# Patient Record
Sex: FEMALE | Race: WHITE | Employment: OTHER | URBAN - METROPOLITAN AREA
[De-identification: names, ages, dates, MRNs, and addresses within clinical notes are randomized per-mention and may not be internally consistent; named-entity substitution may affect disease eponyms.]

---

## 2017-03-20 ENCOUNTER — HOSPITAL ENCOUNTER (OUTPATIENT)
Dept: SURGERY | Age: 52
Discharge: HOME | End: 2017-03-20
Attending: INTERNAL MEDICINE | Admitting: INTERNAL MEDICINE

## 2017-03-20 VITALS
HEART RATE: 71 BPM | BODY MASS INDEX: 23.46 KG/M2 | WEIGHT: 146 LBS | SYSTOLIC BLOOD PRESSURE: 121 MMHG | HEIGHT: 66 IN | RESPIRATION RATE: 16 BRPM | TEMPERATURE: 98.2 F | DIASTOLIC BLOOD PRESSURE: 78 MMHG | OXYGEN SATURATION: 98 %

## 2017-03-20 DIAGNOSIS — R10.13 EPIGASTRIC PAIN: ICD-10-CM

## 2017-03-20 RX ORDER — LIDOCAINE HYDROCHLORIDE 10 MG/ML
1 INJECTION, SOLUTION EPIDURAL; INFILTRATION; INTRACAUDAL; PERINEURAL ONCE
Status: DISCONTINUED | OUTPATIENT
Start: 2017-03-20 | End: 2017-03-21 | Stop reason: HOSPADM

## 2017-03-20 RX ORDER — FAMOTIDINE 10 MG
10 TABLET ORAL 2 TIMES DAILY
COMMUNITY
End: 2017-05-01

## 2017-03-20 RX ORDER — SODIUM CHLORIDE, SODIUM LACTATE, POTASSIUM CHLORIDE, CALCIUM CHLORIDE 600; 310; 30; 20 MG/100ML; MG/100ML; MG/100ML; MG/100ML
1000 INJECTION, SOLUTION INTRAVENOUS ONCE
Status: COMPLETED | OUTPATIENT
Start: 2017-03-20 | End: 2017-03-20

## 2017-03-20 RX ORDER — DICYCLOMINE HCL 20 MG
20 TABLET ORAL EVERY 6 HOURS
COMMUNITY
End: 2017-05-01

## 2017-03-20 RX ADMIN — SODIUM CHLORIDE, SODIUM LACTATE, POTASSIUM CHLORIDE, CALCIUM CHLORIDE 1000 ML: 600; 310; 30; 20 INJECTION, SOLUTION INTRAVENOUS at 11:09

## 2017-03-20 ASSESSMENT — ENCOUNTER SYMPTOMS
SHORTNESS OF BREATH: 1
ABDOMINAL PAIN: 1
VOMITING: 1
CONSTIPATION: 1
NAUSEA: 1

## 2017-03-20 ASSESSMENT — PAIN DESCRIPTION - DESCRIPTORS: DESCRIPTORS: CONSTANT

## 2017-03-20 ASSESSMENT — PAIN - FUNCTIONAL ASSESSMENT: PAIN_FUNCTIONAL_ASSESSMENT: 0-10

## 2017-03-20 ASSESSMENT — PAIN SCALES - GENERAL
PAINLEVEL_OUTOF10: 0
PAINLEVEL_OUTOF10: 0

## 2017-09-14 ENCOUNTER — TELEPHONE (OUTPATIENT)
Dept: ORTHOPEDIC SURGERY | Age: 52
End: 2017-09-14

## 2018-01-15 ENCOUNTER — HOSPITAL ENCOUNTER (OUTPATIENT)
Dept: OTHER | Age: 53
Discharge: HOME | End: 2018-01-15
Attending: FAMILY MEDICINE | Admitting: FAMILY MEDICINE

## 2018-01-15 DIAGNOSIS — M25.552 LEFT HIP PAIN: ICD-10-CM

## 2018-01-22 ENCOUNTER — HOSPITAL ENCOUNTER (OUTPATIENT)
Dept: CT IMAGING | Age: 53
Discharge: HOME | End: 2018-01-22
Attending: PHYSICIAN ASSISTANT | Admitting: PHYSICIAN ASSISTANT

## 2018-01-22 ENCOUNTER — OFFICE VISIT (OUTPATIENT)
Dept: ORTHOPEDIC SURGERY | Age: 53
End: 2018-01-22

## 2018-01-22 VITALS
HEART RATE: 83 BPM | WEIGHT: 139.99 LBS | DIASTOLIC BLOOD PRESSURE: 85 MMHG | BODY MASS INDEX: 22.5 KG/M2 | HEIGHT: 66 IN | SYSTOLIC BLOOD PRESSURE: 111 MMHG

## 2018-01-22 VITALS
HEART RATE: 80 BPM | SYSTOLIC BLOOD PRESSURE: 121 MMHG | BODY MASS INDEX: 22.5 KG/M2 | DIASTOLIC BLOOD PRESSURE: 92 MMHG | HEIGHT: 66 IN | WEIGHT: 139.99 LBS

## 2018-01-22 DIAGNOSIS — M25.552 PAIN IN LEFT HIP: ICD-10-CM

## 2018-01-22 DIAGNOSIS — M70.62 GREATER TROCHANTERIC BURSITIS OF LEFT HIP: ICD-10-CM

## 2018-01-22 DIAGNOSIS — M25.552 PAIN OF LEFT HIP JOINT: Primary | ICD-10-CM

## 2018-01-22 DIAGNOSIS — M25.552 LEFT HIP PAIN: Primary | ICD-10-CM

## 2018-01-22 DIAGNOSIS — R52 PAIN: ICD-10-CM

## 2018-01-22 DIAGNOSIS — M25.552 LEFT HIP PAIN: ICD-10-CM

## 2018-01-22 DIAGNOSIS — M24.152 ARTICULAR CARTILAGE DISORDER OF HIP, LEFT: ICD-10-CM

## 2018-01-22 PROCEDURE — G8420 CALC BMI NORM PARAMETERS: HCPCS | Performed by: ORTHOPAEDIC SURGERY

## 2018-01-22 PROCEDURE — G8427 DOCREV CUR MEDS BY ELIG CLIN: HCPCS | Performed by: ORTHOPAEDIC SURGERY

## 2018-01-22 PROCEDURE — 73502 X-RAY EXAM HIP UNI 2-3 VIEWS: CPT | Performed by: ORTHOPAEDIC SURGERY

## 2018-01-22 PROCEDURE — 1036F TOBACCO NON-USER: CPT | Performed by: ORTHOPAEDIC SURGERY

## 2018-01-22 PROCEDURE — 99204 OFFICE O/P NEW MOD 45 MIN: CPT | Performed by: ORTHOPAEDIC SURGERY

## 2018-01-22 PROCEDURE — G8484 FLU IMMUNIZE NO ADMIN: HCPCS | Performed by: ORTHOPAEDIC SURGERY

## 2018-01-22 PROCEDURE — 3017F COLORECTAL CA SCREEN DOC REV: CPT | Performed by: ORTHOPAEDIC SURGERY

## 2018-01-22 PROCEDURE — 99202 OFFICE O/P NEW SF 15 MIN: CPT | Performed by: ORTHOPAEDIC SURGERY

## 2018-01-22 RX ORDER — LIDOCAINE HYDROCHLORIDE 10 MG/ML
5 INJECTION, SOLUTION EPIDURAL; INFILTRATION; INTRACAUDAL; PERINEURAL ONCE
Status: COMPLETED | OUTPATIENT
Start: 2018-01-22 | End: 2018-01-22

## 2018-01-22 RX ORDER — LIDOCAINE HYDROCHLORIDE 10 MG/ML
5 INJECTION, SOLUTION INFILTRATION; PERINEURAL ONCE
Status: COMPLETED | OUTPATIENT
Start: 2018-01-22 | End: 2018-01-22

## 2018-01-22 RX ADMIN — LIDOCAINE HYDROCHLORIDE 5 ML: 10 INJECTION, SOLUTION INFILTRATION; PERINEURAL at 14:03

## 2018-01-22 RX ADMIN — LIDOCAINE HYDROCHLORIDE 5 ML: 10 INJECTION, SOLUTION EPIDURAL; INFILTRATION; INTRACAUDAL; PERINEURAL at 14:04

## 2018-01-22 ASSESSMENT — PAIN SCALES - GENERAL: PAINLEVEL_OUTOF10: 9

## 2018-01-22 NOTE — PROGRESS NOTES
found in media images folder. She was instructed to contact her primary care physician regarding ROS positives if not already being addressed during today's visit. Objective:   Physical Exam  Vital Signs:  /85   Pulse 83   Ht 5' 5.98\" (1.676 m)   Wt 139 lb 15.9 oz (63.5 kg)   BMI 22.61 kg/m²     Constitution:  Generally, Shannan Mosley is [x] alert, [x] appears stated age, and [x] in no distress.   Her general body habitus is [] Cachectic  [] Thin  [x] Normal  [] Obese  [] Morbidly Obese    Head: [x] Normocephalic  Eyes: [x] Extra-occular muscles intact  Left Ear: [x] External Ear normal   Right Ear: [x] External Ear normal   Nose: [x] Normal  Mouth: [x] Oral mucosa moist  [x] No perioral lesions    Pulmonary: [x] Respirations unlabored and regular  Skin: [x] Warm [x] Well perfused     Psychiatric:   [x] Good judgement and insight  [x] Oriented to [x] person, [x] place, and [x] time  [x] Mood appropriate for circumstances    Gait:   Gait is [] Normal  [x] Impaired limping  Assistive Device: [x] None  [] Knee Brace  [] Cane  [] Crutches   [] Cookie Pontiff   [] Wheelchair  [] Other     ORTHOPAEDIC LS SPINE EXAM   Inspection:  [x] Skin intact without abrasion, lacerations, surgical scars, pigment changes, dimpling, hairy patches or rashes  [x] Normal back alignment  [] Scoliosis [] Kyphosis  [] Dimpling  [] Hyper/hypopigmentation  [] Hairy Patches  [] Scar / Surgical incision    Palpation:   No tenderness over lower lumbar spine, SI joints or paraspinal musculature    LEFT HIP ORTHOPAEDIC  EXAM:   Inspection:  [x] Skin intact without abrasion, lacerations or rashes  [x] Leg lengths equal  [x] Ecchymosis:  [x] none  [] mild  [] moderate  [] severe   [] Atrophy:  [x] none  [] mild  [] moderate  [] severe      Range of Motion:  [x] No flexion contracture         [] Deferred: acute injury/post-surgery/pain  [] Flexion contracture    Forward flexion: 110 degrees with pain  Supine Internal rotation: 15 degrees with pain  Supine External rotation: 15 degrees with pain  Abduction: with pain   Adduction: with pain      Palpation:   TTP over AIIS and greater trochanteric area    Provocative Tests:  [] Negative  Positive Tests:  [x] Log Roll   [] Fany Test: ITB Tightness   [x] FADIR Anterior impingement:    [] Posterior Impingement    [] Shuck test for insufficient suction seal   [] Dial test for capsular insufficiency     Motor Function:  [] No gross motor weakness of hip [x] No gross motor weakness of knee  [x] No gross motor weakness of ankle    [x] No gross motor weakness of great toe    [x] Motor strength: secondary to pain  [x] Hip Flex [] 5/5 [x] 4/5 [] 3/5 [] 2/5 [] 1/5 [] 0/5   [x] Hip ABductors [] 5/5 [x] 4/5 [] 3/5 [] 2/5 [] 1/5 [] 0/5   [x] Hip ADductors [] 5/5 [x] 4/5 [] 3/5 [] 2/5 [] 1/5 [] 0/5     Neurologic:   [x] Sensation to light touch intact  [x] Coordination / proprioception intact  Motor function intact L2-S1    Circulation:  [x] The limb is warm and well perfused. [x] Capillary refill is intact.   [x] Edema:  [x] none  [] mild  [] moderate  [] severe       RIGHT HIP ORTHOPAEDIC  EXAM:  Inspection:  [x] Skin intact without abrasion, lacerations or rashes  [x] Leg lengths equal  [x] Ecchymosis:  [x] none  [] mild  [] moderate  [] severe   [x] Atrophy:  [x] none  [] mild  [] moderate  [] severe      Range of Motion:  [x] No flexion contracture         [] Deferred: acute injury/post-surgery/pain  [] Flexion contracture     [x] Normal ROM  Forward flexion   Supine Internal rotation   Supine External rotation   Abduction  Adduction:      Palpation:   No TTP    Provocative Tests:  [x] Negative: log roll and FADIR  Positive Tests:  [] Log Roll   [] Fany Test: ITB Tightness   [] FADIR Anterior impingement:    [] Posterior Impingement    [] Shuck test for insufficient suction seal   [] Dial test for capsular insufficiency:    [] Resisted adduction for athletic pubalgia   [] Resisted curl up for athletic pubalgia [address] [facility] [place]  Analy Herrera () - [phone]

## 2018-01-22 NOTE — PATIENT INSTRUCTIONS
Patient Education        Hip Pain: Care Instructions  Your Care Instructions    Hip pain may be caused by many things, including overuse, a fall, or a twisting movement. Another cause of hip pain is arthritis. Your pain may increase when you stand up, walk, or squat. The pain may come and go or may be constant. Home treatment can help relieve hip pain, swelling, and stiffness. If your pain is ongoing, you may need more tests and treatment. Follow-up care is a key part of your treatment and safety. Be sure to make and go to all appointments, and call your doctor if you are having problems. It's also a good idea to know your test results and keep a list of the medicines you take. How can you care for yourself at home? · Take pain medicines exactly as directed. ¨ If the doctor gave you a prescription medicine for pain, take it as prescribed. ¨ If you are not taking a prescription pain medicine, ask your doctor if you can take an over-the-counter medicine. · Rest and protect your hip. Take a break from any activity, including standing or walking, that may cause pain. · Put ice or a cold pack against your hip for 10 to 20 minutes at a time. Try to do this every 1 to 2 hours for the next 3 days (when you are awake) or until the swelling goes down. Put a thin cloth between the ice and your skin. · Sleep on your healthy side with a pillow between your knees, or sleep on your back with pillows under your knees. · If there is no swelling, you can put moist heat, a heating pad, or a warm cloth on your hip. Do gentle stretching exercises to help keep your hip flexible. · Learn how to prevent falls. Have your vision and hearing checked regularly. Wear slippers or shoes with a nonskid sole. · Stay at a healthy weight. · Wear comfortable shoes. When should you call for help? Call 911 anytime you think you may need emergency care.  For example, call if:  ? · You have sudden chest pain and shortness of breath, or you

## 2018-01-23 ENCOUNTER — OFFICE VISIT (OUTPATIENT)
Dept: ORTHOPEDIC SURGERY | Age: 53
End: 2018-01-23

## 2018-01-23 VITALS
BODY MASS INDEX: 22.5 KG/M2 | DIASTOLIC BLOOD PRESSURE: 79 MMHG | HEIGHT: 66 IN | HEART RATE: 82 BPM | SYSTOLIC BLOOD PRESSURE: 118 MMHG | WEIGHT: 139.99 LBS

## 2018-01-23 DIAGNOSIS — M24.152 ARTICULAR CARTILAGE DISORDER OF HIP, LEFT: ICD-10-CM

## 2018-01-23 DIAGNOSIS — M25.552 LEFT HIP PAIN: Primary | ICD-10-CM

## 2018-01-23 DIAGNOSIS — M25.852 FEMOROACETABULAR IMPINGEMENT OF LEFT HIP: ICD-10-CM

## 2018-01-23 DIAGNOSIS — M24.052 LOOSE BODY IN LEFT HIP: ICD-10-CM

## 2018-01-23 PROCEDURE — G8427 DOCREV CUR MEDS BY ELIG CLIN: HCPCS | Performed by: ORTHOPAEDIC SURGERY

## 2018-01-23 PROCEDURE — G8484 FLU IMMUNIZE NO ADMIN: HCPCS | Performed by: ORTHOPAEDIC SURGERY

## 2018-01-23 PROCEDURE — L1686 HO POST-OP HIP ABDUCTION: HCPCS | Performed by: ORTHOPAEDIC SURGERY

## 2018-01-23 PROCEDURE — 99214 OFFICE O/P EST MOD 30 MIN: CPT | Performed by: ORTHOPAEDIC SURGERY

## 2018-01-23 PROCEDURE — G8420 CALC BMI NORM PARAMETERS: HCPCS | Performed by: ORTHOPAEDIC SURGERY

## 2018-01-23 PROCEDURE — 3017F COLORECTAL CA SCREEN DOC REV: CPT | Performed by: ORTHOPAEDIC SURGERY

## 2018-01-23 PROCEDURE — 1036F TOBACCO NON-USER: CPT | Performed by: ORTHOPAEDIC SURGERY

## 2018-01-23 PROCEDURE — E0114 CRUTCH UNDERARM PAIR NO WOOD: HCPCS | Performed by: ORTHOPAEDIC SURGERY

## 2018-01-23 RX ORDER — TRAMADOL HYDROCHLORIDE 50 MG/1
50 TABLET, COATED ORAL EVERY 8 HOURS PRN
Qty: 20 TABLET | Refills: 0 | Status: SHIPPED | OUTPATIENT
Start: 2018-01-23 | End: 2018-01-30

## 2018-01-23 NOTE — LETTER
CONSENT TO OPERATION  AND/OR OTHER PROCEDURE(S)    PATIENT : Juan Alberto Benavidez     YOB: 1965    DATE : 01/23/18      1. I request and consent that Olya Munroe and/or his associates or assistants perform an operation and/or procedures on the above patient at [facility], to treat the condition(s) which appear indicated by the diagnostic studies already performed. I have been told that in general terms the nature, purpose and reasonable expectations of the operation and/or procedure(s) are:     Procedure:  Left Hip Diagnostic Arthroscopy with possible loose body removal and chondroplasty, possible labral repair vs debridement, possible acetabular rim trimming and femoral osteoplasty, possible microfracture of full thickness cartilage defect, possible capsular repair vs plication    2. It has been explained to me by the informing physician that during the course of the operation and/or procedure(s) unforeseen conditions may be revealed that necessitate an extension of the original operation and/or procedure(s) or different operation and/or procedures than those set forth in Paragraph 1. I therefore authorize and request that my physician and/or his associates or assistants perform such operations and/or procedures as are necessary and desirable in the exercise of professional judgment. The authority granted under this Paragraph 2 shall extend to all conditions that require treatment and are known to my physician at the time the operation is commenced. 3. I have been made aware by the informing physician of certain risks and consequences that are associated with the operation and/or procedure(s) described in Paragraph 1, the reasonable alternative methods or treatment, the possible consequences, the possibility that the operation and/or procedure(s) may be unsuccessful and the possibility of complications. I understand the reasonably known risks to be:    ? Bleeding  ?  Infection ? Poor Healing  ? Possible Damage to Nerve, Vessel, Tendon/Muscle or Bone  ? Need for further Treatment/Surgery  ? Stiffness  ? Pain  ? Residual or Recurrent Symptom  ? Anesthetic and/or Medical Risks including death  ? Risks specific to Hip arthroscopy: traction related neuropraxia of leg, foot or groin; heterotopic ossification; inability to reverse course of arthritis or redevelopment of loose bodies from pre-existing unstable cartilage and chondrolysis; flexor tendinitis; microinstability     4. I have also been informed by the informing physician that there are other risks from both known and unknown causes that are attendant to the performance of any surgical procedure. I am aware that the practice of medicine and surgery is not an exact science, and that no guarantees have been made to me concerning the results of the operation and/or procedure(s). 5. I   CONSENT / REFUSE CONSENT  (strike the phrase that does not apply) to the taking of photographs before, during and/or after the operation or procedure for scientific/educational purposes. 6. I consent to the administration of anesthesia and to the use of such anesthetics as may be deemed advisable by the anesthesiologist who has been engaged by me or my physician.     7. I certify that I have read and understand the above consent to operation and/or other procedure(s); that the explanations therein referred to were made to me by the informing physician in advance of my signing this consent; that all blanks or statements requiring insertion or completion were filled in and inapplicable paragraphs, if any, were stricken before I signed; and that all questions asked by me about the operation and/or procedure(s) which I have consented to have been fully answered in a satisfactory manner.             _______________________  Witness        Signature Of Patient      Francy Bee MD.       Informing Physician         Signature of Informing Physician

## 2018-01-23 NOTE — PROGRESS NOTES
8/22/13    PACEMAKER INSERTION      TONSILLECTOMY      UPPER GASTROINTESTINAL ENDOSCOPY  03/20/2017       Allergies:  Quinolones; Dexamethasone sodium phosphate; Codeine; Nitroglycerin; and Prednisone    Medications:  Outpatient Prescriptions Marked as Taking for the 1/23/18 encounter (Office Visit) with Tex Jernigan MD   Medication Sig Dispense Refill    ULTRAM 50 MG tablet Take 1 tablet by mouth every 8 hours as needed for Pain for up to 7 days. 20 tablet 0    ondansetron (ZOFRAN) 4 MG tablet Take 1 tablet by mouth every 8 hours as needed for Nausea or Vomiting 20 tablet 0    nadolol (CORGARD) 40 MG tablet Take 120 mg by mouth daily. Social History     Social History    Marital status:      Spouse name: N/A    Number of children: N/A    Years of education: N/A     Occupational History    Not on file. Social History Main Topics    Smoking status: Former Smoker     Packs/day: 0.25     Years: 3.00     Types: Cigarettes     Quit date: 12/17/1998    Smokeless tobacco: Never Used    Alcohol use No      Comment: rare;u    Drug use: No    Sexual activity: Yes     Partners: Male     Other Topics Concern    Not on file     Social History Narrative    No narrative on file     Family History   Problem Relation Age of Onset    Heart Disease Mother     Colon Cancer Father      colon    Heart Disease Father     High Blood Pressure Father        Review of Systems:    Maycol Nellikerry GERARDO Reema's reported review of systems has been reviewed from 1/22/2018 and is in her medical record. The scanned image can be found in media images folder. She was instructed to contact her primary care physician regarding ROS positives if not already being addressed during today's visit.     Objective:   Physical Exam  Vital Signs:  /79   Pulse 82   Ht 5' 5.98\" (1.676 m)   Wt 139 lb 15.9 oz (63.5 kg)   BMI 22.61 kg/m²     Constitution:  Generally, Perla Valverde is [x] alert, [x] appears stated age, and within the hip joint space (described on   noncontrast CT hip-see report above) is suggested on coronal   series 9 image 32       Cluster of subchondral cysts again involve the posterior   acetabulum.       No fracture identified       IMPRESSION:       Tiny ossific density within the hip joint space, presumably a   loose body       Arthropathic subchondral cysts of the acetabular       No discrete labral tear         Assessment:     Caryl Hoffman is a 50y.o. year old female who appears to have left hip mechanical pain, acute onset from an ossific body that is present within the hip. She also has some evidence of femoral acetabular impingement. She has failed several months of activity modification as well as oral medication    Diagnosis:   1. Left hip pain  Breg T-Scope Hip Brace    Aluminum Crutches   2. Articular cartilage disorder of hip, left     3. Femoroacetabular impingement of left hip     4. Loose body in left hip  ULTRAM 50 MG tablet         Plan:      I discussed the diagnosis and the treatment options with Caryl Hoffman today. Given her severe mechanical symptoms as well as a loose body that is present in the hip, I did recommend arthroscopic intervention for diagnostic arthroscopy, likely loose body removal, possible labral repair and possible femoral osteoplasty and acetabular rim trimming. Patient would like to go ahead with the surgery as soon as possible. We have her scheduled. We did go over the risks, benefits, and alternatives of nonsurgical versus surgical intervention patient signed informed. Greater than 25 minutes was spent counseling coronary care     Return to Clinic/Follow - Up:  Elmer Palacios was instructed to call the office if her symptoms worsen or if new symptoms appear prior to the next scheduled visit.  She is specifically instructed to contact the office between now & her scheduled appointment if she has concerns related to her condition or if she needs

## 2018-01-29 ENCOUNTER — TELEPHONE (OUTPATIENT)
Dept: ORTHOPEDIC SURGERY | Age: 53
End: 2018-01-29

## 2018-01-29 NOTE — TELEPHONE ENCOUNTER
1/29/18  [facility]  - APPROVED #[id number]   1/23/18 TO 7/22/18 - PER FAX FROM SAVITA FUENTES RN @ [facility]

## 2018-02-01 ENCOUNTER — TELEPHONE (OUTPATIENT)
Dept: ORTHOPEDIC SURGERY | Age: 53
End: 2018-02-01

## 2018-02-01 ENCOUNTER — HOSPITAL ENCOUNTER (OUTPATIENT)
Dept: PHYSICAL THERAPY | Age: 53
Discharge: HOME | End: 2018-02-28
Attending: ORTHOPAEDIC SURGERY | Admitting: ORTHOPAEDIC SURGERY

## 2018-02-01 NOTE — PROGRESS NOTES
[facility] / [facility] ([facility]) [address] [facility], [address]    Acknowledgment of Informed Consent for Surgical or Medical Procedure and Sedation  I agree to allow doctor(s) Frankie Ramsey and his/her associates or assistants, including residents and/or other qualified medical practitioner to perform the following medical treatment or procedure and to administer or direct the administration of sedation as necessary:  Procedure(s): LEFT HIP DIAGNOSTIC ARTHROSCOPY WITH POSSIBLE LOOSE BODY REMOVAL AND CHONDROPLASTY, LABRAL REPAIR VERSUS DEBRIDEMENT, ACETABULAR RIM TRIMMING AND FEMORAL OSTEOPLASTY, MICROFRACTURE OF FULL THICKNESS CARTILAGE DEFECT, [address] [facility]  My doctor has explained the following regarding the proposed procedure:   the explanation of the procedure   the benefits of the procedure   the potential problems that might occur during recuperation   the risks and side effects of the procedure which could include but are not limited to severe blood loss, infection, stroke or death   the benefits, risks and side effect of alternative procedures including the consequences of declining this procedure or any alternative procedures   the likelihood of achieving satisfactory results. I acknowledge no guarantee or assurance has been made to me regarding the results. I understand that during the course of this treatment/procedure, unforeseen conditions can occur which require an additional or different procedure. I agree to allow my physician or assistants to perform such extension of the original procedure as they may find necessary. I understand that sedation will often result in temporary impairment of memory and fine motor skills and that sedation can occasionally progress to a state of deep sedation or general anesthesia.     I understand the risks of anesthesia for surgery include, but are not limited to, sore throat, hoarseness, injury to face, mouth, or teeth; nausea; headache; injury to blood vessels or nerves; death, brain damage, or paralysis. I understand that if I have a Limitation of Treatment order in effect during my hospitalization, the order may or may not be in effect during this procedure. I give my doctor permission to give me blood or blood products. I understand that there are risks with receiving blood such as hepatitis, AIDS, fever, or allergic reaction. I acknowledge that the risks, benefits, and alternatives of this treatment have been explained to me and that no express or implied warranty has been given by the hospital, any blood bank, or any person or entity as to the blood or blood components transfused. At the discretion of my doctor, I agree to allow observers, equipment/product representatives and allow photographing, and/or televising of the procedure, provided my name or identity is maintained confidentially. I agree the hospital may dispose of or use for scientific or educational purposes any tissue, fluid, or body parts which may be removed.     ________________________________Date________Time______ am/pm  ([place] [facility])  Patient or Signature of Closest Relative or Legal Guardian    ________________________________Date________Time______am/pm      Page 1 of  2  Witness

## 2018-02-05 ENCOUNTER — TELEPHONE (OUTPATIENT)
Dept: ORTHOPEDIC SURGERY | Age: 53
End: 2018-02-05

## 2018-02-05 ENCOUNTER — HOSPITAL ENCOUNTER (OUTPATIENT)
Dept: OTHER | Age: 53
Discharge: HOME | End: 2018-02-05
Attending: FAMILY MEDICINE | Admitting: FAMILY MEDICINE

## 2018-02-05 DIAGNOSIS — Z01.811 PRE-OP CHEST EXAM: ICD-10-CM

## 2018-02-05 RX ORDER — ALBUTEROL SULFATE 90 UG/1
2 AEROSOL, METERED RESPIRATORY (INHALATION) EVERY 6 HOURS PRN
COMMUNITY
End: 2018-07-17

## 2018-02-05 RX ORDER — AMOXICILLIN AND CLAVULANATE POTASSIUM 875; 125 MG/1; MG/1
1 TABLET, FILM COATED ORAL 2 TIMES DAILY
COMMUNITY
End: 2018-07-17

## 2018-02-05 NOTE — PRE-PROCEDURE INSTRUCTIONS
option #2 option #2 if you have not been preregistered yet. On the day of your procedure bring your insurance card and photo ID. You will be registered at your bedside once brought back to your room. 5. DO NOT EAT OR DRINK ANYTHING AFTER MIDNIGHT. 6. MEDICATIONS    Take the following medications with a SMALL sip of water: BRING INHALER. TAKE NADOLOL NIGHT BEFORE    Use your usual dose of inhalers the morning of surgery. BRING your rescue inhaler with you to hospital.    Anesthesia does NOT want you to take insulin the morning of surgery. They will control your blood sugar while you are at the hospital. Please contact your ordering physician for instructions regarding your insulin the night before your procedure. If you have an insulin pump, please keep it set on basal rate. 7. Do not swallow water when brushing teeth. No gum, candy, mints or ice chips. Refrain from smoking or at least decrease the amount. 8. Dress in loose, comfortable clothing appropriate for redressing after your procedure. Do not wear jewelry (including body piercings), make-up (especially NO eye make-up), fingernail polish (NO toenail polish if foot/leg surgery), lotion, powders or metal hairclips. 9. Dentures, glasses, or contacts will need to be removed before your procedure. Bring cases for your glasses, contacts, dentures, or hearing aids to protect them while you are in surgery. 10. If you use a CPAP, please bring it with you on the day of your procedure. 11. We recommend that valuable personal  belongings, such as credit cards, cash, cell phones, e-tablets or jewelry, be left at home during your stay. The hospital will not be responsible for valuables that are not secured in the hospital safe. However, if your insurance requires a co-pay, you may want to bring a method of payment, i.e. Check or credit card, if you wish to pay your co-pay the day of surgery.       12. If you are to stay overnight, you may bring before and after caring for your wound. Do not let your family touch your surgery site without cleaning their hands. 4. Mild nausea, headache, muscle aches, sore throat, or fatigue may occur after anesthesia. Should any of these symptoms become severe, or should you notice any signs of infection, you should call your surgeon. 5. Narcotic pain medications can cause significant constipation. You may want to add a stool softener to your postoperative medication schedule or speak to your surgeon on how best to manage this SIDE EFFECT. SPECIAL INSTRUCTIONS     Thank you for allowing us to care for you. We strive to exceed your expectations in the delivery of care and service provided to you and your family. If you need to contact us for any reason, please call us at [phone]    Instructions reviewed with patient during preadmission testing phone interview. Alex Judge. 2/5/2018 .3:22 PM      ADDITIONAL EDUCATIONAL INFORMATION REVIEWED PER PHONE WITH YOU AND/OR YOUR FAMILY:  No Bring a urine sample on day of surgery  Yes Pain Goal-Taking Control of Your Pain  Yes FAQs about Surgical Site Infections  Yes Hibiclens® Bathing Instructions / or Other Antibacterial Soap  NO Incentive Spirometer Education  No Other

## 2018-02-05 NOTE — TELEPHONE ENCOUNTER
questions about what is needed before sx, needs paperwork with what blood work is needed faxed to her pcp or [employer] location and she has a pace maker and wants to know if a cardiac release is needed, and needs to get these things done asap and [place] exactly close in distance, patient can be reached at [phone], leave a msg, Electronically signed by Maci Oliveira on 2/5/18 at 10:40 AM

## 2018-02-05 NOTE — PROGRESS NOTES
The following educational items and goals will be achieved upon completion of the patient's Pre-admission testing experience:             Identify the learner who is being assessed for education:  patient                    Ability to Learn:  Exhibits ability to grasp concepts and respond to questions: High  Ready to Learn: Yes  calm   Preferred Method of Learning:  verbal  Barriers to Learning: Verbalizes interest  Special Considerations due to cultural, Mandaeism, spiritual beliefs:  No  Language:  English  :  Isadora Frias  [x] Appropriate evaluation / integration of data as delineated by ASPAN Standards of Perianesthesia Nursing Practice    Pain scale and pain management   [x]Patient verbalizes understanding of pain scale and pain management  [x]Pre-operative determination of patients anticipated Post-Operative pain goal:   4 of 10 on 10 point scale post op goal  [] Other     Medication(s) - Compliance with preop medication instructions  [x] Patient verbalizes understanding of preop medications (see [facility] Presurgical Instructions)    Instructions, Pre op                                                                                            [x] Patient verbalizes understanding of presurgical instructions as reviewed with phone interview nurse or in-person nurse review    Fall Risk Potential, Preoperatively                                                                                   [x]No preoperative risk identified  []Preop risk identified:                    []Sensory deficit        []Motor deficit        []Balance problem        []Home medication        []Uses assistive device                    []History of a Fall within the last 30 days    Goal(s) for fall prevention:  [x]Prevent fall or injury by requesting assistance with activities of daily living  [x]Patient / Significant other verbalizes understanding the need to call for assistance prior to getting out of bed during hospitalization      Infection Precautions                                                                                            [x] Patient understands implementation of Surgical Site Infection precautions (see [facility] Presurgical Instructions)     Patient Safety  [x] Patient identification verified  [x] Site verified    Instructions - Discharge Planning for Outpatients  [x] Patient / significant other voices understanding of home care and follow up procedures  [x] Encourage patient / significant other to review discharge instructions the day after procedure due to sedation on day of surgery    Anticipated Special Needs upon discharge:        [] Cooling device        [] Crutches       [] Zaira Hedge        [] Wound Support device        [] Drain        [] Other       Instructions - Discharge Planning for Admitted patients  [] Patient / significant other understands plan for admission after surgery  [] Patient / significant other understands plan for anticipated discharge dispostion        2/5/2018 3:26 PM Sonido Freeman

## 2018-02-07 RX ORDER — ROPIVACAINE HYDROCHLORIDE 5 MG/ML
30 INJECTION, SOLUTION EPIDURAL; INFILTRATION; PERINEURAL ONCE
Status: CANCELLED | OUTPATIENT
Start: 2018-02-07 | End: 2018-02-07

## 2018-02-07 RX ORDER — FENTANYL CITRATE 50 UG/ML
100 INJECTION, SOLUTION INTRAMUSCULAR; INTRAVENOUS ONCE
Status: CANCELLED | OUTPATIENT
Start: 2018-02-07 | End: 2018-02-07

## 2018-02-07 RX ORDER — MIDAZOLAM HYDROCHLORIDE 1 MG/ML
4 INJECTION INTRAMUSCULAR; INTRAVENOUS
Status: CANCELLED | OUTPATIENT
Start: 2018-02-07 | End: 2018-02-07

## 2018-02-08 ENCOUNTER — HOSPITAL ENCOUNTER (OUTPATIENT)
Dept: PREADMISSION TESTING | Age: 53
Discharge: HOME | End: 2018-02-08
Attending: ORTHOPAEDIC SURGERY | Admitting: ORTHOPAEDIC SURGERY

## 2018-02-08 VITALS
OXYGEN SATURATION: 99 % | WEIGHT: 160 LBS | BODY MASS INDEX: 25.71 KG/M2 | HEIGHT: 66 IN | SYSTOLIC BLOOD PRESSURE: 118 MMHG | TEMPERATURE: 97.4 F | DIASTOLIC BLOOD PRESSURE: 81 MMHG | RESPIRATION RATE: 19 BRPM | HEART RATE: 71 BPM

## 2018-02-08 DIAGNOSIS — M70.72 BURSITIS OF LEFT HIP, UNSPECIFIED BURSA: ICD-10-CM

## 2018-02-08 DIAGNOSIS — Z98.890 STATUS POST HIP SURGERY: Primary | ICD-10-CM

## 2018-02-08 LAB
ABO/RH: NORMAL
ANTIBODY SCREEN: NORMAL

## 2018-02-08 RX ORDER — ACETAMINOPHEN 325 MG/1
650 TABLET ORAL EVERY 4 HOURS PRN
Status: DISCONTINUED | OUTPATIENT
Start: 2018-02-08 | End: 2018-02-09 | Stop reason: HOSPADM

## 2018-02-08 RX ORDER — SODIUM CHLORIDE, SODIUM LACTATE, POTASSIUM CHLORIDE, CALCIUM CHLORIDE 600; 310; 30; 20 MG/100ML; MG/100ML; MG/100ML; MG/100ML
INJECTION, SOLUTION INTRAVENOUS CONTINUOUS
Status: DISCONTINUED | OUTPATIENT
Start: 2018-02-08 | End: 2018-02-09 | Stop reason: HOSPADM

## 2018-02-08 RX ORDER — FENTANYL CITRATE 50 UG/ML
25 INJECTION, SOLUTION INTRAMUSCULAR; INTRAVENOUS EVERY 5 MIN PRN
Status: DISCONTINUED | OUTPATIENT
Start: 2018-02-08 | End: 2018-02-09 | Stop reason: HOSPADM

## 2018-02-08 RX ORDER — ONDANSETRON 2 MG/ML
4 INJECTION INTRAMUSCULAR; INTRAVENOUS
Status: ACTIVE | OUTPATIENT
Start: 2018-02-08 | End: 2018-02-08

## 2018-02-08 RX ORDER — CYCLOBENZAPRINE HCL 5 MG
5 TABLET ORAL 2 TIMES DAILY PRN
Qty: 40 TABLET | Refills: 0 | Status: SHIPPED | OUTPATIENT
Start: 2018-02-08 | End: 2018-02-18

## 2018-02-08 RX ORDER — DOCUSATE SODIUM 100 MG/1
100 CAPSULE, LIQUID FILLED ORAL 2 TIMES DAILY PRN
Qty: 60 CAPSULE | Refills: 0 | Status: SHIPPED | OUTPATIENT
Start: 2018-02-08 | End: 2018-04-09 | Stop reason: ALTCHOICE

## 2018-02-08 RX ORDER — ONDANSETRON 2 MG/ML
4 INJECTION INTRAMUSCULAR; INTRAVENOUS EVERY 6 HOURS PRN
Status: DISCONTINUED | OUTPATIENT
Start: 2018-02-08 | End: 2018-02-09 | Stop reason: HOSPADM

## 2018-02-08 RX ORDER — SCOLOPAMINE TRANSDERMAL SYSTEM 1 MG/1
1 PATCH, EXTENDED RELEASE TRANSDERMAL ONCE
Status: DISCONTINUED | OUTPATIENT
Start: 2018-02-08 | End: 2018-02-09 | Stop reason: HOSPADM

## 2018-02-08 RX ORDER — GLYCOPYRROLATE 0.2 MG/ML
0.1 INJECTION INTRAMUSCULAR; INTRAVENOUS ONCE
Status: DISCONTINUED | OUTPATIENT
Start: 2018-02-08 | End: 2018-02-09 | Stop reason: HOSPADM

## 2018-02-08 RX ORDER — SODIUM CHLORIDE 0.9 % (FLUSH) 0.9 %
10 SYRINGE (ML) INJECTION PRN
Status: DISCONTINUED | OUTPATIENT
Start: 2018-02-08 | End: 2018-02-09 | Stop reason: HOSPADM

## 2018-02-08 RX ORDER — OXYCODONE HCL 20 MG/1
20 TABLET, FILM COATED, EXTENDED RELEASE ORAL ONCE
Status: COMPLETED | OUTPATIENT
Start: 2018-02-08 | End: 2018-02-08

## 2018-02-08 RX ORDER — OXYCODONE HYDROCHLORIDE AND ACETAMINOPHEN 5; 325 MG/1; MG/1
1 TABLET ORAL EVERY 6 HOURS PRN
Qty: 30 TABLET | Refills: 0 | Status: SHIPPED | OUTPATIENT
Start: 2018-02-08 | End: 2018-02-15

## 2018-02-08 RX ORDER — MEPERIDINE HYDROCHLORIDE 25 MG/ML
12.5 INJECTION INTRAMUSCULAR; INTRAVENOUS; SUBCUTANEOUS EVERY 5 MIN PRN
Status: DISCONTINUED | OUTPATIENT
Start: 2018-02-08 | End: 2018-02-09 | Stop reason: HOSPADM

## 2018-02-08 RX ORDER — LABETALOL HYDROCHLORIDE 5 MG/ML
5 INJECTION, SOLUTION INTRAVENOUS EVERY 10 MIN PRN
Status: DISCONTINUED | OUTPATIENT
Start: 2018-02-08 | End: 2018-02-09 | Stop reason: HOSPADM

## 2018-02-08 RX ORDER — PANTOPRAZOLE SODIUM 20 MG/1
20 TABLET, DELAYED RELEASE ORAL DAILY
Qty: 28 TABLET | Refills: 0 | Status: SHIPPED | OUTPATIENT
Start: 2018-02-08 | End: 2018-04-09 | Stop reason: ALTCHOICE

## 2018-02-08 RX ORDER — OXYCODONE HYDROCHLORIDE AND ACETAMINOPHEN 5; 325 MG/1; MG/1
1 TABLET ORAL EVERY 4 HOURS PRN
Status: DISCONTINUED | OUTPATIENT
Start: 2018-02-08 | End: 2018-02-09 | Stop reason: HOSPADM

## 2018-02-08 RX ORDER — OXYCODONE HYDROCHLORIDE AND ACETAMINOPHEN 5; 325 MG/1; MG/1
2 TABLET ORAL EVERY 4 HOURS PRN
Status: DISCONTINUED | OUTPATIENT
Start: 2018-02-08 | End: 2018-02-09 | Stop reason: HOSPADM

## 2018-02-08 RX ORDER — ASPIRIN 325 MG
325 TABLET, DELAYED RELEASE (ENTERIC COATED) ORAL 2 TIMES DAILY WITH MEALS
Qty: 56 TABLET | Refills: 0 | Status: SHIPPED | OUTPATIENT
Start: 2018-02-08 | End: 2018-06-28 | Stop reason: ALTCHOICE

## 2018-02-08 RX ORDER — DOCUSATE SODIUM 100 MG/1
100 CAPSULE, LIQUID FILLED ORAL 2 TIMES DAILY
Status: DISCONTINUED | OUTPATIENT
Start: 2018-02-08 | End: 2018-02-09 | Stop reason: HOSPADM

## 2018-02-08 RX ORDER — INDOMETHACIN 75 MG/1
75 CAPSULE, EXTENDED RELEASE ORAL
Qty: 7 CAPSULE | Refills: 0 | Status: SHIPPED | OUTPATIENT
Start: 2018-02-08 | End: 2018-04-09 | Stop reason: ALTCHOICE

## 2018-02-08 RX ORDER — ACETAMINOPHEN 500 MG
1000 TABLET ORAL ONCE
Status: COMPLETED | OUTPATIENT
Start: 2018-02-08 | End: 2018-02-08

## 2018-02-08 RX ORDER — SODIUM CHLORIDE 0.9 % (FLUSH) 0.9 %
10 SYRINGE (ML) INJECTION EVERY 12 HOURS SCHEDULED
Status: DISCONTINUED | OUTPATIENT
Start: 2018-02-08 | End: 2018-02-09 | Stop reason: HOSPADM

## 2018-02-08 RX ADMIN — Medication 1000 MG: at 11:33

## 2018-02-08 RX ADMIN — OXYCODONE HCL 20 MG: 20 TABLET, FILM COATED, EXTENDED RELEASE ORAL at 11:32

## 2018-02-08 RX ADMIN — Medication 0.5 MG: at 18:14

## 2018-02-08 RX ADMIN — Medication 0.5 MG: at 18:36

## 2018-02-08 RX ADMIN — OXYCODONE HYDROCHLORIDE AND ACETAMINOPHEN 1 TABLET: 5; 325 TABLET ORAL at 19:32

## 2018-02-08 RX ADMIN — Medication 0.5 MG: at 18:21

## 2018-02-08 RX ADMIN — SODIUM CHLORIDE, SODIUM LACTATE, POTASSIUM CHLORIDE, CALCIUM CHLORIDE: 600; 310; 30; 20 INJECTION, SOLUTION INTRAVENOUS at 11:15

## 2018-02-08 ASSESSMENT — PAIN SCALES - GENERAL
PAINLEVEL_OUTOF10: 7
PAINLEVEL_OUTOF10: 4
PAINLEVEL_OUTOF10: 5
PAINLEVEL_OUTOF10: 9
PAINLEVEL_OUTOF10: 7

## 2018-02-08 ASSESSMENT — PAIN DESCRIPTION - FREQUENCY
FREQUENCY: CONTINUOUS

## 2018-02-08 ASSESSMENT — PAIN DESCRIPTION - LOCATION
LOCATION: HIP

## 2018-02-08 ASSESSMENT — PAIN DESCRIPTION - DESCRIPTORS
DESCRIPTORS: DISCOMFORT
DESCRIPTORS: ACHING;DISCOMFORT
DESCRIPTORS: DISCOMFORT

## 2018-02-08 ASSESSMENT — PAIN DESCRIPTION - PAIN TYPE
TYPE: SURGICAL PAIN

## 2018-02-08 ASSESSMENT — PAIN DESCRIPTION - ORIENTATION
ORIENTATION: LEFT

## 2018-02-08 ASSESSMENT — ENCOUNTER SYMPTOMS: SHORTNESS OF BREATH: 1

## 2018-02-08 ASSESSMENT — PAIN - FUNCTIONAL ASSESSMENT
PAIN_FUNCTIONAL_ASSESSMENT: 0-10
PAIN_FUNCTIONAL_ASSESSMENT: 0-10

## 2018-02-08 ASSESSMENT — LIFESTYLE VARIABLES: SMOKING_STATUS: 0

## 2018-02-08 NOTE — ANESTHESIA PRE-OP
Used    Alcohol use No      Comment: rare;u                                Counseling given: Not Answered      Vital Signs (Current):   Vitals:    02/05/18 1516 02/08/18 1045   BP:  108/77   Pulse:  87   Resp:  16   Temp:  97.3 °F (36.3 °C)   TempSrc:  Oral   SpO2:  97%   Weight: 160 lb (72.6 kg) 160 lb (72.6 kg)   Height: 5' 6\" (1.676 m) 5' 6\" (1.676 m)                                              BP Readings from Last 3 Encounters:   02/08/18 108/77   01/23/18 118/79   01/22/18 111/85       NPO Status:MN Time of last liquid consumption: 2000                        Time of last solid consumption: 2000                        Date of last liquid consumption: 02/07/18                        Date of last solid food consumption: 02/07/18    BMI:   Wt Readings from Last 3 Encounters:   02/08/18 160 lb (72.6 kg)   01/23/18 139 lb 15.9 oz (63.5 kg)   01/22/18 139 lb 15.9 oz (63.5 kg)     Body mass index is 25.82 kg/m². Anesthesia Evaluation  Patient summary reviewed and Nursing notes reviewed   history of anesthetic complications: PONV. Airway: Mallampati: I  TM distance: >3 FB   Neck ROM: full  Mouth opening: > = 3 FB Dental: normal exam         Pulmonary:Negative Pulmonary ROS and normal exam    (+) shortness of breath:  asthma:     (-) sleep apnea and not a current smoker                          ROS comment: Hx of lung MRSA  On Augmentin   Cardiovascular:Negative CV ROS    (+) pacemaker: pacemaker, CAD:, dysrhythmias:,       ECG reviewed                     ROS comment: Prolonged QT  PPM for prolonged QT     Neuro/Psych:   Negative Neuro/Psych ROS  (+) seizures:,             GI/Hepatic/Renal: Neg GI/Hepatic/Renal ROS       (-) hiatal hernia and GERD       Endo/Other: Negative Endo/Other ROS                    Abdominal:           Vascular: negative vascular ROS.                                 6/25/2017 ECG Electronic atrial pacemaker   When compared with ECG of 13-MAR-2017 12:25,   No significant change was found

## 2018-02-08 NOTE — PROGRESS NOTES
[facility] PACU Education and Care Plan Goals  The following items will be achieved upon completion of the patient's transfer or discharge from the PACU:    Post Operative Pain Management                                                                               [x] Patient will verbalize understanding of pain scale and pain management. [x] Patient achieves predetermined pain goal of 4. [x] Self reports a comfort level acceptable for discharge to home.   [] Other     Fall Risk Potential  [x] Due to Perioperative medication administration  Additional Risk Identified:   [] Sensory deficit         [] Motor deficit         [] Balance problem         [] Home medication         [] Uses assistive device to ambulate    Goal(s) for fall prevention:  [x] Prevent fall or injury by calling for assistance with activity and use of siderails while hospitalized  [x] Prevent fall or injury by using assistance with activity after discharge to home. [x] Patient / Significant other verbalize understanding in use of any ordered assistive devices (previously provided crutches and hip brace)     Mobility Safety/ ADL  [x] Reach a functional mobility goal within limitations of the procedure. Infection Precautions                                                                                                            [x]Patient understands implementation of infection precautions (see [facility] Presurgical Instructions and SSI Prevention Handout)    Post operative Assessment and Care                                                             [x] Standards of care met as delineated by ASPAN.                                                               Discharge Education and Goals  [x]Patient voices understanding of PACU discharge criteria  [x]Outpatient / significant other voices understanding of home care and follow up procedures (See [facility] Procedure Discharge Instructions)  [x] Patient / significant other

## 2018-02-08 NOTE — ANESTHESIA POST-OP
Anesthesia Post-op Note    Patient: Muna Díaz  MRN: [id number]  YOB: 1965  Date of evaluation: 2/8/2018  Time:  6:19 PM     Procedure(s) Performed:     Last Vitals: BP (!) 136/91   Pulse 70   Temp 98.4 °F (36.9 °C) (Temporal)   Resp 18   Ht 5' 6\" (1.676 m)   Wt 160 lb (72.6 kg)   SpO2 100%   BMI 25.82 kg/m²     Med Phase I: Med Score: 8    Med Phase II:      Anesthesia Post Evaluation    Final anesthesia type: general  Patient location during evaluation: PACU  Patient participation: complete - patient participated  Level of consciousness: awake and alert  Pain score: 0  Airway patency: patent  Nausea & Vomiting: no nausea and no vomiting  Complications: no  Cardiovascular status: blood pressure returned to baseline  Respiratory status: acceptable  Hydration status: euvolemic        Maria Luisa Anton MD  6:19 PM

## 2018-02-08 NOTE — BRIEF OP NOTE
Brief Postoperative Note    Caryl Hoffman  YOB: 1965  [id number]    Pre-operative Diagnosis: NAGI L Hip    Post-operative Diagnosis: Same    Procedure: Arthroscopic L hip labral repair, acetabuloplasty and femoroplasty    Anesthesia: General    Surgeons/Assistants: Sushant Suárez    Estimated Blood Loss: nil    Complications: None    Specimens: Was Not Obtained    Stable to PACU    Electronically signed by Mily Salinas DO on 2/8/2018 at 6:02 PM

## 2018-02-08 NOTE — OP NOTE
MRN: [id number]  :  1965  Location: [facility]    DOS: 2018    Surgeon: Blessing Graves MD    Assistant:  1. Diandra Quiñones MD 2. Yajaira Denis MD    PRE-OPERATIVE DIAGNOSIS:  1. Left Hip (focal) femoroacetabular impingement   a. Mixed type, Cam-Predominant    2. Left Hip labral tear  3. Left Hip chondromalacia / chondral defect  4. Left Hip symptomatic loose body  5. Left Hip synovitis  6. Left Hip capsular laxity     POST-OPERATIVE DIAGNOSIS:  1. Left Hip (focal) femoroacetabular impingement  a. Mixed type, Cam-Predominant    2. Left Hip labral tear  a. From 12 oclock to 3 oclock  3. Left Hip Grade II/III chondromalacia of the chondrolabral junction associated with the labral tear. 4. Left Hip intra-articular loose body   5. Left Hip synovitis  6. Left Hip capsular laxity    PROCEDURE PERFORMED:  1. Left Hip arthroscopic labral repair with placement of 4 suture anchors  2. Left Hip arthroscopic acetabuloplasty to resect pincer lesion   3. Left Hip arthroscopic femoral head-neck osteochondroplasty to resect cam lesion  4. Left Hip arthroscopic removal of loose body  5. Left Hip acetabular chondroplasty   6. Left Hip arthroscopic synovectomy and limited debridement of ligamentum teres  7. Left Hip capsular plication    ANESTHESIA:  General with single shot maryann-capsular block    IV FLUIDS: Per anesthesia report    ESTIMATED BLOOD LOSS: Minimal  COMPLICATIONS: none   TRACTION TIME: 70 MINUTES  DISPOSITION: stable, to PACU    INDICATIONS FOR PROCEDURE: Shannan Mosley is a pleasant 50 y.o. woman with sudden onset mechanical Left hip pain. Clinical and radiographic evaluation demonstrated an ossific loose body in the hip which may have been causing the sharp sudden onset pain as well as mixed NAGI with associated labral tear and possibly a cartilage lesion. Because the pain is interfering with her activities of daily living, a recommendation for hip arthroscopy was made.   A thorough CHONDROPLASTY: With the hip in traction, the cartilage overlying the area of pincer resection was noted to have Grade II/III chondromalacia and a wave sign. This cartilage was stabilized using mechanical shaver back to a normal and stable transition zone. This cartilage was then stabilized during labral repair. LABRAL REPAIR: Following an adequate rim resection, we proceeded with fixation of the labrum. A total of 4 anchors were used to repair the labrum back to the acetabular rim. First, a 1.4mm anchor was placed at the 3 oclock position in a looped configuration and secured to the bone using an arthroscopic sliding viktor knot techique with care to avoid significant eversion or inversion of the labrum. Two more anchors were placed, one at the 12 oclock (1.4mm Nanotack) and one at the 1 oclock position (1.4mm Nanotack). These passed in a looped fashion and secured using standard knot-tying technique. An additional anchor was placed at the 2 o'clock position. Great care was taken to re-create the acetabular-labral seal and there was anatomic reduction of the labrum to the acetabulum without significant eversion or inversion. The delaminated cartilage from the wave sign was incorporated into the repair as well with sutures. Traction was let down and under dynamic examination the labrum appeared stable without motion. DEBRIDEMENT OF LIGAMENTUM TERES: The ligamentum teres was debrided using the radiofrequency probe and mechanical shaver. LIMITED SYNOVECTOMY AND DEBRIDEMENT: Using the radiofrequency device and a mechanical shaver, a synovectomy was performed to remove the inflamed lining of the hip joint and cotyloid fossa. FEMORAL HEAD-NECK OSTEOCHONDROPLASTY: The femoral osteochondroplasty was performed after dynamic examination of the hip to verify the location of the cam deformity and the area of impingement with the labrum.   A Modified interportal technique was used throughout the resection to avoid excessive distruption to the capsule or iliofemoral ligament. An arthroscopic jerson was utilized to perform a femoral head-neck osteochondroplasty. Resection was carried from the 12 oclock position to the 6 oclock position until there was a smooth concavity and re-establishment of femoral head-neck offset. The lateral epiphyseal vessels were identified and protected throughout the resection. Final images were saved into the patients chart. Fluoroscopic imaging was utilized to ensure adequate resection in flexion and extension with both internal and external rotation. Additionally a dynamic exam was performed with the leg in flexion, internal and external rotation to verify the location of femoroacetabular impingement under direct arthroscopic visualization and to confirm adequacy of osteoplasty resection. CAPSULAR PLICATION: Attention was then turned capsular plication. The central and peripheral compartments were copiously lavaged with arthroscopic fluid. The capsule was plicated with 2 stitches of #2 force fiber sutures passed through the capsular leaflets with a suture passer and secured with a sliding mariangel knot. Care was taken to ensure approximately 5mm of overlap of the capsular leaflets given the patients presentation of capsular laxity, ensuring an adequate and secure capsular plication. This was a separate and identifiable procedure that required extra operative time. SKIN CLOSURE: Following this, the pump was turned off and the hip drained of fluid. At this time, a single shot maryann-capsular block was administered after closure of the capsule. Using a spinal needle via the MAP just outside the capsule, I injected a mixture consisting of 30cc 0.5% ropivicaine. This went without complication. The arthroscopic instruments and cannula were then removed. The portal sites were closed with 3-0 buried vicryl sutures followed by 4-0 buried monocryl sutures and steri-strips.  A sterile

## 2018-02-08 NOTE — H&P
Mary Beth Hickey    [id number]    [facility] Same Day Surgery Update H & P  Department of General Surgery   Surgical Service   CNP Pre-operative History and Physical  Last H & P within the last 30 days. DIAGNOSIS:   GREAT TROCHANTERIC BURSITIS OF LE    PROCEDURE:  Left Hip Diagnostic Arthroscopy With Possible Loose Body Removal and Chondroplasty, Labral Repair VS Debridement, Acetabular Rim Trimming And Femoral Osteoplasty, Microfracrure Of Full Thickness Cartilage Defect, Capsular Repair VS Plication      HISTORY OF PRESENT ILLNESS: Pt. Is a 50 y.o. Female who c/o of left hip pain, stiffness, limited ROM and inability to achieve and maintain FWB status with ambulation. Sx. Not relieved with conservative treatment. Pt. Is now here for surgical intervention. Please see initial H & P     Past Medical History:        Diagnosis Date    Asthma     Bursitis of left hip     CAD (coronary artery disease)     COPD (chronic obstructive pulmonary disease) ([address]     History of blood transfusion     Long Q-T syndrome     MRSA (methicillin resistant staph aureus) culture positive 12/2015    Lungs.   Diagnosed [facility] [place] with bronchoscopy    PONV (postoperative nausea and vomiting)     Prolonged QT interval syndrome     S/P bronchoscopy     Vertigo      Past Surgical History:        Procedure Laterality Date    BREAST SURGERY      augmentation    CARDIAC SURGERY      septum occluder    COLONOSCOPY      CYSTOSCOPY  6/8/2016    [facility]    HYSTERECTOMY      NASAL SINUS SURGERY  8/22/13    PACEMAKER INSERTION      TONSILLECTOMY      UPPER GASTROINTESTINAL ENDOSCOPY  03/20/2017     Past Social History:  Social History     Social History    Marital status:      Spouse name: N/A    Number of children: N/A    Years of education: N/A     Social History Main Topics    Smoking status: Former Smoker     Packs/day: 0.25     Years: 3.00     Types: Cigarettes     Quit date: 12/17/1998    Smokeless

## 2018-02-09 ENCOUNTER — HOSPITAL ENCOUNTER (OUTPATIENT)
Dept: PHYSICAL THERAPY | Age: 53
Discharge: HOME | End: 2018-01-31
Admitting: ORTHOPAEDIC SURGERY

## 2018-02-09 DIAGNOSIS — J44.9 OTHER SPECIFIED CHRONIC OBSTRUCTIVE AIRWAYS DISEASE: ICD-10-CM

## 2018-02-09 NOTE — PROGRESS NOTES
Ambulatory Surgery/Procedure Discharge Note    Vitals:    02/08/18 2015   BP: 118/81   Pulse: 71   Resp: 19   Temp: 97.4 °F (36.3 °C)   SpO2: 99%       In: 2355 [P.O.:240; I.V.:2115]  Out: 20     Pain assessment:  present - adequately treated  Pain Level: 4    Patient discharged to home/self care. Patient discharged via wheel chair by transporter to waiting family/S.O. Patient discharged to home with boyfriend. Hip brace in place prior to discharge. No further changes.        2/8/2018 8:44 PM

## 2018-02-13 ENCOUNTER — OFFICE VISIT (OUTPATIENT)
Dept: ORTHOPEDIC SURGERY | Age: 53
End: 2018-02-13

## 2018-02-13 VITALS
SYSTOLIC BLOOD PRESSURE: 128 MMHG | BODY MASS INDEX: 25.72 KG/M2 | HEART RATE: 90 BPM | HEIGHT: 66 IN | DIASTOLIC BLOOD PRESSURE: 90 MMHG | WEIGHT: 160.05 LBS

## 2018-02-13 DIAGNOSIS — Z98.890 STATUS POST HIP SURGERY: Primary | ICD-10-CM

## 2018-02-13 PROCEDURE — 99024 POSTOP FOLLOW-UP VISIT: CPT | Performed by: ORTHOPAEDIC SURGERY

## 2018-02-15 DIAGNOSIS — Z98.890 STATUS POST HIP SURGERY: Primary | ICD-10-CM

## 2018-03-20 DIAGNOSIS — Z98.890 STATUS POST HIP SURGERY: ICD-10-CM

## 2018-03-26 ENCOUNTER — TELEPHONE (OUTPATIENT)
Dept: ORTHOPEDIC SURGERY | Age: 53
End: 2018-03-26

## 2018-06-12 ENCOUNTER — HOSPITAL ENCOUNTER (OUTPATIENT)
Dept: OTHER | Age: 53
Discharge: HOME | End: 2018-06-12
Attending: INTERNAL MEDICINE | Admitting: INTERNAL MEDICINE

## 2018-06-12 DIAGNOSIS — R05.9 COUGH: ICD-10-CM

## 2018-06-27 ENCOUNTER — TELEPHONE (OUTPATIENT)
Dept: ORTHOPEDIC SURGERY | Age: 53
End: 2018-06-27

## 2018-06-28 ENCOUNTER — OFFICE VISIT (OUTPATIENT)
Dept: ORTHOPEDIC SURGERY | Age: 53
End: 2018-06-28

## 2018-06-28 VITALS
SYSTOLIC BLOOD PRESSURE: 120 MMHG | HEIGHT: 66 IN | HEART RATE: 76 BPM | DIASTOLIC BLOOD PRESSURE: 84 MMHG | WEIGHT: 155 LBS | BODY MASS INDEX: 24.91 KG/M2

## 2018-06-28 DIAGNOSIS — Z98.890 STATUS POST HIP SURGERY: ICD-10-CM
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
## 2018-06-28 DIAGNOSIS — M25.552 LEFT HIP PAIN: Primary | ICD-10-CM

## 2018-06-28 DIAGNOSIS — M24.152 ARTICULAR CARTILAGE DISORDER OF HIP, LEFT: ICD-10-CM

## 2018-06-28 DIAGNOSIS — M70.62 GREATER TROCHANTERIC BURSITIS, LEFT: ICD-10-CM

## 2018-06-28 PROCEDURE — 3017F COLORECTAL CA SCREEN DOC REV: CPT | Performed by: PHYSICIAN ASSISTANT

## 2018-06-28 PROCEDURE — 1036F TOBACCO NON-USER: CPT | Performed by: PHYSICIAN ASSISTANT

## 2018-06-28 PROCEDURE — G8427 DOCREV CUR MEDS BY ELIG CLIN: HCPCS | Performed by: PHYSICIAN ASSISTANT

## 2018-06-28 PROCEDURE — 99214 OFFICE O/P EST MOD 30 MIN: CPT | Performed by: PHYSICIAN ASSISTANT

## 2018-06-28 PROCEDURE — G8419 CALC BMI OUT NRM PARAM NOF/U: HCPCS | Performed by: PHYSICIAN ASSISTANT

## 2018-06-28 RX ORDER — IBUPROFEN 800 MG/1
800 TABLET ORAL EVERY 6 HOURS PRN
Qty: 30 TABLET | Refills: 0 | Status: SHIPPED | OUTPATIENT
Start: 2018-06-28 | End: 2018-09-27 | Stop reason: ALTCHOICE

## 2018-07-17 ENCOUNTER — APPOINTMENT (OUTPATIENT)
Dept: GENERAL RADIOLOGY | Age: 53
End: 2018-07-17
Payer: COMMERCIAL

## 2018-07-17 ENCOUNTER — HOSPITAL ENCOUNTER (EMERGENCY)
Age: 53
Discharge: HOME | End: 2018-07-17
Attending: EMERGENCY MEDICINE
Payer: COMMERCIAL

## 2018-07-17 VITALS
WEIGHT: 150 LBS | TEMPERATURE: 98.2 F | SYSTOLIC BLOOD PRESSURE: 133 MMHG | BODY MASS INDEX: 24.11 KG/M2 | HEIGHT: 66 IN | DIASTOLIC BLOOD PRESSURE: 88 MMHG | RESPIRATION RATE: 16 BRPM | OXYGEN SATURATION: 99 % | HEART RATE: 77 BPM

## 2018-07-17 DIAGNOSIS — S70.12XA CONTUSION OF LEFT HIP AND THIGH, INITIAL ENCOUNTER: ICD-10-CM

## 2018-07-17 DIAGNOSIS — S70.02XA CONTUSION OF LEFT HIP AND THIGH, INITIAL ENCOUNTER: ICD-10-CM

## 2018-07-17 DIAGNOSIS — S60.222A CONTUSION OF LEFT HAND, INITIAL ENCOUNTER: ICD-10-CM

## 2018-07-17 DIAGNOSIS — S93.402A MODERATE LEFT ANKLE SPRAIN, INITIAL ENCOUNTER: Primary | ICD-10-CM

## 2018-07-17 PROCEDURE — 99284 EMERGENCY DEPT VISIT MOD MDM: CPT

## 2018-07-17 PROCEDURE — 73610 X-RAY EXAM OF ANKLE: CPT

## 2018-07-17 PROCEDURE — 73630 X-RAY EXAM OF FOOT: CPT

## 2018-07-17 PROCEDURE — 73130 X-RAY EXAM OF HAND: CPT

## 2018-07-17 PROCEDURE — 73502 X-RAY EXAM HIP UNI 2-3 VIEWS: CPT

## 2018-07-17 PROCEDURE — L4350 ANKLE CONTROL ORTHO PRE OTS: HCPCS

## 2018-07-17 RX ORDER — NAPROXEN 500 MG/1
500 TABLET ORAL 2 TIMES DAILY WITH MEALS
Qty: 30 TABLET | Refills: 0 | Status: SHIPPED | OUTPATIENT
Start: 2018-07-17 | End: 2018-09-27 | Stop reason: ALTCHOICE

## 2018-07-17 ASSESSMENT — PAIN SCALES - GENERAL: PAINLEVEL_OUTOF10: 7

## 2018-07-18 NOTE — ED NOTES
Documentation removed due to charting error on wrong patient  Yeyo Sims RN  07/17/18 2135       Yeyo Sims RN  07/17/18 2135       Yeyo Sims RN  07/17/18 2136

## 2018-07-19 NOTE — ED PROVIDER NOTES
[facility] PROVIDER NOTE    Patient Identification  Pt Name: Ross Merrill  MRN: [id number]  [place] 1965  Date of evaluation: 7/17/2018  Provider: Carolann Dunn MD  PCP: Edward Epperson MD      [address] [place] [address]       Chief Complaint   Patient presents with    Fall     multiple c/o left shoulder pain, hip, hand, and outer ankle pain. noted outer ankle to have slight discoloration and edema. Fall (multiple c/o left shoulder pain, hip, hand, and outer ankle pain. noted outer ankle to have slight discoloration and edema. )        HISTORY OF PRESENT ILLNESS   (Location/Symptom, Timing/Onset, Context/Setting, Quality, Duration, Modifying Factors, Severity)  Note limiting factors. (History provided by patient)  Ross Merrill is a 50 y.o. female with PMH as below who presents to the emergency department with multiple complaints of pain status post mechanical fall. States she got her foot caught in something and tripped and fell, and immediate pain in the left foot and ankle, hip, shoulder and left palm. States she's had continuing worsening swelling and discoloration of the left foot/ankle with continued pain, worse with ambulation, so came in for evaluation. Pain is 7 out of 10, worse with movement and weightbearing, somewhat improved at rest.  No numbness or tingling. Able to move all extremities equally. States she has chronic left hip pain and is being followed closely by or so, has a CT scheduled next week for reevaluation. No head injury, loss of consciousness, chest pain, shortness of breath or other complaints. HPI    Nursing Notes were reviewed. REVIEW OF SYSTEMS    (2-9 systems for level 4, 10 or more for level 5)     Review of Systems    Except as noted above the remainder of the review of systems was reviewed and negative. I have reviewed the following nursing documentation:    Allergies: Quinolones;  Dexamethasone sodium phosphate; Codeine; prescriptions over the last year from multiple different providers, though prescribed for well-documented issues at this time. Given that there is no acute fracture today, NSAIDs and Tylenol via the most benefit for continued treatment actually addressed the issues at hand as well as RICE and other therapies. Discussed this with the patient, who verbalized understanding and agreement. Encouraged close follow-up with her orthopedic doctor for continued management of her chronic and now acute on chronic complaints including continued pain management. Discussed home care, follow-up, strict return precautions; patient verbalized understanding and agreement. Stable for discharge. CRITICAL CARE TIME   Total Critical Care time was 0 minutes, excluding separately reportable procedures. There was a high probability of clinically significant/life threatening deterioration in the patient's condition which required my urgent intervention. CONSULTS:  None      FINAL IMPRESSION      1. Moderate left ankle sprain, initial encounter    2. Contusion of left hip and thigh, initial encounter    3. Contusion of left hand, initial encounter          DISPOSITION/PLAN   DISPOSITION Decision To Discharge 07/17/2018 10:46:56 PM      PATIENT REFERRED TO:  Melania Mosley MD  [address] [place] [address] Dr. [place] [address] [place] [address]  [phone]    Schedule an appointment as soon as possible for a visit in 2 days      Your orthopedic    Schedule an appointment as soon as possible for a visit in 2 days        DISCHARGE MEDICATIONS:  Discharge Medication List as of 7/17/2018 10:48 PM      START taking these medications    Details   naproxen (NAPROSYN) 500 MG tablet Take 1 tablet by mouth 2 times daily (with meals), Disp-30 tablet, R-0Print           Attestation: The Prescription Monitoring Report for this patient was reviewed today.  Pastor Abbe MD)    (Please note that portions of this note were completed with a voice recognition program.  Efforts were made to edit the dictations but occasionally words are mis-transcribed.)    Shavonne Caldera MD (electronically signed)  Attending Emergency Physician             Shavonne Caldera MD  07/19/18 0600

## 2018-07-20 ENCOUNTER — TELEPHONE (OUTPATIENT)
Dept: ORTHOPEDIC SURGERY | Age: 53
End: 2018-07-20

## 2018-07-20 NOTE — TELEPHONE ENCOUNTER
PATIENT FELL AGAIN AND IS HAVING PROBLEMS WITH HIP THAT DR Cheryl Brandon DID SX AND WAS IN THE HOSPITAL AND WOULD LIKE A CALL BACK TO SEE IF THERE IS AN RX FOR PAIN THAT SHE COULD TAKE BEFORE HER 07-30-18 APPT, CAN BE REACHED [phone], OK TO LEAVE A MSG ON THIS NUMBER

## 2018-07-26 ENCOUNTER — TELEPHONE (OUTPATIENT)
Dept: ORTHOPEDIC SURGERY | Age: 53
End: 2018-07-26

## 2018-07-30 ENCOUNTER — OFFICE VISIT (OUTPATIENT)
Dept: ORTHOPEDIC SURGERY | Age: 53
End: 2018-07-30

## 2018-07-30 ENCOUNTER — TELEPHONE (OUTPATIENT)
Dept: ORTHOPEDIC SURGERY | Age: 53
End: 2018-07-30

## 2018-07-30 VITALS
HEIGHT: 66 IN | SYSTOLIC BLOOD PRESSURE: 118 MMHG | HEART RATE: 72 BPM | WEIGHT: 150 LBS | BODY MASS INDEX: 24.11 KG/M2 | DIASTOLIC BLOOD PRESSURE: 80 MMHG

## 2018-07-30 DIAGNOSIS — M25.552 LEFT HIP PAIN: Primary | ICD-10-CM

## 2018-07-30 DIAGNOSIS — M76.892 HIP FLEXOR TENDINITIS, LEFT: ICD-10-CM

## 2018-07-30 DIAGNOSIS — Z98.890 STATUS POST HIP SURGERY: ICD-10-CM

## 2018-07-30 PROCEDURE — 99214 OFFICE O/P EST MOD 30 MIN: CPT | Performed by: ORTHOPAEDIC SURGERY

## 2018-07-30 PROCEDURE — 1036F TOBACCO NON-USER: CPT | Performed by: ORTHOPAEDIC SURGERY

## 2018-07-30 PROCEDURE — 20611 DRAIN/INJ JOINT/BURSA W/US: CPT | Performed by: ORTHOPAEDIC SURGERY

## 2018-07-30 PROCEDURE — G8427 DOCREV CUR MEDS BY ELIG CLIN: HCPCS | Performed by: ORTHOPAEDIC SURGERY

## 2018-07-30 PROCEDURE — G8420 CALC BMI NORM PARAMETERS: HCPCS | Performed by: ORTHOPAEDIC SURGERY

## 2018-07-30 PROCEDURE — 3017F COLORECTAL CA SCREEN DOC REV: CPT | Performed by: ORTHOPAEDIC SURGERY

## 2018-07-30 RX ORDER — IBUPROFEN 800 MG/1
800 TABLET ORAL EVERY 8 HOURS PRN
Qty: 42 TABLET | Refills: 0 | Status: SHIPPED | OUTPATIENT
Start: 2018-07-30 | End: 2018-09-27 | Stop reason: ALTCHOICE

## 2018-07-30 NOTE — LETTER
education to avoid continued irritation with ADLs. Normalization of gait. AVOID LONG LEVER SIDE or STRAIGHT LEG RAISE.

## 2018-07-30 NOTE — PROGRESS NOTES
with bronchoscopy    PONV (postoperative nausea and vomiting)     Prolonged QT interval syndrome     S/P bronchoscopy     Vertigo      Past Surgical History:   Procedure Laterality Date    BREAST SURGERY      augmentation    CARDIAC SURGERY      septum occluder    COLONOSCOPY      CYSTOSCOPY  6/8/2016    [facility]    HIP ARTHROSCOPY Left 02/08/2018    HYSTERECTOMY      NASAL SINUS SURGERY  8/22/13    PACEMAKER INSERTION      TONSILLECTOMY      UPPER GASTROINTESTINAL ENDOSCOPY  03/20/2017       Allergies:  Quinolones; Dexamethasone sodium phosphate; Codeine; Nitroglycerin; and Prednisone    Medications:  Outpatient Prescriptions Marked as Taking for the 7/30/18 encounter (Office Visit) with Merlinda Quarto, MD   Medication Sig Dispense Refill     MG tablet Take 1 tablet by mouth every 8 hours as needed for Pain 42 tablet 0    naproxen (NAPROSYN) 500 MG tablet Take 1 tablet by mouth 2 times daily (with meals) 30 tablet 0    ibuprofen (ADVIL;MOTRIN) 800 MG tablet Take 1 tablet by mouth every 6 hours as needed for Pain 30 tablet 0    nadolol (CORGARD) 40 MG tablet Take 120 mg by mouth nightly        Social History     Social History    Marital status:      Spouse name: N/A    Number of children: N/A    Years of education: N/A     Occupational History    Not on file.      Social History Main Topics    Smoking status: Former Smoker     Packs/day: 0.25     Years: 3.00     Types: Cigarettes     Quit date: 12/17/1998    Smokeless tobacco: Never Used    Alcohol use Yes      Comment: 4 beers last night    Drug use: No    Sexual activity: Yes     Partners: Male     Other Topics Concern    Not on file     Social History Narrative    No narrative on file     Family History   Problem Relation Age of Onset    Heart Disease Mother     Colon Cancer Father         colon    Heart Disease Father     High Blood Pressure Father        Review of Systems:    Sol Benavidez's reported review of [address] [address]  Phone: [phone] Fax: [phone]         Faheem Lan MD  Orthopaedic Surgeon, Sports Medicine  Director, Hip Arthroscopy and [facility] [address] [place] [address]    Contact Information:  (Raeann Cross 52 [phone])  [facility] ([place], [place]) main number: use after hours [phone])    This dictation was performed with a verbal recognition program (DRAGON) and it was checked for errors. It is possible that there are still dictated errors within this office note. If so, please bring any errors to my attention for an addendum. All efforts were made to ensure that this office note is accurate.

## 2018-09-04 ENCOUNTER — HOSPITAL ENCOUNTER (EMERGENCY)
Age: 53
Discharge: HOME | End: 2018-09-04
Payer: COMMERCIAL

## 2018-09-04 ENCOUNTER — APPOINTMENT (OUTPATIENT)
Dept: GENERAL RADIOLOGY | Age: 53
End: 2018-09-04
Payer: COMMERCIAL

## 2018-09-04 VITALS
SYSTOLIC BLOOD PRESSURE: 140 MMHG | WEIGHT: 160 LBS | RESPIRATION RATE: 16 BRPM | DIASTOLIC BLOOD PRESSURE: 105 MMHG | HEART RATE: 80 BPM | TEMPERATURE: 98.2 F | HEIGHT: 66 IN | BODY MASS INDEX: 25.71 KG/M2

## 2018-09-04 DIAGNOSIS — M70.72 BURSITIS OF LEFT HIP, UNSPECIFIED BURSA: Primary | ICD-10-CM

## 2018-09-04 DIAGNOSIS — M25.552 LEFT HIP PAIN: ICD-10-CM

## 2018-09-04 PROCEDURE — G8980 MOBILITY D/C STATUS: HCPCS

## 2018-09-04 PROCEDURE — 97161 PT EVAL LOW COMPLEX 20 MIN: CPT

## 2018-09-04 PROCEDURE — 73502 X-RAY EXAM HIP UNI 2-3 VIEWS: CPT

## 2018-09-04 PROCEDURE — 99283 EMERGENCY DEPT VISIT LOW MDM: CPT

## 2018-09-04 PROCEDURE — G8978 MOBILITY CURRENT STATUS: HCPCS

## 2018-09-04 PROCEDURE — G8979 MOBILITY GOAL STATUS: HCPCS

## 2018-09-04 RX ORDER — TRAMADOL HYDROCHLORIDE 50 MG/1
50 TABLET ORAL EVERY 6 HOURS PRN
Qty: 12 TABLET | Refills: 0 | Status: SHIPPED | OUTPATIENT
Start: 2018-09-04 | End: 2018-09-07

## 2018-09-04 ASSESSMENT — ENCOUNTER SYMPTOMS
EYE PAIN: 0
CHEST TIGHTNESS: 0
EYE REDNESS: 0
CONSTIPATION: 0
BACK PAIN: 0
DIARRHEA: 0
COUGH: 0
SHORTNESS OF BREATH: 0
SORE THROAT: 0
RHINORRHEA: 0
FACIAL SWELLING: 0
ABDOMINAL PAIN: 0
NAUSEA: 0

## 2018-09-04 ASSESSMENT — PAIN DESCRIPTION - LOCATION: LOCATION: HIP

## 2018-09-04 ASSESSMENT — PAIN SCALES - GENERAL: PAINLEVEL_OUTOF10: 9

## 2018-09-04 ASSESSMENT — PAIN DESCRIPTION - PAIN TYPE: TYPE: ACUTE PAIN

## 2018-09-04 NOTE — ED PROVIDER NOTES
left hip     CAD (coronary artery disease)     COPD (chronic obstructive pulmonary disease) (HCC)     History of blood transfusion     Long Q-T syndrome     MRSA (methicillin resistant staph aureus) culture positive 12/2015    Lungs. Diagnosed [facility] with bronchoscopy    PONV (postoperative nausea and vomiting)     Prolonged QT interval syndrome     S/P bronchoscopy     Vertigo          Procedure Laterality Date    BREAST SURGERY      augmentation    CARDIAC SURGERY      septum occluder    COLONOSCOPY      CYSTOSCOPY  6/8/2016    [facility]il    HIP ARTHROSCOPY Left 02/08/2018    HYSTERECTOMY      NASAL SINUS SURGERY  8/22/13    PACEMAKER INSERTION      TONSILLECTOMY      UPPER GASTROINTESTINAL ENDOSCOPY  03/20/2017       Medications:  Previous Medications     MG TABLET    Take 1 tablet by mouth every 8 hours as needed for Pain    IBUPROFEN (ADVIL;MOTRIN) 800 MG TABLET    Take 1 tablet by mouth every 6 hours as needed for Pain    NADOLOL (CORGARD) 40 MG TABLET    Take 120 mg by mouth nightly     NAPROXEN (NAPROSYN) 500 MG TABLET    Take 1 tablet by mouth 2 times daily (with meals)         Review of Systems:  Review of Systems   Constitutional: Negative for diaphoresis, fatigue and fever. HENT: Negative for ear pain, facial swelling, postnasal drip, rhinorrhea and sore throat. Eyes: Negative for pain, redness and visual disturbance. Respiratory: Negative for cough, chest tightness and shortness of breath. Cardiovascular: Negative for chest pain, palpitations and leg swelling. Gastrointestinal: Negative for abdominal pain, constipation, diarrhea and nausea. Musculoskeletal: Positive for arthralgias. Negative for back pain and myalgias. Skin: Negative for pallor and rash. Neurological: Negative for dizziness, numbness and headaches. Psychiatric/Behavioral: Negative for agitation, behavioral problems and confusion. Positives and Pertinent negatives as per HPI.   Except as noted above in the ROS, problem specific ROS was completed and is negative. Physical Exam:  Physical Exam   Constitutional: She is oriented to person, place, and time. She appears well-developed and well-nourished. HENT:   Head: Normocephalic and atraumatic. Nose: Nose normal.   Eyes: Right eye exhibits no discharge. Left eye exhibits no discharge. Neck: Normal range of motion. Neck supple. Pulmonary/Chest: Effort normal. No respiratory distress. Musculoskeletal: Normal range of motion. Left hip: She exhibits tenderness and crepitus. She exhibits normal range of motion, normal strength, no bony tenderness, no swelling, no deformity and no laceration. Left knee: Normal. She exhibits normal range of motion and no swelling. Left ankle: Normal. No tenderness. Achilles tendon normal.        Right foot: There is normal range of motion and no tenderness. Neurological: She is alert and oriented to person, place, and time. Reflex Scores:       Patellar reflexes are 2+ on the left side. Achilles reflexes are 2+ on the left side. Skin: Skin is warm and dry. She is not diaphoretic. Psychiatric: She has a normal mood and affect. Her behavior is normal.   Nursing note and vitals reviewed. MEDICAL DECISION MAKING    Vitals:    Vitals:    09/04/18 1006   BP: (!) 140/105   Pulse: 80   Resp: 16   Temp: 98.2 °F (36.8 °C)   TempSrc: Oral   Weight: 160 lb (72.6 kg)   Height: 5' 6\" (1.676 m)       LABS: Labs Reviewed - No data to display     Remainder of labs reviewed and were negative at this time or not returned at the time of this note.     RADIOLOGY:   Non-plain film images such as CT, Ultrasound and MRI are read by the radiologist. Zain Saldaña PA-C have directly visualized the radiologic plain film image(s) with the below findings:        Interpretation per the Radiologist below, if available at the time of this note:    XR HIP LEFT (2-3 VIEWS)   Final Result   No discussion, Jelly Abel decided to verbally consent to opiates. Prior to consenting, I believe that Jelly Abel has the capacity to make this medical decision. This patient will be discharged home with the medications listed below. I counseled on how to take these medicines and risks/benefits and AEs. The patient was agreeable to the prescriptions. He/She will follow up with primary care provider or present back for worsening symptoms. I estimate there is LOW risk for FRACTURE, COMPARTMENT SYNDROME, DEEP VENOUS THROMBOSIS, SEPTIC ARTHRITIS, TENDON OR NEUROVASCULAR INJURY, thus I consider the discharge disposition reasonable. The patient tolerated their visit well. I evaluated the patient. The physician was available for consultation as needed. The patient and / or the family were informed of the results of any tests, a time was given to answer questions, a plan was proposed and they agreed with plan. CLINICAL IMPRESSION:  1. Bursitis of left hip, unspecified bursa    2. Left hip pain        DISPOSITION Decision To Discharge 09/04/2018 11:04:55 AM      PATIENT REFERRED TO:  Marga Gunderson MD  [address] [place] [address] [address]  [address] [address] [facility]  [phone]    Schedule an appointment as soon as possible for a visit in 2 days      [facility] [place] [facility]  [address] [facility] 53  Go to   As needed, If symptoms worsen    [address]  [phone]    Schedule an appointment as soon as possible for a visit in 2 days        DISCHARGE MEDICATIONS:  New Prescriptions    TRAMADOL (ULTRAM) 50 MG TABLET    Take 1 tablet by mouth every 6 hours as needed for Pain for up to 3 days. Intended supply: 3 days. Take lowest dose possible to manage pain.        DISCONTINUED MEDICATIONS:  Discontinued Medications    No medications on file              (Please note the MDM and HPI sections of this note were completed with a voice recognition program.  Efforts were made to edit the dictations but occasionally words are mis-transcribed.)    Electronically signed, Flora JC Marino,           Enrico Muñoz PA-C  09/04/18 1142

## 2018-09-04 NOTE — PROGRESS NOTES
unless otherwise noted below:  Static/ Dynamic Sitting:  Static/Dynamic Standing:    Lumbar Spine    [x]   [facility] []   Dysfunction Noted. Comment below:     Quick Tests/Functional Myotome Tests:     Heel Walk (L4): [x]   Able to perform WNL       []   Unable to perform        Toe Walk (S1):  [x]   Able to perform WNL     []   Unable to perform    Range of Motion/Strength Testing     [x] All ROM and strength WNL except as marked below   * Denotes limitation by pain    Range Tested AROM PROM MMT/Resisted    Left Right Left Right Left Right   Hip Flexion     4    Hip Extension         Hip Abduction     4-    Hip Adduction         Hip IR         Hip ER         Knee Flexion         Knee Extension         Ankle Dorsiflex         Ankle Plantarflex         Ankle Inversion         Ankle Eversion           Flexibility     [x] All flexibility WNL except as marked below  Muscle Left Right   Hamstrings (90/90) Tighter than R    Gastroc     TFL/ITB (Fanys) Tighter than R    Iliopsoas (Akshat) Tighter than R    Piriformis       Palpation/Tenderness/Visual Inspection    Noted tenderness with palpation over L hip greater trochanter and bursa  No noted redness   No noted warmth   No noted edema   No pain with Scouring of L hip  Pain with passive L hip ER and abd    TREATMENT  Educated on anatomy, physiology, and biomechanics of hip joint. Pt verbalized understanding and all of patient's questions answered to satisfaction. Business card and HEP issued. Manual tx: none    Therex: issued HEP of IT band and hip flexor stretches, not performed today    Equipment: none    Pt response to treatment: no change in s/s      ASSESSMENT  Pt presenting to ED with c/o L hip pain worsening over past three months. Thorough evaluation and examination, identified dec flexibility L hip joint and tenderness at bursa indicating possible L hip bursitis. Issued HEP for L hip stretches. PT recommending pt follow-up with OP PT.  Discussed findings and recommendations with referring provider. GCODEs and Functional Outcome Measure:     PT G-Codes  Functional Assessment Tool Used: pain scale  Score: 7/10  Functional Limitation: Mobility: Walking and moving around  Mobility: Walking and Moving Around Current Status (): At least 60 percent but less than 80 percent impaired, limited or restricted  Mobility: Walking and Moving Around Goal Status (): At least 60 percent but less than 80 percent impaired, limited or restricted  Mobility: Walking and Moving Around Discharge Status (): At least 60 percent but less than 80 percent impaired, limited or restricted    PLAN OF CARE    One time visit in the setting of the ED. No goals written. Thank you for the referral of this patient.      Timed Code Treatment Minutes:  0   minutes            Total Treatment Time: 15  minutes    Jovita Benoit PT, DPT # [id number]

## 2018-09-27 ENCOUNTER — TELEPHONE (OUTPATIENT)
Dept: ORTHOPEDIC SURGERY | Age: 53
End: 2018-09-27

## 2018-09-27 ENCOUNTER — OFFICE VISIT (OUTPATIENT)
Dept: ORTHOPEDIC SURGERY | Age: 53
End: 2018-09-27
Payer: COMMERCIAL

## 2018-09-27 VITALS
DIASTOLIC BLOOD PRESSURE: 82 MMHG | HEIGHT: 66 IN | SYSTOLIC BLOOD PRESSURE: 124 MMHG | WEIGHT: 163 LBS | BODY MASS INDEX: 26.2 KG/M2 | HEART RATE: 80 BPM

## 2018-09-27 DIAGNOSIS — S76.012A HIP STRAIN, LEFT, INITIAL ENCOUNTER: ICD-10-CM

## 2018-09-27 DIAGNOSIS — M25.552 HIP PAIN, LEFT: Primary | ICD-10-CM

## 2018-09-27 PROCEDURE — 99213 OFFICE O/P EST LOW 20 MIN: CPT | Performed by: ORTHOPAEDIC SURGERY

## 2018-09-27 PROCEDURE — 3017F COLORECTAL CA SCREEN DOC REV: CPT | Performed by: ORTHOPAEDIC SURGERY

## 2018-09-27 PROCEDURE — G8427 DOCREV CUR MEDS BY ELIG CLIN: HCPCS | Performed by: ORTHOPAEDIC SURGERY

## 2018-09-27 PROCEDURE — G8419 CALC BMI OUT NRM PARAM NOF/U: HCPCS | Performed by: ORTHOPAEDIC SURGERY

## 2018-09-27 PROCEDURE — 1036F TOBACCO NON-USER: CPT | Performed by: ORTHOPAEDIC SURGERY

## 2018-09-27 RX ORDER — TRAMADOL HYDROCHLORIDE 50 MG/1
50 TABLET ORAL EVERY 6 HOURS PRN
Qty: 20 TABLET | Refills: 0 | Status: SHIPPED | OUTPATIENT
Start: 2018-09-27 | End: 2018-10-02

## 2018-09-27 RX ORDER — TRAMADOL HYDROCHLORIDE 50 MG/1
50 TABLET ORAL EVERY 6 HOURS PRN
COMMUNITY
End: 2018-10-31

## 2018-09-27 NOTE — PROGRESS NOTES
staph aureus) culture positive 12/2015    Lungs. Diagnosed [facility] with bronchoscopy    PONV (postoperative nausea and vomiting)     Prolonged QT interval syndrome     S/P bronchoscopy     Vertigo      Patient Active Problem List    Diagnosis Date Noted    Asthma exacerbation 04/30/2015     Priority: High    Recurrent respiratory infection 03/02/2016    Acute tracheobronchitis 05/02/2015    Cough 04/29/2015    Dyspnea 04/29/2015    Chest heaviness 04/29/2015    Toe fracture, right 07/16/2014     Past Surgical History:   Procedure Laterality Date    BREAST SURGERY      augmentation    CARDIAC SURGERY      septum occluder    COLONOSCOPY      CYSTOSCOPY  6/8/2016    [facility]    HIP ARTHROSCOPY Left 02/08/2018    HIP SURGERY      HYSTERECTOMY      NASAL SINUS SURGERY  8/22/13    PACEMAKER INSERTION      TONSILLECTOMY      UPPER GASTROINTESTINAL ENDOSCOPY  03/20/2017     Family History   Problem Relation Age of Onset    Heart Disease Mother     Colon Cancer Father         colon    Heart Disease Father     High Blood Pressure Father      Social History     Social History    Marital status:      Spouse name: N/A    Number of children: N/A    Years of education: N/A     Social History Main Topics    Smoking status: Former Smoker     Packs/day: 0.25     Years: 3.00     Types: Cigarettes     Quit date: 12/17/1998    Smokeless tobacco: Never Used    Alcohol use Yes      Comment: 4 beers last night    Drug use: No    Sexual activity: Yes     Partners: Male     Other Topics Concern    None     Social History Narrative    None     Current Outpatient Prescriptions   Medication Sig Dispense Refill    traMADol (ULTRAM) 50 MG tablet Take 50 mg by mouth every 6 hours as needed for Pain. Valiant Alvarado nadolol (CORGARD) 40 MG tablet Take 120 mg by mouth nightly        No current facility-administered medications for this visit.         Review of Systems:  Relevant review of systems reviewed and available in the patient's chart    Vital Signs:  Vitals:    09/27/18 1034   BP: 124/82   Pulse: 80       General Exam:   Constitutional: Patient is adequately groomed with no evidence of malnutrition  DTRs: Deep tendon reflexes are intact  Mental Status: The patient is oriented to time, place and person. The patient's mood and affect are appropriate. Lymphatic: The lymphatic examination bilaterally reveals all areas to be without enlargement or induration. Vascular: Examination reveals no swelling or calf tenderness. Peripheral pulses are palpable and 2+. Neurological: The patient has good coordination. There is no weakness or sensory deficit. Body mass index is 26.31 kg/m². Left Hip Examination:    Inspection:  No erythema or signs of infection. There are no cutaneous lesions. Palpation:  Severe tenderness to palpation over the greater trochanteric region. Continued tenderness over the ASIS and posterior hip. Range of Motion:  Painful limited range of motion of the hip particularly with flexion and external rotation causing reproducible groin pain. Strength:  Core/5 strength in flexion and abduction limited by pain. Special Tests: There is a positive log roll maneuver. Positive straight leg raise against resistance. Positive Fany's test.  Negative Homans test.    Skin: There are no rashes, ulcerations or lesions. Gait: Antalgic favoring the right side    Reflex 2+ patellar    Additional Comments:       Additional Examinations:         Contralateral Exam: Examination of the right hip reveals intact skin. The patient demonstrates full painless range of motion with regards to flexion, abduction, internal and external rotation. There is no tenderness about the greater trochanter. There is a negative straight leg raise against resistance. Strength is 5/5 throughout all planes. Lower Back: Examination of the lower back does not show any tenderness, deformity or injury.   Range of motion

## 2018-10-01 ENCOUNTER — TELEPHONE (OUTPATIENT)
Dept: ORTHOPEDIC SURGERY | Age: 53
End: 2018-10-01

## 2018-10-04 ENCOUNTER — TELEPHONE (OUTPATIENT)
Dept: ORTHOPEDIC SURGERY | Age: 53
End: 2018-10-04

## 2018-10-04 DIAGNOSIS — Z98.890 STATUS POST HIP SURGERY: ICD-10-CM

## 2018-10-04 DIAGNOSIS — M25.552 LEFT HIP PAIN: Primary | ICD-10-CM

## 2018-10-04 NOTE — TELEPHONE ENCOUNTER
Patient called in very upset because she has not heard anything about scheduling her arthrogram. She would like to speak with someone in Dr. Yarbrough [facility] office. Please call her back at [phone].

## 2018-10-05 DIAGNOSIS — M25.552 LEFT HIP PAIN: Primary | ICD-10-CM

## 2018-10-18 ENCOUNTER — HOSPITAL ENCOUNTER (OUTPATIENT)
Dept: CT IMAGING | Age: 53
Discharge: HOME | End: 2018-10-18
Payer: COMMERCIAL

## 2018-10-18 ENCOUNTER — HOSPITAL ENCOUNTER (OUTPATIENT)
Dept: GENERAL RADIOLOGY | Age: 53
Discharge: HOME | End: 2018-10-18
Payer: COMMERCIAL

## 2018-10-18 VITALS
DIASTOLIC BLOOD PRESSURE: 94 MMHG | SYSTOLIC BLOOD PRESSURE: 133 MMHG | RESPIRATION RATE: 14 BRPM | OXYGEN SATURATION: 97 % | HEART RATE: 69 BPM

## 2018-10-18 DIAGNOSIS — M25.552 LEFT HIP PAIN: ICD-10-CM

## 2018-10-18 PROCEDURE — 77002 NEEDLE LOCALIZATION BY XRAY: CPT

## 2018-10-18 PROCEDURE — 27093 INJECTION FOR HIP X-RAY: CPT

## 2018-10-18 PROCEDURE — 99152 MOD SED SAME PHYS/QHP 5/>YRS: CPT

## 2018-10-18 PROCEDURE — 73701 CT LOWER EXTREMITY W/DYE: CPT

## 2018-10-18 PROCEDURE — 6360000002 HC RX W HCPCS: Performed by: RADIOLOGY

## 2018-10-18 PROCEDURE — 73525 CONTRAST X-RAY OF HIP: CPT

## 2018-10-18 PROCEDURE — 6360000004 HC RX CONTRAST MEDICATION

## 2018-10-18 RX ORDER — MIDAZOLAM HYDROCHLORIDE 5 MG/ML
INJECTION INTRAMUSCULAR; INTRAVENOUS
Status: COMPLETED | OUTPATIENT
Start: 2018-10-18 | End: 2018-10-18

## 2018-10-18 RX ORDER — FENTANYL CITRATE 50 UG/ML
INJECTION, SOLUTION INTRAMUSCULAR; INTRAVENOUS
Status: COMPLETED | OUTPATIENT
Start: 2018-10-18 | End: 2018-10-18

## 2018-10-18 RX ADMIN — MIDAZOLAM HYDROCHLORIDE 0.5 MG: 5 INJECTION, SOLUTION INTRAMUSCULAR; INTRAVENOUS at 14:55

## 2018-10-18 RX ADMIN — FENTANYL CITRATE 50 MCG: 50 INJECTION INTRAMUSCULAR; INTRAVENOUS at 14:54

## 2018-10-22 ENCOUNTER — OFFICE VISIT (OUTPATIENT)
Dept: ORTHOPEDIC SURGERY | Age: 53
End: 2018-10-22
Payer: COMMERCIAL

## 2018-10-22 VITALS
WEIGHT: 163 LBS | SYSTOLIC BLOOD PRESSURE: 122 MMHG | BODY MASS INDEX: 26.2 KG/M2 | HEART RATE: 83 BPM | DIASTOLIC BLOOD PRESSURE: 85 MMHG | HEIGHT: 66 IN

## 2018-10-22 DIAGNOSIS — Z98.890 STATUS POST HIP SURGERY: Primary | ICD-10-CM

## 2018-10-22 DIAGNOSIS — M24.152 ARTICULAR CARTILAGE DISORDER OF HIP, LEFT: ICD-10-CM

## 2018-10-22 DIAGNOSIS — M76.892 HIP FLEXOR TENDINITIS, LEFT: ICD-10-CM

## 2018-10-22 PROCEDURE — 3017F COLORECTAL CA SCREEN DOC REV: CPT | Performed by: ORTHOPAEDIC SURGERY

## 2018-10-22 PROCEDURE — 1036F TOBACCO NON-USER: CPT | Performed by: ORTHOPAEDIC SURGERY

## 2018-10-22 PROCEDURE — 99214 OFFICE O/P EST MOD 30 MIN: CPT | Performed by: ORTHOPAEDIC SURGERY

## 2018-10-22 PROCEDURE — G8484 FLU IMMUNIZE NO ADMIN: HCPCS | Performed by: ORTHOPAEDIC SURGERY

## 2018-10-22 PROCEDURE — G8427 DOCREV CUR MEDS BY ELIG CLIN: HCPCS | Performed by: ORTHOPAEDIC SURGERY

## 2018-10-22 PROCEDURE — G8419 CALC BMI OUT NRM PARAM NOF/U: HCPCS | Performed by: ORTHOPAEDIC SURGERY

## 2018-10-22 NOTE — PROGRESS NOTES
Son Basurto MD  Orthopaedic Surgery & Sports Medicine  Shoulder, Hip & Knee Specialist                       MAIN PHONE NUMBER  [phone]      PATIENT: Conchita Hernandez    50 y.o.  female  [facility]: 1965   MRN:  [id number]       Date of current encounter: 10/22/2018  This encounter is evaluated as a:       [] New Patient Visit    [x] Established Patient Visit   [] Post-Op Visit     [] Consult: requested by          [] Worker's Comp       Patient's PCP is Dr. Eliana Rutledge MD    Subjective:     Chief Complaint   Patient presents with    Follow-up     lt hip pain       HPI:  [address] [facility] Carina is a very nice 51-year-old woman who follows up with us today regarding her left hip. She is approximately 8 months status post a left hip arthroscopy. The hip arthroscopy was done for loose body removal.  She did extremely well for approximately 6 months but then had a very severe fall in July 2018 and since that point she has had severe catching and mechanical symptoms regarding the left hip. She is had intra-articular injections as well as some physical therapy and is still having these issues.   She recently had a CT arthrogram.  Pain is mostly with external rotation and extension of the hip    Pain Assessment  Location of Pain:  (hip)  Location Modifiers: Left  Severity of Pain: 10  Quality of Pain: Dull, Aching, Sharp  Duration of Pain: Persistent  Frequency of Pain: Constant  Limiting Behavior: Yes  Result of Injury: No  Work-Related Injury: No  Are there other pain locations you wish to document?: No    Patient Active Problem List   Diagnosis    Toe fracture, right    Cough    Dyspnea    Chest heaviness    Asthma exacerbation    Acute tracheobronchitis    Recurrent respiratory infection    Hip strain, left, initial encounter     Past Medical History:   Diagnosis Date    Asthma     Bursitis of left hip     CAD (coronary artery disease)     COPD (chronic obstructive pulmonary disease) ([facility] [address]     History

## 2018-10-24 NOTE — PROGRESS NOTES
understand that if I have a Limitation of Treatment order in effect during my hospitalization, the order may or may not be in effect during this procedure. I give my doctor permission to give me blood or blood products. I understand that there are risks with receiving blood such as hepatitis, AIDS, fever, or allergic reaction. I acknowledge that the risks, benefits, and alternatives of this treatment have been explained to me and that no express or implied warranty has been given by the hospital, any blood bank, or any person or entity as to the blood or blood components transfused. At the discretion of my doctor, I agree to allow observers, equipment/product representatives and allow photographing, and/or televising of the procedure, provided my name or identity is maintained confidentially. I agree the hospital may dispose of or use for scientific or educational purposes any tissue, fluid, or body parts which may be removed.     ________________________________Date________Time______ am/pm  ([facility])  Patient or Signature of Closest Relative or Legal Guardian    ________________________________Date________Time______am/pm      Page 1 of  1  Witness

## 2018-10-26 ENCOUNTER — HOSPITAL ENCOUNTER (OUTPATIENT)
Age: 53
Discharge: HOME | End: 2018-10-26
Payer: COMMERCIAL

## 2018-10-26 LAB
ANION GAP SERPL CALCULATED.3IONS-SCNC: 12 MMOL/L (ref 3–16)
APTT: 34 SEC (ref 26–36)
BASOPHILS ABSOLUTE: 0 K/UL (ref 0–0.2)
BASOPHILS RELATIVE PERCENT: 0.8 %
BUN BLDV-MCNC: 16 MG/DL (ref 7–20)
CALCIUM SERPL-MCNC: 9.4 MG/DL (ref 8.3–10.6)
CHLORIDE BLD-SCNC: 104 MMOL/L (ref 99–110)
CO2: 25 MMOL/L (ref 21–32)
CREAT SERPL-MCNC: 0.6 MG/DL (ref 0.6–1.1)
EOSINOPHILS ABSOLUTE: 0.2 K/UL (ref 0–0.6)
EOSINOPHILS RELATIVE PERCENT: 3.4 %
GFR AFRICAN AMERICAN: >60
GFR NON-AFRICAN AMERICAN: >60
GLUCOSE BLD-MCNC: 93 MG/DL (ref 70–99)
HCT VFR BLD CALC: 42.4 % (ref 36–48)
HEMOGLOBIN: 13.9 G/DL (ref 12–16)
INR BLD: 0.84 (ref 0.86–1.14)
LYMPHOCYTES ABSOLUTE: 1.6 K/UL (ref 1–5.1)
LYMPHOCYTES RELATIVE PERCENT: 33.4 %
MCH RBC QN AUTO: 30.5 PG (ref 26–34)
MCHC RBC AUTO-ENTMCNC: 32.9 G/DL (ref 31–36)
MCV RBC AUTO: 92.8 FL (ref 80–100)
MONOCYTES ABSOLUTE: 0.4 K/UL (ref 0–1.3)
MONOCYTES RELATIVE PERCENT: 7.9 %
NEUTROPHILS ABSOLUTE: 2.7 K/UL (ref 1.7–7.7)
NEUTROPHILS RELATIVE PERCENT: 54.5 %
PDW BLD-RTO: 13 % (ref 12.4–15.4)
PLATELET # BLD: 177 K/UL (ref 135–450)
PMV BLD AUTO: 9.6 FL (ref 5–10.5)
POTASSIUM SERPL-SCNC: 5.1 MMOL/L (ref 3.5–5.1)
PROTHROMBIN TIME: 9.6 SEC (ref 9.8–13)
RBC # BLD: 4.56 M/UL (ref 4–5.2)
SODIUM BLD-SCNC: 141 MMOL/L (ref 136–145)
WBC # BLD: 4.9 K/UL (ref 4–11)

## 2018-10-26 PROCEDURE — 80048 BASIC METABOLIC PNL TOTAL CA: CPT

## 2018-10-26 PROCEDURE — 85025 COMPLETE CBC W/AUTO DIFF WBC: CPT

## 2018-10-26 PROCEDURE — 36415 COLL VENOUS BLD VENIPUNCTURE: CPT

## 2018-10-26 PROCEDURE — 85610 PROTHROMBIN TIME: CPT

## 2018-10-26 PROCEDURE — 85730 THROMBOPLASTIN TIME PARTIAL: CPT

## 2018-10-31 ENCOUNTER — ANESTHESIA EVENT (OUTPATIENT)
Dept: OPERATING ROOM | Age: 53
End: 2018-10-31
Payer: COMMERCIAL

## 2018-10-31 RX ORDER — HYDROCODONE BITARTRATE AND ACETAMINOPHEN 5; 325 MG/1; MG/1
1 TABLET ORAL EVERY 8 HOURS PRN
COMMUNITY
End: 2019-05-02 | Stop reason: ALTCHOICE

## 2018-10-31 RX ORDER — CYCLOBENZAPRINE HCL 10 MG
10 TABLET ORAL 3 TIMES DAILY PRN
COMMUNITY
End: 2019-05-05

## 2018-10-31 RX ORDER — PHENOL 1.4 %
AEROSOL, SPRAY (ML) MUCOUS MEMBRANE NIGHTLY
COMMUNITY
End: 2019-05-02 | Stop reason: ALTCHOICE

## 2018-10-31 NOTE — PROGRESS NOTES
[address]                          Date of Procedure 11/1/18 Time of Procedure per surgeon's office    PRIOR TO PROCEDURE DATE:  1. Please follow any guidelines/instructions prior to your procedure as advised by your surgeon. 2. Arrange for someone to drive you home and be with you for the first 24 hours after discharge for your safety after your procedure for which you received sedation. Ensure it is someone we can share information with regarding your discharge. 3. You must contact your surgeon for instructions IF:   You are taking any blood thinners, aspirin, anti-inflammatory or vitamin E.   There is a change in your physical condition such as a cold, fever, rash, cuts, sores or any other infection, especially near your surgical site. 4. Do not drink alcohol the day before or day of your procedure. 5. A Pre-op History and Physical for surgery MUST be completed by your Physician or Urgent Care within 30 days of your procedure date. Please bring a copy with you on the day of your procedure and along with any other testing performed. THE DAY OF YOUR PROCEDURE:  1. Follow instructions for ARRIVAL TIME as DIRECTED BY YOUR SURGEON. If your surgeon does not give you a specific arrival time, please arrive at per surgeon's office. 2. Enter the MAIN entrance from [employer] and follow the signs to the free [employer] or [employer] parking (offered free of charge 6am-5pm). 3. Enter the Main Entrance of the hospital (do not enter from the lower level of the parking garage). Upon entrance, check in with the  at the main desk on your left. If no one is available at the desk, proceed into the [facility] Waiting Room and go through the door directly into the [facility]. There is a Check-in desk ACROSS from Room 5 (marked with a sign hanging from the ceiling). The phone number for the surgery center is [phone].     4. Please call breathing patterns. Nausea, headache, muscle aches, or sore throat may also occur after anesthesia. Your nurse will help you manage these potential side effects. 2. For comfort and safety, arrange to have someone at home with you for the first 24 hours after discharge. 3. You and your family will be given written instructions about your diet, activity, dressing care, medications, and return visits. 4. Once at home, should issues with nausea, pain, or bleeding occur, or should you notice any signs of infection, you should call your surgeon. 5. Always clean your hands before and after caring for your wound. Do not let your family touch your surgery site without cleaning their hands. 6. Narcotic pain medications can cause significant constipation. You may want to add a stool softener to your postoperative medication schedule or speak to your surgeon on how best to manage this SIDE EFFECT. SPECIAL INSTRUCTIONS    Thank you for allowing us to care for you. We strive to exceed your expectations in the delivery of care and service provided to you and your family. If you need to contact us for any reason, please call us at [phone]    Instructions reviewed with patient during preadmission testing phone interview. Vladimir Patel. 10/31/2018 .1:00 PM      ADDITIONAL EDUCATIONAL INFORMATION REVIEWED PER PHONE WITH YOU AND/OR YOUR FAMILY:  No Bring a urine sample on day of surgery  No Pain Goal-Taking Control of Your Pain  No FAQs about Surgical Site Infections  No Hibiclens® Bathing Instructions   Yes Antibacterial Soap  No Zachary® Wipes Bathing Instructions (Obtained from: [email or web address]://www.[employer]/. pdf )  No Incentive Spirometer Education  No Other

## 2018-11-01 ENCOUNTER — HOSPITAL ENCOUNTER (OUTPATIENT)
Age: 53
Setting detail: OUTPATIENT SURGERY
Discharge: HOME | End: 2018-11-01
Attending: ORTHOPAEDIC SURGERY | Admitting: ORTHOPAEDIC SURGERY
Payer: COMMERCIAL

## 2018-11-01 ENCOUNTER — APPOINTMENT (OUTPATIENT)
Dept: GENERAL RADIOLOGY | Age: 53
End: 2018-11-01
Attending: ORTHOPAEDIC SURGERY
Payer: COMMERCIAL

## 2018-11-01 ENCOUNTER — TELEPHONE (OUTPATIENT)
Dept: ORTHOPEDIC SURGERY | Age: 53
End: 2018-11-01

## 2018-11-01 ENCOUNTER — ANESTHESIA (OUTPATIENT)
Dept: OPERATING ROOM | Age: 53
End: 2018-11-01
Payer: COMMERCIAL

## 2018-11-01 VITALS
BODY MASS INDEX: 26.52 KG/M2 | HEART RATE: 78 BPM | TEMPERATURE: 97 F | HEIGHT: 66 IN | SYSTOLIC BLOOD PRESSURE: 105 MMHG | RESPIRATION RATE: 10 BRPM | DIASTOLIC BLOOD PRESSURE: 77 MMHG | OXYGEN SATURATION: 98 % | WEIGHT: 165 LBS

## 2018-11-01 VITALS — TEMPERATURE: 92.1 F | DIASTOLIC BLOOD PRESSURE: 74 MMHG | SYSTOLIC BLOOD PRESSURE: 108 MMHG | OXYGEN SATURATION: 100 %

## 2018-11-01 LAB
APTT: 32.2 SEC (ref 26–36)
INR BLD: 0.88 (ref 0.86–1.14)
PROTHROMBIN TIME: 10 SEC (ref 9.8–13)

## 2018-11-01 PROCEDURE — 2580000003 HC RX 258: Performed by: ORTHOPAEDIC SURGERY

## 2018-11-01 PROCEDURE — 6360000002 HC RX W HCPCS: Performed by: NURSE ANESTHETIST, CERTIFIED REGISTERED

## 2018-11-01 PROCEDURE — 2709999900 HC NON-CHARGEABLE SUPPLY: Performed by: ORTHOPAEDIC SURGERY

## 2018-11-01 PROCEDURE — 85730 THROMBOPLASTIN TIME PARTIAL: CPT

## 2018-11-01 PROCEDURE — 2500000003 HC RX 250 WO HCPCS: Performed by: NURSE ANESTHETIST, CERTIFIED REGISTERED

## 2018-11-01 PROCEDURE — 6370000000 HC RX 637 (ALT 250 FOR IP): Performed by: ANESTHESIOLOGY

## 2018-11-01 PROCEDURE — 2720000010 HC SURG SUPPLY STERILE: Performed by: ORTHOPAEDIC SURGERY

## 2018-11-01 PROCEDURE — 3700000001 HC ADD 15 MINUTES (ANESTHESIA): Performed by: ORTHOPAEDIC SURGERY

## 2018-11-01 PROCEDURE — 7100000000 HC PACU RECOVERY - FIRST 15 MIN: Performed by: ORTHOPAEDIC SURGERY

## 2018-11-01 PROCEDURE — C1788 PORT, INDWELLING, IMP: HCPCS | Performed by: ORTHOPAEDIC SURGERY

## 2018-11-01 PROCEDURE — 2580000003 HC RX 258: Performed by: ANESTHESIOLOGY

## 2018-11-01 PROCEDURE — 6360000002 HC RX W HCPCS: Performed by: ANESTHESIOLOGY

## 2018-11-01 PROCEDURE — 6370000000 HC RX 637 (ALT 250 FOR IP): Performed by: ORTHOPAEDIC SURGERY

## 2018-11-01 PROCEDURE — 7100000010 HC PHASE II RECOVERY - FIRST 15 MIN: Performed by: ORTHOPAEDIC SURGERY

## 2018-11-01 PROCEDURE — 7100000001 HC PACU RECOVERY - ADDTL 15 MIN: Performed by: ORTHOPAEDIC SURGERY

## 2018-11-01 PROCEDURE — 85610 PROTHROMBIN TIME: CPT

## 2018-11-01 PROCEDURE — 64450 NJX AA&/STRD OTHER PN/BRANCH: CPT | Performed by: ANESTHESIOLOGY

## 2018-11-01 PROCEDURE — 6360000002 HC RX W HCPCS: Performed by: ORTHOPAEDIC SURGERY

## 2018-11-01 PROCEDURE — 3600000014 HC SURGERY LEVEL 4 ADDTL 15MIN: Performed by: ORTHOPAEDIC SURGERY

## 2018-11-01 PROCEDURE — 6360000002 HC RX W HCPCS

## 2018-11-01 PROCEDURE — 3209999900 FLUORO FOR SURGICAL PROCEDURES

## 2018-11-01 PROCEDURE — 3600000004 HC SURGERY LEVEL 4 BASE: Performed by: ORTHOPAEDIC SURGERY

## 2018-11-01 PROCEDURE — 73501 X-RAY EXAM HIP UNI 1 VIEW: CPT

## 2018-11-01 PROCEDURE — 7100000011 HC PHASE II RECOVERY - ADDTL 15 MIN: Performed by: ORTHOPAEDIC SURGERY

## 2018-11-01 PROCEDURE — 3700000000 HC ANESTHESIA ATTENDED CARE: Performed by: ORTHOPAEDIC SURGERY

## 2018-11-01 RX ORDER — ONDANSETRON 2 MG/ML
INJECTION INTRAMUSCULAR; INTRAVENOUS PRN
Status: DISCONTINUED | OUTPATIENT
Start: 2018-11-01 | End: 2018-11-01 | Stop reason: SDUPTHER

## 2018-11-01 RX ORDER — ROPIVACAINE HYDROCHLORIDE 5 MG/ML
INJECTION, SOLUTION EPIDURAL; INFILTRATION; PERINEURAL PRN
Status: DISCONTINUED | OUTPATIENT
Start: 2018-11-01 | End: 2018-11-01 | Stop reason: SDUPTHER

## 2018-11-01 RX ORDER — ONDANSETRON 2 MG/ML
4 INJECTION INTRAMUSCULAR; INTRAVENOUS EVERY 6 HOURS PRN
Status: CANCELLED | OUTPATIENT
Start: 2018-11-01

## 2018-11-01 RX ORDER — LABETALOL HYDROCHLORIDE 5 MG/ML
5 INJECTION, SOLUTION INTRAVENOUS EVERY 10 MIN PRN
Status: DISCONTINUED | OUTPATIENT
Start: 2018-11-01 | End: 2018-11-06 | Stop reason: HOSPADM

## 2018-11-01 RX ORDER — ROPIVACAINE HYDROCHLORIDE 5 MG/ML
INJECTION, SOLUTION EPIDURAL; INFILTRATION; PERINEURAL PRN
Status: DISCONTINUED | OUTPATIENT
Start: 2018-11-01 | End: 2018-11-06 | Stop reason: HOSPADM

## 2018-11-01 RX ORDER — FENTANYL CITRATE 50 UG/ML
25 INJECTION, SOLUTION INTRAMUSCULAR; INTRAVENOUS EVERY 5 MIN PRN
Status: DISCONTINUED | OUTPATIENT
Start: 2018-11-01 | End: 2018-11-06 | Stop reason: HOSPADM

## 2018-11-01 RX ORDER — NEOSTIGMINE METHYLSULFATE 5 MG/5 ML
SYRINGE (ML) INTRAVENOUS PRN
Status: DISCONTINUED | OUTPATIENT
Start: 2018-11-01 | End: 2018-11-01 | Stop reason: SDUPTHER

## 2018-11-01 RX ORDER — SCOLOPAMINE TRANSDERMAL SYSTEM 1 MG/1
1 PATCH, EXTENDED RELEASE TRANSDERMAL ONCE
Status: COMPLETED | OUTPATIENT
Start: 2018-11-01 | End: 2018-11-04

## 2018-11-01 RX ORDER — PANTOPRAZOLE SODIUM 40 MG/1
40 TABLET, DELAYED RELEASE ORAL DAILY
Qty: 10 TABLET | Refills: 0 | Status: SHIPPED | OUTPATIENT
Start: 2018-11-01 | End: 2019-05-05

## 2018-11-01 RX ORDER — GLYCOPYRROLATE 1 MG/5 ML
SYRINGE (ML) INTRAVENOUS PRN
Status: DISCONTINUED | OUTPATIENT
Start: 2018-11-01 | End: 2018-11-01 | Stop reason: SDUPTHER

## 2018-11-01 RX ORDER — SODIUM CHLORIDE, SODIUM LACTATE, POTASSIUM CHLORIDE, CALCIUM CHLORIDE 600; 310; 30; 20 MG/100ML; MG/100ML; MG/100ML; MG/100ML
INJECTION, SOLUTION INTRAVENOUS CONTINUOUS
Status: DISCONTINUED | OUTPATIENT
Start: 2018-11-01 | End: 2018-11-06 | Stop reason: HOSPADM

## 2018-11-01 RX ORDER — SODIUM CHLORIDE, SODIUM LACTATE, POTASSIUM CHLORIDE, CALCIUM CHLORIDE 600; 310; 30; 20 MG/100ML; MG/100ML; MG/100ML; MG/100ML
INJECTION, SOLUTION INTRAVENOUS CONTINUOUS
Status: CANCELLED | OUTPATIENT
Start: 2018-11-01

## 2018-11-01 RX ORDER — ROCURONIUM BROMIDE 10 MG/ML
INJECTION, SOLUTION INTRAVENOUS PRN
Status: DISCONTINUED | OUTPATIENT
Start: 2018-11-01 | End: 2018-11-01 | Stop reason: SDUPTHER

## 2018-11-01 RX ORDER — DIAZEPAM 5 MG/1
5 TABLET ORAL EVERY 6 HOURS PRN
Status: DISCONTINUED | OUTPATIENT
Start: 2018-11-01 | End: 2018-11-06 | Stop reason: HOSPADM

## 2018-11-01 RX ORDER — OXYCODONE HCL 10 MG/1
10 TABLET, FILM COATED, EXTENDED RELEASE ORAL ONCE
Status: COMPLETED | OUTPATIENT
Start: 2018-11-01 | End: 2018-11-01

## 2018-11-01 RX ORDER — OXYCODONE HYDROCHLORIDE AND ACETAMINOPHEN 5; 325 MG/1; MG/1
2 TABLET ORAL PRN
Status: COMPLETED | OUTPATIENT
Start: 2018-11-01 | End: 2018-11-01

## 2018-11-01 RX ORDER — ACETAMINOPHEN 325 MG/1
650 TABLET ORAL EVERY 4 HOURS PRN
Status: CANCELLED | OUTPATIENT
Start: 2018-11-01

## 2018-11-01 RX ORDER — SODIUM CHLORIDE, SODIUM LACTATE, POTASSIUM CHLORIDE, AND CALCIUM CHLORIDE .6; .31; .03; .02 G/100ML; G/100ML; G/100ML; G/100ML
IRRIGANT IRRIGATION PRN
Status: DISCONTINUED | OUTPATIENT
Start: 2018-11-01 | End: 2018-11-06 | Stop reason: HOSPADM

## 2018-11-01 RX ORDER — DOCUSATE SODIUM 100 MG/1
100 CAPSULE, LIQUID FILLED ORAL 2 TIMES DAILY PRN
Qty: 60 CAPSULE | Refills: 0 | Status: SHIPPED | OUTPATIENT
Start: 2018-11-01 | End: 2019-05-02 | Stop reason: ALTCHOICE

## 2018-11-01 RX ORDER — ASPIRIN 325 MG
325 TABLET, DELAYED RELEASE (ENTERIC COATED) ORAL 2 TIMES DAILY WITH MEALS
Qty: 60 TABLET | Refills: 0 | Status: SHIPPED | OUTPATIENT
Start: 2018-11-01 | End: 2019-05-05

## 2018-11-01 RX ORDER — HYDRALAZINE HYDROCHLORIDE 20 MG/ML
5 INJECTION INTRAMUSCULAR; INTRAVENOUS EVERY 10 MIN PRN
Status: DISCONTINUED | OUTPATIENT
Start: 2018-11-01 | End: 2018-11-06 | Stop reason: HOSPADM

## 2018-11-01 RX ORDER — MIDAZOLAM HYDROCHLORIDE 1 MG/ML
INJECTION INTRAMUSCULAR; INTRAVENOUS
Status: COMPLETED
Start: 2018-11-01 | End: 2018-11-01

## 2018-11-01 RX ORDER — CEFAZOLIN SODIUM 2 G/50ML
2 SOLUTION INTRAVENOUS ONCE
Status: COMPLETED | OUTPATIENT
Start: 2018-11-01 | End: 2018-11-01

## 2018-11-01 RX ORDER — ONDANSETRON 2 MG/ML
4 INJECTION INTRAMUSCULAR; INTRAVENOUS
Status: ACTIVE | OUTPATIENT
Start: 2018-11-01 | End: 2018-11-01

## 2018-11-01 RX ORDER — FENTANYL CITRATE 50 UG/ML
INJECTION, SOLUTION INTRAMUSCULAR; INTRAVENOUS PRN
Status: DISCONTINUED | OUTPATIENT
Start: 2018-11-01 | End: 2018-11-01 | Stop reason: SDUPTHER

## 2018-11-01 RX ORDER — ROPIVACAINE HYDROCHLORIDE 5 MG/ML
INJECTION, SOLUTION EPIDURAL; INFILTRATION; PERINEURAL
Status: COMPLETED
Start: 2018-11-01 | End: 2018-11-01

## 2018-11-01 RX ORDER — FENTANYL CITRATE 50 UG/ML
50 INJECTION, SOLUTION INTRAMUSCULAR; INTRAVENOUS EVERY 5 MIN PRN
Status: DISCONTINUED | OUTPATIENT
Start: 2018-11-01 | End: 2018-11-06 | Stop reason: HOSPADM

## 2018-11-01 RX ORDER — PROPOFOL 10 MG/ML
INJECTION, EMULSION INTRAVENOUS PRN
Status: DISCONTINUED | OUTPATIENT
Start: 2018-11-01 | End: 2018-11-01 | Stop reason: SDUPTHER

## 2018-11-01 RX ORDER — HYDROCODONE BITARTRATE AND ACETAMINOPHEN 5; 325 MG/1; MG/1
1 TABLET ORAL EVERY 4 HOURS PRN
Status: CANCELLED | OUTPATIENT
Start: 2018-11-01

## 2018-11-01 RX ORDER — INDOMETHACIN 75 MG/1
75 CAPSULE, EXTENDED RELEASE ORAL
Qty: 7 CAPSULE | Refills: 0 | Status: SHIPPED | OUTPATIENT
Start: 2018-11-01 | End: 2018-11-09 | Stop reason: ALTCHOICE

## 2018-11-01 RX ORDER — DOCUSATE SODIUM 100 MG/1
100 CAPSULE, LIQUID FILLED ORAL 2 TIMES DAILY
Status: CANCELLED | OUTPATIENT
Start: 2018-11-01

## 2018-11-01 RX ORDER — SODIUM CHLORIDE 0.9 % (FLUSH) 0.9 %
10 SYRINGE (ML) INJECTION PRN
Status: CANCELLED | OUTPATIENT
Start: 2018-11-01

## 2018-11-01 RX ORDER — OXYCODONE HYDROCHLORIDE AND ACETAMINOPHEN 5; 325 MG/1; MG/1
1 TABLET ORAL PRN
Status: COMPLETED | OUTPATIENT
Start: 2018-11-01 | End: 2018-11-01

## 2018-11-01 RX ORDER — KETOROLAC TROMETHAMINE 15 MG/ML
INJECTION, SOLUTION INTRAMUSCULAR; INTRAVENOUS PRN
Status: DISCONTINUED | OUTPATIENT
Start: 2018-11-01 | End: 2018-11-01 | Stop reason: SDUPTHER

## 2018-11-01 RX ORDER — SODIUM CHLORIDE 0.9 % (FLUSH) 0.9 %
10 SYRINGE (ML) INJECTION EVERY 12 HOURS SCHEDULED
Status: CANCELLED | OUTPATIENT
Start: 2018-11-01

## 2018-11-01 RX ORDER — ACETAMINOPHEN 500 MG
1000 TABLET ORAL ONCE
Status: COMPLETED | OUTPATIENT
Start: 2018-11-01 | End: 2018-11-01

## 2018-11-01 RX ORDER — HYDROCODONE BITARTRATE AND ACETAMINOPHEN 5; 325 MG/1; MG/1
2 TABLET ORAL EVERY 4 HOURS PRN
Status: CANCELLED | OUTPATIENT
Start: 2018-11-01

## 2018-11-01 RX ADMIN — KETOROLAC TROMETHAMINE 30 MG: 15 INJECTION, SOLUTION INTRAMUSCULAR; INTRAVENOUS at 11:30

## 2018-11-01 RX ADMIN — HYDROMORPHONE HYDROCHLORIDE 0.5 MG: 1 INJECTION, SOLUTION INTRAMUSCULAR; INTRAVENOUS; SUBCUTANEOUS at 12:33

## 2018-11-01 RX ADMIN — OXYCODONE HYDROCHLORIDE AND ACETAMINOPHEN 2 TABLET: 5; 325 TABLET ORAL at 12:57

## 2018-11-01 RX ADMIN — FENTANYL CITRATE 100 MCG: 50 INJECTION INTRAMUSCULAR; INTRAVENOUS at 10:25

## 2018-11-01 RX ADMIN — SODIUM CHLORIDE, SODIUM LACTATE, POTASSIUM CHLORIDE, AND CALCIUM CHLORIDE: 600; 310; 30; 20 INJECTION, SOLUTION INTRAVENOUS at 06:45

## 2018-11-01 RX ADMIN — Medication 0.8 MG: at 11:40

## 2018-11-01 RX ADMIN — Medication 5 MG: at 11:40

## 2018-11-01 RX ADMIN — CEFAZOLIN SODIUM 2 G: 2 SOLUTION INTRAVENOUS at 10:37

## 2018-11-01 RX ADMIN — HYDROMORPHONE HYDROCHLORIDE 0.5 MG: 1 INJECTION, SOLUTION INTRAMUSCULAR; INTRAVENOUS; SUBCUTANEOUS at 12:40

## 2018-11-01 RX ADMIN — SODIUM CHLORIDE, SODIUM LACTATE, POTASSIUM CHLORIDE, AND CALCIUM CHLORIDE: 600; 310; 30; 20 INJECTION, SOLUTION INTRAVENOUS at 11:30

## 2018-11-01 RX ADMIN — ONDANSETRON 4 MG: 2 INJECTION INTRAMUSCULAR; INTRAVENOUS at 11:30

## 2018-11-01 RX ADMIN — FENTANYL CITRATE 100 MCG: 50 INJECTION INTRAMUSCULAR; INTRAVENOUS at 11:50

## 2018-11-01 RX ADMIN — OXYCODONE HYDROCHLORIDE 10 MG: 10 TABLET, FILM COATED, EXTENDED RELEASE ORAL at 07:57

## 2018-11-01 RX ADMIN — PROPOFOL 200 MG: 10 INJECTION, EMULSION INTRAVENOUS at 10:25

## 2018-11-01 RX ADMIN — ROCURONIUM BROMIDE 50 MG: 10 INJECTION, SOLUTION INTRAVENOUS at 10:25

## 2018-11-01 RX ADMIN — HYDROMORPHONE HYDROCHLORIDE 0.5 MG: 1 INJECTION, SOLUTION INTRAMUSCULAR; INTRAVENOUS; SUBCUTANEOUS at 12:09

## 2018-11-01 RX ADMIN — ROPIVACAINE HYDROCHLORIDE 30 ML: 5 INJECTION, SOLUTION EPIDURAL; INFILTRATION; PERINEURAL at 08:42

## 2018-11-01 RX ADMIN — HYDROMORPHONE HYDROCHLORIDE 0.5 MG: 1 INJECTION, SOLUTION INTRAMUSCULAR; INTRAVENOUS; SUBCUTANEOUS at 12:19

## 2018-11-01 RX ADMIN — PHENYLEPHRINE HYDROCHLORIDE 100 MCG: 10 INJECTION, SOLUTION INTRAMUSCULAR; INTRAVENOUS; SUBCUTANEOUS at 10:35

## 2018-11-01 RX ADMIN — ACETAMINOPHEN 1000 MG: 500 TABLET, FILM COATED ORAL at 07:32

## 2018-11-01 RX ADMIN — MIDAZOLAM HYDROCHLORIDE 2 MG: 1 INJECTION, SOLUTION INTRAMUSCULAR; INTRAVENOUS at 08:34

## 2018-11-01 ASSESSMENT — PULMONARY FUNCTION TESTS
PIF_VALUE: 17
PIF_VALUE: 20
PIF_VALUE: 4
PIF_VALUE: 32
PIF_VALUE: 12
PIF_VALUE: 18
PIF_VALUE: 14
PIF_VALUE: 18
PIF_VALUE: 19
PIF_VALUE: 18
PIF_VALUE: 21
PIF_VALUE: 14
PIF_VALUE: 14
PIF_VALUE: 17
PIF_VALUE: 17
PIF_VALUE: 14
PIF_VALUE: 17
PIF_VALUE: 20
PIF_VALUE: 19
PIF_VALUE: 17
PIF_VALUE: 14
PIF_VALUE: 19
PIF_VALUE: 15
PIF_VALUE: 19
PIF_VALUE: 17
PIF_VALUE: 14
PIF_VALUE: 1
PIF_VALUE: 14
PIF_VALUE: 16
PIF_VALUE: 14
PIF_VALUE: 19
PIF_VALUE: 19
PIF_VALUE: 14
PIF_VALUE: 14
PIF_VALUE: 20
PIF_VALUE: 19
PIF_VALUE: 20
PIF_VALUE: 19
PIF_VALUE: 14
PIF_VALUE: 19
PIF_VALUE: 17
PIF_VALUE: 14
PIF_VALUE: 14
PIF_VALUE: 17
PIF_VALUE: 19
PIF_VALUE: 18
PIF_VALUE: 19
PIF_VALUE: 19
PIF_VALUE: 17
PIF_VALUE: 18
PIF_VALUE: 18
PIF_VALUE: 19
PIF_VALUE: 19
PIF_VALUE: 18
PIF_VALUE: 17
PIF_VALUE: 20
PIF_VALUE: 19
PIF_VALUE: 14
PIF_VALUE: 1
PIF_VALUE: 14
PIF_VALUE: 19
PIF_VALUE: 20
PIF_VALUE: 17
PIF_VALUE: 14
PIF_VALUE: 1
PIF_VALUE: 19
PIF_VALUE: 19
PIF_VALUE: 18
PIF_VALUE: 14
PIF_VALUE: 1
PIF_VALUE: 14
PIF_VALUE: 19
PIF_VALUE: 1
PIF_VALUE: 18
PIF_VALUE: 16
PIF_VALUE: 17
PIF_VALUE: 20
PIF_VALUE: 14
PIF_VALUE: 14
PIF_VALUE: 19
PIF_VALUE: 18
PIF_VALUE: 14
PIF_VALUE: 18
PIF_VALUE: 14

## 2018-11-01 ASSESSMENT — PAIN DESCRIPTION - LOCATION
LOCATION: BACK;HIP

## 2018-11-01 ASSESSMENT — PAIN DESCRIPTION - ORIENTATION
ORIENTATION: LEFT

## 2018-11-01 ASSESSMENT — PAIN SCALES - GENERAL
PAINLEVEL_OUTOF10: 7
PAINLEVEL_OUTOF10: 8
PAINLEVEL_OUTOF10: 7
PAINLEVEL_OUTOF10: 7
PAINLEVEL_OUTOF10: 10
PAINLEVEL_OUTOF10: 10
PAINLEVEL_OUTOF10: 7
PAINLEVEL_OUTOF10: 10
PAINLEVEL_OUTOF10: 10
PAINLEVEL_OUTOF10: 8
PAINLEVEL_OUTOF10: 8
PAINLEVEL_OUTOF10: 10
PAINLEVEL_OUTOF10: 10

## 2018-11-01 ASSESSMENT — PAIN DESCRIPTION - PAIN TYPE
TYPE: CHRONIC PAIN;SURGICAL PAIN

## 2018-11-01 ASSESSMENT — PAIN - FUNCTIONAL ASSESSMENT: PAIN_FUNCTIONAL_ASSESSMENT: 0-10

## 2018-11-01 ASSESSMENT — PAIN DESCRIPTION - FREQUENCY
FREQUENCY: CONTINUOUS

## 2018-11-01 ASSESSMENT — PAIN DESCRIPTION - DESCRIPTORS
DESCRIPTORS: ACHING
DESCRIPTORS: ACHING
DESCRIPTORS: ACHING;DISCOMFORT
DESCRIPTORS: ACHING

## 2018-11-01 ASSESSMENT — LIFESTYLE VARIABLES: SMOKING_STATUS: 0

## 2018-11-01 ASSESSMENT — ENCOUNTER SYMPTOMS: SHORTNESS OF BREATH: 1

## 2018-11-01 NOTE — H&P
Wilner Schirmer    [id number]    [facility] Same Day Surgery Update H & P  Department of General Surgery   Surgical Service   Physician Assistant Pre-operative History and Physical  Last H & P within the last 30 days. DIAGNOSIS:   LEFT HIP PAIN    PROCEDURE:  Left Hip Arthroscopy, Possible Labral Repair VS Debridement, Chondroplasty, Possible Loose Body Removal, Possible Capsular Repair VS Plication-Left      HISTORY OF PRESENT ILLNESS:   Patient has left hip pain and limited range of motion that is unresponsive to conservative treatments. Past Medical History:        Diagnosis Date    Asthma     Bursitis of left hip     CAD (coronary artery disease)     COPD (chronic obstructive pulmonary disease) (HCC)     History of blood transfusion     MRSA (methicillin resistant staph aureus) culture positive 12/2015    Lungs.   Diagnosed [facility] with bronchoscopy    MVP (mitral valve prolapse)     PONV (postoperative nausea and vomiting)     Prolonged QT interval syndrome     S/P bronchoscopy     Vertigo      Past Surgical History:        Procedure Laterality Date    BREAST SURGERY      augmentation    CARDIAC CATHETERIZATION      CARDIAC SURGERY      septum occluder    COLONOSCOPY      CYSTOSCOPY  6/8/2016    [facility]    HIP ARTHROSCOPY Left 02/08/2018    HIP SURGERY      HYSTERECTOMY      NASAL SINUS SURGERY  8/22/13    OTHER SURGICAL HISTORY      thoracic sympathectomy    PACEMAKER INSERTION      also revision x 3    TONSILLECTOMY      UPPER GASTROINTESTINAL ENDOSCOPY  03/20/2017     Past Social History:  Social History     Social History    Marital status:      Spouse name: N/A    Number of children: N/A    Years of education: N/A     Social History Main Topics    Smoking status: Former Smoker     Packs/day: 0.25     Years: 3.00     Types: Cigarettes     Quit date: 12/17/1998    Smokeless tobacco: Never Used    Alcohol use Yes      Comment: 2 glasses wine or 1

## 2018-11-01 NOTE — ANESTHESIA PRE PROCEDURE
(1.676 m)                                              BP Readings from Last 3 Encounters:   11/01/18 120/84   10/22/18 122/85   10/18/18 (!) 133/94       NPO Status: Time of last liquid consumption: 2000                        Time of last solid consumption: 2000                        Date of last liquid consumption: 10/31/18                        Date of last solid food consumption: 10/31/18    BMI:   Wt Readings from Last 3 Encounters:   11/01/18 165 lb (74.8 kg)   10/22/18 163 lb (73.9 kg)   09/27/18 163 lb (73.9 kg)     Body mass index is 26.63 kg/m². CBC:   Lab Results   Component Value Date    WBC 4.9 10/26/2018    RBC 4.56 10/26/2018    RBC 4.61 03/09/2016    HGB 13.9 10/26/2018    HCT 42.4 10/26/2018    MCV 92.8 10/26/2018    RDW 13.0 10/26/2018     10/26/2018       CMP:   Lab Results   Component Value Date     10/26/2018    K 5.1 10/26/2018     10/26/2018    CO2 25 10/26/2018    BUN 16 10/26/2018    CREATININE 0.6 10/26/2018    GFRAA >60 10/26/2018    GFRAA >60 03/18/2011    AGRATIO 1.7 10/18/2017    LABGLOM >60 10/26/2018    GLUCOSE 93 10/26/2018    PROT 7.6 10/18/2017    PROT 7.1 03/18/2011    CALCIUM 9.4 10/26/2018    BILITOT 0.5 10/18/2017    ALKPHOS 99 10/18/2017    AST 21 10/18/2017    ALT 22 10/18/2017       POC Tests: No results for input(s): POCGLU, POCNA, POCK, POCCL, POCBUN, POCHEMO, POCHCT in the last 72 hours. Coags:   Lab Results   Component Value Date    PROTIME 10.0 11/01/2018    INR 0.88 11/01/2018    APTT 32.2 11/01/2018       HCG (If Applicable):   Lab Results   Component Value Date    PREGTESTUR Negative 05/22/2016        ABGs: No results found for: PHART, PO2ART, RVR6ADO, QMA9AFS, BEART, Y8SLODEI     Type & Screen (If Applicable):  No results found for: LABABO, LABRH    Anesthesia Evaluation     history of anesthetic complications: PONV.   Airway: Mallampati: II  TM distance: >3 FB   Neck ROM: full  Mouth opening: > = 3 FB Dental:          Pulmonary: breath

## 2018-11-01 NOTE — PROGRESS NOTES
From OR to pacu bay 9 accompanied by michael crna and monitors applied. Pt arrives in pain 10/10 and no intraop issues reported. Pt prefers to lay on her right side due to chronic back pain. Assisted pt to lay on the right side. Pt states she has crutches and walker at home. Pt has COPD and arrivs with a slight productive cough and feels that some mucous is stuck in her airway. Airway intact and pt is trying to cough up stuck mucous.

## 2018-11-02 NOTE — OP NOTE
[address] [facility]               [address]                                OPERATIVE REPORT    PATIENT NAME: Priscilla Arteaga                   :        1965  MED REC NO:   [id number]                          ROOM:  ACCOUNT NO:   [de-identified]                           ADMIT DATE: 2018  PROVIDER:     Jc Lr MD    DATE OF PROCEDURE:  2018    PREOPERATIVE DIAGNOSES:  1. Left hip mechanical symptoms status post fall three months ago. 2.  Left hip status post hip arthroscopy with labral repair, femoral  osteoplasty, loose body removal and acetabular rim trimming, and  capsular repair, 2018. POSTOPERATIVE DIAGNOSES:  1. Left hip severe synovitis. 2.  Left hip healed labrum and no recurrent labral tear. 3.  Left hip small regrowth of femoral osteoplasty bone spur. 4.  Left hip no intraarticular loose body. 5.  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 OPERATION PERFORMED:  1. Left hip arthroscopy, diagnostic. 2.  Left hip arthroscopic synovectomy with partial labral debridement of  synovial tissue. 3.  Left hip revision femoral osteoplasty. ATTENDING SURGEON:  Jc Lr MD    ASSISTANTS:  1. Cyndee Mendoza DO.  2.  Fox Torres PA-C.    TRACTION TIME:  25 minutes. ANESTHESIA:  General with single-shot fascia iliaca block. COMPLICATIONS:  None observed. IMPLANT:  None used. DISPOSITION:  Stable to PACU. INDICATIONS FOR SURGERY:  The patient is a very nice 51-year-old woman,  who has past medical history notable for pain management issues as well  as a previous hip arthroscopy for a loose body removal, labral repair,  and femoral acetabular impingement take down in 2018. The patient  did extremely well following the surgery for approximately six months.    In 2018, the patient then had a rather severe fall and since that  point, has been having recurrent pain in the hip as well as some sharp  mechanical symptoms. She does have a past medical history notable for  being unable to get an MRI or any type of MR arthrogram study. As such,  she did have a CT arthrogram performed a few weeks ago, which was not  notable for any recurrent loose body or any other fractures of any kind. However, she continued to complain of sharp mechanical pain in her hip  and had difficulty with range of motion. She also underwent an  intraarticular injection, but still is having some pain. She also is  requesting more pain medications. Given the fact that we were unable to  get a true MRI of her hip, her history of intraarticular loose body, her  recent accident in 07/2018, and the mechanical symptoms that she was  having, we did discuss the possibility of a diagnostic hip arthroscopy  to make sure there is no mechanical cause for her symptoms and then  followed by physical therapy. The patient was on board with this. She  also understood that if the arthroscopy was negative, her symptoms  likely will be related to internal snapping hip or any type of iliopsoas  dysfunction. We did discuss the risk of surgery which include but are  not limited to persistent pain from chronic inflammation and narcotic  use, persistent progression of arthritis, chondrolysis, hip flexor  tendinitis, and stiffness about the hip. The patient wanted to go ahead  with surgery and signed the informed consent. DESCRIPTION OF SURGERY:  On 11/01/2018, the patient was met in the  preoperative holding area. The left hip was marked with indelible ink. The patient was seen by the anesthesia team who successfully placed a  single-shot fascia iliaca block. She was taken to the operating room,  placed supine on the OR table. There, she was positioned in traction  boots. Anesthesia team successfully induced the general anesthesia. The patient was then positioned and draped in the standard sterile  fashion.   A timeout was performed to identify the correct patient,  allergies, implants, procedure, and use of preoperative IV antibiotics. The left hip was then gently distracted in order to allow access into  the central compartment of the hip. DIAGNOSTIC ARTHROSCOPY:  At this point, using the same arthroscopy  portal sites, a spinal needle was used to enter the central compartment. We cannulated the spinal needle with a nitinol wire and then gently  entered into the joint with a trocar in an atraumatic fashion. Prior to  distracting, the patient was noted to have an excellent suction seal and  no evidence of any type of microinstability. The scope was then  introduced into the joint and immediately there was an incredible amount  of synovitis that was appreciable. An anteromedial portal was then  established using fluoroscopic and arthroscopic visualization. OPERATIVE FINDINGS:  There were no recurrent loose bodies in the left  hip joint. The labral repair that had previously been performed eight  months ago was completely healed. It was stable to probing. There was  a significant amount of synovitis throughout the hip joint including  just proximal to the labrum over the proximal leaflet of the capsule as  well as in the pulvinar area. There was grade 1 and grade 2  chondromalacia of the acetabulum as well as the femoral head. There was  also a small recurrent early-developing bone spur near the prior femoral  osteoplasty site medially. ARTHROSCOPIC SYNOVECTOMY AND PARTIAL LABRAL DEBRIDEMENT:  At this stage,  I then used an arthroscopic shaver and an ArthroCare wand to gently  perform an arthroscopic synovectomy of the central compartment in the  condyloid fossa area, the proximal portion of the capsular leaflet, the  distal capsular leaflet as well as in the peripheral compartment of the  hip. I also used a mechanical shaver to gently debride some frayed  synovial tissue that was adherent to the labrum.   Once again, the labrum  was assessed throughout the repair area, and was noted to be completely  healed with excellent stability upon probing. There was an excellent  suction seal.    REVISION FEMORAL OSTEOPLASTY:  At this point, the hip was reduced back  into the joint and was partially flexed. A small recurrent bone spur  was demonstrable on the anterior aspect of the previous femoral  osteoplasty site. Using a mechanical jerson, I did smoothed this over to  help restore the normal head and neck offset of the joint surface. I  then performed a dynamic exam to confirm there was no areas of  impingement. There was an excellent suction seal about the hip. There  was excellent hemostasis as well. Hemostasis was maintained with fluid  pressure as well as an ArthroCare wand. CAPSULAR WEAR:  Because a true capsulotomy was not performed,  specifically the portal site capsular windows were never connected, a  capsular repair was unnecessary. However, evidence of capsular healing  was appreciable and the previous capsular repair wore demonstrated the  things that healed and everything was intact. WOUND CLOSURE:  At this point, the arthroscope was removed from the hip. Portal sites were closed with 3-0 Vicryl in deep layer followed by 4-0  Monocryl in an inverted interrupted fashion. Wounds were covered by  Steri-Strips, followed by gauze, followed by a Medipore dressing. POSTOPERATIVE COURSE: The patient will be allowed to go home. She is  receiving pain medications from her pain management physician, Dr. Domingo Mercado. She will start the physical therapy tomorrow and she has been  encouraged to do this twice a week as majority of her symptoms likely  are related to her iliopsoas and hip spasming.   Once again, there were  no mechanical causes for hip pain such as recurrent loose bodies or  anything else intraarticularly in the joint and therefore, I think she  should respond very stably to conservative management

## 2018-11-05 ENCOUNTER — TELEPHONE (OUTPATIENT)
Dept: ORTHOPEDIC SURGERY | Age: 53
End: 2018-11-05

## 2018-11-05 NOTE — TELEPHONE ENCOUNTER
Lm: scheduled the patient post op visit for 11/12/2018. Advised patient to call me on my direct line if that date / time don't work.

## 2018-11-06 ENCOUNTER — HOSPITAL ENCOUNTER (OUTPATIENT)
Dept: PHYSICAL THERAPY | Age: 53
Setting detail: THERAPIES SERIES
Discharge: HOME | End: 2018-11-06
Payer: COMMERCIAL

## 2018-11-06 DIAGNOSIS — M62.81 MUSCLE WEAKNESS: ICD-10-CM

## 2018-11-06 DIAGNOSIS — Z98.890 STATUS POST HIP SURGERY: Primary | ICD-10-CM

## 2018-11-06 PROCEDURE — 97161 PT EVAL LOW COMPLEX 20 MIN: CPT | Performed by: PHYSICAL THERAPIST

## 2018-11-06 PROCEDURE — G8979 MOBILITY GOAL STATUS: HCPCS | Performed by: PHYSICAL THERAPIST

## 2018-11-06 PROCEDURE — G8978 MOBILITY CURRENT STATUS: HCPCS | Performed by: PHYSICAL THERAPIST

## 2018-11-06 PROCEDURE — 97110 THERAPEUTIC EXERCISES: CPT | Performed by: PHYSICAL THERAPIST

## 2018-11-06 NOTE — PLAN OF CARE
Deficits. Long Term Goals: To be achieved in: 12 weeks  1. Disability index score of 10% or less for the LEFS to assist with reaching prior level of function. 2. Patient will demonstrate increased AROM to equal the opposite side bilaterally to allow for proper joint functioning as indicated by patients Functional Deficits. 3. Patient will demonstrate an increase in strength to LE MMT scores to match bilaterally to allow for proper functional mobility as indicated by patients Functional Deficits. 4. Patient will return to all transfers, work activities, and functional activities without increased symptoms or restriction.     5. Patient will have 0/10 pain with ADL's.  6. Patient stated goal: Return to outdoor activities (hiking, camping, riding 4 skinner)     Electronically signed by:  Santiago Restrepo PT

## 2018-11-08 ENCOUNTER — TELEPHONE (OUTPATIENT)
Dept: ORTHOPEDIC SURGERY | Age: 53
End: 2018-11-08

## 2018-11-09 ENCOUNTER — OFFICE VISIT (OUTPATIENT)
Dept: ORTHOPEDIC SURGERY | Age: 53
End: 2018-11-09

## 2018-11-09 VITALS
DIASTOLIC BLOOD PRESSURE: 80 MMHG | BODY MASS INDEX: 26.52 KG/M2 | SYSTOLIC BLOOD PRESSURE: 113 MMHG | HEART RATE: 86 BPM | WEIGHT: 165 LBS | HEIGHT: 66 IN

## 2018-11-09 DIAGNOSIS — Z98.890 STATUS POST HIP SURGERY: Primary | ICD-10-CM

## 2018-11-09 DIAGNOSIS — R21: ICD-10-CM

## 2018-11-09 PROCEDURE — 99024 POSTOP FOLLOW-UP VISIT: CPT | Performed by: ORTHOPAEDIC SURGERY

## 2018-11-09 RX ORDER — SULFAMETHOXAZOLE AND TRIMETHOPRIM 800; 160 MG/1; MG/1
1 TABLET ORAL 2 TIMES DAILY
Qty: 20 TABLET | Refills: 0 | Status: SHIPPED | OUTPATIENT
Start: 2018-11-09 | End: 2018-11-19

## 2018-11-09 NOTE — PROGRESS NOTES
Hip Arthroscopy Follow-up  Conchita Hernandez is here for follow up after left hip arthroscopic surgery. Surgery date was 8 days ago. Findings at surgery: No loose body. The patient's pain is rated at 1/10. The patient denies fever, wound drainage but she has had a papular rash developed on the left lateral thigh area as well as the left stomach area. She denies pus, increasing swelling, fevers, chills, shortness of breath or any increased pain with motion. In fact patient has been moving and ambulate quite well. She states that the preoperative pain that she had is gone completely. She has not been using crutches except for long distances    Physical Examination:    Patient is awake, alert, and in no acute distress. The wound is healing. No dressing or Prineo is on the wound. There is no induration or fluctuance of any kind. There is no tenderness about the wounds. There is a somewhat papular rash surrounding the wounds and this papular rash does extend into her abdomen. Hip brace is not present    Forward flexion: 110  Supine Internal rotation: 25  Supine External rotation: 65  Abduction: 50  Adduction: 30    No pain with short arcs about the hip    Motor intact L4-S1    Sensation intact L2-S1    No calf tenderness    Lower extremity warm and well perfused    Xrays:   No new x-rays taken today about the hip    Assessment:   8 days status post left hip arthroscopy, debridement and revision femoral osteoplasty. Fortunately, patient states that her preoperative pain is gone. However, she does appear to have a rash surrounding her skin wounds and extending up into her abdomen. It is unclear if this rash is related to drug allergy versus early developing cellulitis. At this time, clinically she has no signs of deep infection because she has excellent range of motion, better than would be expected at this stage, is ambulating without any problems, and also has no fevers or chills. Plan:    We

## 2018-11-13 ENCOUNTER — OFFICE VISIT (OUTPATIENT)
Dept: ORTHOPEDIC SURGERY | Age: 53
End: 2018-11-13

## 2018-11-13 VITALS
HEART RATE: 87 BPM | BODY MASS INDEX: 26.52 KG/M2 | HEIGHT: 66 IN | SYSTOLIC BLOOD PRESSURE: 102 MMHG | DIASTOLIC BLOOD PRESSURE: 73 MMHG | WEIGHT: 165 LBS

## 2018-11-13 DIAGNOSIS — Z98.890 STATUS POST HIP SURGERY: Primary | ICD-10-CM

## 2018-11-13 PROCEDURE — 99024 POSTOP FOLLOW-UP VISIT: CPT | Performed by: ORTHOPAEDIC SURGERY

## 2018-11-14 ENCOUNTER — HOSPITAL ENCOUNTER (OUTPATIENT)
Dept: PHYSICAL THERAPY | Age: 53
Setting detail: THERAPIES SERIES
End: 2018-11-14
Payer: COMMERCIAL

## 2018-11-20 ENCOUNTER — HOSPITAL ENCOUNTER (OUTPATIENT)
Dept: PHYSICAL THERAPY | Age: 53
Setting detail: THERAPIES SERIES
Discharge: HOME | End: 2018-11-20
Payer: COMMERCIAL

## 2018-11-20 PROCEDURE — 97112 NEUROMUSCULAR REEDUCATION: CPT

## 2018-11-20 PROCEDURE — 97110 THERAPEUTIC EXERCISES: CPT

## 2018-11-20 NOTE — FLOWSHEET NOTE
per patient tolerance, in order to prevent re-injury. 2. Patient will have a decrease in pain to facilitate improvement in movement, function, and ADLs as indicated by Functional Deficits.     Long Term Goals: To be achieved in: 12 weeks  1. Disability index score of 10% or less for the LEFS to assist with reaching prior level of function. 2. Patient will demonstrate increased AROM to equal the opposite side bilaterally to allow for proper joint functioning as indicated by patients Functional Deficits. 3. Patient will demonstrate an increase in strength to LE MMT scores to match bilaterally to allow for proper functional mobility as indicated by patients Functional Deficits. 4. Patient will return to all transfers, work activities, and functional activities without increased symptoms or restriction. 5. Patient will have 0/10 pain with ADL's.  6. Patient stated goal: Return to outdoor activities (hiking, camping, riding 4 skinner)           Goals that are underlined signify the goal has been accomplished. Progression Towards Functional goals:  [] Patient is progressing as expected towards functional goals listed. [] Progression is slowed due to complexities listed. [] Progression has been slowed due to co-morbidities.   [x] Plan just implemented, too soon to assess goals progression  [] Other:     ASSESSMENT:  See eval    Treatment/Activity Tolerance:   [x] Patient tolerated treatment well [] Patient limited by fatique  [] Patient limited by pain  [] Patient limited by other medical complications  [] Other:      Prognosis: [x] Good [] Fair  [] Poor    Patient Requires Follow-up: [x] Yes  [] No    PLAN: See eval  [x] Continue per plan of care [] Alter current plan (see comments)  [] Plan of care initiated [] Hold pending MD visit [] Discharge    Electronically signed by: Michelle Parkinson PT

## 2018-11-26 ENCOUNTER — HOSPITAL ENCOUNTER (OUTPATIENT)
Dept: PHYSICAL THERAPY | Age: 53
Setting detail: THERAPIES SERIES
End: 2018-11-26
Payer: COMMERCIAL

## 2018-11-28 ENCOUNTER — HOSPITAL ENCOUNTER (OUTPATIENT)
Dept: PHYSICAL THERAPY | Age: 53
Setting detail: THERAPIES SERIES
End: 2018-11-28
Payer: COMMERCIAL

## 2018-12-10 RX ORDER — PANTOPRAZOLE SODIUM 20 MG/1
TABLET, DELAYED RELEASE ORAL
Qty: 28 TABLET | Refills: 0 | OUTPATIENT
Start: 2018-12-10

## 2019-04-04 ENCOUNTER — OFFICE VISIT (OUTPATIENT)
Dept: ORTHOPEDIC SURGERY | Age: 54
End: 2019-04-04
Payer: COMMERCIAL

## 2019-04-04 DIAGNOSIS — M25.562 ACUTE PAIN OF LEFT KNEE: ICD-10-CM

## 2019-04-04 DIAGNOSIS — M17.12 PRIMARY OSTEOARTHRITIS OF LEFT KNEE: ICD-10-CM

## 2019-04-04 DIAGNOSIS — M23.204 DEGENERATIVE TEAR OF MEDIAL MENISCUS OF LEFT KNEE: ICD-10-CM

## 2019-04-04 DIAGNOSIS — M22.42 CHONDROMALACIA PATELLAE OF LEFT KNEE: Primary | ICD-10-CM

## 2019-04-04 PROCEDURE — G8428 CUR MEDS NOT DOCUMENT: HCPCS | Performed by: ORTHOPAEDIC SURGERY

## 2019-04-04 PROCEDURE — L1812 KO ELASTIC W/JOINTS PRE OTS: HCPCS | Performed by: ORTHOPAEDIC SURGERY

## 2019-04-04 PROCEDURE — G8419 CALC BMI OUT NRM PARAM NOF/U: HCPCS | Performed by: ORTHOPAEDIC SURGERY

## 2019-04-04 PROCEDURE — 99243 OFF/OP CNSLTJ NEW/EST LOW 30: CPT | Performed by: ORTHOPAEDIC SURGERY

## 2019-04-04 PROCEDURE — 3017F COLORECTAL CA SCREEN DOC REV: CPT | Performed by: ORTHOPAEDIC SURGERY

## 2019-04-04 NOTE — PROGRESS NOTES
KNEE VISIT      HISTORY OF PRESENT ILLNESS    Camilla Schaefer is a 48 y.o. female who presents for consultation at request of Dr. Santos Jama, for left knee pain. He she's had for last 25 years. She had a reinjury on 2/13/19 when she stepped off a curb, but does have pain in around her kneecap. Since that time and over the last several years intermittently. The pain is constant and accompanied by stiffness and instability and seems to be getting worse. Walking, standing, steps and 6 popping seemed to aggravate it. Elevation and ice seem to help it. She's had a knee brace on. 4.  Medical comorbidities she does have a pacemaker and has had an ablation. ROS    Well-documented in the patient history form dated 4/4/19  All other ROS negative except for above. Past Surgical history    Past Surgical History:   Procedure Laterality Date    BREAST SURGERY      augmentation    CARDIAC CATHETERIZATION      CARDIAC SURGERY      septum occluder    COLONOSCOPY      CYSTOSCOPY  6/8/2016    [facility]    HIP ARTHROSCOPY Left 02/08/2018    HIP SURGERY      HYSTERECTOMY      NASAL SINUS SURGERY  8/22/13    OTHER SURGICAL HISTORY      thoracic sympathectomy    PACEMAKER INSERTION      also revision x 3    [place] HIP ARTHROSCOPY, DX Left 11/1/2018    LEFT HIP ARTHROSCOPY, LABRAL  DEBRIDEMENT, CHONDROPLASTY performed by Tegan Veronica MD at [address]  03/20/2017       PAST MEDICAL    Past Medical History:   Diagnosis Date    Asthma     Bursitis of left hip     CAD (coronary artery disease)     COPD (chronic obstructive pulmonary disease) ([facility] [address]     History of blood transfusion     MRSA (methicillin resistant staph aureus) culture positive 12/2015    Lungs.   Diagnosed [facility] with bronchoscopy    MVP (mitral valve prolapse)     PONV (postoperative nausea and vomiting)     Prolonged QT interval syndrome     S/P bronchoscopy     Vertigo 1998     Years since quittin.3    Smokeless tobacco: Never Used   Substance and Sexual Activity    Alcohol use: Yes     Comment: 2 glasses wine or 1 beer/nightly    Drug use: No    Sexual activity: Yes     Partners: Male   Lifestyle    Physical activity:     Days per week: Not on file     Minutes per session: Not on file    Stress: Not on file   Relationships    Social connections:     Talks on phone: Not on file     Gets together: Not on file     Attends Baptism service: Not on file     Active member of club or organization: Not on file     Attends meetings of clubs or organizations: Not on file     Relationship status: Not on file    Intimate partner violence:     Fear of current or ex partner: Not on file     Emotionally abused: Not on file     Physically abused: Not on file     Forced sexual activity: Not on file   Other Topics Concern    Not on file   Social History Narrative    Not on file       Family HISTORY    Family History   Problem Relation Age of Onset    Heart Disease Mother     Colon Cancer Father         colon    Heart Disease Father     High Blood Pressure Father        PHYSICAL EXAM    Vital Signs: There were no vitals taken for this visit. General Appearance:  Normal body habitus. Alert and oriented to person, place, and time. Affect:  Normal.   Gait:  Normal. Good balance and coordination. Skin:  Intact. Sensation:  Intact. Strength:  Intact. Reflexes:  Intact. Pulses:  Intact. Knee Exam:    Effusion:  Negative    Range of Motion Right Left   Extension 0 0   Flexion 125 125     Provocative Test Right Left    Positive Negative Positive Negative   Anterior drawer [] [x] [] [x]   Lachman [] [x] [] [x]   Posterior drawer [] [x] [] [x]   Varus testing [] [x] [] [x]   Valgus testing [] [x] [x] []   Joint line tenderness [] [x] [x] []     Additional Exam Comments:  Her neurocirculatory lymphatic exam is normal symmetric to both lower extremities.   She does have medial compartment pain to direct palpation as well as crepitus in the parapatellar region and with compression has patellofemoral pain. 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 Conservative care options including physical therapy, NSAIDs, bracing, and activity modification were discussed. 2.  The indications for therapeutic injections were discussed. 3.  The indications for additional imaging studies were discussed. 4.  After considering the various options discussed, the patient elected to pursue a course that includes a knee support, and aggressive home rehab program, some meloxicam, and if not substantially improved over the next few weeks. Return for consideration of injection versus arthroscopic intervention.

## 2019-04-05 ENCOUNTER — TELEPHONE (OUTPATIENT)
Dept: ORTHOPEDIC SURGERY | Age: 54
End: 2019-04-05

## 2019-04-05 NOTE — TELEPHONE ENCOUNTER
4/4/19  DME   - NOT COVERED - FOLLOW MEDICAID FEE SCHEDULE AND ITEM NOT ON FEE SCHEDULE - SPLIT CODING CROSSWALK FOR MEDICAID  IS COVERED AND NO PRECERT REQUIRED - PER NOTES - NDS

## 2019-04-25 ENCOUNTER — TELEPHONE (OUTPATIENT)
Dept: ORTHOPEDIC SURGERY | Age: 54
End: 2019-04-25

## 2019-05-01 NOTE — TELEPHONE ENCOUNTER
Auth: [employer]  Date: 5/13/19  Reference # None  Spoke with: None  Type of SX: Outpatient  Location: [facility]  CPT [id number]   SX area: Lt knee

## 2019-05-02 ENCOUNTER — APPOINTMENT (OUTPATIENT)
Dept: CT IMAGING | Age: 54
End: 2019-05-02
Payer: COMMERCIAL

## 2019-05-02 ENCOUNTER — HOSPITAL ENCOUNTER (EMERGENCY)
Age: 54
Discharge: HOME | End: 2019-05-02
Attending: EMERGENCY MEDICINE
Payer: COMMERCIAL

## 2019-05-02 ENCOUNTER — APPOINTMENT (OUTPATIENT)
Dept: ULTRASOUND IMAGING | Age: 54
End: 2019-05-02
Payer: COMMERCIAL

## 2019-05-02 VITALS
BODY MASS INDEX: 27 KG/M2 | HEIGHT: 66 IN | WEIGHT: 168 LBS | SYSTOLIC BLOOD PRESSURE: 114 MMHG | OXYGEN SATURATION: 97 % | RESPIRATION RATE: 16 BRPM | HEART RATE: 76 BPM | TEMPERATURE: 98 F | DIASTOLIC BLOOD PRESSURE: 82 MMHG

## 2019-05-02 DIAGNOSIS — R10.11 RIGHT UPPER QUADRANT ABDOMINAL PAIN: Primary | ICD-10-CM

## 2019-05-02 LAB
A/G RATIO: 1.8 (ref 1.1–2.2)
ALBUMIN SERPL-MCNC: 4.8 G/DL (ref 3.4–5)
ALP BLD-CCNC: 130 U/L (ref 40–129)
ALT SERPL-CCNC: 18 U/L (ref 10–40)
ANION GAP SERPL CALCULATED.3IONS-SCNC: 10 MMOL/L (ref 3–16)
AST SERPL-CCNC: 21 U/L (ref 15–37)
BASOPHILS ABSOLUTE: 0.1 K/UL (ref 0–0.2)
BASOPHILS RELATIVE PERCENT: 0.8 %
BILIRUB SERPL-MCNC: 0.3 MG/DL (ref 0–1)
BUN BLDV-MCNC: 17 MG/DL (ref 7–20)
CALCIUM SERPL-MCNC: 9.7 MG/DL (ref 8.3–10.6)
CHLORIDE BLD-SCNC: 104 MMOL/L (ref 99–110)
CO2: 28 MMOL/L (ref 21–32)
CREAT SERPL-MCNC: 0.7 MG/DL (ref 0.6–1.1)
EOSINOPHILS ABSOLUTE: 0.2 K/UL (ref 0–0.6)
EOSINOPHILS RELATIVE PERCENT: 2.7 %
GFR AFRICAN AMERICAN: >60
GFR NON-AFRICAN AMERICAN: >60
GLOBULIN: 2.7 G/DL
GLUCOSE BLD-MCNC: 94 MG/DL (ref 70–99)
HCT VFR BLD CALC: 45.4 % (ref 36–48)
HEMOGLOBIN: 15.5 G/DL (ref 12–16)
LIPASE: 42 U/L (ref 13–60)
LYMPHOCYTES ABSOLUTE: 2.2 K/UL (ref 1–5.1)
LYMPHOCYTES RELATIVE PERCENT: 32 %
MCH RBC QN AUTO: 31.7 PG (ref 26–34)
MCHC RBC AUTO-ENTMCNC: 34.2 G/DL (ref 31–36)
MCV RBC AUTO: 92.6 FL (ref 80–100)
MONOCYTES ABSOLUTE: 0.5 K/UL (ref 0–1.3)
MONOCYTES RELATIVE PERCENT: 7.6 %
NEUTROPHILS ABSOLUTE: 4 K/UL (ref 1.7–7.7)
NEUTROPHILS RELATIVE PERCENT: 56.9 %
PDW BLD-RTO: 12.6 % (ref 12.4–15.4)
PLATELET # BLD: 177 K/UL (ref 135–450)
PMV BLD AUTO: 9 FL (ref 5–10.5)
POTASSIUM REFLEX MAGNESIUM: 4 MMOL/L (ref 3.5–5.1)
RBC # BLD: 4.9 M/UL (ref 4–5.2)
SODIUM BLD-SCNC: 142 MMOL/L (ref 136–145)
TOTAL PROTEIN: 7.5 G/DL (ref 6.4–8.2)
WBC # BLD: 7 K/UL (ref 4–11)

## 2019-05-02 PROCEDURE — 96375 TX/PRO/DX INJ NEW DRUG ADDON: CPT

## 2019-05-02 PROCEDURE — 96374 THER/PROPH/DIAG INJ IV PUSH: CPT

## 2019-05-02 PROCEDURE — 6360000002 HC RX W HCPCS: Performed by: EMERGENCY MEDICINE

## 2019-05-02 PROCEDURE — 93005 ELECTROCARDIOGRAM TRACING: CPT | Performed by: EMERGENCY MEDICINE

## 2019-05-02 PROCEDURE — 6360000004 HC RX CONTRAST MEDICATION: Performed by: EMERGENCY MEDICINE

## 2019-05-02 PROCEDURE — 76705 ECHO EXAM OF ABDOMEN: CPT

## 2019-05-02 PROCEDURE — 96361 HYDRATE IV INFUSION ADD-ON: CPT

## 2019-05-02 PROCEDURE — 80053 COMPREHEN METABOLIC PANEL: CPT

## 2019-05-02 PROCEDURE — 74177 CT ABD & PELVIS W/CONTRAST: CPT

## 2019-05-02 PROCEDURE — 2580000003 HC RX 258: Performed by: EMERGENCY MEDICINE

## 2019-05-02 PROCEDURE — 83690 ASSAY OF LIPASE: CPT

## 2019-05-02 PROCEDURE — 85025 COMPLETE CBC W/AUTO DIFF WBC: CPT

## 2019-05-02 PROCEDURE — 99284 EMERGENCY DEPT VISIT MOD MDM: CPT

## 2019-05-02 RX ORDER — ONDANSETRON 2 MG/ML
4 INJECTION INTRAMUSCULAR; INTRAVENOUS ONCE
Status: COMPLETED | OUTPATIENT
Start: 2019-05-02 | End: 2019-05-02

## 2019-05-02 RX ORDER — 0.9 % SODIUM CHLORIDE 0.9 %
1000 INTRAVENOUS SOLUTION INTRAVENOUS ONCE
Status: COMPLETED | OUTPATIENT
Start: 2019-05-02 | End: 2019-05-02

## 2019-05-02 RX ORDER — MORPHINE SULFATE 4 MG/ML
4 INJECTION, SOLUTION INTRAMUSCULAR; INTRAVENOUS
Status: DISCONTINUED | OUTPATIENT
Start: 2019-05-02 | End: 2019-05-02 | Stop reason: HOSPADM

## 2019-05-02 RX ORDER — HYDROCODONE BITARTRATE AND ACETAMINOPHEN 5; 325 MG/1; MG/1
1 TABLET ORAL EVERY 6 HOURS PRN
Qty: 12 TABLET | Refills: 0 | Status: SHIPPED | OUTPATIENT
Start: 2019-05-02 | End: 2019-05-05

## 2019-05-02 RX ORDER — CLONIDINE HYDROCHLORIDE 0.1 MG/1
0.1 TABLET ORAL NIGHTLY PRN
COMMUNITY
End: 2019-09-02

## 2019-05-02 RX ORDER — ONDANSETRON 4 MG/1
4 TABLET, FILM COATED ORAL EVERY 8 HOURS PRN
Qty: 20 TABLET | Refills: 0 | Status: SHIPPED | OUTPATIENT
Start: 2019-05-02 | End: 2019-09-02

## 2019-05-02 RX ADMIN — SODIUM CHLORIDE 1000 ML: 9 INJECTION, SOLUTION INTRAVENOUS at 18:03

## 2019-05-02 RX ADMIN — IOPAMIDOL 75 ML: 755 INJECTION, SOLUTION INTRAVENOUS at 19:36

## 2019-05-02 RX ADMIN — ONDANSETRON 4 MG: 2 INJECTION INTRAMUSCULAR; INTRAVENOUS at 18:03

## 2019-05-02 RX ADMIN — MORPHINE SULFATE 4 MG: 4 INJECTION INTRAVENOUS at 18:03

## 2019-05-02 RX ADMIN — HYDROMORPHONE HYDROCHLORIDE 1 MG: 1 INJECTION, SOLUTION INTRAMUSCULAR; INTRAVENOUS; SUBCUTANEOUS at 18:42

## 2019-05-02 ASSESSMENT — ENCOUNTER SYMPTOMS
NAUSEA: 1
RESPIRATORY NEGATIVE: 1
DIARRHEA: 0
CONSTIPATION: 1
ABDOMINAL DISTENTION: 1
ABDOMINAL PAIN: 1
EYES NEGATIVE: 1

## 2019-05-02 ASSESSMENT — PAIN SCALES - GENERAL
PAINLEVEL_OUTOF10: 9
PAINLEVEL_OUTOF10: 10

## 2019-05-02 ASSESSMENT — PAIN DESCRIPTION - LOCATION: LOCATION: ABDOMEN

## 2019-05-02 ASSESSMENT — PAIN DESCRIPTION - PAIN TYPE: TYPE: ACUTE PAIN

## 2019-05-02 NOTE — ED NOTES
Patient states that this is the worst pain she has ever had.  EMD aware     Ata Lynch RN  05/02/19 [id number]

## 2019-05-02 NOTE — ED PROVIDER NOTES
[address] [facility]  ED  eMERGENCY dEPARTMENT eNCOUnter      Pt Name: Weston Field  MRN: [id number]  [place] 1965  Date of evaluation: 5/2/2019  Provider: Marcos Puentes MD    [address] [place] [address]       Chief Complaint   Patient presents with    Abdominal Pain     c/o right sided abd pain for 2 days. c/o pain getting worse after eating chick constantine a        HISTORY OF PRESENT ILLNESS   (Location/Symptom, Timing/Onset,Context/Setting, Quality, Duration, Modifying Factors, Severity)  Note limiting factors. HPI: Weston Field is a 48 y.o. female, with hx of CAD, pacer, and s/p hyster, who presents to the emergency department with abdominal pain. Patient notes that pain began yesterday afternoon after she ate fatty meal.  Since that time, she has had pain with any eating. She is also concern with change to her stool noting that it is \"wheelie\" and that she has not had a bowel movement or passed gas since this morning. Patient denies any fever or chills. She notes some nausea. She is Sharp Involving Upper Abdomen in the Epigastric and Right Upper Quadrant Area and Radiating to the Back. Patient Denies a Tearing Sensation. She Denies Any Reflux Symptoms. Patient Notes That Pain Is Worse If She Lays on Her Right Side or with Any Eating. She Has Not Found Any Relieving Factors of Her Pain. NursingNotes were reviewed. REVIEW OF SYSTEMS    (2-9 systems for level 4, 10 or more for level 5)     Review of Systems   Constitutional: Positive for fatigue. Negative for chills. HENT: Negative. Eyes: Negative. Respiratory: Negative. Cardiovascular: Negative. Gastrointestinal: Positive for abdominal distention, abdominal pain, constipation and nausea. Negative for diarrhea. Genitourinary: Negative. Musculoskeletal: Negative. Skin: Negative. Neurological: Negative. Psychiatric/Behavioral: Negative.         Except as noted above the remainder of the review of systems was reviewed and negative. PAST MEDICAL HISTORY     Past Medical History:   Diagnosis Date    Asthma     Bursitis of left hip     CAD (coronary artery disease)     COPD (chronic obstructive pulmonary disease) ([address]     History of blood transfusion     MRSA (methicillin resistant staph aureus) culture positive 12/2015    Lungs. Diagnosed [facility] with bronchoscopy    MVP (mitral valve prolapse)     PONV (postoperative nausea and vomiting)     Prolonged QT interval syndrome     S/P bronchoscopy     Vertigo          SURGICALHISTORY       Past Surgical History:   Procedure Laterality Date    BREAST SURGERY      augmentation    CARDIAC CATHETERIZATION      CARDIAC SURGERY      septum occluder    COLONOSCOPY      CYSTOSCOPY  6/8/2016    [facility]    HIP ARTHROSCOPY Left 02/08/2018    HIP SURGERY      HYSTERECTOMY      NASAL SINUS SURGERY  8/22/13    OTHER SURGICAL HISTORY      thoracic sympathectomy    PACEMAKER INSERTION      also revision x 3    [place] HIP ARTHROSCOPY, DX Left 11/1/2018    LEFT HIP ARTHROSCOPY, LABRAL  DEBRIDEMENT, CHONDROPLASTY performed by [place] Arena[place] at [address] [facility],[address] ENDOSCOPY  03/20/2017         CURRENT MEDICATIONS       Previous Medications    ASPIRIN 325 MG EC TABLET    Take 1 tablet by mouth 2 times daily (with meals)    CLONIDINE (CATAPRES) 0.1 MG TABLET    Take 0.1 mg by mouth nightly as needed for High Blood Pressure    CYCLOBENZAPRINE (FLEXERIL) 10 MG TABLET    Take 10 mg by mouth 3 times daily as needed for Muscle spasms    NADOLOL (CORGARD) 40 MG TABLET    Take 120 mg by mouth nightly     PANTOPRAZOLE (PROTONIX) 40 MG TABLET    Take 1 tablet by mouth daily for 10 days       ALLERGIES     Quinolones;  Dexamethasone sodium phosphate; Codeine; Nitroglycerin; and Prednisone    FAMILY HISTORY       Family History   Problem Relation Age of Onset    Heart Disease Mother     Colon Cancer Father         colon    Heart Disease Father  High Blood Pressure Father        SOCIAL HISTORY       Social History     Socioeconomic History    Marital status:      Spouse name: None    Number of children: None    Years of education: None    Highest education level: None   Occupational History    None   Social Needs    Financial resource strain: None    Food insecurity:     Worry: None     Inability: None    Transportation needs:     Medical: None     Non-medical: None   Tobacco Use    Smoking status: Former Smoker     Packs/day: 0.25     Years: 3.00     Pack years: 0.75     Types: Cigarettes     Last attempt to quit: 1998     Years since quittin.3    Smokeless tobacco: Never Used   Substance and Sexual Activity    Alcohol use: Yes     Comment: 2 glasses wine or 1 beer/nightly    Drug use: No    Sexual activity: Yes     Partners: Male   Lifestyle    Physical activity:     Days per week: None     Minutes per session: None    Stress: None   Relationships    Social connections:     Talks on phone: None     Gets together: None     Attends Jew service: None     Active member of club or organization: None     Attends meetings of clubs or organizations: None     Relationship status: None    Intimate partner violence:     Fear of current or ex partner: None     Emotionally abused: None     Physically abused: None     Forced sexual activity: None   Other Topics Concern    None   Social History Narrative    None       SCREENINGS             PHYSICAL EXAM    (up to 7 for level 4, 8 or more for level 5)     ED Triage Vitals [19 1733]   BP Temp Temp Source Pulse Resp SpO2 Height Weight   124/84 97.9 °F (36.6 °C) Oral 79 22 99 % 5' 6\" (1.676 m) 168 lb (76.2 kg)       Physical Exam   Constitutional: She is oriented to person, place, and time. She appears well-developed and well-nourished. No distress. HENT:   Head: Normocephalic and atraumatic.    Nose: Nose normal.   Mouth/Throat: Oropharynx is clear and moist.   Eyes: 5.20 M/uL    Hemoglobin 15.5 12.0 - 16.0 g/dL    Hematocrit 45.4 36.0 - 48.0 %    MCV 92.6 80.0 - 100.0 fL    MCH 31.7 26.0 - 34.0 pg    MCHC 34.2 31.0 - 36.0 g/dL    RDW 12.6 12.4 - 15.4 %    Platelets 418 068 - 802 K/uL    MPV 9.0 5.0 - 10.5 fL    Neutrophils % 56.9 %    Lymphocytes % 32.0 %    Monocytes % 7.6 %    Eosinophils % 2.7 %    Basophils % 0.8 %    Neutrophils # 4.0 1.7 - 7.7 K/uL    Lymphocytes # 2.2 1.0 - 5.1 K/uL    Monocytes # 0.5 0.0 - 1.3 K/uL    Eosinophils # 0.2 0.0 - 0.6 K/uL    Basophils # 0.1 0.0 - 0.2 K/uL   Comprehensive Metabolic Panel w/ Reflex to MG   Result Value Ref Range    Sodium 142 136 - 145 mmol/L    Potassium reflex Magnesium 4.0 3.5 - 5.1 mmol/L    Chloride 104 99 - 110 mmol/L    CO2 28 21 - 32 mmol/L    Anion Gap 10 3 - 16    Glucose 94 70 - 99 mg/dL    BUN 17 7 - 20 mg/dL    CREATININE 0.7 0.6 - 1.1 mg/dL    GFR Non-African American >60 >60    GFR African American >60 >60    Calcium 9.7 8.3 - 10.6 mg/dL    Total Protein 7.5 6.4 - 8.2 g/dL    Alb 4.8 3.4 - 5.0 g/dL    Albumin/Globulin Ratio 1.8 1.1 - 2.2    Total Bilirubin 0.3 0.0 - 1.0 mg/dL    Alkaline Phosphatase 130 (H) 40 - 129 U/L    ALT 18 10 - 40 U/L    AST 21 15 - 37 U/L    Globulin 2.7 g/dL   Lipase   Result Value Ref Range    Lipase 42.0 13.0 - 60.0 U/L   EKG 12 Lead   Result Value Ref Range    Ventricular Rate 72 BPM    Atrial Rate 72 BPM    P-R Interval 104 ms    QRS Duration 74 ms    Q-T Interval 444 ms    QTc Calculation (Bazett) 486 ms    P Axis 63 degrees    R Axis 55 degrees    T Axis 76 degrees    Diagnosis       Electronic atrial pacemakerT wave abnormality, consider anterior ischemiaProlonged QTAbnormal ECGNo previous ECGs available     Ct Abdomen Pelvis W Iv Contrast Additional Contrast? None    Result Date: 5/2/2019  EXAMINATION: CT OF THE ABDOMEN AND PELVIS WITH CONTRAST 5/2/2019 7:25 pm TECHNIQUE: CT of the abdomen and pelvis was performed with the administration of intravenous contrast. Multiplanar reformatted images are provided for review. Dose modulation, iterative reconstruction, and/or weight based adjustment of the mA/kV was utilized to reduce the radiation dose to as low as reasonably achievable. COMPARISON: CT 10/18/2017 HISTORY: ORDERING SYSTEM PROVIDED HISTORY: upper abd pain and concern for SOB, pancreatitis, and GB disease TECHNOLOGIST PROVIDED HISTORY: Additional Contrast?->None Ordering Physician Provided Reason for Exam: RUQ pain going into back starting yesterday, feels bloated and has severe belching. states she had \"oily and loose\" BM this morning Acuity: Acute Type of Exam: Initial Relevant Medical/Surgical History: hysterectomy, appendectomy FINDINGS: Lower Chest: Clear lungs. Organs: 12 mm low-density lesion in the liver likely represents a cyst.  Mild intra and extrahepatic biliary ductal dilation, increased from the prior. Gallbladder, adrenals, spleen and pancreas are normal.  Incidentally noted pancreatic divisum. 6 mm low-density lesion in left kidney likely represents a cyst.  No hydronephrosis. GI/Bowel: Normal appendix. No evidence of bowel obstruction. Pelvis: Prior hysterectomy. Bladder is normal. Peritoneum/Retroperitoneum: No evidence of AAA or lymphadenopathy. Bones/Soft Tissues: No suspicious osseous lesions. Increased intra and extrahepatic biliary ductal dilation from the prior study. No evidence of cholecystitis. Consider MRI with MRCP for further evaluation. Us Gallbladder Ruq    Result Date: 5/2/2019  EXAMINATION: RIGHT UPPER QUADRANT ULTRASOUND 5/2/2019 5:50 pm COMPARISON: None. HISTORY: ORDERING SYSTEM PROVIDED HISTORY: RUQ pain, postprandial pain TECHNOLOGIST PROVIDED HISTORY: Ordering Physician Provided Reason for Exam: ruq pain Acuity: Acute Type of Exam: Initial FINDINGS: LIVER:  The liver demonstrates normal echogenicity without evidence of intrahepatic biliary ductal dilatation. 1.2 cm cyst in the right lobe.  BILIARY SYSTEM:  Gallbladder is unremarkable without evidence of pericholecystic fluid, wall thickening or stones. Negative sonographic Martinez's sign. Common bile duct is within normal limits measuring 2 mm. RIGHT KIDNEY: The right kidney is grossly unremarkable without evidence of hydronephrosis. PANCREAS:  Visualized portions of the pancreas are unremarkable. OTHER: No evidence of right upper quadrant ascites. Unremarkable right upper quadrant ultrasound. All other labs were within normal range or not returned as of this dictation. EMERGENCY DEPARTMENT COURSE and DIFFERENTIAL DIAGNOSIS/MDM:   Vitals:    Vitals:    05/02/19 1733 05/02/19 1822 05/02/19 1941   BP: 124/84 (!) 124/100 (!) 129/93   Pulse: 79 73 85   Resp: 22 16 16   Temp: 97.9 °F (36.6 °C)     TempSrc: Oral     SpO2: 99% (!) 72% 100%   Weight: 168 lb (76.2 kg)     Height: 5' 6\" (1.676 m)         MDM: Most concern for gallbladder disease, pancreatitis, hepatitis, and SBO. Imaging and laboratory pending. Ultrasound and CT results are nonacute, LFTs are normal, white cell count is unremarkable, and vitals are normal as well. Appreciate CT read which mentions intra and extrahepatic biliary ductal dilation, this patient does not have an elevated bilirubin, low concern for choledocholithiasis. I spoke with GI who agrees the patient can follow-up as an outpatient and is happy with plan. Patient will not be able to obtain an MRCP due to a pacemaker, but will follow up with GI. Some concern for secondary gain as patient's controlled substance database notes 14 different narcotic prescribers at 8 different pharmacies over the last year. Patient was on a large amount of narcotics a few December which she states is related to falls and a broken hip. Her last prescription was at that time. Patient with normal vitals and laboratory discharge and happy with discharge plan.       CRITICAL CARE TIME   Total Critical Care time was 0 minutes, excluding separately reportable procedures. CONSULTS:  IP CONSULT TO GI    PROCEDURES:  Unless otherwise noted below, none     Procedures    FINAL IMPRESSION      1. Right upper quadrant abdominal pain          DISPOSITION/PLAN   DISPOSITION        PATIENT REFERRED TO:  Irina Desai MD  [address] [place] [address], [address] [place] [address]  [phone]    Schedule an appointment as soon as possible for a visit   right upper quadrant pain    [facility]  ED  [facility] [address]  [phone]    If symptoms worsen      DISCHARGE MEDICATIONS:  New Prescriptions    HYDROCODONE-ACETAMINOPHEN (NORCO) 5-325 MG PER TABLET    Take 1 tablet by mouth every 6 hours as needed for Pain for up to 3 days. Intended supply: 3 days.  Take lowest dose possible to manage pain    ONDANSETRON (ZOFRAN) 4 MG TABLET    Take 1 tablet by mouth every 8 hours as needed for Nausea          (Please note that portions of this note were completed with a voice recognition program.Efforts were made to edit the dictations but occasionally words are mis-transcribed.)    Jannie Ansari MD (electronically signed)  Attending Emergency Physician       Danni Bailey MD  05/02/19 2411

## 2019-05-03 LAB
EKG ATRIAL RATE: 72 BPM
EKG DIAGNOSIS: NORMAL
EKG P AXIS: 63 DEGREES
EKG P-R INTERVAL: 104 MS
EKG Q-T INTERVAL: 444 MS
EKG QRS DURATION: 74 MS
EKG QTC CALCULATION (BAZETT): 486 MS
EKG R AXIS: 55 DEGREES
EKG T AXIS: 76 DEGREES
EKG VENTRICULAR RATE: 72 BPM

## 2019-05-03 PROCEDURE — 93010 ELECTROCARDIOGRAM REPORT: CPT | Performed by: INTERNAL MEDICINE

## 2019-05-03 NOTE — ED NOTES
Patient requests something for pain before she leaves.  7/10  EMD aware     Sirisha Martínez RN  05/02/19 3789

## 2019-05-03 NOTE — PROGRESS NOTES
Call to Miriam FU @ 2026  Re: CT results - increase biliary dilation from last study and RUQ pain per Dr. Brittany Amaya @ 2030

## 2019-05-03 NOTE — ED NOTES
Patient given d/c instructions with return verbalization including medications. Emphasis on f/u, to return with worsening s/s. Aware that EMD states that pt should get Rx filled for pain. Pt to return with worsening s/s. Ambulated to lobby with steady gait. Waiting for .      Janice Qureshi RN  05/02/19 7204

## 2019-05-05 ENCOUNTER — HOSPITAL ENCOUNTER (EMERGENCY)
Age: 54
Discharge: HOME | DRG: 445 | End: 2019-05-05
Attending: EMERGENCY MEDICINE
Payer: COMMERCIAL

## 2019-05-05 ENCOUNTER — APPOINTMENT (OUTPATIENT)
Dept: GENERAL RADIOLOGY | Age: 54
DRG: 445 | End: 2019-05-05
Payer: COMMERCIAL

## 2019-05-05 VITALS
OXYGEN SATURATION: 97 % | RESPIRATION RATE: 16 BRPM | BODY MASS INDEX: 27 KG/M2 | SYSTOLIC BLOOD PRESSURE: 123 MMHG | WEIGHT: 168 LBS | HEIGHT: 66 IN | TEMPERATURE: 97.6 F | HEART RATE: 78 BPM | DIASTOLIC BLOOD PRESSURE: 78 MMHG

## 2019-05-05 DIAGNOSIS — R10.10 UPPER ABDOMINAL PAIN: Primary | ICD-10-CM

## 2019-05-05 LAB
A/G RATIO: 1.7 (ref 1.1–2.2)
ALBUMIN SERPL-MCNC: 4.7 G/DL (ref 3.4–5)
ALP BLD-CCNC: 115 U/L (ref 40–129)
ALT SERPL-CCNC: 15 U/L (ref 10–40)
ANION GAP SERPL CALCULATED.3IONS-SCNC: 9 MMOL/L (ref 3–16)
AST SERPL-CCNC: 22 U/L (ref 15–37)
BASOPHILS ABSOLUTE: 0 K/UL (ref 0–0.2)
BASOPHILS RELATIVE PERCENT: 0.7 %
BILIRUB SERPL-MCNC: 0.3 MG/DL (ref 0–1)
BILIRUBIN URINE: NEGATIVE
BLOOD, URINE: NEGATIVE
BUN BLDV-MCNC: 13 MG/DL (ref 7–20)
CALCIUM SERPL-MCNC: 10.1 MG/DL (ref 8.3–10.6)
CHLORIDE BLD-SCNC: 104 MMOL/L (ref 99–110)
CLARITY: CLEAR
CO2: 29 MMOL/L (ref 21–32)
COLOR: YELLOW
CREAT SERPL-MCNC: 0.7 MG/DL (ref 0.6–1.1)
EOSINOPHILS ABSOLUTE: 0.2 K/UL (ref 0–0.6)
EOSINOPHILS RELATIVE PERCENT: 2.9 %
GFR AFRICAN AMERICAN: >60
GFR NON-AFRICAN AMERICAN: >60
GLOBULIN: 2.7 G/DL
GLUCOSE BLD-MCNC: 96 MG/DL (ref 70–99)
GLUCOSE URINE: NEGATIVE MG/DL
HCT VFR BLD CALC: 43.7 % (ref 36–48)
HEMOGLOBIN: 14.8 G/DL (ref 12–16)
KETONES, URINE: NEGATIVE MG/DL
LEUKOCYTE ESTERASE, URINE: NEGATIVE
LIPASE: 29 U/L (ref 13–60)
LYMPHOCYTES ABSOLUTE: 1.8 K/UL (ref 1–5.1)
LYMPHOCYTES RELATIVE PERCENT: 26.9 %
MCH RBC QN AUTO: 31.3 PG (ref 26–34)
MCHC RBC AUTO-ENTMCNC: 34 G/DL (ref 31–36)
MCV RBC AUTO: 92.2 FL (ref 80–100)
MICROSCOPIC EXAMINATION: NORMAL
MONOCYTES ABSOLUTE: 0.4 K/UL (ref 0–1.3)
MONOCYTES RELATIVE PERCENT: 6.4 %
NEUTROPHILS ABSOLUTE: 4.1 K/UL (ref 1.7–7.7)
NEUTROPHILS RELATIVE PERCENT: 63.1 %
NITRITE, URINE: NEGATIVE
PDW BLD-RTO: 12.5 % (ref 12.4–15.4)
PH UA: 7 (ref 5–8)
PLATELET # BLD: 169 K/UL (ref 135–450)
PMV BLD AUTO: 9.1 FL (ref 5–10.5)
POTASSIUM REFLEX MAGNESIUM: 4.3 MMOL/L (ref 3.5–5.1)
PROTEIN UA: NEGATIVE MG/DL
RBC # BLD: 4.73 M/UL (ref 4–5.2)
SODIUM BLD-SCNC: 142 MMOL/L (ref 136–145)
SPECIFIC GRAVITY UA: 1.01 (ref 1–1.03)
TOTAL PROTEIN: 7.4 G/DL (ref 6.4–8.2)
URINE REFLEX TO CULTURE: NORMAL
URINE TYPE: NORMAL
UROBILINOGEN, URINE: 0.2 E.U./DL
WBC # BLD: 6.5 K/UL (ref 4–11)

## 2019-05-05 PROCEDURE — 96374 THER/PROPH/DIAG INJ IV PUSH: CPT

## 2019-05-05 PROCEDURE — 36415 COLL VENOUS BLD VENIPUNCTURE: CPT

## 2019-05-05 PROCEDURE — 80053 COMPREHEN METABOLIC PANEL: CPT

## 2019-05-05 PROCEDURE — 99284 EMERGENCY DEPT VISIT MOD MDM: CPT

## 2019-05-05 PROCEDURE — 85025 COMPLETE CBC W/AUTO DIFF WBC: CPT

## 2019-05-05 PROCEDURE — 83690 ASSAY OF LIPASE: CPT

## 2019-05-05 PROCEDURE — 6360000002 HC RX W HCPCS: Performed by: PHYSICIAN ASSISTANT

## 2019-05-05 PROCEDURE — 71045 X-RAY EXAM CHEST 1 VIEW: CPT

## 2019-05-05 PROCEDURE — 81003 URINALYSIS AUTO W/O SCOPE: CPT

## 2019-05-05 PROCEDURE — 96375 TX/PRO/DX INJ NEW DRUG ADDON: CPT

## 2019-05-05 PROCEDURE — 2580000003 HC RX 258: Performed by: PHYSICIAN ASSISTANT

## 2019-05-05 PROCEDURE — 96361 HYDRATE IV INFUSION ADD-ON: CPT

## 2019-05-05 RX ORDER — DOXYCYCLINE HYCLATE 100 MG/1
CAPSULE ORAL
COMMUNITY
Start: 2019-02-15 | End: 2019-09-02

## 2019-05-05 RX ORDER — 0.9 % SODIUM CHLORIDE 0.9 %
1000 INTRAVENOUS SOLUTION INTRAVENOUS ONCE
Status: COMPLETED | OUTPATIENT
Start: 2019-05-05 | End: 2019-05-05

## 2019-05-05 RX ORDER — ONDANSETRON 2 MG/ML
4 INJECTION INTRAMUSCULAR; INTRAVENOUS ONCE
Status: DISCONTINUED | OUTPATIENT
Start: 2019-05-05 | End: 2019-05-05

## 2019-05-05 RX ORDER — PROMETHAZINE HYDROCHLORIDE 25 MG/ML
6.25 INJECTION, SOLUTION INTRAMUSCULAR; INTRAVENOUS ONCE
Status: COMPLETED | OUTPATIENT
Start: 2019-05-05 | End: 2019-05-05

## 2019-05-05 RX ORDER — OXYCODONE HYDROCHLORIDE AND ACETAMINOPHEN 5; 325 MG/1; MG/1
1 TABLET ORAL EVERY 6 HOURS PRN
Qty: 6 TABLET | Refills: 0 | Status: ON HOLD | OUTPATIENT
Start: 2019-05-05 | End: 2019-05-11

## 2019-05-05 RX ADMIN — HYDROMORPHONE HYDROCHLORIDE 1 MG: 1 INJECTION, SOLUTION INTRAMUSCULAR; INTRAVENOUS; SUBCUTANEOUS at 14:36

## 2019-05-05 RX ADMIN — PROMETHAZINE HYDROCHLORIDE 6.25 MG: 25 INJECTION INTRAMUSCULAR; INTRAVENOUS at 14:40

## 2019-05-05 RX ADMIN — SODIUM CHLORIDE 1000 ML: 9 INJECTION, SOLUTION INTRAVENOUS at 14:35

## 2019-05-05 ASSESSMENT — ENCOUNTER SYMPTOMS
VOMITING: 0
DIARRHEA: 0
SHORTNESS OF BREATH: 0
ABDOMINAL PAIN: 1
NAUSEA: 1
COUGH: 0

## 2019-05-05 ASSESSMENT — PAIN DESCRIPTION - FREQUENCY: FREQUENCY: INTERMITTENT

## 2019-05-05 ASSESSMENT — PAIN DESCRIPTION - DIRECTION: RADIATING_TOWARDS: RT BACK

## 2019-05-05 ASSESSMENT — PAIN DESCRIPTION - LOCATION: LOCATION: ABDOMEN

## 2019-05-05 ASSESSMENT — PAIN SCALES - GENERAL
PAINLEVEL_OUTOF10: 3
PAINLEVEL_OUTOF10: 9

## 2019-05-05 ASSESSMENT — PAIN DESCRIPTION - ORIENTATION: ORIENTATION: RIGHT;UPPER

## 2019-05-05 ASSESSMENT — PAIN DESCRIPTION - DESCRIPTORS: DESCRIPTORS: PRESSURE;SHARP

## 2019-05-05 ASSESSMENT — PAIN DESCRIPTION - PAIN TYPE: TYPE: ACUTE PAIN

## 2019-05-05 NOTE — ED NOTES
Pt DC home in good condition with RX x 1. V/u of Dc instructions. Denies questions or concerns. Teaching done re: s/s to report.      Ineljeannie Hancock RN  05/05/19 2286

## 2019-05-05 NOTE — ED PROVIDER NOTES
RUQ abdominal pain started Wednesday after had eaten chicken sandwich at [employer]. Pain comes in waves. Radiates to back. She was seen at [facility] ER last Thursday with evaluation CT scan abdomen showing some ductal dilation, normal GB US, referral to GI and placed on Vicodin and Phenergan. States pain is worsening. She has not been able to eat because it makes the pain much worse. Tried eating piece of cheese and pain worse. No known fevers. No vomiting. No chest pain or shortness of breath. Taking phenergan at home helps with nausea. Patient taking doxycycline ×3 days. States she takes an antibiotic monthly due to history of MRSA lung infection. Not currently coughing or any chest symptoms. Hx peptic ulcer had not been taking her stomach medicine just started back on carafate. appt with swathi tomorrow. The history is provided by the patient. Abdominal Pain   Pain location:  RUQ and epigastric  Pain quality: sharp    Pain radiates to:  Back  Onset quality:  Sudden  Progression:  Worsening  Chronicity:  New  Worsened by:  Eating  Associated symptoms: nausea    Associated symptoms: no chest pain, no chills, no cough, no diarrhea, no dysuria, no fever, no shortness of breath and no vomiting        Review of Systems   Constitutional: Negative for chills and fever. Respiratory: Negative for cough and shortness of breath. Cardiovascular: Negative for chest pain. Gastrointestinal: Positive for abdominal pain and nausea. Negative for diarrhea and vomiting. Genitourinary: Negative for difficulty urinating and dysuria. All other systems reviewed and are negative.         PAST MEDICAL HISTORY   has a past medical history of Asthma, Bursitis of left hip, CAD (coronary artery disease), COPD (chronic obstructive pulmonary disease) ([facility] [address] History of blood transfusion, MRSA (methicillin resistant staph aureus) culture positive (12/2015), MVP (mitral valve prolapse), PONV (postoperative nausea and vomiting), Prolonged QT interval syndrome, S/P bronchoscopy, and Vertigo. PAST SURGICAL HISTORY   has a past surgical history that includes Pacemaker insertion; Hysterectomy; Nasal sinus surgery (8/22/13); Cystoscopy (6/8/2016); Breast surgery; Tonsillectomy; Colonoscopy; Upper gastrointestinal endoscopy (03/20/2017); Hip arthroscopy (Left, 02/08/2018); hip surgery; Cardiac surgery; Cardiac catheterization; other surgical history; and pr hip arthroscopy, dx (Left, 11/1/2018). FAMILY HISTORY  family history includes Colon Cancer in her father; Heart Disease in her father and mother; High Blood Pressure in her father. SOCIAL HISTORY   reports that she quit smoking about 20 years ago. Her smoking use included cigarettes. She has a 0.75 pack-year smoking history. She has never used smokeless tobacco. She reports that she drinks alcohol. She reports that she does not use drugs. HOME MEDICATIONS     Prior to Admission medications    Medication Sig Start Date End Date Taking? Authorizing Provider   cloNIDine (CATAPRES) 0.1 MG tablet Take 0.1 mg by mouth nightly as needed for High Blood Pressure   Yes Historical Provider, MD   HYDROcodone-acetaminophen (NORCO) 5-325 MG per tablet Take 1 tablet by mouth every 6 hours as needed for Pain for up to 3 days. Intended supply: 3 days. Take lowest dose possible to manage pain 5/2/19 5/5/19 Yes Arnold Vuong MD   ondansetron [facility]) 4 MG tablet Take 1 tablet by mouth every 8 hours as needed for Nausea 5/2/19  Yes Arnold Vuong MD   nadolol (CORGARD) 40 MG tablet Take 120 mg by mouth nightly    Yes Historical Provider, MD   doxycycline hyclate (VIBRAMYCIN) 100 MG capsule  2/15/19   Historical Provider, MD        ALLERGIES  is allergic to quinolones; dexamethasone sodium phosphate; other; codeine; nitroglycerin; and prednisone.      BP (!) 127/90   Pulse 73   Temp 97.6 °F (36.4 °C) (Oral)   Resp 16   Ht 5' 6\" (1.676 m)   Wt 168 lb (76.2 kg)   SpO2 96%   BMI 27.12 kg/m²     Physical Exam   Constitutional: She is oriented to person, place, and time. She appears well-developed and well-nourished. Non-toxic appearance. HENT:   Head: Normocephalic and atraumatic. Mouth/Throat: Oropharynx is clear and moist and mucous membranes are normal. No oropharyngeal exudate, posterior oropharyngeal edema or posterior oropharyngeal erythema. Eyes: Pupils are equal, round, and reactive to light. Conjunctivae and EOM are normal.   Neck: Normal range of motion. Neck supple. No JVD present. Cardiovascular: Normal rate, regular rhythm and intact distal pulses. Pulmonary/Chest: Effort normal and breath sounds normal. No stridor. No respiratory distress. She has no wheezes. She has no rales. Abdominal: Soft. Bowel sounds are normal. She exhibits no distension and no mass. There is tenderness in the right upper quadrant and epigastric area. There is no rigidity, no rebound, no guarding and no CVA tenderness. Musculoskeletal: Normal range of motion. She exhibits no edema. Neurological: She is alert and oriented to person, place, and time. She has normal strength. No cranial nerve deficit or sensory deficit. She exhibits normal muscle tone. Coordination normal. GCS eye subscore is 4. GCS verbal subscore is 5. GCS motor subscore is 6. Skin: Skin is warm and dry. No rash noted. Psychiatric: She has a normal mood and affect. Her behavior is normal.   Vitals reviewed.       Procedures    MDM  Number of Diagnoses or Management Options  Upper abdominal pain:      Amount and/or Complexity of Data Reviewed  Clinical lab tests: ordered and reviewed  Review and summarize past medical records: yes  Discuss the patient with other providers: yes    Patient Progress  Patient progress: stable    Results for orders placed or performed during the hospital encounter of 05/05/19   Urinalysis Reflex to Culture   Result Value Ref Range    Color, UA Yellow Straw/Yellow    Clarity, UA Clear Clear Glucose, Ur Negative Negative mg/dL    Bilirubin Urine Negative Negative    Ketones, Urine Negative Negative mg/dL    Specific Gravity, UA 1.010 1.005 - 1.030    Blood, Urine Negative Negative    pH, UA 7.0 5.0 - 8.0    Protein, UA Negative Negative mg/dL    Urobilinogen, Urine 0.2 <2.0 E.U./dL    Nitrite, Urine Negative Negative    Leukocyte Esterase, Urine Negative Negative    Microscopic Examination Not Indicated     Urine Reflex to Culture Not Indicated     Urine Type Not Specified    CBC Auto Differential   Result Value Ref Range    WBC 6.5 4.0 - 11.0 K/uL    RBC 4.73 4.00 - 5.20 M/uL    Hemoglobin 14.8 12.0 - 16.0 g/dL    Hematocrit 43.7 36.0 - 48.0 %    MCV 92.2 80.0 - 100.0 fL    MCH 31.3 26.0 - 34.0 pg    MCHC 34.0 31.0 - 36.0 g/dL    RDW 12.5 12.4 - 15.4 %    Platelets 962 592 - 948 K/uL    MPV 9.1 5.0 - 10.5 fL    Neutrophils % 63.1 %    Lymphocytes % 26.9 %    Monocytes % 6.4 %    Eosinophils % 2.9 %    Basophils % 0.7 %    Neutrophils # 4.1 1.7 - 7.7 K/uL    Lymphocytes # 1.8 1.0 - 5.1 K/uL    Monocytes # 0.4 0.0 - 1.3 K/uL    Eosinophils # 0.2 0.0 - 0.6 K/uL    Basophils # 0.0 0.0 - 0.2 K/uL   Comprehensive Metabolic Panel w/ Reflex to MG   Result Value Ref Range    Sodium 142 136 - 145 mmol/L    Potassium reflex Magnesium 4.3 3.5 - 5.1 mmol/L    Chloride 104 99 - 110 mmol/L    CO2 29 21 - 32 mmol/L    Anion Gap 9 3 - 16    Glucose 96 70 - 99 mg/dL    BUN 13 7 - 20 mg/dL    CREATININE 0.7 0.6 - 1.1 mg/dL    GFR Non-African American >60 >60    GFR African American >60 >60    Calcium 10.1 8.3 - 10.6 mg/dL    Total Protein 7.4 6.4 - 8.2 g/dL    Alb 4.7 3.4 - 5.0 g/dL    Albumin/Globulin Ratio 1.7 1.1 - 2.2    Total Bilirubin 0.3 0.0 - 1.0 mg/dL    Alkaline Phosphatase 115 40 - 129 U/L    ALT 15 10 - 40 U/L    AST 22 15 - 37 U/L    Globulin 2.7 g/dL   Lipase   Result Value Ref Range    Lipase 29.0 13.0 - 60.0 U/L     Ct Abdomen Pelvis W Iv Contrast Additional Contrast? None    Result Date: Initial FINDINGS: LIVER:  The liver demonstrates normal echogenicity without evidence of intrahepatic biliary ductal dilatation. 1.2 cm cyst in the right lobe. BILIARY SYSTEM:  Gallbladder is unremarkable without evidence of pericholecystic fluid, wall thickening or stones. Negative sonographic Martinez's sign. Common bile duct is within normal limits measuring 2 mm. RIGHT KIDNEY: The right kidney is grossly unremarkable without evidence of hydronephrosis. PANCREAS:  Visualized portions of the pancreas are unremarkable. OTHER: No evidence of right upper quadrant ascites. Unremarkable right upper quadrant ultrasound. Xr Chest Portable    Result Date: 5/5/2019  EXAMINATION: SINGLE XRAY VIEW OF THE CHEST 5/5/2019 3:40 pm COMPARISON: 06/12/2018 HISTORY: ORDERING SYSTEM PROVIDED HISTORY: upper abd pain, r/o free air TECHNOLOGIST PROVIDED HISTORY: Reason for exam:->upper abd pain, r/o free air Ordering Physician Provided Reason for Exam: upper abd pain, r/o free air Acuity: Acute Type of Exam: Initial Relevant Medical/Surgical History: cardiac hx w/pacemaker FINDINGS: There is bibasilar scarring. The cardiac silhouette is within normal limits status post dual lead pacemaker. There is no pneumothorax or pleural effusion. 1.  No acute abnormality. 4:14 PM  Pain relief with pain medication provided here. Labs today are unremarkable. Normal white count, afebrile, normal vitals. Normal lipase. Normal LFTs. Portable chest x-ray is negative no free air. She does not have an acute abdomen. I think she can safely be discharged home provided pain medication she has appointment with Dr. Hyun Duran tomorrow will see him in follow-up and advised return to ER for worsening symptoms. She understands and agrees.     I estimate there is LOW risk for ACUTE APPENDICITIS, BOWEL OBSTRUCTION, CHOLECYSTITIS, DIVERTICULITIS, INCARCERATED HERNIA, PANCREATITIS, PERFORATED BOWEL, BOWEL ISCHEMIA, OR CARDIAC ISCHEMIA, thus I consider the discharge disposition reasonable. Also, there is no evidence or peritonitis, sepsis, or toxicity. Dr. Laisha Jacob spent face to face time with patient and agrees with above dx and treatment. Please note that this chart was generated using Dragon dictation software.  Although every effort was made to ensure the accuracy of this automated transcription, some errors in transcription may have occurred       Jeremy Connolly PA-C  05/05/19 1572

## 2019-05-05 NOTE — ED NOTES
I have personally performed and/or participated in the history, exam and medical decision making and spent time face to face with the patient and agree with all pertinent clinical information. My history and physical revealed: This patient presented with abd pain after eating a chicken sandwich. Pt states she has hx of ulcer and had taken Carafate in the past but has been off of it x 1 year. She states she is followed by GI and has an appointment with Dr Ana María Madrid tomorrow. Pt states eating and drinking makes the pain worse . Pt states she stopped drinking alcohol 10 days ago but was drinking wine daily-  Glasses a day- but has been too painful. She denies fever. She denies n/v or diarrhea. She denies CP or SOB. Exam-   HEENT: Normocephalic and atraumatic. Pupils equal round and reactive. TMs are clear. Mouth: mucous membranes pink and moist with no erythema and no exudates. Lungs are clear with equal bilateral breath sounds; no rales, wheezes, rhonchi, retractions, or stridor. Heart has regular rate and rhythm. Normal S1-S2 without gallop or murmur. Abdomen is soft with epigastric tenderness no guarding no rebound no masses. Good bowel sounds. Extremities have no cyanosis, erythema, edema, cords, or Gio's sign. Alert, nontoxic in appearance   Vitals viewed. I was present during the key portions of the examination and treatment of the patient. Case discussed with MLP. Concur with treatment and disposition.         /76   Pulse 70   Temp 97.6 °F (36.4 °C) (Oral)   Resp 16   Ht 5' 6\" (1.676 m)   Wt 168 lb (76.2 kg)   SpO2 97%   BMI 27.12 kg/m²   Results for orders placed or performed during the hospital encounter of 05/05/19   Urinalysis Reflex to Culture   Result Value Ref Range    Color, UA Yellow Straw/Yellow    Clarity, UA Clear Clear    Glucose, Ur Negative Negative mg/dL    Bilirubin Urine Negative Negative    Ketones, Urine Negative Negative mg/dL    Specific Gravity, UA 1.010 1.005 - 1.030    Blood, Urine Negative Negative    pH, UA 7.0 5.0 - 8.0    Protein, UA Negative Negative mg/dL    Urobilinogen, Urine 0.2 <2.0 E.U./dL    Nitrite, Urine Negative Negative    Leukocyte Esterase, Urine Negative Negative    Microscopic Examination Not Indicated     Urine Reflex to Culture Not Indicated     Urine Type Not Specified    CBC Auto Differential   Result Value Ref Range    WBC 6.5 4.0 - 11.0 K/uL    RBC 4.73 4.00 - 5.20 M/uL    Hemoglobin 14.8 12.0 - 16.0 g/dL    Hematocrit 43.7 36.0 - 48.0 %    MCV 92.2 80.0 - 100.0 fL    MCH 31.3 26.0 - 34.0 pg    MCHC 34.0 31.0 - 36.0 g/dL    RDW 12.5 12.4 - 15.4 %    Platelets 288 873 - 208 K/uL    MPV 9.1 5.0 - 10.5 fL    Neutrophils % 63.1 %    Lymphocytes % 26.9 %    Monocytes % 6.4 %    Eosinophils % 2.9 %    Basophils % 0.7 %    Neutrophils # 4.1 1.7 - 7.7 K/uL    Lymphocytes # 1.8 1.0 - 5.1 K/uL    Monocytes # 0.4 0.0 - 1.3 K/uL    Eosinophils # 0.2 0.0 - 0.6 K/uL    Basophils # 0.0 0.0 - 0.2 K/uL   Comprehensive Metabolic Panel w/ Reflex to MG   Result Value Ref Range    Sodium 142 136 - 145 mmol/L    Potassium reflex Magnesium 4.3 3.5 - 5.1 mmol/L    Chloride 104 99 - 110 mmol/L    CO2 29 21 - 32 mmol/L    Anion Gap 9 3 - 16    Glucose 96 70 - 99 mg/dL    BUN 13 7 - 20 mg/dL    CREATININE 0.7 0.6 - 1.1 mg/dL    GFR Non-African American >60 >60    GFR African American >60 >60    Calcium 10.1 8.3 - 10.6 mg/dL    Total Protein 7.4 6.4 - 8.2 g/dL    Alb 4.7 3.4 - 5.0 g/dL    Albumin/Globulin Ratio 1.7 1.1 - 2.2    Total Bilirubin 0.3 0.0 - 1.0 mg/dL    Alkaline Phosphatase 115 40 - 129 U/L    ALT 15 10 - 40 U/L    AST 22 15 - 37 U/L    Globulin 2.7 g/dL   Lipase   Result Value Ref Range    Lipase 29.0 13.0 - 60.0 U/L       We discussed diet patient will refrain from cheese she will refrain from lemonade. She will switch to just water and stiffly water. His appointment tomorrow with Dr. Hanna Mcclain.   We discussed the possibility of this being related to ulcers opposed gallbladder. She has been off her ulcer medication for a year and restarted it recently. At this time patient will have a chest x-ray to rule out any free air and if that is unremarkable we will discharge patient home. Again at this time there is no indication acute or surgical abdomen and no indication for elevated LFTs or pancreatitis. Patient is agreeable with this plan. Discussed results, diagnosis and plan with patient and/or family. Questions addressed. Disposition and follow-up agreed upon. Specific discharge instructions explained, including reasons to return to the emergency department. All entries by Lenetta Rinne are made while acting as a scribe for Gayle Lam MD.    This dictation was generated by voice recognition computer software. Although all attempts are made to edit the dictation for accuracy, there may be errors in the transcription that are not intended.          Gayle Lam MD  05/05/19 3996

## 2019-05-07 ENCOUNTER — HOSPITAL ENCOUNTER (INPATIENT)
Age: 54
LOS: 4 days | Discharge: HOME | DRG: 445 | End: 2019-05-11
Attending: EMERGENCY MEDICINE | Admitting: INTERNAL MEDICINE
Payer: COMMERCIAL

## 2019-05-07 ENCOUNTER — APPOINTMENT (OUTPATIENT)
Dept: ULTRASOUND IMAGING | Age: 54
DRG: 445 | End: 2019-05-07
Payer: COMMERCIAL

## 2019-05-07 DIAGNOSIS — R10.11 RIGHT UPPER QUADRANT ABDOMINAL PAIN: Primary | ICD-10-CM

## 2019-05-07 DIAGNOSIS — R10.10 UPPER ABDOMINAL PAIN: ICD-10-CM

## 2019-05-07 DIAGNOSIS — E86.0 DEHYDRATION: ICD-10-CM

## 2019-05-07 LAB
A/G RATIO: 2 (ref 1.1–2.2)
ALBUMIN SERPL-MCNC: 4.7 G/DL (ref 3.4–5)
ALP BLD-CCNC: 112 U/L (ref 40–129)
ALT SERPL-CCNC: 16 U/L (ref 10–40)
ANION GAP SERPL CALCULATED.3IONS-SCNC: 9 MMOL/L (ref 3–16)
AST SERPL-CCNC: 24 U/L (ref 15–37)
BASOPHILS ABSOLUTE: 0 K/UL (ref 0–0.2)
BASOPHILS RELATIVE PERCENT: 0.5 %
BILIRUB SERPL-MCNC: 0.3 MG/DL (ref 0–1)
BUN BLDV-MCNC: 11 MG/DL (ref 7–20)
CALCIUM SERPL-MCNC: 10 MG/DL (ref 8.3–10.6)
CHLORIDE BLD-SCNC: 104 MMOL/L (ref 99–110)
CO2: 30 MMOL/L (ref 21–32)
CREAT SERPL-MCNC: 0.8 MG/DL (ref 0.6–1.1)
EOSINOPHILS ABSOLUTE: 0.2 K/UL (ref 0–0.6)
EOSINOPHILS RELATIVE PERCENT: 3.4 %
GFR AFRICAN AMERICAN: >60
GFR NON-AFRICAN AMERICAN: >60
GLOBULIN: 2.4 G/DL
GLUCOSE BLD-MCNC: 127 MG/DL (ref 70–99)
HCT VFR BLD CALC: 42.7 % (ref 36–48)
HEMOGLOBIN: 14.5 G/DL (ref 12–16)
LIPASE: 23 U/L (ref 13–60)
LYMPHOCYTES ABSOLUTE: 1.9 K/UL (ref 1–5.1)
LYMPHOCYTES RELATIVE PERCENT: 42.3 %
MCH RBC QN AUTO: 31.3 PG (ref 26–34)
MCHC RBC AUTO-ENTMCNC: 33.9 G/DL (ref 31–36)
MCV RBC AUTO: 92.3 FL (ref 80–100)
MONOCYTES ABSOLUTE: 0.4 K/UL (ref 0–1.3)
MONOCYTES RELATIVE PERCENT: 7.6 %
NEUTROPHILS ABSOLUTE: 2.1 K/UL (ref 1.7–7.7)
NEUTROPHILS RELATIVE PERCENT: 46.2 %
PDW BLD-RTO: 12.5 % (ref 12.4–15.4)
PLATELET # BLD: 173 K/UL (ref 135–450)
PMV BLD AUTO: 9.4 FL (ref 5–10.5)
POTASSIUM REFLEX MAGNESIUM: 4 MMOL/L (ref 3.5–5.1)
RBC # BLD: 4.62 M/UL (ref 4–5.2)
SODIUM BLD-SCNC: 143 MMOL/L (ref 136–145)
TOTAL PROTEIN: 7.1 G/DL (ref 6.4–8.2)
WBC # BLD: 4.6 K/UL (ref 4–11)

## 2019-05-07 PROCEDURE — 96374 THER/PROPH/DIAG INJ IV PUSH: CPT

## 2019-05-07 PROCEDURE — 99285 EMERGENCY DEPT VISIT HI MDM: CPT

## 2019-05-07 PROCEDURE — 6360000002 HC RX W HCPCS: Performed by: PHYSICIAN ASSISTANT

## 2019-05-07 PROCEDURE — 1200000000 HC SEMI PRIVATE

## 2019-05-07 PROCEDURE — 76705 ECHO EXAM OF ABDOMEN: CPT

## 2019-05-07 PROCEDURE — 83690 ASSAY OF LIPASE: CPT

## 2019-05-07 PROCEDURE — 2580000003 HC RX 258: Performed by: PHYSICIAN ASSISTANT

## 2019-05-07 PROCEDURE — 96375 TX/PRO/DX INJ NEW DRUG ADDON: CPT

## 2019-05-07 PROCEDURE — 85025 COMPLETE CBC W/AUTO DIFF WBC: CPT

## 2019-05-07 PROCEDURE — 80053 COMPREHEN METABOLIC PANEL: CPT

## 2019-05-07 PROCEDURE — 96361 HYDRATE IV INFUSION ADD-ON: CPT

## 2019-05-07 RX ORDER — SODIUM CHLORIDE 0.9 % (FLUSH) 0.9 %
10 SYRINGE (ML) INJECTION EVERY 12 HOURS SCHEDULED
Status: DISCONTINUED | OUTPATIENT
Start: 2019-05-07 | End: 2019-05-11 | Stop reason: HOSPADM

## 2019-05-07 RX ORDER — HYDROMORPHONE HCL 110MG/55ML
0.5 PATIENT CONTROLLED ANALGESIA SYRINGE INTRAVENOUS EVERY 30 MIN PRN
Status: DISCONTINUED | OUTPATIENT
Start: 2019-05-07 | End: 2019-05-07

## 2019-05-07 RX ORDER — ACETAMINOPHEN 325 MG/1
650 TABLET ORAL EVERY 4 HOURS PRN
Status: DISCONTINUED | OUTPATIENT
Start: 2019-05-07 | End: 2019-05-11 | Stop reason: HOSPADM

## 2019-05-07 RX ORDER — SODIUM CHLORIDE 9 MG/ML
INJECTION, SOLUTION INTRAVENOUS CONTINUOUS
Status: DISCONTINUED | OUTPATIENT
Start: 2019-05-07 | End: 2019-05-07

## 2019-05-07 RX ORDER — KETOROLAC TROMETHAMINE 30 MG/ML
15 INJECTION, SOLUTION INTRAMUSCULAR; INTRAVENOUS ONCE
Status: COMPLETED | OUTPATIENT
Start: 2019-05-07 | End: 2019-05-07

## 2019-05-07 RX ORDER — PROMETHAZINE HYDROCHLORIDE 25 MG/ML
12.5 INJECTION, SOLUTION INTRAMUSCULAR; INTRAVENOUS EVERY 6 HOURS PRN
Status: DISCONTINUED | OUTPATIENT
Start: 2019-05-07 | End: 2019-05-11 | Stop reason: HOSPADM

## 2019-05-07 RX ORDER — SODIUM CHLORIDE 9 MG/ML
INJECTION, SOLUTION INTRAVENOUS CONTINUOUS
Status: DISCONTINUED | OUTPATIENT
Start: 2019-05-07 | End: 2019-05-09

## 2019-05-07 RX ORDER — ONDANSETRON 2 MG/ML
4 INJECTION INTRAMUSCULAR; INTRAVENOUS EVERY 6 HOURS PRN
Status: DISCONTINUED | OUTPATIENT
Start: 2019-05-07 | End: 2019-05-07

## 2019-05-07 RX ORDER — DOXYCYCLINE HYCLATE 100 MG
100 TABLET ORAL EVERY 12 HOURS SCHEDULED
Status: DISCONTINUED | OUTPATIENT
Start: 2019-05-07 | End: 2019-05-11 | Stop reason: HOSPADM

## 2019-05-07 RX ORDER — 0.9 % SODIUM CHLORIDE 0.9 %
1000 INTRAVENOUS SOLUTION INTRAVENOUS ONCE
Status: COMPLETED | OUTPATIENT
Start: 2019-05-07 | End: 2019-05-07

## 2019-05-07 RX ORDER — HYDROMORPHONE HCL 110MG/55ML
0.5 PATIENT CONTROLLED ANALGESIA SYRINGE INTRAVENOUS EVERY 4 HOURS PRN
Status: DISCONTINUED | OUTPATIENT
Start: 2019-05-07 | End: 2019-05-09

## 2019-05-07 RX ORDER — NADOLOL 40 MG/1
120 TABLET ORAL NIGHTLY
Status: DISCONTINUED | OUTPATIENT
Start: 2019-05-07 | End: 2019-05-08

## 2019-05-07 RX ORDER — SODIUM CHLORIDE 0.9 % (FLUSH) 0.9 %
10 SYRINGE (ML) INJECTION PRN
Status: DISCONTINUED | OUTPATIENT
Start: 2019-05-07 | End: 2019-05-11 | Stop reason: HOSPADM

## 2019-05-07 RX ORDER — ONDANSETRON 2 MG/ML
4 INJECTION INTRAMUSCULAR; INTRAVENOUS EVERY 30 MIN PRN
Status: DISCONTINUED | OUTPATIENT
Start: 2019-05-07 | End: 2019-05-07

## 2019-05-07 RX ORDER — MORPHINE SULFATE 4 MG/ML
4 INJECTION, SOLUTION INTRAMUSCULAR; INTRAVENOUS EVERY 30 MIN PRN
Status: DISCONTINUED | OUTPATIENT
Start: 2019-05-07 | End: 2019-05-07

## 2019-05-07 RX ORDER — CLONIDINE HYDROCHLORIDE 0.1 MG/1
0.1 TABLET ORAL NIGHTLY PRN
Status: DISCONTINUED | OUTPATIENT
Start: 2019-05-07 | End: 2019-05-11 | Stop reason: HOSPADM

## 2019-05-07 RX ORDER — PANTOPRAZOLE SODIUM 40 MG/1
40 TABLET, DELAYED RELEASE ORAL
Status: DISCONTINUED | OUTPATIENT
Start: 2019-05-08 | End: 2019-05-11 | Stop reason: HOSPADM

## 2019-05-07 RX ADMIN — Medication 10 ML: at 20:26

## 2019-05-07 RX ADMIN — ONDANSETRON 4 MG: 2 INJECTION INTRAMUSCULAR; INTRAVENOUS at 14:51

## 2019-05-07 RX ADMIN — HYDROMORPHONE HYDROCHLORIDE 0.5 MG: 2 INJECTION INTRAMUSCULAR; INTRAVENOUS; SUBCUTANEOUS at 21:34

## 2019-05-07 RX ADMIN — SODIUM CHLORIDE 1000 ML: 9 INJECTION, SOLUTION INTRAVENOUS at 14:51

## 2019-05-07 RX ADMIN — HYDROMORPHONE HYDROCHLORIDE 0.5 MG: 2 INJECTION INTRAMUSCULAR; INTRAVENOUS; SUBCUTANEOUS at 15:48

## 2019-05-07 RX ADMIN — HYDROMORPHONE HYDROCHLORIDE 0.5 MG: 2 INJECTION INTRAMUSCULAR; INTRAVENOUS; SUBCUTANEOUS at 16:48

## 2019-05-07 RX ADMIN — SODIUM CHLORIDE: 9 INJECTION, SOLUTION INTRAVENOUS at 20:26

## 2019-05-07 RX ADMIN — MORPHINE SULFATE 4 MG: 4 INJECTION INTRAVENOUS at 14:51

## 2019-05-07 RX ADMIN — KETOROLAC TROMETHAMINE 15 MG: 30 INJECTION, SOLUTION INTRAMUSCULAR at 14:52

## 2019-05-07 ASSESSMENT — PAIN SCALES - GENERAL
PAINLEVEL_OUTOF10: 0
PAINLEVEL_OUTOF10: 10
PAINLEVEL_OUTOF10: 7
PAINLEVEL_OUTOF10: 10

## 2019-05-07 ASSESSMENT — PAIN DESCRIPTION - PAIN TYPE
TYPE: ACUTE PAIN

## 2019-05-07 ASSESSMENT — PAIN DESCRIPTION - ORIENTATION
ORIENTATION: RIGHT;MID
ORIENTATION: RIGHT
ORIENTATION: RIGHT;MID

## 2019-05-07 ASSESSMENT — PAIN DESCRIPTION - DESCRIPTORS: DESCRIPTORS: PRESSURE

## 2019-05-07 ASSESSMENT — PAIN DESCRIPTION - FREQUENCY
FREQUENCY: CONTINUOUS
FREQUENCY: CONTINUOUS

## 2019-05-07 ASSESSMENT — PAIN DESCRIPTION - LOCATION
LOCATION: ABDOMEN

## 2019-05-07 ASSESSMENT — PAIN DESCRIPTION - PROGRESSION: CLINICAL_PROGRESSION: GRADUALLY WORSENING

## 2019-05-07 ASSESSMENT — PAIN DESCRIPTION - DIRECTION: RADIATING_TOWARDS: BACK RIGHT

## 2019-05-07 ASSESSMENT — PAIN DESCRIPTION - ONSET: ONSET: ON-GOING

## 2019-05-07 NOTE — ED PROVIDER NOTES
Spouse name: None    Number of children: None    Years of education: None    Highest education level: None   Occupational History    None   Social Needs    Financial resource strain: None    Food insecurity:     Worry: None     Inability: None    Transportation needs:     Medical: None     Non-medical: None   Tobacco Use    Smoking status: Former Smoker     Packs/day: 0.25     Years: 3.00     Pack years: 0.75     Types: Cigarettes     Last attempt to quit: 1998     Years since quittin.4    Smokeless tobacco: Never Used   Substance and Sexual Activity    Alcohol use: Yes     Comment: 2 glasses wine or 1 beer/nightlynot drank in 7-10 days    Drug use: No    Sexual activity: Yes     Partners: Male   Lifestyle    Physical activity:     Days per week: None     Minutes per session: None    Stress: None   Relationships    Social connections:     Talks on phone: None     Gets together: None     Attends Sabianist service: None     Active member of club or organization: None     Attends meetings of clubs or organizations: None     Relationship status: None    Intimate partner violence:     Fear of current or ex partner: None     Emotionally abused: None     Physically abused: None     Forced sexual activity: None   Other Topics Concern    None   Social History Narrative    None       SCREENINGS             PHYSICAL EXAM    (up to 7 for level 4, 8 or more for level 5)     ED Triage Vitals [19 1326]   BP Temp Temp src Pulse Resp SpO2 Height Weight   125/86 97.5 °F (36.4 °C) -- (!) 7 16 100 % 5' 6\" (1.676 m) 168 lb (76.2 kg)       Physical Exam   Constitutional: She is oriented to person, place, and time. She appears well-developed and well-nourished. HENT:   Head: Normocephalic and atraumatic. Right Ear: External ear normal.   Left Ear: External ear normal.   Nose: Nose normal.   Eyes: Right eye exhibits no discharge. Left eye exhibits no discharge. Neck: Normal range of motion.  Neck supple. Cardiovascular: Normal rate, regular rhythm, normal heart sounds and intact distal pulses. Exam reveals no gallop and no friction rub. No murmur heard. Pulmonary/Chest: Effort normal and breath sounds normal. No stridor. No respiratory distress. She has no wheezes. She has no rales. She exhibits no tenderness. Abdominal: Soft. Normal appearance and bowel sounds are normal. She exhibits no distension, no abdominal bruit, no pulsatile midline mass and no mass. There is generalized tenderness and tenderness in the right upper quadrant. There is no rigidity, no rebound, no guarding, no CVA tenderness, no tenderness at McBurney's point and negative Martinez's sign. Musculoskeletal: Normal range of motion. Neurological: She is alert and oriented to person, place, and time. Skin: Skin is warm and dry. She is not diaphoretic. No pallor. Psychiatric: She has a normal mood and affect. Her behavior is normal.   Nursing note and vitals reviewed. DIAGNOSTIC RESULTS   LABS:    Labs Reviewed   COMPREHENSIVE METABOLIC PANEL W/ REFLEX TO MG FOR LOW K - Abnormal; Notable for the following components:       Result Value    Glucose 127 (*)     All other components within normal limits    Narrative:     Performed at:  [facility] [address],  [place], [facility]   Phone [phone]   CBC WITH AUTO DIFFERENTIAL    Narrative:     Performed at:  [facility] [address],  [place], [facility]   Phone [phone]   LIPASE    Narrative:     Performed at:  [facility]) - [facility] [address],  [place], [facility]   Phone [phone]       All other labs were within normal range or not returned as of this dictation. EKG:  All EKG's are interpreted by the Emergency Department Physician who either signs orCo-signs this chart in the absence of a cardiologist.  Please see their note for interpretation of EKG.      RADIOLOGY:   Non-plain film images such as CT, Ultrasound and MRI are read by the radiologist. Plain radiographic images are visualized andpreliminarily interpreted by the  ED Provider with the below findings:        Interpretation perthe Radiologist below, if available at the time of this note:    [address]   Final Result   1. Extrahepatic biliary dilatation appears stable from prior CT with mild   intrahepatic dilatation not well characterized due to technical factors. Underlying etiology is not determined. 2. Somewhat contracted gallbladder, likely due to lack of fasting. No   evidence of cholelithiasis. No inflammation. Ct Abdomen Pelvis W Iv Contrast Additional Contrast? None    Result Date: 5/2/2019  EXAMINATION: CT OF THE ABDOMEN AND PELVIS WITH CONTRAST 5/2/2019 7:25 pm TECHNIQUE: CT of the abdomen and pelvis was performed with the administration of intravenous contrast. Multiplanar reformatted images are provided for review. Dose modulation, iterative reconstruction, and/or weight based adjustment of the mA/kV was utilized to reduce the radiation dose to as low as reasonably achievable. COMPARISON: CT 10/18/2017 HISTORY: ORDERING SYSTEM PROVIDED HISTORY: upper abd pain and concern for SOB, pancreatitis, and GB disease TECHNOLOGIST PROVIDED HISTORY: Additional Contrast?->None Ordering Physician Provided Reason for Exam: RUQ pain going into back starting yesterday, feels bloated and has severe belching. states she had \"oily and loose\" BM this morning Acuity: Acute Type of Exam: Initial Relevant Medical/Surgical History: hysterectomy, appendectomy FINDINGS: Lower Chest: Clear lungs. Organs: 12 mm low-density lesion in the liver likely represents a cyst.  Mild intra and extrahepatic biliary ductal dilation, increased from the prior. Gallbladder, adrenals, spleen and pancreas are normal.  Incidentally noted pancreatic divisum.   6 mm low-density lesion in left kidney likely Acute Type of Exam: Initial FINDINGS: LIVER:  The liver demonstrates normal echogenicity without evidence of intrahepatic biliary ductal dilatation. 1.2 cm cyst in the right lobe. BILIARY SYSTEM:  Gallbladder is unremarkable without evidence of pericholecystic fluid, wall thickening or stones. Negative sonographic Martinez's sign. Common bile duct is within normal limits measuring 2 mm. RIGHT KIDNEY: The right kidney is grossly unremarkable without evidence of hydronephrosis. PANCREAS:  Visualized portions of the pancreas are unremarkable. OTHER: No evidence of right upper quadrant ascites. Unremarkable right upper quadrant ultrasound. Xr Chest Portable    Result Date: 5/5/2019  EXAMINATION: SINGLE XRAY VIEW OF THE CHEST 5/5/2019 3:40 pm COMPARISON: 06/12/2018 HISTORY: ORDERING SYSTEM PROVIDED HISTORY: upper abd pain, r/o free air TECHNOLOGIST PROVIDED HISTORY: Reason for exam:->upper abd pain, r/o free air Ordering Physician Provided Reason for Exam: upper abd pain, r/o free air Acuity: Acute Type of Exam: Initial Relevant Medical/Surgical History: cardiac hx w/pacemaker FINDINGS: There is bibasilar scarring. The cardiac silhouette is within normal limits status post dual lead pacemaker. There is no pneumothorax or pleural effusion. 1.  No acute abnormality.          PROCEDURES   Unless otherwise noted below, none     Procedures    CRITICAL CARE TIME   N/A    CONSULTS:  IP CONSULT TO GI  IP CONSULT TO HOSPITALIST  IP CONSULT TO HOSPITALIST  IP CONSULT TO GI      EMERGENCY DEPARTMENT COURSE and DIFFERENTIALDIAGNOSIS/MDM:   Vitals:    Vitals:    05/07/19 1326 05/07/19 1415 05/07/19 1548   BP: 125/86 112/75 (!) 122/97   Pulse: (!) 7 74 72   Resp: 16 16 16   Temp: 97.5 °F (36.4 °C)     SpO2: 100% 100% 98%   Weight: 168 lb (76.2 kg)     Height: 5' 6\" (1.676 m)         Patient was given thefollowing medications:  Medications   0.9 % sodium chloride infusion (has no administration in time range)   ondansetron [facility]) injection 4 mg (4 mg Intravenous Given 5/7/19 1451)   morphine sulfate (PF) injection 4 mg (4 mg Intravenous Given 5/7/19 1451)   HYDROmorphone (DILAUDID) injection 0.5 mg (0.5 mg Intravenous Given 5/7/19 1548)   0.9 % sodium chloride bolus (1,000 mLs Intravenous New Bag 5/7/19 1451)   ketorolac (TORADOL) injection 15 mg (15 mg Intravenous Given 5/7/19 1452)       This patient is dehydrated, no evidence of sepsis, no evidence of overwhelming infection, the patient has severe tenderness to her right upper quadrant, she will be admitted to the hospital for follow-up with GI and possible ERCP tomorrow. FINAL IMPRESSION      1. Right upper quadrant abdominal pain    2.  Dehydration          DISPOSITION/PLAN   DISPOSITION Admitted 05/07/2019 03:55:10 PM      PATIENT REFERREDTO:  Nishi Shelton MD  [address] [place] [address] [facility]  [address] [address],[address] [address]          Louis Soares MD  [facility] [address] [facility]  [phone]            DISCHARGE MEDICATIONS:  New Prescriptions    No medications on file       DISCONTINUED MEDICATIONS:  Discontinued Medications    No medications on file              (Please note that portions ofthis note were completed with a voice recognition program.  Efforts were made to edit the dictations but occasionally words are mis-transcribed.)    SHANAE Carmona (electronically signed)         SHANAE Carmona  05/07/19 9243

## 2019-05-07 NOTE — PLAN OF CARE
Admit to Med Surg    RUQ abdominal Pain  - prior CT from 5/2 showed increased intra and extrahepatic biliary ductal dilatation  - pt was supposed to have OP ERCP next Monday but pain returned, pt called GI and she was told to come to the ER    GI consult

## 2019-05-07 NOTE — PROGRESS NOTES
Patient admitted to [facility] 13 206. Patient oriented to room, staff, and call light. Patient updated on current plan of care. Patient currently awake in bed, no needs voiced at this time.

## 2019-05-07 NOTE — ED NOTES
An addendum  Patient here with right upper quadrant pain has negative workup inhibited for ERCP. Her exam shows right upper quadrant tenderness. Labs are stable.      Bushra Garcia MD  05/07/19 2106

## 2019-05-07 NOTE — LETTER
[facility]   [address] [address]  Phone: [phone]             May 11, 2019    Patient: Lynette Escobar   YOB: 1965   Date of Visit: 5/7/2019       To Whom It May Concern:    Vasyl Block was seen and treated in our facility  beginning 5/7/2019 until 5/11/2019. She may return to work on Wednesday May 15, 2019 without restrictions.       Sincerely,       Rickey Siemens, RN         Signature:__________________________________

## 2019-05-08 ENCOUNTER — ANESTHESIA (OUTPATIENT)
Dept: ENDOSCOPY | Age: 54
DRG: 445 | End: 2019-05-08
Payer: COMMERCIAL

## 2019-05-08 ENCOUNTER — ANESTHESIA EVENT (OUTPATIENT)
Dept: ENDOSCOPY | Age: 54
DRG: 445 | End: 2019-05-08
Payer: COMMERCIAL

## 2019-05-08 ENCOUNTER — APPOINTMENT (OUTPATIENT)
Dept: GENERAL RADIOLOGY | Age: 54
DRG: 445 | End: 2019-05-08
Payer: COMMERCIAL

## 2019-05-08 VITALS
SYSTOLIC BLOOD PRESSURE: 118 MMHG | OXYGEN SATURATION: 82 % | DIASTOLIC BLOOD PRESSURE: 85 MMHG | RESPIRATION RATE: 12 BRPM

## 2019-05-08 LAB
A/G RATIO: 1.6 (ref 1.1–2.2)
ALBUMIN SERPL-MCNC: 4 G/DL (ref 3.4–5)
ALP BLD-CCNC: 98 U/L (ref 40–129)
ALT SERPL-CCNC: 14 U/L (ref 10–40)
ANION GAP SERPL CALCULATED.3IONS-SCNC: 9 MMOL/L (ref 3–16)
AST SERPL-CCNC: 20 U/L (ref 15–37)
BASOPHILS ABSOLUTE: 0 K/UL (ref 0–0.2)
BASOPHILS RELATIVE PERCENT: 0.6 %
BILIRUB SERPL-MCNC: 0.3 MG/DL (ref 0–1)
BUN BLDV-MCNC: 10 MG/DL (ref 7–20)
CALCIUM SERPL-MCNC: 9.1 MG/DL (ref 8.3–10.6)
CHLORIDE BLD-SCNC: 105 MMOL/L (ref 99–110)
CO2: 26 MMOL/L (ref 21–32)
CREAT SERPL-MCNC: 0.7 MG/DL (ref 0.6–1.1)
EOSINOPHILS ABSOLUTE: 0.2 K/UL (ref 0–0.6)
EOSINOPHILS RELATIVE PERCENT: 2.8 %
GFR AFRICAN AMERICAN: >60
GFR NON-AFRICAN AMERICAN: >60
GLOBULIN: 2.5 G/DL
GLUCOSE BLD-MCNC: 85 MG/DL (ref 70–99)
HCT VFR BLD CALC: 40.5 % (ref 36–48)
HEMOGLOBIN: 13.8 G/DL (ref 12–16)
LYMPHOCYTES ABSOLUTE: 1.5 K/UL (ref 1–5.1)
LYMPHOCYTES RELATIVE PERCENT: 26.7 %
MCH RBC QN AUTO: 31.7 PG (ref 26–34)
MCHC RBC AUTO-ENTMCNC: 34.1 G/DL (ref 31–36)
MCV RBC AUTO: 92.9 FL (ref 80–100)
MONOCYTES ABSOLUTE: 0.4 K/UL (ref 0–1.3)
MONOCYTES RELATIVE PERCENT: 7.3 %
NEUTROPHILS ABSOLUTE: 3.6 K/UL (ref 1.7–7.7)
NEUTROPHILS RELATIVE PERCENT: 62.6 %
PDW BLD-RTO: 12.3 % (ref 12.4–15.4)
PLATELET # BLD: 147 K/UL (ref 135–450)
PMV BLD AUTO: 9.5 FL (ref 5–10.5)
POTASSIUM REFLEX MAGNESIUM: 3.6 MMOL/L (ref 3.5–5.1)
RBC # BLD: 4.36 M/UL (ref 4–5.2)
SODIUM BLD-SCNC: 140 MMOL/L (ref 136–145)
TOTAL PROTEIN: 6.5 G/DL (ref 6.4–8.2)
TROPONIN: 0.01 NG/ML
WBC # BLD: 5.8 K/UL (ref 4–11)

## 2019-05-08 PROCEDURE — 7100000011 HC PHASE II RECOVERY - ADDTL 15 MIN: Performed by: INTERNAL MEDICINE

## 2019-05-08 PROCEDURE — 36415 COLL VENOUS BLD VENIPUNCTURE: CPT

## 2019-05-08 PROCEDURE — 6360000002 HC RX W HCPCS: Performed by: HOSPITALIST

## 2019-05-08 PROCEDURE — 3609015200 HC ERCP REMOVE CALCULI/DEBRIS BILIARY/PANCREAS DUCT: Performed by: INTERNAL MEDICINE

## 2019-05-08 PROCEDURE — 2720000010 HC SURG SUPPLY STERILE: Performed by: INTERNAL MEDICINE

## 2019-05-08 PROCEDURE — 99232 SBSQ HOSP IP/OBS MODERATE 35: CPT | Performed by: INTERNAL MEDICINE

## 2019-05-08 PROCEDURE — 0F798ZZ DILATION OF COMMON BILE DUCT, VIA NATURAL OR ARTIFICIAL OPENING ENDOSCOPIC: ICD-10-PCS | Performed by: INTERNAL MEDICINE

## 2019-05-08 PROCEDURE — 0DJ08ZZ INSPECTION OF UPPER INTESTINAL TRACT, VIA NATURAL OR ARTIFICIAL OPENING ENDOSCOPIC: ICD-10-PCS | Performed by: INTERNAL MEDICINE

## 2019-05-08 PROCEDURE — 3700000000 HC ANESTHESIA ATTENDED CARE: Performed by: INTERNAL MEDICINE

## 2019-05-08 PROCEDURE — 2580000003 HC RX 258: Performed by: NURSE ANESTHETIST, CERTIFIED REGISTERED

## 2019-05-08 PROCEDURE — 99253 IP/OBS CNSLTJ NEW/EST LOW 45: CPT | Performed by: SURGERY

## 2019-05-08 PROCEDURE — 85025 COMPLETE CBC W/AUTO DIFF WBC: CPT

## 2019-05-08 PROCEDURE — 84484 ASSAY OF TROPONIN QUANT: CPT

## 2019-05-08 PROCEDURE — 6370000000 HC RX 637 (ALT 250 FOR IP): Performed by: HOSPITALIST

## 2019-05-08 PROCEDURE — 7100000010 HC PHASE II RECOVERY - FIRST 15 MIN: Performed by: INTERNAL MEDICINE

## 2019-05-08 PROCEDURE — 3609014900 HC ERCP W/SPHINCTEROTOMY &/OR PAPILLOTOMY: Performed by: INTERNAL MEDICINE

## 2019-05-08 PROCEDURE — 1200000000 HC SEMI PRIVATE

## 2019-05-08 PROCEDURE — 2709999900 HC NON-CHARGEABLE SUPPLY: Performed by: INTERNAL MEDICINE

## 2019-05-08 PROCEDURE — 2580000003 HC RX 258: Performed by: PHYSICIAN ASSISTANT

## 2019-05-08 PROCEDURE — 74330 X-RAY BILE/PANC ENDOSCOPY: CPT

## 2019-05-08 PROCEDURE — 6360000002 HC RX W HCPCS: Performed by: NURSE ANESTHETIST, CERTIFIED REGISTERED

## 2019-05-08 PROCEDURE — C1769 GUIDE WIRE: HCPCS | Performed by: INTERNAL MEDICINE

## 2019-05-08 PROCEDURE — 3700000001 HC ADD 15 MINUTES (ANESTHESIA): Performed by: INTERNAL MEDICINE

## 2019-05-08 PROCEDURE — 6360000002 HC RX W HCPCS: Performed by: PHYSICIAN ASSISTANT

## 2019-05-08 PROCEDURE — 80053 COMPREHEN METABOLIC PANEL: CPT

## 2019-05-08 PROCEDURE — 2500000003 HC RX 250 WO HCPCS: Performed by: NURSE ANESTHETIST, CERTIFIED REGISTERED

## 2019-05-08 PROCEDURE — 3609018500 HC EGD US SCOPE W/ADJACENT STRUCTURES: Performed by: INTERNAL MEDICINE

## 2019-05-08 RX ORDER — LIDOCAINE HYDROCHLORIDE 20 MG/ML
INJECTION, SOLUTION INFILTRATION; PERINEURAL PRN
Status: DISCONTINUED | OUTPATIENT
Start: 2019-05-08 | End: 2019-05-08 | Stop reason: SDUPTHER

## 2019-05-08 RX ORDER — FENTANYL CITRATE 50 UG/ML
INJECTION, SOLUTION INTRAMUSCULAR; INTRAVENOUS PRN
Status: DISCONTINUED | OUTPATIENT
Start: 2019-05-08 | End: 2019-05-08 | Stop reason: SDUPTHER

## 2019-05-08 RX ORDER — PROPOFOL 10 MG/ML
INJECTION, EMULSION INTRAVENOUS PRN
Status: DISCONTINUED | OUTPATIENT
Start: 2019-05-08 | End: 2019-05-08 | Stop reason: SDUPTHER

## 2019-05-08 RX ORDER — NADOLOL 40 MG/1
120 TABLET ORAL DAILY
Status: DISCONTINUED | OUTPATIENT
Start: 2019-05-08 | End: 2019-05-11 | Stop reason: HOSPADM

## 2019-05-08 RX ORDER — SODIUM CHLORIDE, SODIUM LACTATE, POTASSIUM CHLORIDE, CALCIUM CHLORIDE 600; 310; 30; 20 MG/100ML; MG/100ML; MG/100ML; MG/100ML
INJECTION, SOLUTION INTRAVENOUS CONTINUOUS PRN
Status: DISCONTINUED | OUTPATIENT
Start: 2019-05-08 | End: 2019-05-08 | Stop reason: SDUPTHER

## 2019-05-08 RX ORDER — METOCLOPRAMIDE HYDROCHLORIDE 5 MG/ML
INJECTION INTRAMUSCULAR; INTRAVENOUS PRN
Status: DISCONTINUED | OUTPATIENT
Start: 2019-05-08 | End: 2019-05-08 | Stop reason: SDUPTHER

## 2019-05-08 RX ORDER — ROCURONIUM BROMIDE 10 MG/ML
INJECTION, SOLUTION INTRAVENOUS PRN
Status: DISCONTINUED | OUTPATIENT
Start: 2019-05-08 | End: 2019-05-08 | Stop reason: SDUPTHER

## 2019-05-08 RX ADMIN — LIDOCAINE HYDROCHLORIDE 40 MG: 20 INJECTION, SOLUTION INFILTRATION; PERINEURAL at 13:47

## 2019-05-08 RX ADMIN — HYDROMORPHONE HYDROCHLORIDE 0.5 MG: 2 INJECTION INTRAMUSCULAR; INTRAVENOUS; SUBCUTANEOUS at 08:03

## 2019-05-08 RX ADMIN — HYDROMORPHONE HYDROCHLORIDE 0.5 MG: 2 INJECTION INTRAMUSCULAR; INTRAVENOUS; SUBCUTANEOUS at 15:46

## 2019-05-08 RX ADMIN — DOXYCYCLINE HYCLATE 100 MG: 100 TABLET, COATED ORAL at 10:18

## 2019-05-08 RX ADMIN — SODIUM CHLORIDE: 9 INJECTION, SOLUTION INTRAVENOUS at 21:23

## 2019-05-08 RX ADMIN — HYDROMORPHONE HYDROCHLORIDE 0.5 MG: 2 INJECTION INTRAMUSCULAR; INTRAVENOUS; SUBCUTANEOUS at 02:49

## 2019-05-08 RX ADMIN — SODIUM CHLORIDE: 9 INJECTION, SOLUTION INTRAVENOUS at 05:36

## 2019-05-08 RX ADMIN — FAMOTIDINE 20 MG: 10 INJECTION, SOLUTION INTRAVENOUS at 13:58

## 2019-05-08 RX ADMIN — PROPOFOL 200 MG: 10 INJECTION, EMULSION INTRAVENOUS at 13:47

## 2019-05-08 RX ADMIN — DOXYCYCLINE HYCLATE 100 MG: 100 TABLET, COATED ORAL at 21:23

## 2019-05-08 RX ADMIN — SODIUM CHLORIDE: 9 INJECTION, SOLUTION INTRAVENOUS at 18:12

## 2019-05-08 RX ADMIN — HYDROMORPHONE HYDROCHLORIDE 0.5 MG: 2 INJECTION INTRAMUSCULAR; INTRAVENOUS; SUBCUTANEOUS at 19:25

## 2019-05-08 RX ADMIN — ROCURONIUM BROMIDE 40 MG: 10 INJECTION, SOLUTION INTRAVENOUS at 13:47

## 2019-05-08 RX ADMIN — PROMETHAZINE HYDROCHLORIDE 12.5 MG: 25 INJECTION INTRAMUSCULAR; INTRAVENOUS at 02:49

## 2019-05-08 RX ADMIN — DOXYCYCLINE HYCLATE 100 MG: 100 TABLET, COATED ORAL at 00:46

## 2019-05-08 RX ADMIN — GLUCAGON HYDROCHLORIDE 0.5 MG: KIT at 14:18

## 2019-05-08 RX ADMIN — FENTANYL CITRATE 50 MCG: 50 INJECTION INTRAMUSCULAR; INTRAVENOUS at 13:47

## 2019-05-08 RX ADMIN — FENTANYL CITRATE 50 MCG: 50 INJECTION INTRAMUSCULAR; INTRAVENOUS at 14:26

## 2019-05-08 RX ADMIN — SODIUM CHLORIDE, POTASSIUM CHLORIDE, SODIUM LACTATE AND CALCIUM CHLORIDE: 600; 310; 30; 20 INJECTION, SOLUTION INTRAVENOUS at 13:43

## 2019-05-08 RX ADMIN — SUGAMMADEX 100 MG: 100 INJECTION, SOLUTION INTRAVENOUS at 14:30

## 2019-05-08 RX ADMIN — HYDROMORPHONE HYDROCHLORIDE 0.5 MG: 2 INJECTION INTRAMUSCULAR; INTRAVENOUS; SUBCUTANEOUS at 23:15

## 2019-05-08 RX ADMIN — METOCLOPRAMIDE 10 MG: 5 INJECTION, SOLUTION INTRAMUSCULAR; INTRAVENOUS at 13:58

## 2019-05-08 RX ADMIN — NADOLOL 120 MG: 40 TABLET ORAL at 08:03

## 2019-05-08 ASSESSMENT — PULMONARY FUNCTION TESTS
PIF_VALUE: 15
PIF_VALUE: 22
PIF_VALUE: 22
PIF_VALUE: 17
PIF_VALUE: 13
PIF_VALUE: 1
PIF_VALUE: 21
PIF_VALUE: 16
PIF_VALUE: 15
PIF_VALUE: 22
PIF_VALUE: 10
PIF_VALUE: 10
PIF_VALUE: 1
PIF_VALUE: 11
PIF_VALUE: 16
PIF_VALUE: 15
PIF_VALUE: 20
PIF_VALUE: 15
PIF_VALUE: 13
PIF_VALUE: 3
PIF_VALUE: 16
PIF_VALUE: 23
PIF_VALUE: 15
PIF_VALUE: 15
PIF_VALUE: 17
PIF_VALUE: 3
PIF_VALUE: 22
PIF_VALUE: 3
PIF_VALUE: 0
PIF_VALUE: 16
PIF_VALUE: 16
PIF_VALUE: 13
PIF_VALUE: 15
PIF_VALUE: 12
PIF_VALUE: 21
PIF_VALUE: 15
PIF_VALUE: 15
PIF_VALUE: 7
PIF_VALUE: 15
PIF_VALUE: 15
PIF_VALUE: 1
PIF_VALUE: 15
PIF_VALUE: 2
PIF_VALUE: 15
PIF_VALUE: 15
PIF_VALUE: 10
PIF_VALUE: 1
PIF_VALUE: 4
PIF_VALUE: 16
PIF_VALUE: 16
PIF_VALUE: 3
PIF_VALUE: 17
PIF_VALUE: 0
PIF_VALUE: 11
PIF_VALUE: 21
PIF_VALUE: 15
PIF_VALUE: 3
PIF_VALUE: 15
PIF_VALUE: 15
PIF_VALUE: 0

## 2019-05-08 ASSESSMENT — PAIN DESCRIPTION - DESCRIPTORS
DESCRIPTORS: ACHING;DISCOMFORT
DESCRIPTORS: ACHING

## 2019-05-08 ASSESSMENT — PAIN DESCRIPTION - PAIN TYPE
TYPE: ACUTE PAIN

## 2019-05-08 ASSESSMENT — PAIN SCALES - GENERAL
PAINLEVEL_OUTOF10: 0
PAINLEVEL_OUTOF10: 7
PAINLEVEL_OUTOF10: 5
PAINLEVEL_OUTOF10: 10
PAINLEVEL_OUTOF10: 8
PAINLEVEL_OUTOF10: 5
PAINLEVEL_OUTOF10: 0
PAINLEVEL_OUTOF10: 10
PAINLEVEL_OUTOF10: 7
PAINLEVEL_OUTOF10: 9

## 2019-05-08 ASSESSMENT — PAIN DESCRIPTION - LOCATION
LOCATION: ABDOMEN

## 2019-05-08 ASSESSMENT — ENCOUNTER SYMPTOMS: SHORTNESS OF BREATH: 1

## 2019-05-08 ASSESSMENT — PAIN - FUNCTIONAL ASSESSMENT: PAIN_FUNCTIONAL_ASSESSMENT: 0-10

## 2019-05-08 ASSESSMENT — PAIN DESCRIPTION - ORIENTATION: ORIENTATION: RIGHT;MID

## 2019-05-08 NOTE — PROGRESS NOTES
Shift assessment completed. Pt is alert and oriented. Vital signs are WNL. bp rechecked and stable. Respirations are even & easy. Pt complains of RUQ pain. Prn meds given pillow being used for pressure relief. No bm since 5/7. Bowel sound present . Med rec discussed will discuss with MD. Up to bathroom tolerated ambulation very well. Pt denies needs at this time. SR up x 2 and bed in low position. Call light is within reach. Will monitor.

## 2019-05-08 NOTE — ANESTHESIA POSTPROCEDURE EVALUATION
Department of Anesthesiology  Postprocedure Note    Patient: Wilma Garcia  MRN: [id number]  YOB: 1965  Date of evaluation: 5/8/2019  Time:  3:22 PM     Procedure Summary     Date:  05/08/19 Room / Location:  [facility] 01 / [facility]    Anesthesia Start:  0427 Anesthesia Stop:  6199    Procedures:       ERCP SPHINCTER/PAPILLOTOMY (N/A )      UPPER EUS W/ANES. (N/A )      ERCP STONE REMOVAL Diagnosis:  (RUQ pain)    Surgeon:  Suresh Mims DO Responsible Provider:  Cornell Cordova MD    Anesthesia Type:  general ASA Status:  2          Anesthesia Type: general    Med Phase I: Med Score: 10    Med Phase II: Med Score: 10    Last vitals: Reviewed and per EMR flowsheets.        Anesthesia Post Evaluation    Comments: Postoperative Anesthesia Note    Name:    Wilma Garcia  MRN:      [id number]    Patient Vitals in the past 12 hrs:  05/08/19 1513, BP:(!) 136/93, Pulse:73, Resp:14, SpO2:97 %  05/08/19 1458, BP:(!) 129/92, Pulse:75, Resp:14, SpO2:98 %  05/08/19 1452, BP:(!) 143/93, Pulse:75, Resp:14, SpO2:97 %  05/08/19 1447, BP:(!) 126/51, Pulse:79, Resp:14, SpO2:97 %  05/08/19 1442, BP:(!) 142/85, Temp:97.5 °F (36.4 °C), Temp src:Temporal, Pulse:80, Resp:14, SpO2:98 %  05/08/19 1309, BP:127/87, Temp:98.4 °F (36.9 °C), Temp src:Temporal, Pulse:80, Resp:16, SpO2:100 %  05/08/19 0757, BP:128/89, Temp:97.4 °F (36.3 °C), Temp src:Oral, Pulse:74, Resp:16, SpO2:96 %     LABS:    CBC  Lab Results       Component                Value               Date/Time                  WBC                      5.8                 05/08/2019 05:36 AM        HGB                      13.8                05/08/2019 05:36 AM        HCT                      40.5                05/08/2019 05:36 AM        PLT                      147                 05/08/2019 05:36 AM   RENAL  Lab Results       Component                Value               Date/Time                  NA                       140

## 2019-05-08 NOTE — CONSULTS
Gastroenterology Consult Note    Patient:   Rico Mcrae   :    1965   Facility:   [facility]  Referring/PCP: Nicole Carmona MD  Date:     2019  Consultant:   Herson Galloway DO    Subjective: This 48 y.o. female was admitted 2019 with a diagnosis of \"RUQ abdominal pain [R10.11]\" and is seen in consultation regarding abdominal pain    47 yo female patient of Dr. Gabriel Jenkins with abdominal pain and dilated biliary tree seen on CT scan. Has been seen multiple times in the ER for ongoing RUQ pain. Was unable to get MRI and outpatient EUS/ERCP was scheduled for further evaluation, but patient's pain was severe and she was directed to the hospital for pain control. Past Medical History:   Diagnosis Date    Asthma     Atrial septal defect     had insertion of atrial septal occluder    Bursitis of left hip     CAD (coronary artery disease)     COPD (chronic obstructive pulmonary disease) (HCC)     History of blood transfusion     Migraines     Mitral valve prolapse     MRSA (methicillin resistant staph aureus) culture positive 2015    Lungs.   Diagnosed [facility] with bronchoscopy    MVP (mitral valve prolapse)     PONV (postoperative nausea and vomiting)     Prolonged QT interval syndrome     S/P bronchoscopy     TIA (transient ischemic attack)     Vertigo      Past Surgical History:   Procedure Laterality Date    BREAST ENHANCEMENT SURGERY      augmentation    CARDIAC CATHETERIZATION      CARDIAC SURGERY      septum occluder    COLONOSCOPY      CYSTOSCOPY  2016    [facility]    HIP ARTHROSCOPY Left 2018    HIP SURGERY      HYSTERECTOMY      HYSTERECTOMY, TOTAL ABDOMINAL  1992    NASAL SINUS SURGERY  13    OTHER SURGICAL HISTORY      thoracic sympathectomy    PACEMAKER INSERTION      also revision x 3    DC HIP ARTHROSCOPY, DX Left 2018    LEFT HIP ARTHROSCOPY, LABRAL  DEBRIDEMENT, CHONDROPLASTY performed by Gerard Jolley MD at [facility]
Attends Taoist service: Not on file     Active member of club or organization: Not on file     Attends meetings of clubs or organizations: Not on file     Relationship status: Not on file    Intimate partner violence:     Fear of current or ex partner: Not on file     Emotionally abused: Not on file     Physically abused: Not on file     Forced sexual activity: Not on file   Other Topics Concern    Not on file   Social History Narrative    Not on file       Family History   Problem Relation Age of Onset    Heart Disease Mother     Colon Cancer Father         colon    Heart Disease Father     High Blood Pressure Father        ROS:  She reports no complaints related to the eyes, ears , nose throat or mouth. She denies weight loss. No chest pain. No SOB. No urinary complaints. No musculoskeletal complaints. No skin rashes. No neurologic deficits. No bleeding tendencies. GI complaints include RUQ pain. Physical Exam:  Vitals:    05/08/19 1541   BP: 124/84   Pulse: 82   Resp: 16   Temp: 98.2 °F (36.8 °C)   SpO2: 93%     General:  Comfortable  Eyes:  No scleral icterus  Ears:  Normal  Nose:  Normal  Mouth:  Mucous membranes moist  Respiratory: Lungs CTA. No accessory muscle use. Heart:  Regular rhythm  Abdomen:  Soft. Non distended. Tender RUQ. Musculoskeletal:  No abnormal movements. ROM extremities normal.  Skin:  No rashes. Neurologic:  No focal deficits. Psychiatric:  AAA. O x 3.    Radiographic studies:  CT and GBUS with some dilated CBD. No stones. No GB wall thickening.     ERCP today normal.      Laboratory Studies:   Lab Results   Component Value Date    BILITOT 0.3 05/08/2019    ALKPHOS 98 05/08/2019    AST 20 05/08/2019    ALT 14 05/08/2019    LABALBU 4.0 05/08/2019       ASSESSMENT:  RUQ abdominal pain        PLAN:  HIDA with CCK in AM    [address] [facility]

## 2019-05-08 NOTE — H&P
Hospital Medicine History & Physical      PCP: Roel Suárez MD    Date of Admission: 5/7/2019    Date of Service: Pt seen/examined on 5/7/2019 and Admitted to Inpatient with expected LOS greater than two midnights due to medical therapy. Chief Complaint:  Abdominal pain      History Of Present Illness:      48 y.o. female  RUQ/epigastric abdominal pain  6 days ago upon waking. Pain worsened immediately with PO intake with waxing/waning cramping pain since. Last BM 5 days ago with loose, oily stools noted. (+) flatus but no BM since. Pain worsened with radiation to bilat shoulder blades, L chest. No fevers, chills, sob, n/v.    Past Medical History:          Diagnosis Date    Asthma     Atrial septal defect     had insertion of atrial septal occluder    Bursitis of left hip     CAD (coronary artery disease)     COPD (chronic obstructive pulmonary disease) (HCC)     History of blood transfusion     Migraines     Mitral valve prolapse     MRSA (methicillin resistant staph aureus) culture positive 12/2015    Lungs.   Diagnosed [facility] with bronchoscopy    MVP (mitral valve prolapse)     PONV (postoperative nausea and vomiting)     Prolonged QT interval syndrome     S/P bronchoscopy     TIA (transient ischemic attack)     Vertigo        Past Surgical History:          Procedure Laterality Date    BREAST ENHANCEMENT SURGERY      augmentation    CARDIAC CATHETERIZATION      CARDIAC SURGERY      septum occluder    COLONOSCOPY      CYSTOSCOPY  6/8/2016    [facility]    HIP ARTHROSCOPY Left 02/08/2018    HIP SURGERY      HYSTERECTOMY      HYSTERECTOMY, TOTAL ABDOMINAL  1992    NASAL SINUS SURGERY  8/22/13    OTHER SURGICAL HISTORY      thoracic sympathectomy    PACEMAKER INSERTION      also revision x 3    [place] HIP ARTHROSCOPY, DX Left 11/1/2018    LEFT HIP ARTHROSCOPY, LABRAL  DEBRIDEMENT, CHONDROPLASTY performed by Marshal Beach MD at [place] [address] midline. Respiratory:  Normal respiratory effort. Clear to auscultation, bilaterally without Rales/Wheezes/Rhonchi. Cardiovascular:  Regular rate and rhythm with normal S1/S2 without murmurs, rubs or gallops. Abdomen: Soft, non-tender, non-distended with normal bowel sounds. Musculoskeletal:  No clubbing, cyanosis or edema bilaterally. Full range of motion without deformity. Skin: Skin color, texture, turgor normal.  No rashes or lesions. Neurologic:  Neurovascularly intact without any focal sensory/motor deficits. Cranial nerves: II-XII intact, grossly non-focal.  Psychiatric:  Alert and oriented, thought content appropriate, normal insight  Capillary Refill: Brisk,< 3 seconds   Peripheral Pulses: +2 palpable, equal bilaterally       Labs:     Recent Labs     05/05/19  1413 05/07/19  1355   WBC 6.5 4.6   HGB 14.8 14.5   HCT 43.7 42.7    173     Recent Labs     05/05/19  1413 05/07/19  1355    143   K 4.3 4.0    104   CO2 29 30   BUN 13 11   CREATININE 0.7 0.8   CALCIUM 10.1 10.0     Recent Labs     05/05/19  1413 05/07/19  1355   AST 22 24   ALT 15 16   BILITOT 0.3 0.3   ALKPHOS 115 112     No results for input(s): INR in the last 72 hours. No results for input(s): Katerine Ka in the last 72 hours. Urinalysis:      Lab Results   Component Value Date    NITRU Negative 05/05/2019    WBCUA 3-5 12/12/2016    BACTERIA 1+ 12/12/2016    RBCUA 3-5 12/12/2016    BLOODU Negative 05/05/2019    SPECGRAV 1.010 05/05/2019    GLUCOSEU Negative 05/05/2019    GLUCOSEU Neg 08/01/2011       Radiology:          US GALLBLADDER RUQ   Final Result   1. Extrahepatic biliary dilatation appears stable from prior CT with mild   intrahepatic dilatation not well characterized due to technical factors. Underlying etiology is not determined. 2. Somewhat contracted gallbladder, likely due to lack of fasting. No   evidence of cholelithiasis. No inflammation.              ASSESSMENT:    C/Lore Foss 1106 Problems    Diagnosis Date Noted    RUQ abdominal pain [R10.11] 05/07/2019         PLAN:      1) Abdominal pain  - for ERCP tomorrow, npo after midnight  - normal lipase  - RUQ neg for cholecystitis, cholelithiasis    2) Prolonged QT  - discontinue zofran  - maintain on tele monitor  - continue coreg, nadlolol    3) CAD  - check troponin    4) h/o recurrent MRSA infxn  - continue doxycycline     DVT Prophylaxis: lovenox  Diet: DIET CLEAR LIQUID;  Diet NPO, After Midnight  Code Status: Full Code       Cholo Engel MD    Thank you La Crane MD for the opportunity to be involved in this patient's care. If you have any questions or concerns please feel free to contact me at [phone].

## 2019-05-08 NOTE — CARE COORDINATION
Case Management Assessment  Initial Evaluation    Date/Time of Evaluation: 5/8/2019 11:23 AM  Assessment Completed by: Eliseo Raymond    Patient Name: Jax Bear  YOB: 1965  Diagnosis: RUQ abdominal pain [R10.11]  Date / Time: 5/7/2019  1:30 PM  Admission status/Date:5/7/19 1330 inpt  Chart Reviewed: Yes      Patient Interviewed: Yes   Family Interviewed:  No      Hospitalization in the last 30 days:  No    Contacts  :     Relationship to Patient:   Phone Number:    Alternate Contact:     Relationship to Patient:     Phone Number:    Met with:    Current PCP Tc Enamorado MD    Financial  [employer]  Precert required for SNF : Y, N        3 night stay required - Y, [address]:    Transportation: self    Meal Preparation: self    Housing  Home Environment: double wide with significant other  Steps: 2  Plans to Return to Present Housing: Yes  Other Identified Issues:     Home Care Information  Currently active with [address] : No     Passport/Waiver : No  :                      Phone Number:    Passport/Waiver Services:           Durable Medical Equipment   DME Provider: none  Equipment: noneWalker__Cane__RTS__ BSC__Shower Chair__  02__ HHN__ CPAP__  BiPap__  Hospital Bed__ W/C___ Other__________      Has Home O2 in place on admit:  No  Informed of need to bring portable home O2 tank on day of discharge for nursing to connect prior to leaving:   Not Indicated  Verbalized agreement/Understanding:   Not Indicated    Community Service Affiliation  Dialysis:  No        Outpatient PT/OT: No    [facility]: No     CHF Clinic: No     Pulmonary Rehab: No  Pain Clinic: No  Community Mental Health: No    Wound Clinic: No     Other:     DISCHARGE PLAN: Reviewed chart. Role of discharge planner explained and patient verbalized understanding. Pt states that she is from a double wide with significant other and plans to return.  Pt states that she is self sufficient. Pt denies needs. CM is not following pt. If CM is needed, please contact CM. Explained Case Management role/services.

## 2019-05-08 NOTE — ANESTHESIA PRE PROCEDURE
Department of Anesthesiology  Preprocedure Note       Name:  Britney Figueroa   Age:  48 y.o.  :  1965                                          MRN:  [id number]         Date:  2019      Surgeon: Eun Sandoval):  Beverley Castleman, DO    Procedure: ERCP W/ANES. (N/A )  UPPER EUS W/ANES. (N/A )    Medications prior to admission:   Prior to Admission medications    Medication Sig Start Date End Date Taking?  Authorizing Provider   cloNIDine (CATAPRES) 0.1 MG tablet Take 0.1 mg by mouth nightly as needed for High Blood Pressure   Yes Historical Provider, MD   ondansetron (ZOFRAN) 4 MG tablet Take 1 tablet by mouth every 8 hours as needed for Nausea 19  Yes Samson Clarke MD   nadolol (CORGARD) 40 MG tablet Take 120 mg by mouth nightly    Yes Historical Provider, MD   doxycycline hyclate (VIBRAMYCIN) 100 MG capsule  2/15/19   Historical Provider, MD       Current medications:    Current Facility-Administered Medications   Medication Dose Route Frequency Provider Last Rate Last Dose    nadolol (CORGARD) tablet 120 mg  120 mg Oral Daily Randa Masters MD   120 mg at 19 08    cloNIDine (CATAPRES) tablet 0.1 mg  0.1 mg Oral Nightly PRN SHANAE Lockett        sodium chloride flush 0.9 % injection 10 mL  10 mL Intravenous 2 times per day SHANAE Lockett   10 mL at 19    sodium chloride flush 0.9 % injection 10 mL  10 mL Intravenous PRN SHANAE Lockett        magnesium hydroxide (MILK OF MAGNESIA) 400 MG/5ML suspension 30 mL  30 mL Oral Daily PRN SHANAE Lockett        enoxaparin (LOVENOX) injection 40 mg  40 mg Subcutaneous Daily Janette Lockett        0.9 % sodium chloride infusion   Intravenous Continuous SHANAE Lockett 100 mL/hr at 19 0536      acetaminophen (TYLENOL) tablet 650 mg  650 mg Oral Q4H PRN Janette Lockett        HYDROmorphone (DILAUDID) injection 0.5 mg  0.5 mg Intravenous Q4H PRN SHANAE Lockett   0.5 mg at 19 0803    doxycycline hyclate (VIBRA-TABS) tablet 100 mg  100 mg Oral 2 times per day Sherin Goldstein MD   100 mg at 05/08/19 1018    promethazine (PHENERGAN) injection 12.5 mg  12.5 mg Intravenous Q6H PRN Sherin Goldstein MD   12.5 mg at 05/08/19 0249    pantoprazole (PROTONIX) tablet 40 mg  40 mg Oral QAM AC Sherin Goldstein MD   Stopped at 05/08/19 0700       Allergies: Allergies   Allergen Reactions    Quinolones      \"because of heart\"    Dexamethasone Sodium Phosphate Other (See Comments)     \"I feel hot all over\"    Other Hives     Adhesives      Codeine Nausea Only     Itchy nose    Nitroglycerin Other (See Comments)     Cardiologist states patient is not to have it, pt does not know why.  Prednisone      Other reaction(s): Flushing  \"I turn red and really hot\"       Problem List:    Patient Active Problem List   Diagnosis Code    Toe fracture, right S92.911A    Dyspnea R06.00    Chest heaviness R07.89    Asthma exacerbation J45. 901    Acute tracheobronchitis J20.9    Recurrent respiratory infection J98.8    Hip strain, left, initial encounter S76.012A    Acute pain of left knee M25.562    Chondromalacia patellae of left knee M22.42    Primary osteoarthritis of left knee M17.12    Right upper quadrant abdominal pain R10.11    Dehydration E86.0       Past Medical History:        Diagnosis Date    Asthma     Atrial septal defect     had insertion of atrial septal occluder    Bursitis of left hip     CAD (coronary artery disease)     COPD (chronic obstructive pulmonary disease) (HCC)     History of blood transfusion     Migraines     Mitral valve prolapse     MRSA (methicillin resistant staph aureus) culture positive 12/2015    Lungs.   Diagnosed [facility] with bronchoscopy    MVP (mitral valve prolapse)     PONV (postoperative nausea and vomiting)     Prolonged QT interval syndrome     S/P bronchoscopy     TIA (transient ischemic attack)     Vertigo        Past Surgical History:        Procedure Laterality Date    BREAST ENHANCEMENT SURGERY      augmentation    CARDIAC CATHETERIZATION      CARDIAC SURGERY      septum occluder    COLONOSCOPY  ?     polyps    CYSTOSCOPY  2016    [facility]    HIP ARTHROSCOPY Left 2018    HIP SURGERY      HYSTERECTOMY      HYSTERECTOMY, TOTAL ABDOMINAL  1992    NASAL SINUS SURGERY  13    OTHER SURGICAL HISTORY      thoracic sympathectomy    PACEMAKER INSERTION      also revision x 3    [place] H[place] ARTHROSCOPY, DX Left 2018    LEFT HIP ARTHROSCOPY, LABRAL  DEBRIDEMENT, CHONDROPLASTY performed by Guillermina Monahan MD at [address] [facility]  2017       Social History:    Social History     Tobacco Use    Smoking status: Former Smoker     Packs/day: 0.25     Years: 3.00     Pack years: 0.75     Types: Cigarettes     Last attempt to quit: 1998     Years since quittin.4    Smokeless tobacco: Never Used   Substance Use Topics    Alcohol use: Yes     Comment: 2 glasses wine or 1 beer/nightlynot drank in 7-10 days                                Counseling given: Not Answered      Vital Signs (Current):   Vitals:    19 2134 19 0300 19 0757 19 1309   BP: 114/73 125/84 128/89 127/87   Pulse: 78 71 74 80   Resp: 18 17 16 16   Temp: 98 °F (36.7 °C) 98.4 °F (36.9 °C) 97.4 °F (36.3 °C) 98.4 °F (36.9 °C)   TempSrc: Oral Oral Oral Temporal   SpO2:  97% 96% 100%   Weight:       Height:                                                  BP Readings from Last 3 Encounters:   19 127/87   19 123/78   19 114/82       NPO Status: Time of last liquid consumption: 2359                        Time of last solid consumption: 0800                        Date of last liquid consumption: 19                        Date of last solid food consumption: 19    BMI:   Wt Readings from Last 3 Encounters:   19 168 lb (76.2 kg)   19 168 lb (76.2 kg)   19 168 lb patient. Plan discussed with CRNA.                   Chauncey Garvin MD   5/8/2019

## 2019-05-08 NOTE — H&P
Gastroenterology Preop Assessment    Patient:   Sofia Koch   :    1965   Facility:   [facility]  Referring/PCP: Artem Burgess MD  Date:     2019    Subjective:   Procedure: ercp/eus    HPI/Reason for procedure:  Dilated bile ducts, RUQ abdominal pain    Past Medical History:   Diagnosis Date    Asthma     Atrial septal defect     had insertion of atrial septal occluder    Bursitis of left hip     CAD (coronary artery disease)     COPD (chronic obstructive pulmonary disease) ([address]     History of blood transfusion     Migraines     Mitral valve prolapse     MRSA (methicillin resistant staph aureus) culture positive 2015    Lungs.   Diagnosed [facility] with bronchoscopy    MVP (mitral valve prolapse)     PONV (postoperative nausea and vomiting)     Prolonged QT interval syndrome     S/P bronchoscopy     TIA (transient ischemic attack)     Vertigo      Past Surgical History:   Procedure Laterality Date    BREAST ENHANCEMENT SURGERY      augmentation    CARDIAC CATHETERIZATION      CARDIAC SURGERY      septum occluder    COLONOSCOPY      [facility]COPY  2016    [facility]    HIP ARTHROSCOPY Left 2018    HIP SURGERY      HYSTERECTOMY      HYSTERECTOMY, TOTAL ABDOMINAL  1992    NASAL SINUS SURGERY  13    OTHER SURGICAL HISTORY      thoracic sympathectomy    PACEMAKER INSERTION      also revision x 3    [place] HIP ARTHROSCOPY, DX Left 2018    LEFT HIP ARTHROSCOPY, LABRAL  DEBRIDEMENT, CHONDROPLASTY performed by Tanika Nathan MD at [address] [place] [address]  2017       Social:   Social History     Tobacco Use    Smoking status: Former Smoker     Packs/day: 0.25     Years: 3.00     Pack years: 0.75     Types: Cigarettes     Last attempt to quit: 1998     Years since quittin.4    Smokeless tobacco: Never Used   Substance Use Topics    Alcohol use: Yes     Comment: 2 glasses wine or 1 beer/nightlynot drank in 7-10 days     Family:   Family History   Problem Relation Age of Onset    Heart Disease Mother     Colon Cancer Father         colon    Heart Disease Father     High Blood Pressure Father        Scheduled Medications:    nadolol  120 mg Oral Daily    sodium chloride flush  10 mL Intravenous 2 times per day    enoxaparin  40 mg Subcutaneous Daily    doxycycline hyclate  100 mg Oral 2 times per day    pantoprazole  40 mg Oral QAM AC       Current Medications:    Prior to Admission medications    Medication Sig Start Date End Date Taking?  Authorizing Provider   cloNIDine (CATAPRES) 0.1 MG tablet Take 0.1 mg by mouth nightly as needed for High Blood Pressure   Yes Historical Provider, MD   ondansetron (ZOFRAN) 4 MG tablet Take 1 tablet by mouth every 8 hours as needed for Nausea 5/2/19  Yes Teri Winn MD   nadolol (CORGARD) 40 MG tablet Take 120 mg by mouth nightly    Yes Historical Provider, MD   doxycycline hyclate (VIBRAMYCIN) 100 MG capsule  2/15/19   Historical Provider, MD         Current Facility-Administered Medications:     nadolol (CORGARD) tablet 120 mg, 120 mg, Oral, Daily, Rosmery Hassan MD, 120 mg at 05/08/19 0803    cloNIDine (CATAPRES) tablet 0.1 mg, 0.1 mg, Oral, Nightly PRN, SHANAE Carrillo    sodium chloride flush 0.9 % injection 10 mL, 10 mL, Intravenous, 2 times per day, SHANAE Carrillo, 10 mL at 05/07/19 2026    sodium chloride flush 0.9 % injection 10 mL, 10 mL, Intravenous, PRN, SHANAE Carrillo    magnesium hydroxide (MILK OF MAGNESIA) 400 MG/5ML suspension 30 mL, 30 mL, Oral, Daily PRN, SHANAE Carrillo    enoxaparin (LOVENOX) injection 40 mg, 40 mg, Subcutaneous, Daily, SHANAE Carrillo    0.9 % sodium chloride infusion, , Intravenous, Continuous, Janette Carrillo, Last Rate: 100 mL/hr at 05/08/19 0536    acetaminophen (TYLENOL) tablet 650 mg, 650 mg, Oral, Q4H PRN, SHANAE Carrillo    HYDROmorphone (DILAUDID) injection 0.5 mg, 0.5 mg, Intravenous, Q4H PRN, SHANAE Ng, 0.5 mg at 05/08/19 0803    doxycycline hyclate (VIBRA-TABS) tablet 100 mg, 100 mg, Oral, 2 times per day, Ester Masters MD, 100 mg at 05/08/19 1018    promethazine (PHENERGAN) injection 12.5 mg, 12.5 mg, Intravenous, Q6H PRN, Ester Masters MD, 12.5 mg at 05/08/19 0249    pantoprazole (PROTONIX) tablet 40 mg, 40 mg, Oral, QAM AC, Ester Masters MD, Stopped at 05/08/19 0700      Infusions:    sodium chloride 100 mL/hr at 05/08/19 0536     PRN Medications: cloNIDine, sodium chloride flush, magnesium hydroxide, acetaminophen, HYDROmorphone, promethazine  Allergies: Allergies   Allergen Reactions    Quinolones      \"because of heart\"    Dexamethasone Sodium Phosphate Other (See Comments)     \"I feel hot all over\"    Other Hives     Adhesives      Codeine Nausea Only     Itchy nose    Nitroglycerin Other (See Comments)     Cardiologist states patient is not to have it, pt does not know why.  Prednisone      Other reaction(s): Flushing  \"I turn red and really hot\"         Objective:     Physical Exam:   /89   Pulse 74   Temp 97.4 °F (36.3 °C) (Oral)   Resp 16   Ht 5' 6\" (1.676 m)   Wt 168 lb (76.2 kg)   SpO2 96%   BMI 27.12 kg/m²     HEENT: NCAT  Lungs: CTAB  CV: RRR  Abd: soft, ntd  Ext: dpi    Lab and Imaging Review   Labs:  CBC:   Recent Labs     05/05/19  1413 05/07/19  1355 05/08/19  0536   WBC 6.5 4.6 5.8   HGB 14.8 14.5 13.8   HCT 43.7 42.7 40.5   MCV 92.2 92.3 92.9    173 147     BMP:   Recent Labs     05/05/19  1413 05/07/19  1355 05/08/19  0536    143 140   K 4.3 4.0 3.6    104 105   CO2 29 30 26   BUN 13 11 10   CREATININE 0.7 0.8 0.7     LIVER PROFILE:   Recent Labs     05/05/19  1413 05/07/19  1355 05/08/19  0536   AST 22 24 20   ALT 15 16 14   LIPASE 29.0 23.0  --    PROT 7.4 7.1 6.5   BILITOT 0.3 0.3 0.3   ALKPHOS 115 112 98     PT/INR: No results for input(s): INR in the last 72 hours.     Invalid input(s): PT    Pre-Procedure Assessment / Plan:  ASA: Class 2 - A normal healthy patient with mild systemic disease  Airway: Mallampati: II (soft palate, uvula, fauces visible)  Level of Sedation Plan:Deep sedation  Post Procedure plan: Return to same level of care      Plan:   1. eus/ercp    I assessed the patient and find that the patient is in satisfactory condition to proceed with the planned procedure and sedation plan. I have explained the risk, benefits, and alternatives to the procedure; the patient understands and agrees to proceed.        Saritha Anand  5/8/2019

## 2019-05-08 NOTE — PROGRESS NOTES
Consult has been called to Dr. Cindy Reyes on 5/8/19. Spoke with balbina.  3:44 PM    Susan Guido  5/8/2019

## 2019-05-08 NOTE — PLAN OF CARE
Problem: Pain:  Goal: Pain level will decrease  Description  Pain level will decrease  Outcome: Ongoing  Goal: Control of acute pain  Description  Control of acute pain  Outcome: Ongoing  Goal: Control of chronic pain  Description  Control of chronic pain  Outcome: Ongoing  Goal: Patient's pain/discomfort is manageable  Description  Patient's pain/discomfort is manageable  Outcome: Ongoing     Problem: Falls - Risk of:  Goal: Will remain free from falls  Description  Will remain free from falls  Outcome: Ongoing  Goal: Absence of physical injury  Description  Absence of physical injury  Outcome: Ongoing     Problem: Infection:  Goal: Will remain free from infection  Description  Will remain free from infection  Outcome: Ongoing     Problem: Safety:  Goal: Free from accidental physical injury  Description  Free from accidental physical injury  Outcome: Ongoing  Goal: Free from intentional harm  Description  Free from intentional harm  Outcome: Ongoing     Problem: Daily Care:  Goal: Daily care needs are met  Description  Daily care needs are met  Outcome: Ongoing     Problem: Skin Integrity:  Goal: Skin integrity will stabilize  Description  Skin integrity will stabilize  Outcome: Ongoing     Problem: Discharge Planning:  Goal: Patients continuum of care needs are met  Description  Patients continuum of care needs are met  Outcome: Ongoing     Problem: Activity:  Goal: Risk for activity intolerance will decrease  Description  Risk for activity intolerance will decrease  Outcome: Ongoing     Problem:  Bowel/Gastric:  Goal: Bowel function will improve  Description  Bowel function will improve  Outcome: Ongoing  Goal: Diagnostic test results will improve  Description  Diagnostic test results will improve  Outcome: Ongoing  Goal: Occurrences of nausea will decrease  Description  Occurrences of nausea will decrease  Outcome: Ongoing  Goal: Occurrences of vomiting will decrease  Description  Occurrences of vomiting will decrease  Outcome: Ongoing     Problem: Fluid Volume:  Goal: Maintenance of adequate hydration will improve  Description  Maintenance of adequate hydration will improve  Outcome: Ongoing     Problem: Health Behavior:  Goal: Ability to state signs and symptoms to report to health care provider will improve  Description  Ability to state signs and symptoms to report to health care provider will improve  Outcome: Ongoing     Problem: Physical Regulation:  Goal: Complications related to the disease process, condition or treatment will be avoided or minimized  Description  Complications related to the disease process, condition or treatment will be avoided or minimized  Outcome: Ongoing  Goal: Ability to maintain clinical measurements within normal limits will improve  Description  Ability to maintain clinical measurements within normal limits will improve  Outcome: Ongoing     Problem: Sensory:  Goal: Pain level will decrease  Description  Pain level will decrease  Outcome: Ongoing  Goal: Ability to identify factors that increase the pain will improve  Description  Ability to identify factors that increase the pain will improve  Outcome: Ongoing  Goal: Ability to notify healthcare provider of pain before it becomes unmanageable or unbearable will improve  Description  Ability to notify healthcare provider of pain before it becomes unmanageable or unbearable will improve  Outcome: Ongoing

## 2019-05-08 NOTE — H&P
Gastroenterology Preop Assessment    Patient:   Meaghan Justin   :    1965   Facility:   [facility]  Referring/PCP: Ryan Jones MD  Date:     2019    Subjective:   Procedure: EUS/ERCP    HPI/Reason for procedure:  Abdominal pain, dilated bile duct    Past Medical History:   Diagnosis Date    Asthma     Atrial septal defect     had insertion of atrial septal occluder    Bursitis of left hip     CAD (coronary artery disease)     COPD (chronic obstructive pulmonary disease) ([address]     History of blood transfusion     Migraines     Mitral valve prolapse     MRSA (methicillin resistant staph aureus) culture positive 2015    Lungs. Diagnosed [facility] with bronchoscopy    MVP (mitral valve prolapse)     PONV (postoperative nausea and vomiting)     Prolonged QT interval syndrome     S/P bronchoscopy     TIA (transient ischemic attack)     Vertigo      Past Surgical History:   Procedure Laterality Date    BREAST ENHANCEMENT SURGERY      augmentation    CARDIAC CATHETERIZATION      CARDIAC SURGERY      septum occluder    COLONOSCOPY  ?     polyps    CYSTOSCOPY  2016    [facility]    HIP ARTHROSCOPY Left 2018    HIP SURGERY      HYSTERECTOMY      HYSTERECTOMY, TOTAL ABDOMINAL  1992    NASAL SINUS SURGERY  13    OTHER SURGICAL HISTORY      thoracic sympathectomy    PACEMAKER INSERTION      also revision x 3    ME HIP ARTHROSCOPY, DX Left 2018    LEFT HIP ARTHROSCOPY, LABRAL  DEBRIDEMENT, CHONDROPLASTY performed by Genna Smith MD at [address]  2017       Social:   Social History     Tobacco Use    Smoking status: Former Smoker     Packs/day: 0.25     Years: 3.00     Pack years: 0.75     Types: Cigarettes     Last attempt to quit: 1998     Years since quittin.4    Smokeless tobacco: Never Used   Substance Use Topics    Alcohol use: Yes     Comment: 2 glasses wine or 1 beer/nightlynot drank in 7-10 days     Family:   Family History   Problem Relation Age of Onset    Heart Disease Mother     Colon Cancer Father         colon    Heart Disease Father     High Blood Pressure Father        Scheduled Medications:    nadolol  120 mg Oral Daily    sodium chloride flush  10 mL Intravenous 2 times per day    enoxaparin  40 mg Subcutaneous Daily    doxycycline hyclate  100 mg Oral 2 times per day    pantoprazole  40 mg Oral QAM AC       Current Medications:    Prior to Admission medications    Medication Sig Start Date End Date Taking?  Authorizing Provider   cloNIDine (CATAPRES) 0.1 MG tablet Take 0.1 mg by mouth nightly as needed for High Blood Pressure   Yes Historical Provider, MD   ondansetron (ZOFRAN) 4 MG tablet Take 1 tablet by mouth every 8 hours as needed for Nausea 5/2/19  Yes Radha Cassidy MD   nadolol (CORGARD) 40 MG tablet Take 120 mg by mouth nightly    Yes Historical Provider, MD   doxycycline hyclate (VIBRAMYCIN) 100 MG capsule  2/15/19   Historical Provider, MD         Current Facility-Administered Medications:     nadolol (CORGARD) tablet 120 mg, 120 mg, Oral, Daily, Keke Chaney MD, 120 mg at 05/08/19 0803    cloNIDine (CATAPRES) tablet 0.1 mg, 0.1 mg, Oral, Nightly PRN, SHANAE Callahan    sodium chloride flush 0.9 % injection 10 mL, 10 mL, Intravenous, 2 times per day, SHANAE Callahan, 10 mL at 05/07/19 2026    sodium chloride flush 0.9 % injection 10 mL, 10 mL, Intravenous, PRN, SHANAE Callahan    magnesium hydroxide (MILK OF MAGNESIA) 400 MG/5ML suspension 30 mL, 30 mL, Oral, Daily PRN, SHANAE Callahan    enoxaparin (LOVENOX) injection 40 mg, 40 mg, Subcutaneous, Daily, SHANAE Callahan    0.9 % sodium chloride infusion, , Intravenous, Continuous, Janette Callahan, Last Rate: 100 mL/hr at 05/08/19 0536    acetaminophen (TYLENOL) tablet 650 mg, 650 mg, Oral, Q4H PRN, SHANAE Callahan    HYDROmorphone (DILAUDID) injection 0.5 mg, 0.5 mg, Intravenous, Q4H PRN, SHANAE Dorsey, 0.5 mg at 05/08/19 0803    doxycycline hyclate (VIBRA-TABS) tablet 100 mg, 100 mg, Oral, 2 times per day, Nannette Jon MD, 100 mg at 05/08/19 1018    promethazine (PHENERGAN) injection 12.5 mg, 12.5 mg, Intravenous, Q6H PRN, Nannette Jon MD, 12.5 mg at 05/08/19 0249    pantoprazole (PROTONIX) tablet 40 mg, 40 mg, Oral, QAM AC, Nannette Jon MD, Stopped at 05/08/19 0700      Infusions:    sodium chloride 100 mL/hr at 05/08/19 0536     PRN Medications: cloNIDine, sodium chloride flush, magnesium hydroxide, acetaminophen, HYDROmorphone, promethazine  Allergies: Allergies   Allergen Reactions    Quinolones      \"because of heart\"    Dexamethasone Sodium Phosphate Other (See Comments)     \"I feel hot all over\"    Other Hives     Adhesives      Codeine Nausea Only     Itchy nose    Nitroglycerin Other (See Comments)     Cardiologist states patient is not to have it, pt does not know why.  Prednisone      Other reaction(s): Flushing  \"I turn red and really hot\"         Objective:     Physical Exam:   /87   Pulse 80   Temp 98.4 °F (36.9 °C) (Temporal)   Resp 16   Ht 5' 6\" (1.676 m)   Wt 168 lb (76.2 kg)   SpO2 100%   BMI 27.12 kg/m²     HEENT: NCAT  Lungs: CTAB  CV: RRR  Abd: soft, ntd  Ext: dpi    Lab and Imaging Review   Labs:  CBC:   Recent Labs     05/05/19  1413 05/07/19  1355 05/08/19  0536   WBC 6.5 4.6 5.8   HGB 14.8 14.5 13.8   HCT 43.7 42.7 40.5   MCV 92.2 92.3 92.9    173 147     BMP:   Recent Labs     05/05/19  1413 05/07/19  1355 05/08/19  0536    143 140   K 4.3 4.0 3.6    104 105   CO2 29 30 26   BUN 13 11 10   CREATININE 0.7 0.8 0.7     LIVER PROFILE:   Recent Labs     05/05/19  1413 05/07/19  1355 05/08/19  0536   AST 22 24 20   ALT 15 16 14   LIPASE 29.0 23.0  --    PROT 7.4 7.1 6.5   BILITOT 0.3 0.3 0.3   ALKPHOS 115 112 98     PT/INR: No results for input(s): INR in the last 72 hours.     Invalid input(s): PT    Pre-Procedure Assessment / Plan:  ASA: Class 2 - A normal healthy patient with mild systemic disease  Airway: Mallampati: II (soft palate, uvula, fauces visible)  Level of Sedation Plan:Deep sedation  Post Procedure plan: Return to same level of care      Plan:   1. EUS/ERCP    I assessed the patient and find that the patient is in satisfactory condition to proceed with the planned procedure and sedation plan. I have explained the risk, benefits, and alternatives to the procedure; the patient understands and agrees to proceed.        Marian Sheldon  5/8/2019

## 2019-05-09 ENCOUNTER — APPOINTMENT (OUTPATIENT)
Dept: NUCLEAR MEDICINE | Age: 54
DRG: 445 | End: 2019-05-09
Payer: COMMERCIAL

## 2019-05-09 PROCEDURE — 3430000000 HC RX DIAGNOSTIC RADIOPHARMACEUTICAL: Performed by: SURGERY

## 2019-05-09 PROCEDURE — 99232 SBSQ HOSP IP/OBS MODERATE 35: CPT | Performed by: INTERNAL MEDICINE

## 2019-05-09 PROCEDURE — 2580000003 HC RX 258: Performed by: PHYSICIAN ASSISTANT

## 2019-05-09 PROCEDURE — 99232 SBSQ HOSP IP/OBS MODERATE 35: CPT | Performed by: SURGERY

## 2019-05-09 PROCEDURE — 6360000004 HC RX CONTRAST MEDICATION: Performed by: SURGERY

## 2019-05-09 PROCEDURE — A9537 TC99M MEBROFENIN: HCPCS | Performed by: SURGERY

## 2019-05-09 PROCEDURE — 6360000002 HC RX W HCPCS: Performed by: INTERNAL MEDICINE

## 2019-05-09 PROCEDURE — 78227 HEPATOBIL SYST IMAGE W/DRUG: CPT

## 2019-05-09 PROCEDURE — 6360000002 HC RX W HCPCS: Performed by: HOSPITALIST

## 2019-05-09 PROCEDURE — 2580000003 HC RX 258: Performed by: SURGERY

## 2019-05-09 PROCEDURE — 6360000002 HC RX W HCPCS: Performed by: PHYSICIAN ASSISTANT

## 2019-05-09 PROCEDURE — 1200000000 HC SEMI PRIVATE

## 2019-05-09 RX ORDER — HYDROMORPHONE HCL 110MG/55ML
0.5 PATIENT CONTROLLED ANALGESIA SYRINGE INTRAVENOUS EVERY 6 HOURS PRN
Status: DISCONTINUED | OUTPATIENT
Start: 2019-05-09 | End: 2019-05-10

## 2019-05-09 RX ORDER — KETOROLAC TROMETHAMINE 30 MG/ML
15 INJECTION, SOLUTION INTRAMUSCULAR; INTRAVENOUS EVERY 6 HOURS PRN
Status: DISCONTINUED | OUTPATIENT
Start: 2019-05-09 | End: 2019-05-11 | Stop reason: HOSPADM

## 2019-05-09 RX ADMIN — Medication 10 ML: at 19:56

## 2019-05-09 RX ADMIN — HYDROMORPHONE HYDROCHLORIDE 0.5 MG: 2 INJECTION INTRAMUSCULAR; INTRAVENOUS; SUBCUTANEOUS at 03:02

## 2019-05-09 RX ADMIN — HYDROMORPHONE HYDROCHLORIDE 0.5 MG: 2 INJECTION INTRAMUSCULAR; INTRAVENOUS; SUBCUTANEOUS at 14:16

## 2019-05-09 RX ADMIN — PROMETHAZINE HYDROCHLORIDE 12.5 MG: 25 INJECTION INTRAMUSCULAR; INTRAVENOUS at 11:04

## 2019-05-09 RX ADMIN — SODIUM CHLORIDE 1.52 MCG: 9 INJECTION, SOLUTION INTRAVENOUS at 10:43

## 2019-05-09 RX ADMIN — SODIUM CHLORIDE 1.52 MCG: 9 INJECTION, SOLUTION INTRAVENOUS at 12:30

## 2019-05-09 RX ADMIN — HYDROMORPHONE HYDROCHLORIDE 0.5 MG: 2 INJECTION INTRAMUSCULAR; INTRAVENOUS; SUBCUTANEOUS at 19:56

## 2019-05-09 RX ADMIN — Medication 5 MILLICURIE: at 11:26

## 2019-05-09 RX ADMIN — KETOROLAC TROMETHAMINE 15 MG: 30 INJECTION, SOLUTION INTRAMUSCULAR at 19:15

## 2019-05-09 ASSESSMENT — PAIN DESCRIPTION - ORIENTATION
ORIENTATION: RIGHT;MID
ORIENTATION: RIGHT;MID

## 2019-05-09 ASSESSMENT — PAIN SCALES - GENERAL
PAINLEVEL_OUTOF10: 10
PAINLEVEL_OUTOF10: 7
PAINLEVEL_OUTOF10: 0
PAINLEVEL_OUTOF10: 0
PAINLEVEL_OUTOF10: 8
PAINLEVEL_OUTOF10: 0
PAINLEVEL_OUTOF10: 10

## 2019-05-09 ASSESSMENT — PAIN DESCRIPTION - DESCRIPTORS
DESCRIPTORS: SHARP
DESCRIPTORS: SHARP

## 2019-05-09 ASSESSMENT — PAIN DESCRIPTION - PAIN TYPE
TYPE: ACUTE PAIN

## 2019-05-09 ASSESSMENT — PAIN DESCRIPTION - LOCATION
LOCATION: ABDOMEN

## 2019-05-09 ASSESSMENT — PAIN DESCRIPTION - ONSET: ONSET: GRADUAL

## 2019-05-09 ASSESSMENT — PAIN DESCRIPTION - FREQUENCY
FREQUENCY: INTERMITTENT
FREQUENCY: INTERMITTENT

## 2019-05-09 NOTE — PLAN OF CARE
Problem: Pain:  Goal: Pain level will decrease  Description  Pain level will decrease  Outcome: Ongoing     Problem: Pain:  Goal: Control of acute pain  Description  Control of acute pain  Outcome: Ongoing     Problem: Pain:  Goal: Control of chronic pain  Description  Control of chronic pain  5/9/2019 1959 by Annelise Flores RN  Outcome: Ongoing     Problem: Pain:  Goal: Patient's pain/discomfort is manageable  Description  Patient's pain/discomfort is manageable  5/9/2019 1959 by Annelise Flores RN  Outcome: Ongoing     Problem: Infection:  Goal: Will remain free from infection  Description  Will remain free from infection  Outcome: Ongoing     Problem: Bowel/Gastric:  Goal: Bowel function will improve  Description  Bowel function will improve  Outcome: Ongoing     Problem: Bowel/Gastric:  Goal: Diagnostic test results will improve  Description  Diagnostic test results will improve  Outcome: Ongoing     Problem: Bowel/Gastric:  Goal: Occurrences of nausea will decrease  Description  Occurrences of nausea will decrease  Outcome: Ongoing     Problem:  Bowel/Gastric:  Goal: Occurrences of vomiting will decrease  Description  Occurrences of vomiting will decrease  Outcome: Ongoing     Problem: Sensory:  Goal: Pain level will decrease  Description  Pain level will decrease  Outcome: Ongoing

## 2019-05-09 NOTE — PROGRESS NOTES
Shift assessment complete. See doc flow. No medication given at this time per order for procedure at 10 a.m. Patient complaining of pain in abdomen on right side radiating to mid abdomen. Warm blanket offered to patient to put against area of pain, but patient refused. Call light and bedside table within easy reach.

## 2019-05-09 NOTE — PROGRESS NOTES
IM Progress Note    Admit Date:  5/7/2019  1    Interval history:  Admitted for right UQ pain     Subjective:  Ms. Germania Noriega seen up in bed, reports right sided abd pain     Objective:   /84   Pulse 82   Temp 98.2 °F (36.8 °C) (Oral)   Resp 16   Ht 5' 6\" (1.676 m)   Wt 168 lb (76.2 kg)   SpO2 93%   BMI 27.12 kg/m²       Intake/Output Summary (Last 24 hours) at 5/8/2019 2041  Last data filed at 5/8/2019 1812  Gross per 24 hour   Intake 2386 ml   Output --   Net 2386 ml       Physical Exam:    General appearance: alert, appears stated age and cooperative  Head: Normocephalic, without obvious abnormality, atraumatic  Eyes: conjunctivae/corneas clear. PERRL, EOM's intact.   Neck: no adenopathy, no carotid bruit, no JVD, supple, symmetrical, trachea midline and thyroid not enlarged, symmetric, no tenderness/mass/nodules  Lungs: clear to auscultation bilaterally  Heart: regular rate and rhythm, S1, S2 normal, no murmur, click, rub or gallop  Abdomen: soft, rightUQ +-tender; bowel sounds normal; no masses,  no organomegaly  Extremities: extremities normal, atraumatic, no cyanosis or edema  Pulses: 2+ and symmetric  Skin: Skin color, texture, turgor normal. No rashes or lesions  Neurologic: Grossly normal    Medications:   Scheduled Medications:    nadolol  120 mg Oral Daily    sodium chloride flush  10 mL Intravenous 2 times per day    enoxaparin  40 mg Subcutaneous Daily    doxycycline hyclate  100 mg Oral 2 times per day    pantoprazole  40 mg Oral QAM AC     I   sodium chloride 100 mL/hr at 05/08/19 1812     cloNIDine, sodium chloride flush, magnesium hydroxide, acetaminophen, HYDROmorphone, promethazine    Lab Data:  Recent Labs     05/07/19  1355 05/08/19  0536   WBC 4.6 5.8   HGB 14.5 13.8   HCT 42.7 40.5   MCV 92.3 92.9    147     Recent Labs     05/07/19  1355 05/08/19  0536    140   K 4.0 3.6    105   CO2 30 26   BUN 11 10   CREATININE 0.8 0.7     Recent Labs     05/08/19  0124

## 2019-05-09 NOTE — PROGRESS NOTES
IM Progress Note    Admit Date:  5/7/2019  2    Interval history:  Admitted for right UQ pain   ERCP neg     Subjective:  Ms. Ledy Ravi seen up in bed, reports ongoing right sided abd pain     Objective:   /78   Pulse 80   Temp 97.6 °F (36.4 °C) (Oral)   Resp 16   Ht 5' 6\" (1.676 m)   Wt 168 lb (76.2 kg)   SpO2 95%   BMI 27.12 kg/m²       Intake/Output Summary (Last 24 hours) at 5/9/2019 1214  Last data filed at 5/8/2019 2124  Gross per 24 hour   Intake 2706 ml   Output --   Net 2706 ml       Physical Exam:    General appearance: alert, appears stated age and cooperative  Head: Normocephalic, without obvious abnormality, atraumatic  Eyes: conjunctivae/corneas clear. PERRL, EOM's intact.   Neck: no adenopathy, no carotid bruit, no JVD, supple,   Lungs: clear to auscultation bilaterally  Heart: regular rate and rhythm, S1, S2 normal, no murmur, click, rub or gallop  Abdomen: soft, rightUQ +-tender; bowel sounds normal; no masses,  no organomegaly  Extremities: extremities normal, atraumatic, no cyanosis or edema  Pulses: 2+ and symmetric  Skin: Skin color, texture, turgor normal. No rashes or lesions  Neurologic: Grossly normal    Medications:   Scheduled Medications:    sincalide [facility]) infusion  0.02 mcg/kg Intravenous Once    nadolol  120 mg Oral Daily    sodium chloride flush  10 mL Intravenous 2 times per day    enoxaparin  40 mg Subcutaneous Daily    doxycycline hyclate  100 mg Oral 2 times per day    pantoprazole  40 mg Oral QAM AC     I   sodium chloride 100 mL/hr at 05/08/19 2123     cloNIDine, sodium chloride flush, magnesium hydroxide, acetaminophen, HYDROmorphone, promethazine    Lab Data:  Recent Labs     05/07/19  1355 05/08/19  0536   WBC 4.6 5.8   HGB 14.5 13.8   HCT 42.7 40.5   MCV 92.3 92.9    147     Recent Labs     05/07/19  1355 05/08/19  0536    140   K 4.0 3.6    105   CO2 30 26   BUN 11 10   CREATININE 0.8 0.7     Recent Labs     05/08/19  0124 TROPONINI 0.01       Coagulation:   Lab Results   Component Value Date    INR 0.88 11/01/2018    APTT 32.2 11/01/2018     Cardiac markers:   Lab Results   Component Value Date    TROPONINI 0.01 05/08/2019         Lab Results   Component Value Date    ALT 14 05/08/2019    AST 20 05/08/2019    ALKPHOS 98 05/08/2019    BILITOT 0.3 05/08/2019       Lab Results   Component Value Date    INR 0.88 11/01/2018    INR 0.84 (L) 10/26/2018    INR 0.92 10/18/2017    PROTIME 10.0 11/01/2018    PROTIME 9.6 (L) 10/26/2018    PROTIME 10.4 10/18/2017                  US GALLBLADDER RUQ   Final Result   1. Extrahepatic biliary dilatation appears stable from prior CT with mild   intrahepatic dilatation not well characterized due to technical factors. Underlying etiology is not determined. 2. Somewhat contracted gallbladder, likely due to lack of fasting. No   evidence of cholelithiasis. No inflammation.                 ASSESSMENT and PLAN:        1) Abdominal pain- right UQ with dilated bile ducts   - neg ERCP with no biliary stones US with no stones  - normal lipase  - neg  HIDA again today.  Consider muscular etiology     2) Prolonged QT  - discontinue zofran  - maintain on tele monitor  - continue coreg, nadlolol     3) CAD  - stable      4) h/o recurrent MRSA infxn  - continue doxycycline      DVT Prophylaxis: lovenox  Diet: DIET CLEAR LIQUID;  Code Status: Full Code                Margarita Welch MD 5/9/2019 12:14 PM

## 2019-05-09 NOTE — PLAN OF CARE
Problem: Pain:  Goal: Control of chronic pain  Description  Control of chronic pain  Outcome: Ongoing   Patient will be monitored for pain each shift. Problem: Pain:  Goal: Patient's pain/discomfort is manageable  Description  Patient's pain/discomfort is manageable  Outcome: Ongoing   Patient will be monitored for pain each shift. Problem: Falls - Risk of:  Goal: Will remain free from falls  Description  Will remain free from falls  Outcome: Ongoing   Patient will use call light in advance of needs and wait for staff prior to getting out of bed. Problem: Safety:  Goal: Free from accidental physical injury  Description  Free from accidental physical injury  Outcome: Ongoing   Patient will use call light in advance of needs and wait for staff prior to getting out of bed. Problem: Skin Integrity:  Goal: Skin integrity will stabilize  Description  Skin integrity will stabilize  Outcome: Ongoing   Patient will be encouraged to change positions every 2 hours and assisted when needed.

## 2019-05-09 NOTE — PROGRESS NOTES
PM assessment completed. See flow sheet. Pt A&O X 4. Resp E&E with no distress noted. Pt.denies any needs at this time. Call light within reach. Will continue to monitor.

## 2019-05-10 ENCOUNTER — APPOINTMENT (OUTPATIENT)
Dept: GENERAL RADIOLOGY | Age: 54
DRG: 445 | End: 2019-05-10
Payer: COMMERCIAL

## 2019-05-10 LAB
A/G RATIO: 1.6 (ref 1.1–2.2)
ALBUMIN SERPL-MCNC: 4 G/DL (ref 3.4–5)
ALP BLD-CCNC: 102 U/L (ref 40–129)
ALT SERPL-CCNC: 16 U/L (ref 10–40)
ANION GAP SERPL CALCULATED.3IONS-SCNC: 10 MMOL/L (ref 3–16)
AST SERPL-CCNC: 22 U/L (ref 15–37)
BACTERIA: ABNORMAL /HPF
BASOPHILS ABSOLUTE: 0.1 K/UL (ref 0–0.2)
BASOPHILS RELATIVE PERCENT: 0.5 %
BILIRUB SERPL-MCNC: 0.6 MG/DL (ref 0–1)
BILIRUBIN URINE: NEGATIVE
BLOOD, URINE: NEGATIVE
BUN BLDV-MCNC: 12 MG/DL (ref 7–20)
CALCIUM SERPL-MCNC: 9.1 MG/DL (ref 8.3–10.6)
CHLORIDE BLD-SCNC: 106 MMOL/L (ref 99–110)
CLARITY: ABNORMAL
CO2: 23 MMOL/L (ref 21–32)
COLOR: YELLOW
CREAT SERPL-MCNC: 0.6 MG/DL (ref 0.6–1.1)
EOSINOPHILS ABSOLUTE: 0.1 K/UL (ref 0–0.6)
EOSINOPHILS RELATIVE PERCENT: 0.9 %
EPITHELIAL CELLS, UA: ABNORMAL /HPF
GFR AFRICAN AMERICAN: >60
GFR NON-AFRICAN AMERICAN: >60
GLOBULIN: 2.5 G/DL
GLUCOSE BLD-MCNC: 117 MG/DL (ref 70–99)
GLUCOSE URINE: NEGATIVE MG/DL
HCT VFR BLD CALC: 40.7 % (ref 36–48)
HEMOGLOBIN: 14 G/DL (ref 12–16)
KETONES, URINE: 15 MG/DL
LEUKOCYTE ESTERASE, URINE: ABNORMAL
LIPASE: 30 U/L (ref 13–60)
LYMPHOCYTES ABSOLUTE: 1 K/UL (ref 1–5.1)
LYMPHOCYTES RELATIVE PERCENT: 9.3 %
MCH RBC QN AUTO: 31.4 PG (ref 26–34)
MCHC RBC AUTO-ENTMCNC: 34.5 G/DL (ref 31–36)
MCV RBC AUTO: 91.1 FL (ref 80–100)
MICROSCOPIC EXAMINATION: YES
MONOCYTES ABSOLUTE: 0.7 K/UL (ref 0–1.3)
MONOCYTES RELATIVE PERCENT: 6.6 %
NEUTROPHILS ABSOLUTE: 9.1 K/UL (ref 1.7–7.7)
NEUTROPHILS RELATIVE PERCENT: 82.7 %
NITRITE, URINE: NEGATIVE
PDW BLD-RTO: 12.3 % (ref 12.4–15.4)
PH UA: 6 (ref 5–8)
PLATELET # BLD: 147 K/UL (ref 135–450)
PMV BLD AUTO: 9.2 FL (ref 5–10.5)
POTASSIUM SERPL-SCNC: 3.3 MMOL/L (ref 3.5–5.1)
PROTEIN UA: NEGATIVE MG/DL
RBC # BLD: 4.47 M/UL (ref 4–5.2)
RBC UA: ABNORMAL /HPF (ref 0–2)
SODIUM BLD-SCNC: 139 MMOL/L (ref 136–145)
SPECIFIC GRAVITY UA: 1.02 (ref 1–1.03)
TOTAL PROTEIN: 6.5 G/DL (ref 6.4–8.2)
URINE TYPE: ABNORMAL
UROBILINOGEN, URINE: 0.2 E.U./DL
WBC # BLD: 11 K/UL (ref 4–11)
WBC UA: ABNORMAL /HPF (ref 0–5)

## 2019-05-10 PROCEDURE — 85025 COMPLETE CBC W/AUTO DIFF WBC: CPT

## 2019-05-10 PROCEDURE — 83690 ASSAY OF LIPASE: CPT

## 2019-05-10 PROCEDURE — 36415 COLL VENOUS BLD VENIPUNCTURE: CPT

## 2019-05-10 PROCEDURE — 99232 SBSQ HOSP IP/OBS MODERATE 35: CPT | Performed by: INTERNAL MEDICINE

## 2019-05-10 PROCEDURE — 81001 URINALYSIS AUTO W/SCOPE: CPT

## 2019-05-10 PROCEDURE — 6370000000 HC RX 637 (ALT 250 FOR IP): Performed by: PHYSICIAN ASSISTANT

## 2019-05-10 PROCEDURE — 2580000003 HC RX 258: Performed by: PHYSICIAN ASSISTANT

## 2019-05-10 PROCEDURE — 6360000002 HC RX W HCPCS: Performed by: INTERNAL MEDICINE

## 2019-05-10 PROCEDURE — 99232 SBSQ HOSP IP/OBS MODERATE 35: CPT | Performed by: SURGERY

## 2019-05-10 PROCEDURE — 6360000002 HC RX W HCPCS: Performed by: PHYSICIAN ASSISTANT

## 2019-05-10 PROCEDURE — 87086 URINE CULTURE/COLONY COUNT: CPT

## 2019-05-10 PROCEDURE — 1200000000 HC SEMI PRIVATE

## 2019-05-10 PROCEDURE — 6370000000 HC RX 637 (ALT 250 FOR IP): Performed by: HOSPITALIST

## 2019-05-10 PROCEDURE — 71045 X-RAY EXAM CHEST 1 VIEW: CPT

## 2019-05-10 PROCEDURE — 80053 COMPREHEN METABOLIC PANEL: CPT

## 2019-05-10 PROCEDURE — 6360000002 HC RX W HCPCS: Performed by: HOSPITALIST

## 2019-05-10 RX ADMIN — KETOROLAC TROMETHAMINE 15 MG: 30 INJECTION, SOLUTION INTRAMUSCULAR at 01:15

## 2019-05-10 RX ADMIN — NADOLOL 120 MG: 40 TABLET ORAL at 08:17

## 2019-05-10 RX ADMIN — ACETAMINOPHEN 650 MG: 325 TABLET ORAL at 20:42

## 2019-05-10 RX ADMIN — HYDROMORPHONE HYDROCHLORIDE 0.5 MG: 2 INJECTION INTRAMUSCULAR; INTRAVENOUS; SUBCUTANEOUS at 01:57

## 2019-05-10 RX ADMIN — Medication 10 ML: at 08:18

## 2019-05-10 RX ADMIN — PROMETHAZINE HYDROCHLORIDE 12.5 MG: 25 INJECTION INTRAMUSCULAR; INTRAVENOUS at 08:18

## 2019-05-10 RX ADMIN — PANTOPRAZOLE SODIUM 40 MG: 40 TABLET, DELAYED RELEASE ORAL at 05:38

## 2019-05-10 RX ADMIN — DOXYCYCLINE HYCLATE 100 MG: 100 TABLET, COATED ORAL at 08:17

## 2019-05-10 RX ADMIN — DOXYCYCLINE HYCLATE 100 MG: 100 TABLET, COATED ORAL at 20:35

## 2019-05-10 RX ADMIN — ACETAMINOPHEN 650 MG: 325 TABLET ORAL at 08:18

## 2019-05-10 RX ADMIN — Medication 10 ML: at 20:35

## 2019-05-10 RX ADMIN — ENOXAPARIN SODIUM 40 MG: 40 INJECTION SUBCUTANEOUS at 08:17

## 2019-05-10 ASSESSMENT — PAIN DESCRIPTION - FREQUENCY: FREQUENCY: INTERMITTENT

## 2019-05-10 ASSESSMENT — PAIN DESCRIPTION - PAIN TYPE
TYPE: ACUTE PAIN

## 2019-05-10 ASSESSMENT — PAIN DESCRIPTION - DESCRIPTORS
DESCRIPTORS: SHARP
DESCRIPTORS: HEADACHE

## 2019-05-10 ASSESSMENT — PAIN DESCRIPTION - LOCATION
LOCATION: HEAD
LOCATION: ABDOMEN
LOCATION: ABDOMEN

## 2019-05-10 ASSESSMENT — PAIN SCALES - GENERAL
PAINLEVEL_OUTOF10: 0
PAINLEVEL_OUTOF10: 8
PAINLEVEL_OUTOF10: 10
PAINLEVEL_OUTOF10: 6
PAINLEVEL_OUTOF10: 10

## 2019-05-10 ASSESSMENT — PAIN - FUNCTIONAL ASSESSMENT: PAIN_FUNCTIONAL_ASSESSMENT: ACTIVITIES ARE NOT PREVENTED

## 2019-05-10 ASSESSMENT — PAIN DESCRIPTION - ORIENTATION
ORIENTATION: LEFT;RIGHT
ORIENTATION: RIGHT

## 2019-05-10 ASSESSMENT — PAIN DESCRIPTION - ONSET: ONSET: ON-GOING

## 2019-05-10 ASSESSMENT — PAIN DESCRIPTION - PROGRESSION: CLINICAL_PROGRESSION: GRADUALLY WORSENING

## 2019-05-10 NOTE — PROGRESS NOTES
IM Progress Note    Admit Date:  5/7/2019  3    Interval history:  Admitted for right UQ pain   ERCP neg , ct negx2, HIDA neg      Subjective:  Ms. Rhoda Byers seen up in bed, reports ongoing right sided abd pain , low grade fever this am  Tolerating diet well     Objective:   /82   Pulse 74   Temp 99.3 °F (37.4 °C) (Oral)   Resp 16   Ht 5' 6\" (1.676 m)   Wt 168 lb (76.2 kg)   SpO2 96%   BMI 27.12 kg/m²       Intake/Output Summary (Last 24 hours) at 5/10/2019 1925  Last data filed at 5/10/2019 1802  Gross per 24 hour   Intake 190 ml   Output --   Net 190 ml       Physical Exam:    General appearance: alert, appears stated age and cooperative  Head: Normocephalic, without obvious abnormality, atraumatic  Eyes: conjunctivae/corneas clear. PERRL, EOM's intact.   Neck: no adenopathy, no carotid bruit, no JVD, supple,   Lungs: clear to auscultation bilaterally  Heart: regular rate and rhythm, S1, S2 normal, no murmur, click, rub or gallop  Abdomen: soft, rightUQ +-tender; bowel sounds normal; no masses,  no organomegaly  Extremities: extremities normal, atraumatic, no cyanosis or edema  Pulses: 2+ and symmetric  Skin: Skin color, texture, turgor normal. No rashes or lesions  Neurologic: Grossly normal    Medications:   Scheduled Medications:    nadolol  120 mg Oral Daily    sodium chloride flush  10 mL Intravenous 2 times per day    enoxaparin  40 mg Subcutaneous Daily    doxycycline hyclate  100 mg Oral 2 times per day    pantoprazole  40 mg Oral QAM AC     I    ketorolac, cloNIDine, sodium chloride flush, magnesium hydroxide, acetaminophen, promethazine    Lab Data:  Recent Labs     05/08/19  0536 05/10/19  1039   WBC 5.8 11.0   HGB 13.8 14.0   HCT 40.5 40.7   MCV 92.9 91.1    147     Recent Labs     05/08/19  0536 05/10/19  1039    139   K 3.6 3.3*    106   CO2 26 23   BUN 10 12   CREATININE 0.7 0.6     Recent Labs     05/08/19  0124   TROPONINI 0.01       Coagulation:   Lab Results Component Value Date    INR 0.88 11/01/2018    APTT 32.2 11/01/2018     Cardiac markers:   Lab Results   Component Value Date    TROPONINI 0.01 05/08/2019         Lab Results   Component Value Date    ALT 16 05/10/2019    AST 22 05/10/2019    ALKPHOS 102 05/10/2019    BILITOT 0.6 05/10/2019       Lab Results   Component Value Date    INR 0.88 11/01/2018    INR 0.84 (L) 10/26/2018    INR 0.92 10/18/2017    PROTIME 10.0 11/01/2018    PROTIME 9.6 (L) 10/26/2018    PROTIME 10.4 10/18/2017                  US GALLBLADDER RUQ   Final Result   1. Extrahepatic biliary dilatation appears stable from prior CT with mild   intrahepatic dilatation not well characterized due to technical factors. Underlying etiology is not determined. 2. Somewhat contracted gallbladder, likely due to lack of fasting. No   evidence of cholelithiasis. No inflammation.               HIDANo convincing scintigraphic evidence of acute or chronic cholecystitis    cxr- No acute pulmonary finding    ASSESSMENT and PLAN:        1) Abdominal pain- right UQ with dilated bile ducts on ct abd   - neg ERCP with no biliary stones US with no stones  - normal lipase  - neg  HIDA , consider muscular etiology  - stop narcotics, add toradol     2) Prolonged QT  - discontinue zofran  - maintain on tele monitor  - continue coreg, nadlolol     3) CAD  - stable      4) h/o recurrent MRSA infxn  - continue doxycycline       5.  Check UA and cxxr today for low grade fevers     DVT Prophylaxis: lovenox  Diet: regular ;  Code Status: Full Code                Jonathon Mancilla MD 5/10/2019 7:25 PM

## 2019-05-10 NOTE — PROGRESS NOTES
Morning medications given see MAR. Respirations easy unlabored. No signs of distress noted. Call light within reach. Will continue to monitor.  Ashley Dia RN

## 2019-05-10 NOTE — PLAN OF CARE
Problem: Pain:  Goal: Pain level will decrease  Description  Pain level will decrease  Outcome: Ongoing   Patient will be monitored for pain each shift. Problem: Falls - Risk of:  Goal: Will remain free from falls  Description  Will remain free from falls  Outcome: Ongoing   Patient will use call light in advance of needs and wait for staff prior to getting out of bed. Problem: Safety:  Goal: Free from accidental physical injury  Description  Free from accidental physical injury  Outcome: Ongoing   Patient will use call light in advance of needs and wait for staff prior to getting out of bed. Problem: Skin Integrity:  Goal: Skin integrity will stabilize  Description  Skin integrity will stabilize  Outcome: Ongoing   Patient will be encouraged to change positions every 2 hours and assisted when needed.      Problem: Sensory:  Goal: Pain level will decrease  Description  Pain level will decrease  Outcome: Ongoing

## 2019-05-10 NOTE — PROGRESS NOTES
PROGRESS NOTE  S:53 yrs Patient  admitted on 5/7/2019 with RUQ abdominal pain [R10.11] . Today she feels the same. Continues to have Abdominal Pain    Exam:   Vitals:    05/10/19 0800   BP: 131/81   Pulse: 84   Resp: 16   Temp: 100.9 °F (38.3 °C)   SpO2: 97%      General appearance: alert, appears stated age and cooperative  HEENT: Sclera clear, anicteric  Neck: no adenopathy and supple, symmetrical, trachea midline  Lungs: clear to auscultation bilaterally  Heart: regular rate and rhythm, S1, S2 normal, no murmur, click, rub or gallop  Abdomen: soft, non-tender; bowel sounds normal; no masses,  no organomegaly  Extremities: extremities normal, atraumatic, no cyanosis or edema     Medications: Reviewed    Labs:  CBC:   Recent Labs     05/08/19  0536 05/10/19  1039   WBC 5.8 11.0   HGB 13.8 14.0   HCT 40.5 40.7   MCV 92.9 91.1    147     BMP:   Recent Labs     05/08/19  0536 05/10/19  1039    139   K 3.6 3.3*    106   CO2 26 23   BUN 10 12   CREATININE 0.7 0.6     LIVER PROFILE:   Recent Labs     05/08/19  0536 05/10/19  1039   AST 20 22   ALT 14 16   LIPASE  --  30.0   PROT 6.5 6.5   BILITOT 0.3 0.6   ALKPHOS 98 102       Impression: 48year old female with history of HTN, CAD, COPD, TIA, asthma, admitted with severe intractable abd pain. EUS and ERCP negative except for dilated bile duct. HIDA scan negative    Recommendation:  1. Continue supportive care  2. Low fat diet  3. PPI daily  4. Fever workup per primary team  5. Ambulate TID  6.  Will follow      Dara Gabriel MD  2:48 PM 5/10/2019

## 2019-05-10 NOTE — PROGRESS NOTES
Shift assessment complete. See doc flow. Medications given see MAR. Patient complains of pain in abdomen. Patient refuses to try toradol at this time. States it does nothing for her. Patient also complains of nausea, with dry heaves. PRN phenergan given. Patient with no complaints at this time. Call light and bedside table within easy reach.

## 2019-05-10 NOTE — PROGRESS NOTES
Patient alert and oriented. Side rails up x 2, bed in lowest position, wheels locked, bed alarm on, call light and bed side table in reach. Urinalysis sent to lab per order. Blood work collected.

## 2019-05-10 NOTE — PROGRESS NOTES
General Surgery - Jaye Abbott, [facility]  Daily Progress Note    Pt Name: Erica Jiménez [address] Record Number: [id number]  Date of Birth 1965   Today's Date: 5/10/2019    ASSESSMENT  1. HIDA 5/9: EF 92%. No evidence of acute or chronic tyler  2. RUQ ultrasound 5/7: extrahepatic duct dilation stable from prior CT, contracted gallbladder, no stones, no inflammation  3. ERCP 5/8: dilated bile duct with sphincterotomy: post-procedure cholangio: no filling defect  4. ABD: soft, upper abdominal tenderness, no V, Pt ate breakfast and lunch yesterday, no distention. 5. I had to wake pt up upon entering the room. She was very upset her gallbladder was not being removed and she was only given Toradol for pain  6. Labs unremarkable   7. VS: temp this am 100.9    PLAN  1. Fever this am and abdominal pain of unknown etiology: medicine working her up for source  2. Ok with diet as pt ate two meals including all of breakfast   3. Imaging down not support gallbladder as source of upper abdominal pain    SUBJECTIVE  Juan Alberto yelling out and upset, complaining of 101/10 abdominal pain but, ate her breakfast this am. Pain is not well controlled. She has no vomiting. She has passed flatus and has had a bowel movement. She is tolerating regular diet. Current activity is ad hanny    OBJECTIVE  VITALS:  height is 5' 6\" (1.676 m) and weight is 168 lb (76.2 kg). Her oral temperature is 100.9 °F (38.3 °C). Her blood pressure is 131/81 and her pulse is 84. Her respiration is 16 and oxygen saturation is 97%. VITALS:  /81   Pulse 84   Temp 100.9 °F (38.3 °C) (Oral)   Resp 16   Ht 5' 6\" (1.676 m)   Wt 168 lb (76.2 kg)   SpO2 97%   BMI 27.12 kg/m²   INTAKE/OUTPUT:    Intake/Output Summary (Last 24 hours) at 5/10/2019 1158  Last data filed at 5/10/2019 0611  Gross per 24 hour   Intake 370 ml   Output --   Net 370 ml     GENERAL: yelling out, upset, alert, no distress, uncooperative    I/O last 3 completed shifts:   In: 56 [P.O.:360; I.V.:10]  Out: -   No intake/output data recorded.     LABS  Recent Labs     05/10/19  1039 05/10/19  1042   WBC 11.0  --    HGB 14.0  --    HCT 40.7  --      --      --    K 3.3*  --      --    CO2 23  --    BUN 12  --    CREATININE 0.6  --    CALCIUM 9.1  --    AST 22  --    ALT 16  --    BILITOT 0.6  --    NITRU  --  Negative   COLORU  --  Yellow   BACTERIA  --  2+*     CBC with Differential:    Lab Results   Component Value Date    WBC 11.0 05/10/2019    RBC 4.47 05/10/2019    RBC 4.61 03/09/2016    HGB 14.0 05/10/2019    HCT 40.7 05/10/2019     05/10/2019    MCV 91.1 05/10/2019    MCH 31.4 05/10/2019    MCHC 34.5 05/10/2019    RDW 12.3 05/10/2019    SEGSPCT 63.0 03/18/2011    LYMPHOPCT 9.3 05/10/2019    LYMPHOPCT 40.6 03/09/2016    MONOPCT 6.6 05/10/2019    EOSPCT 1.9 03/18/2011    BASOPCT 0.5 05/10/2019    MONOSABS 0.7 05/10/2019    LYMPHSABS 1.0 05/10/2019    EOSABS 0.1 05/10/2019    BASOSABS 0.1 05/10/2019    DIFFTYPE Auto 03/18/2011     CMP:    Lab Results   Component Value Date     05/10/2019    K 3.3 05/10/2019    K 3.6 05/08/2019     05/10/2019    CO2 23 05/10/2019    BUN 12 05/10/2019    CREATININE 0.6 05/10/2019    GFRAA >60 05/10/2019    GFRAA >60 03/18/2011    AGRATIO 1.6 05/10/2019    LABGLOM >60 05/10/2019    GLUCOSE 117 05/10/2019    PROT 6.5 05/10/2019    PROT 7.1 03/18/2011    LABALBU 4.0 05/10/2019    CALCIUM 9.1 05/10/2019    BILITOT 0.6 05/10/2019    ALKPHOS 102 05/10/2019    AST 22 05/10/2019    ALT 16 05/10/2019         Jaye Lombardi Parhenry  Electronically signed 5/10/2019 at 11:34 AM

## 2019-05-10 NOTE — FLOWSHEET NOTE
05/09/19 1953   Vital Signs   Temp 97.2 °F (36.2 °C)   Temp Source Oral   Pulse 80   Heart Rate Source Monitor   Resp 16   BP (!) 139/91   BP Location Right upper arm   Patient Position Semi fowlers   Level of Consciousness 0   MEWS Score 1   Patient Currently in Pain Yes   Pain Assessment   Pain Assessment 0-10   Pain Level 10   Oxygen Therapy   SpO2 97 %   O2 Device None (Room air)     Shift assessment completed. See flow sheet. Respiration easy, even and non labored. VSS Pt alert and oriented. Side rails up x 2. IV flushed without complications. Pt rating pain 10/10. PRN pain medication given see MAR. Call light within reach. Bed in low position. Will continue to monitor.  Peter Yanes RN

## 2019-05-10 NOTE — PROGRESS NOTES
Pt in bed resting at this time, respirations easy unlabored. No signs of distress noted. Call light within reach. Will continue to monitor.

## 2019-05-10 NOTE — PROGRESS NOTES
Pt lying in bed at this time. Pt denies needs at this time. Call light within reach. Daisha Broderick RN

## 2019-05-11 VITALS
OXYGEN SATURATION: 97 % | TEMPERATURE: 97.4 F | HEART RATE: 80 BPM | WEIGHT: 168 LBS | BODY MASS INDEX: 27 KG/M2 | DIASTOLIC BLOOD PRESSURE: 82 MMHG | HEIGHT: 66 IN | RESPIRATION RATE: 16 BRPM | SYSTOLIC BLOOD PRESSURE: 117 MMHG

## 2019-05-11 LAB
BASOPHILS ABSOLUTE: 0 K/UL (ref 0–0.2)
BASOPHILS RELATIVE PERCENT: 0.5 %
EOSINOPHILS ABSOLUTE: 0.1 K/UL (ref 0–0.6)
EOSINOPHILS RELATIVE PERCENT: 0.7 %
HCT VFR BLD CALC: 40.4 % (ref 36–48)
HEMOGLOBIN: 13.9 G/DL (ref 12–16)
LYMPHOCYTES ABSOLUTE: 1.3 K/UL (ref 1–5.1)
LYMPHOCYTES RELATIVE PERCENT: 13.7 %
MCH RBC QN AUTO: 31.6 PG (ref 26–34)
MCHC RBC AUTO-ENTMCNC: 34.6 G/DL (ref 31–36)
MCV RBC AUTO: 91.4 FL (ref 80–100)
MONOCYTES ABSOLUTE: 0.8 K/UL (ref 0–1.3)
MONOCYTES RELATIVE PERCENT: 9 %
NEUTROPHILS ABSOLUTE: 7.1 K/UL (ref 1.7–7.7)
NEUTROPHILS RELATIVE PERCENT: 76.1 %
PDW BLD-RTO: 12.2 % (ref 12.4–15.4)
PLATELET # BLD: 139 K/UL (ref 135–450)
PMV BLD AUTO: 9.6 FL (ref 5–10.5)
RBC # BLD: 4.42 M/UL (ref 4–5.2)
WBC # BLD: 9.3 K/UL (ref 4–11)

## 2019-05-11 PROCEDURE — 99238 HOSP IP/OBS DSCHRG MGMT 30/<: CPT | Performed by: INTERNAL MEDICINE

## 2019-05-11 PROCEDURE — 6370000000 HC RX 637 (ALT 250 FOR IP): Performed by: HOSPITALIST

## 2019-05-11 PROCEDURE — 36415 COLL VENOUS BLD VENIPUNCTURE: CPT

## 2019-05-11 PROCEDURE — 2580000003 HC RX 258: Performed by: PHYSICIAN ASSISTANT

## 2019-05-11 PROCEDURE — 85025 COMPLETE CBC W/AUTO DIFF WBC: CPT

## 2019-05-11 PROCEDURE — 6370000000 HC RX 637 (ALT 250 FOR IP): Performed by: PHYSICIAN ASSISTANT

## 2019-05-11 RX ORDER — CYCLOBENZAPRINE HCL 10 MG
10 TABLET ORAL 3 TIMES DAILY PRN
Status: DISCONTINUED | OUTPATIENT
Start: 2019-05-11 | End: 2019-05-11

## 2019-05-11 RX ORDER — OXYCODONE HYDROCHLORIDE AND ACETAMINOPHEN 5; 325 MG/1; MG/1
1 TABLET ORAL EVERY 6 HOURS PRN
Qty: 10 TABLET | Refills: 0 | Status: SHIPPED | OUTPATIENT
Start: 2019-05-11 | End: 2019-05-14

## 2019-05-11 RX ADMIN — DOXYCYCLINE HYCLATE 100 MG: 100 TABLET, COATED ORAL at 08:01

## 2019-05-11 RX ADMIN — PANTOPRAZOLE SODIUM 40 MG: 40 TABLET, DELAYED RELEASE ORAL at 06:03

## 2019-05-11 RX ADMIN — Medication 10 ML: at 08:00

## 2019-05-11 RX ADMIN — ACETAMINOPHEN 650 MG: 325 TABLET ORAL at 02:53

## 2019-05-11 RX ADMIN — NADOLOL 120 MG: 40 TABLET ORAL at 08:01

## 2019-05-11 ASSESSMENT — PAIN SCALES - GENERAL
PAINLEVEL_OUTOF10: 8
PAINLEVEL_OUTOF10: 6

## 2019-05-11 ASSESSMENT — PAIN DESCRIPTION - PAIN TYPE: TYPE: ACUTE PAIN

## 2019-05-11 ASSESSMENT — PAIN DESCRIPTION - LOCATION: LOCATION: GENERALIZED

## 2019-05-11 ASSESSMENT — PAIN - FUNCTIONAL ASSESSMENT: PAIN_FUNCTIONAL_ASSESSMENT: ACTIVITIES ARE NOT PREVENTED

## 2019-05-11 ASSESSMENT — PAIN DESCRIPTION - ORIENTATION: ORIENTATION: RIGHT;LEFT

## 2019-05-11 ASSESSMENT — PAIN DESCRIPTION - DESCRIPTORS: DESCRIPTORS: ACHING

## 2019-05-11 ASSESSMENT — PAIN DESCRIPTION - PROGRESSION: CLINICAL_PROGRESSION: GRADUALLY WORSENING

## 2019-05-11 ASSESSMENT — PAIN DESCRIPTION - FREQUENCY: FREQUENCY: INTERMITTENT

## 2019-05-11 ASSESSMENT — PAIN DESCRIPTION - ONSET: ONSET: GRADUAL

## 2019-05-11 NOTE — PROGRESS NOTES
Pt resting. Resp e/e. Shift assessment completed and charted. No needs. Will monitor.  Citlalli Heller

## 2019-05-11 NOTE — PROGRESS NOTES
Report given at bedside to [facility] [address] [facility].  [facility]

## 2019-05-11 NOTE — PLAN OF CARE
Problem: Pain:  Description  Pain management should include both nonpharmacologic and pharmacologic interventions. Goal: Pain level will decrease  Description  Pain level will decrease  Outcome: Ongoing  Goal: Control of acute pain  Description  Control of acute pain  Outcome: Ongoing  Goal: Control of chronic pain  Description  Control of chronic pain  Outcome: Ongoing  Goal: Patient's pain/discomfort is manageable  Description  Patient's pain/discomfort is manageable  Outcome: Ongoing     Problem: Falls - Risk of:  Goal: Will remain free from falls  Description  Will remain free from falls  Outcome: Ongoing  Goal: Absence of physical injury  Description  Absence of physical injury  Outcome: Ongoing     Problem: Infection:  Goal: Will remain free from infection  Description  Will remain free from infection  Outcome: Ongoing     Problem: Safety:  Goal: Free from accidental physical injury  Description  Free from accidental physical injury  Outcome: Ongoing     Problem: Daily Care:  Goal: Daily care needs are met  Description  Daily care needs are met  Outcome: Ongoing     Problem: Skin Integrity:  Goal: Skin integrity will stabilize  Description  Skin integrity will stabilize  Outcome: Ongoing     Problem: Discharge Planning:  Goal: Patients continuum of care needs are met  Description  Patients continuum of care needs are met  Outcome: Ongoing     Problem: Activity:  Goal: Risk for activity intolerance will decrease  Description  Risk for activity intolerance will decrease  Outcome: Ongoing     Problem:  Bowel/Gastric:  Goal: Bowel function will improve  Description  Bowel function will improve  Outcome: Ongoing  Goal: Diagnostic test results will improve  Description  Diagnostic test results will improve  Outcome: Ongoing  Goal: Occurrences of nausea will decrease  Description  Occurrences of nausea will decrease  Outcome: Ongoing  Goal: Occurrences of vomiting will decrease  Description  Occurrences of vomiting will decrease  Outcome: Ongoing     Problem: Fluid Volume:  Goal: Maintenance of adequate hydration will improve  Description  Maintenance of adequate hydration will improve  Outcome: Ongoing     Problem: Health Behavior:  Goal: Ability to state signs and symptoms to report to health care provider will improve  Description  Ability to state signs and symptoms to report to health care provider will improve  Outcome: Ongoing     Problem: Physical Regulation:  Goal: Complications related to the disease process, condition or treatment will be avoided or minimized  Description  Complications related to the disease process, condition or treatment will be avoided or minimized  Outcome: Ongoing  Goal: Ability to maintain clinical measurements within normal limits will improve  Description  Ability to maintain clinical measurements within normal limits will improve  Outcome: Ongoing     Problem: Sensory:  Goal: Pain level will decrease  Description  Pain level will decrease  Outcome: Ongoing  Goal: Ability to identify factors that increase the pain will improve  Description  Ability to identify factors that increase the pain will improve  Outcome: Ongoing  Goal: Ability to notify healthcare provider of pain before it becomes unmanageable or unbearable will improve  Description  Ability to notify healthcare provider of pain before it becomes unmanageable or unbearable will improve  Outcome: Ongoing

## 2019-05-11 NOTE — PROGRESS NOTES
IM Progress Note    Admit Date:  5/7/2019  4    Interval history:  Admitted for right UQ pain   ERCP neg , ct negx2, HIDA neg      Subjective:  Ms. Batsheva Sharma seen up in bed, reports ongoing right sided abd pain but wishes to go home  Fevers noted    Objective:   /79   Pulse 81   Temp 100.9 °F (38.3 °C) (Oral)   Resp 16   Ht 5' 6\" (1.676 m)   Wt 168 lb (76.2 kg)   SpO2 93%   BMI 27.12 kg/m²       Intake/Output Summary (Last 24 hours) at 5/11/2019 0732  Last data filed at 5/10/2019 2037  Gross per 24 hour   Intake 300 ml   Output --   Net 300 ml       Physical Exam:    General appearance: alert, appears stated age and cooperative  Head: Normocephalic, without obvious abnormality, atraumatic  Eyes: conjunctivae/corneas clear. PERRL, EOM's intact. Neck: no adenopathy, no carotid bruit, no JVD, supple,   Lungs: clear to auscultation bilaterally  Heart: regular rate and rhythm, S1, S2 normal, no murmur, click, rub or gallop  Abdomen: soft, rightUQ +-tender; bowel sounds normal; no masses,  no organomegaly  Extremities: extremities normal, atraumatic, no cyanosis or edema  Pulses: 2+ and symmetric  Skin: Skin color, texture, turgor normal. No rashes or lesions  Neurologic: Grossly normal    Medications:   Scheduled Medications:    nadolol  120 mg Oral Daily    sodium chloride flush  10 mL Intravenous 2 times per day    enoxaparin  40 mg Subcutaneous Daily    doxycycline hyclate  100 mg Oral 2 times per day    pantoprazole  40 mg Oral QAM AC     I    ketorolac, cloNIDine, sodium chloride flush, magnesium hydroxide, acetaminophen, promethazine    Lab Data:  Recent Labs     05/10/19  1039 05/11/19  0541   WBC 11.0 9.3   HGB 14.0 13.9   HCT 40.7 40.4   MCV 91.1 91.4    139     Recent Labs     05/10/19  1039      K 3.3*      CO2 23   BUN 12   CREATININE 0.6     No results for input(s): CKTOTAL, CKMB, CKMBINDEX, TROPONINI in the last 72 hours.     Coagulation:   Lab Results   Component Value Date    INR 0.88 11/01/2018    APTT 32.2 11/01/2018     Cardiac markers:   Lab Results   Component Value Date    TROPONINI 0.01 05/08/2019         Lab Results   Component Value Date    ALT 16 05/10/2019    AST 22 05/10/2019    ALKPHOS 102 05/10/2019    BILITOT 0.6 05/10/2019       Lab Results   Component Value Date    INR 0.88 11/01/2018    INR 0.84 (L) 10/26/2018    INR 0.92 10/18/2017    PROTIME 10.0 11/01/2018    PROTIME 9.6 (L) 10/26/2018    PROTIME 10.4 10/18/2017                  US GALLBLADDER RUQ   Final Result   1. Extrahepatic biliary dilatation appears stable from prior CT with mild   intrahepatic dilatation not well characterized due to technical factors. Underlying etiology is not determined. 2. Somewhat contracted gallbladder, likely due to lack of fasting. No   evidence of cholelithiasis. No inflammation.               HIDANo convincing scintigraphic evidence of acute or chronic cholecystitis    cxr- No acute pulmonary finding    ASSESSMENT and PLAN:        1) Abdominal pain- right UQ with dilated bile ducts on ct abd   - neg ERCP with no biliary stones US with no stones  - normal lipase  - neg  HIDA , consider muscular etiology  - off narcotics, added toradol  - pt today reports she wishes to go home as workup is neg  - pt can return if unable to tolerate diet or pain increases    2) Prolonged QT syndrome s/p Pacer   - ASD s.p closure   - hx of vtach   - avoid QT prolonging drugs   - maintained on tele monitor  - continue nadolol     3) Recurrent sinusitis and copd exacerbation and recurrent staphylococcal infections, immune workup neg    - on rotational abx every month - doxy, augmentin, cipro every week        5.  Neg UA and cxr today for low grade fevers     Dc home at request of pt                Kayla Vyas MD 5/11/2019 7:32 AM

## 2019-05-11 NOTE — DISCHARGE SUMMARY
Name:  Enmanuel Lange  Room:  0206/0206-01  MRN:    [id number]    IM Discharge Summary    Discharging Physician:  Rayo Cary MD    Admit: 5/7/2019    Discharge:  5/11/2019    PCP      Delisa Portillo MD    Diagnoses and hospital course  this Admission           1) Abdominal pain- right UQ with dilated bile ducts on ct abd   - neg ERCP with no biliary stones US with no stones  - normal lipase  - neg  HIDA , consider muscular etiology  - off narcotics, added toradol  - pt today reports she wishes to go home as workup is neg  - pt can return if unable to tolerate diet or pain increases    2) Prolonged QT syndrome s/p Pacer   - ASD s.p closure   - hx of vtach   - avoid QT prolonging drugs   - maintained on tele monitor  - continue nadolol     3) Recurrent sinusitis and copd exacerbation and recurrent staphylococcal infections, immune workup neg    - on rotational abx every month - doxy, augmentin, cipro every week        5. Neg UA and cxr today for low grade fevers     Dc home at request of pt    HPI 48 y.o. female  RUQ/epigastric abdominal pain  6 days ago upon waking. Pain worsened immediately with PO intake with waxing/waning cramping pain since. Last BM 5 days ago with loose, oily stools noted. (+) flatus but no BM since. Pain worsened with radiation to bilat shoulder blades, L chest. No fevers, chills, sob, n/v.       Physical Exam at Discharge:      General appearance: alert, appears stated age and cooperative  Head: Normocephalic, without obvious abnormality, atraumatic  Eyes: conjunctivae/corneas clear. PERRL, EOM's intact.   Neck: no adenopathy, no carotid bruit, no JVD, supple,   Lungs: clear to auscultation bilaterally  Heart: regular rate and rhythm, S1, S2 normal, no murmur, click, rub or gallop  Abdomen: soft, rightUQ +-tender; bowel sounds normal; no masses,  no organomegaly  Extremities: extremities normal, atraumatic, no cyanosis or edema  Pulses: 2+ and symmetric  Skin: Skin color, texture, turgor normal. No rashes or lesions  Neurologic: Grossly normal          Radiology (Please Review Full Report for Details)       US GALLBLADDER RUQ   Final Result   1. Extrahepatic biliary dilatation appears stable from prior CT with mild   intrahepatic dilatation not well characterized due to technical factors. Underlying etiology is not determined. 2. Somewhat contracted gallbladder, likely due to lack of fasting. No   evidence of cholelithiasis.   No inflammation.               HIDANo convincing scintigraphic evidence of acute or chronic cholecystitis    cxr- No acute pulmonary finding    Consults:    1. GI  2 surgery      Recent Labs     05/10/19  1039 05/11/19  0541   WBC 11.0 9.3   HGB 14.0 13.9   HCT 40.7 40.4   MCV 91.1 91.4    139       Recent Labs     05/10/19  1039      K 3.3*      CO2 23   BUN 12   CREATININE 0.6       CBC:  Lab Results   Component Value Date    WBC 9.3 05/11/2019    HGB 13.9 05/11/2019    HCT 40.4 05/11/2019    MCV 91.4 05/11/2019     05/11/2019    NEUTOPHILPCT 76.1 05/11/2019    LYMPHOPCT 13.7 05/11/2019    LYMPHOPCT 40.6 03/09/2016    MONOPCT 9.0 05/11/2019    EOSPCT 1.9 03/18/2011    BASOPCT 0.5 05/11/2019    NEUTROABS 7.1 05/11/2019    LYMPHSABS 1.3 05/11/2019    MONOSABS 0.8 05/11/2019    EOSABS 0.1 05/11/2019    BASOSABS 0.0 05/11/2019     BNP:   Lab Results   Component Value Date     05/10/2019    K 3.3 05/10/2019    K 3.6 05/08/2019    CO2 23 05/10/2019    BUN 12 05/10/2019    CREATININE 0.6 05/10/2019    CALCIUM 9.1 05/10/2019                Medication List      CONTINUE taking these medications    cloNIDine 0.1 MG tablet  Commonly known as:  CATAPRES     doxycycline hyclate 100 MG capsule  Commonly known as:  VIBRAMYCIN     nadolol 40 MG tablet  Commonly known as:  CORGARD     ondansetron 4 MG tablet  Commonly known as:  ZOFRAN  Take 1 tablet by mouth every 8 hours as needed for Nausea     oxyCODONE-acetaminophen 5-325 MG per tablet  Commonly known as:  PERCOCET  Take 1 tablet by mouth every 6 hours as needed for Pain for up to 3 days. Notes to patient:  Oxycontin(Oxycodone)  Use:  pain    Side effects: sleepiness, constipation           Where to Get Your Medications      You can get these medications from any pharmacy    Bring a paper prescription for each of these medications  · oxyCODONE-acetaminophen 5-325 MG per tablet           Discharge Condition/Location: stable/home     Follow Up:    PCP in 1 weeks      Time Spent on discharge is more than 28 minutes in the examination, evaluation, counseling and review of medications and discharge plan.       Vero Roper MD 5/11/2019 12:04 PM

## 2019-05-11 NOTE — PROGRESS NOTES
prescription and discharge instructions given, pt verbalized understanding, denies questions/ needs at this time. Pt walked off floor for discharge home. Luigi Hassan RN

## 2019-05-11 NOTE — PROGRESS NOTES
PROGRESS NOTE  S:53 yrs Patient  admitted on 5/7/2019 with RUQ abdominal pain [R10.11] . States still with pain exacerbated by eating. Alkaline phosphatase improved. Echogenicity seen in proximal duct on EUS, ERCP negative with good drainage. Low grade temp    Exam:   Vitals:    05/11/19 0214   BP: 123/79   Pulse: 81   Resp: 16   Temp: 100.9 °F (38.3 °C)   SpO2: 93%      General appearance: alert, appears stated age and cooperative  HEENT: PERRLA  Neck: no adenopathy, no carotid bruit, no JVD, supple, symmetrical, trachea midline and thyroid not enlarged, symmetric, no tenderness/mass/nodules  Lungs: clear to auscultation bilaterally  Heart: regular rate and rhythm, S1, S2 normal, no murmur, click, rub or gallop  Abdomen: soft, non-tender; bowel sounds normal; no masses,  no organomegaly  Extremities: extremities normal, atraumatic, no cyanosis or edema     Medications: Reviewed    Labs:  CBC:   Recent Labs     05/10/19  1039 05/11/19  0541   WBC 11.0 9.3   HGB 14.0 13.9   HCT 40.7 40.4   MCV 91.1 91.4    139     BMP:   Recent Labs     05/10/19  1039      K 3.3*      CO2 23   BUN 12   CREATININE 0.6     LIVER PROFILE:   Recent Labs     05/10/19  1039   AST 22   ALT 16   LIPASE 30.0   PROT 6.5   BILITOT 0.6   ALKPHOS 102     PT/INR: No results for input(s): INR in the last 72 hours. Invalid input(s): PT    Impression:  48year old female with history of HTN, CAD, COPD, TIA, asthma, admitted with RUQ pain exacerbated by eating, dilated bile duct, and elevated alkaline phosphatase. EUS with nonshadowing echogenicity in proximal duct. ERCP negative except for dilated bile duct. HIDA scan negative. Still with symptoms post sphincterotomy. Recommendation:  1. General surgery following with suspected musculoskeletal pain. On toradol. Consider flexeril to see if improvement. Advance diet.   Patient nervous to eat as she states exacerbates her symptoms      Dione Shah, DO  7:10 AM 5/11/2019

## 2019-05-12 LAB
ORGANISM: ABNORMAL
URINE CULTURE, ROUTINE: ABNORMAL
URINE CULTURE, ROUTINE: ABNORMAL

## 2019-06-10 ENCOUNTER — APPOINTMENT (OUTPATIENT)
Dept: GENERAL RADIOLOGY | Age: 54
End: 2019-06-10
Payer: COMMERCIAL

## 2019-06-10 ENCOUNTER — HOSPITAL ENCOUNTER (EMERGENCY)
Age: 54
Discharge: HOME | End: 2019-06-10
Attending: EMERGENCY MEDICINE
Payer: COMMERCIAL

## 2019-06-10 ENCOUNTER — TELEPHONE (OUTPATIENT)
Dept: ORTHOPEDIC SURGERY | Age: 54
End: 2019-06-10

## 2019-06-10 VITALS
HEIGHT: 66 IN | OXYGEN SATURATION: 100 % | WEIGHT: 152 LBS | RESPIRATION RATE: 18 BRPM | BODY MASS INDEX: 24.43 KG/M2 | SYSTOLIC BLOOD PRESSURE: 145 MMHG | TEMPERATURE: 98.2 F | HEART RATE: 70 BPM | DIASTOLIC BLOOD PRESSURE: 94 MMHG

## 2019-06-10 DIAGNOSIS — S22.32XA CLOSED FRACTURE OF ONE RIB OF LEFT SIDE, INITIAL ENCOUNTER: Primary | ICD-10-CM

## 2019-06-10 PROCEDURE — 71101 X-RAY EXAM UNILAT RIBS/CHEST: CPT

## 2019-06-10 PROCEDURE — 6370000000 HC RX 637 (ALT 250 FOR IP): Performed by: EMERGENCY MEDICINE

## 2019-06-10 PROCEDURE — 99283 EMERGENCY DEPT VISIT LOW MDM: CPT

## 2019-06-10 RX ORDER — ONDANSETRON 4 MG/1
8 TABLET, ORALLY DISINTEGRATING ORAL ONCE
Status: COMPLETED | OUTPATIENT
Start: 2019-06-10 | End: 2019-06-10

## 2019-06-10 RX ORDER — HYDROCODONE BITATRATE AND ACETAMINOPHEN 5; 325 MG/1; MG/1
1 TABLET ORAL EVERY 6 HOURS PRN
Qty: 10 TABLET | Refills: 0 | Status: SHIPPED | OUTPATIENT
Start: 2019-06-10 | End: 2019-06-20

## 2019-06-10 RX ORDER — HYDROCODONE BITARTRATE AND ACETAMINOPHEN 5; 325 MG/1; MG/1
1 TABLET ORAL ONCE
Status: COMPLETED | OUTPATIENT
Start: 2019-06-10 | End: 2019-06-10

## 2019-06-10 RX ADMIN — HYDROCODONE BITARTRATE AND ACETAMINOPHEN 1 TABLET: 5; 325 TABLET ORAL at 11:44

## 2019-06-10 RX ADMIN — ONDANSETRON 8 MG: 4 TABLET, ORALLY DISINTEGRATING ORAL at 11:44

## 2019-06-10 ASSESSMENT — PAIN SCALES - GENERAL
PAINLEVEL_OUTOF10: 9
PAINLEVEL_OUTOF10: 8

## 2019-06-10 ASSESSMENT — ENCOUNTER SYMPTOMS
BOWEL INCONTINENCE: 0
APNEA: 0
WHEEZING: 0
COUGH: 0
BACK PAIN: 0
STRIDOR: 0
SHORTNESS OF BREATH: 0
CHOKING: 0
VOMITING: 0
CHEST TIGHTNESS: 0
ABDOMINAL PAIN: 0

## 2019-06-10 ASSESSMENT — PAIN DESCRIPTION - ONSET: ONSET: AWAKENED FROM SLEEP

## 2019-06-10 ASSESSMENT — PAIN DESCRIPTION - LOCATION: LOCATION: RIB CAGE

## 2019-06-10 ASSESSMENT — PAIN DESCRIPTION - PROGRESSION: CLINICAL_PROGRESSION: GRADUALLY WORSENING

## 2019-06-10 ASSESSMENT — PAIN DESCRIPTION - FREQUENCY: FREQUENCY: CONTINUOUS

## 2019-06-10 ASSESSMENT — PAIN DESCRIPTION - PAIN TYPE: TYPE: ACUTE PAIN

## 2019-06-10 ASSESSMENT — PAIN DESCRIPTION - ORIENTATION: ORIENTATION: LEFT

## 2019-06-10 ASSESSMENT — PAIN DESCRIPTION - DESCRIPTORS: DESCRIPTORS: ACHING;SHOOTING

## 2019-06-10 NOTE — ED NOTES
Patient and patient mother given discharged instructions, instructions for use of prescribed medication and verbalized understanding.      Ryan Beth RN  06/10/19 5096

## 2019-06-10 NOTE — ED PROVIDER NOTES
She states that she slipped on a deck apparently on vacation  Landing on her left posterior ribs she has had a previous fracture in that area before she said  She says it hurts to breathe but denies shortness of breath denies other injuries including head abdominal pain denies extremity trauma  Pain is isolated left lower ribs she said Motrin is not helped she denies hemoptysis denies severe shortness of breath is requesting narcotic upon arrival this accident occurred on Friday pain is 10/10    The history is provided by the patient. Fall   The accident occurred more than 2 days ago. She fell from a height of 3 to 5 ft. There was no blood loss. Point of impact: Left posterior ribs. Pain location: Posterior ribs. The pain is at a severity of 10/10. The pain is severe. She was ambulatory at the scene. There was no entrapment after the fall. There was no drug use involved in the accident. There was no alcohol use involved in the accident. Pertinent negatives include no fever, no abdominal pain, no bowel incontinence, no vomiting, no headaches and no loss of consciousness. The symptoms are aggravated by activity and pressure on the injury. She has tried nothing for the symptoms. Review of Systems   Constitutional: Positive for activity change. Negative for chills and fever. HENT: Negative for congestion. Eyes: Negative for visual disturbance. Respiratory: Negative for apnea, cough, choking, chest tightness, shortness of breath, wheezing and stridor. Cardiovascular: Positive for chest pain. Negative for palpitations and leg swelling. Gastrointestinal: Negative for abdominal pain, bowel incontinence and vomiting. Genitourinary: Negative for difficulty urinating. Musculoskeletal: Negative for back pain and neck pain. Neurological: Negative for dizziness, loss of consciousness and headaches. Hematological: Negative for adenopathy. Does not bruise/bleed easily.    Psychiatric/Behavioral: Negative for behavioral problems. All other systems reviewed and are negative. Patient Vitals for the past 24 hrs:   BP Temp Temp src Pulse Resp SpO2 Height Weight   06/10/19 1119 (!) 145/94 98.2 °F (36.8 °C) Oral 70 18 100 % 5' 6\" (1.676 m) 152 lb (68.9 kg)       Physical Exam   Constitutional: She is oriented to person, place, and time. She appears well-developed and well-nourished. No distress. HENT:   Head: Normocephalic. Right Ear: External ear normal.   Left Ear: External ear normal.   Mouth/Throat: Oropharynx is clear and moist.   Eyes: Pupils are equal, round, and reactive to light. Conjunctivae and EOM are normal.   Neck: Normal range of motion. Neck supple. No thyromegaly present. Cardiovascular: Normal rate, regular rhythm, normal heart sounds and intact distal pulses. Exam reveals no gallop and no friction rub. No murmur heard. Pulmonary/Chest: Effort normal and breath sounds normal. No stridor. No respiratory distress. She has no wheezes. She has no rales. She exhibits tenderness. Abdominal: Soft. Bowel sounds are normal. She exhibits no distension. There is no tenderness. Neurological: She is alert and oriented to person, place, and time. She displays normal reflexes. No cranial nerve deficit or sensory deficit. She exhibits normal muscle tone. Coordination normal. GCS eye subscore is 4. GCS verbal subscore is 5. GCS motor subscore is 6. Skin: She is not diaphoretic. Psychiatric: She has a normal mood and affect. Her behavior is normal.   Nursing note and vitals reviewed. Procedures    MDM         Labs      Radiology      EKG Interpretation.      Past Medical History:   Diagnosis Date    Asthma     Atrial septal defect     had insertion of atrial septal occluder    Bursitis of left hip     CAD (coronary artery disease)     COPD (chronic obstructive pulmonary disease) (HCC)     History of blood transfusion     Migraines     Mitral valve prolapse     MRSA (methicillin resistant staph aureus) culture positive 12/2015    Lungs. Diagnosed [facility] with bronchoscopy    MVP (mitral valve prolapse)     PONV (postoperative nausea and vomiting)     Prolonged QT interval syndrome     S/P bronchoscopy     TIA (transient ischemic attack)     Vertigo        Past Surgical History:   Procedure Laterality Date    BREAST ENHANCEMENT SURGERY      augmentation    CARDIAC CATHETERIZATION      CARDIAC SURGERY      septum occluder    COLONOSCOPY  2012? polyps    CYSTOSCOPY  6/8/2016    U-Dil    ENDOSCOPIC ULTRASOUND (UPPER)  05/08/2019    ERCP  05/08/2019    Sphincterotomy    ERCP N/A 5/8/2019    ERCP SPHINCTER/PAPILLOTOMY performed by Byron Lucero DO at [facility] ERCP  5/8/2019    ERCP STONE REMOVAL performed by Byron Lucero DO at [facility] ARTHROSCOPY Left 02/08/2018    HIP SURGERY      HYSTERECTOMY      HYSTERECTOMY, TOTAL ABDOMINAL  1992    NASAL SINUS SURGERY  8/22/13    OTHER SURGICAL HISTORY      thoracic sympathectomy    PACEMAKER INSERTION      also revision x 3    [place] HIP ARTHROSCOPY, DX Left 11/1/2018    LEFT HIP ARTHROSCOPY, LABRAL  DEBRIDEMENT, CHONDROPLASTY performed by Guru العراقي MD at [facility]  03/20/2017    UPPER GASTROINTESTINAL ENDOSCOPY N/A 5/8/2019    UPPER EUS W/ANES.  performed by Byron Lucero DO at [facility] ENDOSCOPY       Family History   Problem Relation Age of Onset    Heart Disease Mother     Colon Cancer Father         colon    Heart Disease Father     High Blood Pressure Father        Social History     Socioeconomic History    Marital status:      Spouse name: Not on file    Number of children: Not on file    Years of education: Not on file    Highest education level: Not on file   Occupational History    Not on file   Social Needs    Financial resource strain: Not on file    Food insecurity: Worry: Not on file     Inability: Not on file    Transportation needs:     Medical: Not on file     Non-medical: Not on file   Tobacco Use    Smoking status: Former Smoker     Packs/day: 0.25     Years: 3.00     Pack years: 0.75     Types: Cigarettes     Last attempt to quit: 1998     Years since quittin.4    Smokeless tobacco: Never Used   Substance and Sexual Activity    Alcohol use: Yes     Comment: 2 glasses wine or 1 beer/nightlynot drank in 7-10 days    Drug use: No    Sexual activity: Yes     Partners: Male   Lifestyle    Physical activity:     Days per week: Not on file     Minutes per session: Not on file    Stress: Not on file   Relationships    Social connections:     Talks on phone: Not on file     Gets together: Not on file     Attends Rastafari service: Not on file     Active member of club or organization: Not on file     Attends meetings of clubs or organizations: Not on file     Relationship status: Not on file    Intimate partner violence:     Fear of current or ex partner: Not on file     Emotionally abused: Not on file     Physically abused: Not on file     Forced sexual activity: Not on file   Other Topics Concern    Not on file   Social History Narrative    Not on file       Patient Vitals for the past 24 hrs:   BP Temp Temp src Pulse Resp SpO2 Height Weight   06/10/19 1119 (!) 145/94 98.2 °F (36.8 °C) Oral 70 18 100 % 5' 6\" (1.676 m) 152 lb (68.9 kg)         Medications   HYDROcodone-acetaminophen (NORCO) 5-325 MG per tablet 1 tablet (1 tablet Oral Given 6/10/19 1144)   ondansetron (ZOFRAN-ODT) disintegrating tablet 8 mg (8 mg Oral Given 6/10/19 1144)       No results found for this visit on 06/10/19. Xr Ribs Left Include Chest (min 3 Views)    Result Date: 6/10/2019  EXAMINATION: 2 XRAY VIEWS OF THE LEFT RIBS WITH FRONTAL XRAY VIEW OF THE CHEST 6/10/2019 11:29 am COMPARISON: Chest x-ray 05/10/2019.  HISTORY: ORDERING SYSTEM PROVIDED HISTORY: s/p fall TECHNOLOGIST PROVIDED HISTORY: Reason for exam:->s/p fall Ordering Physician Provided Reason for Exam: left rib pain Acuity: Acute Type of Exam: Initial Mechanism of Injury: fall FINDINGS: Pacer device redemonstrated overlying left hemithorax. This somewhat limits evaluation to left-sided ribs. Minimal left basilar likely atelectasis. No focal consolidations, pleural effusions or pulmonary edema. No pneumothoraces. Cardiac and mediastinal silhouettes are within normal limits. Bilateral breast implants redemonstrated. No acute bony abnormalities are identified. Specifically no left-sided rib fractures are seen. No acute abnormalities are identified. She is feeling a bit better knowing that she did not have an acute displaced rib fracture there is no pneumothorax at this point this patient is advised of the clinical process that she may have a clinical rib fracture since she is quite tender she was reassured however that there was nothing serious advised on deep breast pain management and some limitations were recommended at work for about the next 3 days  New Prescriptions    NORCO 5-325 MG PER TABLET    Take 1 tablet by mouth every 6 hours as needed for Pain for up to 10 doses. Ban Rubi MD  [address] [place] [address] Dr. [facility]  [phone]    In 1 week  As needed      1. Closed fracture of one rib of left side, initial encounter          Patient was advised at any time to return to the emergency department if there was any worsening.              Mari Do MD  06/10/19 [id number]

## 2019-06-10 NOTE — TELEPHONE ENCOUNTER
Auth: NPR  Date: 6/17/19 None   Reference # None   Spoke with: None  Type of SX: Outpatient  [place] [facility]  [place] [id number]   SX area: Lt knee

## 2019-09-02 ENCOUNTER — HOSPITAL ENCOUNTER (EMERGENCY)
Age: 54
Discharge: HOME | End: 2019-09-02
Attending: EMERGENCY MEDICINE
Payer: COMMERCIAL

## 2019-09-02 ENCOUNTER — APPOINTMENT (OUTPATIENT)
Dept: GENERAL RADIOLOGY | Age: 54
End: 2019-09-02
Payer: COMMERCIAL

## 2019-09-02 VITALS
RESPIRATION RATE: 22 BRPM | OXYGEN SATURATION: 97 % | WEIGHT: 160 LBS | DIASTOLIC BLOOD PRESSURE: 88 MMHG | BODY MASS INDEX: 25.71 KG/M2 | HEIGHT: 66 IN | TEMPERATURE: 97.7 F | SYSTOLIC BLOOD PRESSURE: 132 MMHG | HEART RATE: 84 BPM

## 2019-09-02 DIAGNOSIS — J06.9 UPPER RESPIRATORY TRACT INFECTION, UNSPECIFIED TYPE: Primary | ICD-10-CM

## 2019-09-02 DIAGNOSIS — R05.9 COUGH: ICD-10-CM

## 2019-09-02 DIAGNOSIS — J01.90 ACUTE NON-RECURRENT SINUSITIS, UNSPECIFIED LOCATION: ICD-10-CM

## 2019-09-02 PROCEDURE — 6370000000 HC RX 637 (ALT 250 FOR IP): Performed by: EMERGENCY MEDICINE

## 2019-09-02 PROCEDURE — 71046 X-RAY EXAM CHEST 2 VIEWS: CPT

## 2019-09-02 PROCEDURE — 99283 EMERGENCY DEPT VISIT LOW MDM: CPT

## 2019-09-02 RX ORDER — GUAIFENESIN 600 MG/1
600 TABLET, EXTENDED RELEASE ORAL ONCE
Status: COMPLETED | OUTPATIENT
Start: 2019-09-02 | End: 2019-09-02

## 2019-09-02 RX ORDER — DOXYCYCLINE HYCLATE 100 MG
100 TABLET ORAL 2 TIMES DAILY
Qty: 14 TABLET | Refills: 0 | Status: SHIPPED | OUTPATIENT
Start: 2019-09-02 | End: 2019-09-09

## 2019-09-02 RX ORDER — DOXYCYCLINE HYCLATE 100 MG
100 TABLET ORAL ONCE
Status: COMPLETED | OUTPATIENT
Start: 2019-09-02 | End: 2019-09-02

## 2019-09-02 RX ORDER — GUAIFENESIN 600 MG/1
1200 TABLET, EXTENDED RELEASE ORAL 2 TIMES DAILY
Qty: 40 TABLET | Refills: 0 | Status: SHIPPED | OUTPATIENT
Start: 2019-09-02 | End: 2019-09-12

## 2019-09-02 RX ADMIN — DOXYCYCLINE HYCLATE 100 MG: 100 TABLET, COATED ORAL at 22:48

## 2019-09-02 RX ADMIN — GUAIFENESIN 600 MG: 600 TABLET, EXTENDED RELEASE ORAL at 22:48

## 2019-09-02 ASSESSMENT — PAIN DESCRIPTION - ONSET: ONSET: PROGRESSIVE

## 2019-09-02 ASSESSMENT — PAIN DESCRIPTION - DESCRIPTORS: DESCRIPTORS: BURNING

## 2019-09-02 ASSESSMENT — PAIN DESCRIPTION - FREQUENCY: FREQUENCY: CONTINUOUS

## 2019-09-02 ASSESSMENT — PAIN SCALES - GENERAL: PAINLEVEL_OUTOF10: 8

## 2019-09-02 ASSESSMENT — PAIN DESCRIPTION - LOCATION: LOCATION: GENERALIZED

## 2019-09-03 NOTE — ED NOTES
Pt DC home in good condition with RX x__2__. V/u of Dc instructions. Denies questions or concerns. Teaching done re: s/s to report.      Xavier Corona, ARMEN  09/02/19 [id number]

## 2019-09-03 NOTE — ED PROVIDER NOTES
these medications    Details   doxycycline hyclate (VIBRA-TABS) 100 MG tablet Take 1 tablet by mouth 2 times daily for 7 days, Disp-14 tablet, R-0Print      guaiFENesin (MUCINEX) 600 MG extended release tablet Take 2 tablets by mouth 2 times daily for 10 days, Disp-40 tablet, R-0Print             Follow-up with:  Quin Stiles MD  [address] [place] [address] Dr.  [address] [facility] [address] [place] [address]          Quin Stiles MD  [address] [place] [address]   [facility] [address] [facility]  [phone]    Schedule an appointment as soon as possible for a visit in 3 days  Follow up within 3 days, Return to ED sooner if symptoms worsen      DISCLAIMER: This chart was created using Dragon dictation software. Efforts were made by me to ensure accuracy, however some errors may be present due to limitations of this technology and occasionally words are not transcribed correctly.         Shannan Samaniego MD  09/02/19 [id number]

## 2019-09-16 ENCOUNTER — APPOINTMENT (OUTPATIENT)
Dept: GENERAL RADIOLOGY | Age: 54
End: 2019-09-16
Payer: COMMERCIAL

## 2019-09-16 ENCOUNTER — HOSPITAL ENCOUNTER (EMERGENCY)
Age: 54
Discharge: HOME | End: 2019-09-16
Attending: EMERGENCY MEDICINE
Payer: COMMERCIAL

## 2019-09-16 VITALS
OXYGEN SATURATION: 100 % | SYSTOLIC BLOOD PRESSURE: 142 MMHG | HEIGHT: 66 IN | DIASTOLIC BLOOD PRESSURE: 104 MMHG | BODY MASS INDEX: 25.39 KG/M2 | TEMPERATURE: 98.1 F | HEART RATE: 76 BPM | RESPIRATION RATE: 16 BRPM | WEIGHT: 158 LBS

## 2019-09-16 DIAGNOSIS — M77.11 LATERAL EPICONDYLITIS, RIGHT ELBOW: Primary | ICD-10-CM

## 2019-09-16 LAB
A/G RATIO: 1.4 (ref 1.1–2.2)
ALBUMIN SERPL-MCNC: 4.3 G/DL (ref 3.4–5)
ALP BLD-CCNC: 113 U/L (ref 40–129)
ALT SERPL-CCNC: 11 U/L (ref 10–40)
ANION GAP SERPL CALCULATED.3IONS-SCNC: 14 MMOL/L (ref 3–16)
AST SERPL-CCNC: 21 U/L (ref 15–37)
BASOPHILS ABSOLUTE: 0 K/UL (ref 0–0.2)
BASOPHILS RELATIVE PERCENT: 0.7 %
BILIRUB SERPL-MCNC: 0.7 MG/DL (ref 0–1)
BUN BLDV-MCNC: 15 MG/DL (ref 7–20)
CALCIUM SERPL-MCNC: 9.6 MG/DL (ref 8.3–10.6)
CHLORIDE BLD-SCNC: 104 MMOL/L (ref 99–110)
CO2: 20 MMOL/L (ref 21–32)
CREAT SERPL-MCNC: 0.6 MG/DL (ref 0.6–1.1)
EOSINOPHILS ABSOLUTE: 0.1 K/UL (ref 0–0.6)
EOSINOPHILS RELATIVE PERCENT: 2.4 %
GFR AFRICAN AMERICAN: >60
GFR NON-AFRICAN AMERICAN: >60
GLOBULIN: 3 G/DL
GLUCOSE BLD-MCNC: 104 MG/DL (ref 70–99)
HCT VFR BLD CALC: 44.2 % (ref 36–48)
HEMOGLOBIN: 14.6 G/DL (ref 12–16)
LYMPHOCYTES ABSOLUTE: 1.3 K/UL (ref 1–5.1)
LYMPHOCYTES RELATIVE PERCENT: 22 %
MCH RBC QN AUTO: 31.8 PG (ref 26–34)
MCHC RBC AUTO-ENTMCNC: 33 G/DL (ref 31–36)
MCV RBC AUTO: 96.2 FL (ref 80–100)
MONOCYTES ABSOLUTE: 0.4 K/UL (ref 0–1.3)
MONOCYTES RELATIVE PERCENT: 6.9 %
NEUTROPHILS ABSOLUTE: 4.1 K/UL (ref 1.7–7.7)
NEUTROPHILS RELATIVE PERCENT: 68 %
PDW BLD-RTO: 13.5 % (ref 12.4–15.4)
PLATELET # BLD: 164 K/UL (ref 135–450)
PMV BLD AUTO: 9.3 FL (ref 5–10.5)
POTASSIUM SERPL-SCNC: 3.9 MMOL/L (ref 3.5–5.1)
RBC # BLD: 4.6 M/UL (ref 4–5.2)
SEDIMENTATION RATE, ERYTHROCYTE: 3 MM/HR (ref 0–30)
SODIUM BLD-SCNC: 138 MMOL/L (ref 136–145)
TOTAL PROTEIN: 7.3 G/DL (ref 6.4–8.2)
WBC # BLD: 6.1 K/UL (ref 4–11)

## 2019-09-16 PROCEDURE — 36415 COLL VENOUS BLD VENIPUNCTURE: CPT

## 2019-09-16 PROCEDURE — 80053 COMPREHEN METABOLIC PANEL: CPT

## 2019-09-16 PROCEDURE — 85652 RBC SED RATE AUTOMATED: CPT

## 2019-09-16 PROCEDURE — 85025 COMPLETE CBC W/AUTO DIFF WBC: CPT

## 2019-09-16 PROCEDURE — 99283 EMERGENCY DEPT VISIT LOW MDM: CPT

## 2019-09-16 PROCEDURE — 73070 X-RAY EXAM OF ELBOW: CPT

## 2019-09-16 PROCEDURE — 73030 X-RAY EXAM OF SHOULDER: CPT

## 2019-09-16 PROCEDURE — 71046 X-RAY EXAM CHEST 2 VIEWS: CPT

## 2019-09-16 RX ORDER — PREDNISONE 10 MG/1
10 TABLET ORAL SEE ADMIN INSTRUCTIONS
Qty: 30 TABLET | Refills: 0 | Status: SHIPPED | OUTPATIENT
Start: 2019-09-17 | End: 2019-10-09

## 2019-09-16 RX ORDER — ACETAMINOPHEN 500 MG
500 TABLET ORAL EVERY 6 HOURS PRN
Qty: 20 TABLET | Refills: 0 | Status: SHIPPED | OUTPATIENT
Start: 2019-09-16 | End: 2019-10-09

## 2019-09-16 RX ORDER — IBUPROFEN 600 MG/1
600 TABLET ORAL EVERY 6 HOURS PRN
Qty: 20 TABLET | Refills: 0 | Status: SHIPPED | OUTPATIENT
Start: 2019-09-16 | End: 2019-10-09

## 2019-09-16 ASSESSMENT — ENCOUNTER SYMPTOMS
ABDOMINAL PAIN: 0
SHORTNESS OF BREATH: 0
BACK PAIN: 0

## 2019-09-16 NOTE — ED PROVIDER NOTES
level 5)     Review of Systems   Constitutional: Positive for activity change. Negative for chills and fever. Respiratory: Negative for shortness of breath. Cardiovascular: Negative for chest pain. Gastrointestinal: Negative for abdominal pain. Genitourinary: Negative for difficulty urinating. Musculoskeletal: Negative for back pain and neck pain. Neurological: Negative for dizziness and seizures. Psychiatric/Behavioral: Negative for behavioral problems. All other systems reviewed and are negative. Except as noted above the remainder of the review of systems was reviewed and negative. PAST MEDICAL HISTORY     Past Medical History:   Diagnosis Date    Asthma     Atrial septal defect     had insertion of atrial septal occluder    Bursitis of left hip     CAD (coronary artery disease)     COPD (chronic obstructive pulmonary disease) (HCC)     History of blood transfusion     Migraines     Mitral valve prolapse     MRSA (methicillin resistant staph aureus) culture positive 12/2015    Lungs. Diagnosed [facility] with bronchoscopy    MVP (mitral valve prolapse)     PONV (postoperative nausea and vomiting)     Prolonged QT interval syndrome     S/P bronchoscopy     TIA (transient ischemic attack)     Vertigo          SURGICAL HISTORY       Past Surgical History:   Procedure Laterality Date    BREAST ENHANCEMENT SURGERY      augmentation    CARDIAC CATHETERIZATION      CARDIAC SURGERY      septum occluder    COLONOSCOPY  2012?     polyps    CYSTOSCOPY  6/8/2016    U-Dil    ENDOSCOPIC ULTRASOUND (UPPER)  05/08/2019    ERCP  05/08/2019    Sphincterotomy    ERCP N/A 5/8/2019    ERCP SPHINCTER/PAPILLOTOMY performed by Sue Arrington DO at [facility] ERCP  5/8/2019    ERCP STONE REMOVAL performed by Sue Arrington DO at [facility] ARTHROSCOPY Left 02/08/2018    HIP SURGERY      HYSTERECTOMY      HYSTERECTOMY, TOTAL ABDOMINAL  1992 tablets ×4 days  Take 3 tablets ×3 days  Take 2 tablets ×2 days  Take 1 tablet ×1 day       Controlled Substances Monitoring  RX Monitoring 5/5/2019   Attestation The Prescription Monitoring Report for this patient was reviewed today. Periodic Controlled Substance Monitoring -           (Please note that portions of this note were completed with a voice recognition program.  Efforts were made to edit the dictations but occasionally words are mis-transcribed.)    Patient was advised to return to the Emergency Department if there was any worsening.     Tam Zuniga MD (electronically signed)  Attending Emergency Physician          Flori Jack MD  09/16/19 [id number]

## 2019-10-09 ENCOUNTER — APPOINTMENT (OUTPATIENT)
Dept: GENERAL RADIOLOGY | Age: 54
End: 2019-10-09
Payer: COMMERCIAL

## 2019-10-09 ENCOUNTER — HOSPITAL ENCOUNTER (EMERGENCY)
Age: 54
Discharge: HOME | End: 2019-10-09
Attending: EMERGENCY MEDICINE
Payer: COMMERCIAL

## 2019-10-09 VITALS
OXYGEN SATURATION: 100 % | WEIGHT: 165 LBS | SYSTOLIC BLOOD PRESSURE: 150 MMHG | BODY MASS INDEX: 26.52 KG/M2 | HEIGHT: 66 IN | RESPIRATION RATE: 16 BRPM | DIASTOLIC BLOOD PRESSURE: 100 MMHG | HEART RATE: 82 BPM | TEMPERATURE: 97.7 F

## 2019-10-09 DIAGNOSIS — S93.601A SPRAIN OF RIGHT FOOT, INITIAL ENCOUNTER: ICD-10-CM

## 2019-10-09 DIAGNOSIS — S93.401A SPRAIN OF RIGHT ANKLE, UNSPECIFIED LIGAMENT, INITIAL ENCOUNTER: Primary | ICD-10-CM

## 2019-10-09 PROCEDURE — 73630 X-RAY EXAM OF FOOT: CPT

## 2019-10-09 PROCEDURE — 73610 X-RAY EXAM OF ANKLE: CPT

## 2019-10-09 PROCEDURE — 99283 EMERGENCY DEPT VISIT LOW MDM: CPT

## 2019-10-09 ASSESSMENT — ENCOUNTER SYMPTOMS
ABDOMINAL PAIN: 0
BACK PAIN: 0
SHORTNESS OF BREATH: 0

## 2019-10-09 ASSESSMENT — PAIN SCALES - GENERAL: PAINLEVEL_OUTOF10: 6

## 2019-10-09 ASSESSMENT — PAIN DESCRIPTION - ORIENTATION: ORIENTATION: RIGHT

## 2019-10-09 ASSESSMENT — PAIN DESCRIPTION - PAIN TYPE: TYPE: ACUTE PAIN

## 2019-10-09 ASSESSMENT — PAIN DESCRIPTION - LOCATION: LOCATION: FOOT

## 2019-10-09 ASSESSMENT — PAIN DESCRIPTION - DESCRIPTORS: DESCRIPTORS: ACHING

## 2019-10-22 ENCOUNTER — OFFICE VISIT (OUTPATIENT)
Dept: ORTHOPEDIC SURGERY | Age: 54
End: 2019-10-22
Payer: COMMERCIAL

## 2019-10-22 VITALS
BODY MASS INDEX: 26.5 KG/M2 | WEIGHT: 164.9 LBS | HEART RATE: 74 BPM | DIASTOLIC BLOOD PRESSURE: 85 MMHG | SYSTOLIC BLOOD PRESSURE: 135 MMHG | HEIGHT: 66 IN

## 2019-10-22 DIAGNOSIS — S93.401A MODERATE ANKLE SPRAIN, RIGHT, INITIAL ENCOUNTER: Primary | ICD-10-CM

## 2019-10-22 PROCEDURE — 3017F COLORECTAL CA SCREEN DOC REV: CPT | Performed by: PODIATRIST

## 2019-10-22 PROCEDURE — L1902 AFO ANKLE GAUNTLET PRE OTS: HCPCS | Performed by: PODIATRIST

## 2019-10-22 PROCEDURE — G8419 CALC BMI OUT NRM PARAM NOF/U: HCPCS | Performed by: PODIATRIST

## 2019-10-22 PROCEDURE — G8484 FLU IMMUNIZE NO ADMIN: HCPCS | Performed by: PODIATRIST

## 2019-10-22 PROCEDURE — G8427 DOCREV CUR MEDS BY ELIG CLIN: HCPCS | Performed by: PODIATRIST

## 2019-10-22 PROCEDURE — 99203 OFFICE O/P NEW LOW 30 MIN: CPT | Performed by: PODIATRIST

## 2019-10-22 PROCEDURE — 1036F TOBACCO NON-USER: CPT | Performed by: PODIATRIST

## 2019-10-22 RX ORDER — NADOLOL 40 MG/1
120 TABLET ORAL
COMMUNITY
End: 2019-12-09

## 2019-10-22 RX ORDER — IBUPROFEN 800 MG/1
TABLET ORAL
COMMUNITY
Start: 2019-10-21 | End: 2020-07-29

## 2019-10-22 RX ORDER — ACETAMINOPHEN 500 MG
TABLET ORAL
COMMUNITY
Start: 2019-09-16 | End: 2019-12-09

## 2019-12-09 ENCOUNTER — APPOINTMENT (OUTPATIENT)
Dept: GENERAL RADIOLOGY | Age: 54
End: 2019-12-09
Payer: COMMERCIAL

## 2019-12-09 ENCOUNTER — HOSPITAL ENCOUNTER (EMERGENCY)
Age: 54
Discharge: HOME | End: 2019-12-09
Attending: EMERGENCY MEDICINE
Payer: COMMERCIAL

## 2019-12-09 VITALS
HEIGHT: 66 IN | SYSTOLIC BLOOD PRESSURE: 136 MMHG | DIASTOLIC BLOOD PRESSURE: 98 MMHG | BODY MASS INDEX: 25.71 KG/M2 | HEART RATE: 79 BPM | OXYGEN SATURATION: 97 % | RESPIRATION RATE: 15 BRPM | WEIGHT: 160 LBS | TEMPERATURE: 97.9 F

## 2019-12-09 DIAGNOSIS — M79.605 LEG PAIN, LEFT: Primary | ICD-10-CM

## 2019-12-09 LAB
A/G RATIO: 1.4 (ref 1.1–2.2)
ALBUMIN SERPL-MCNC: 4.6 G/DL (ref 3.4–5)
ALP BLD-CCNC: 121 U/L (ref 40–129)
ALT SERPL-CCNC: 16 U/L (ref 10–40)
ANION GAP SERPL CALCULATED.3IONS-SCNC: 9 MMOL/L (ref 3–16)
APTT: 30.5 SEC (ref 24.2–36.2)
AST SERPL-CCNC: 20 U/L (ref 15–37)
BASOPHILS ABSOLUTE: 0 K/UL (ref 0–0.2)
BASOPHILS RELATIVE PERCENT: 0.8 %
BILIRUB SERPL-MCNC: <0.2 MG/DL (ref 0–1)
BUN BLDV-MCNC: 16 MG/DL (ref 7–20)
CALCIUM SERPL-MCNC: 9.2 MG/DL (ref 8.3–10.6)
CHLORIDE BLD-SCNC: 108 MMOL/L (ref 99–110)
CO2: 25 MMOL/L (ref 21–32)
CREAT SERPL-MCNC: 0.6 MG/DL (ref 0.6–1.1)
D DIMER: <200 NG/ML DDU (ref 0–229)
EKG ATRIAL RATE: 71 BPM
EKG DIAGNOSIS: NORMAL
EKG P AXIS: 99 DEGREES
EKG P-R INTERVAL: 166 MS
EKG Q-T INTERVAL: 418 MS
EKG QRS DURATION: 70 MS
EKG QTC CALCULATION (BAZETT): 454 MS
EKG R AXIS: 48 DEGREES
EKG T AXIS: 41 DEGREES
EKG VENTRICULAR RATE: 71 BPM
EOSINOPHILS ABSOLUTE: 0.2 K/UL (ref 0–0.6)
EOSINOPHILS RELATIVE PERCENT: 3.7 %
GFR AFRICAN AMERICAN: >60
GFR NON-AFRICAN AMERICAN: >60
GLOBULIN: 3.2 G/DL
GLUCOSE BLD-MCNC: 90 MG/DL (ref 70–99)
HCT VFR BLD CALC: 43.9 % (ref 36–48)
HEMOGLOBIN: 14.5 G/DL (ref 12–16)
INR BLD: 0.85 (ref 0.86–1.14)
LYMPHOCYTES ABSOLUTE: 1.9 K/UL (ref 1–5.1)
LYMPHOCYTES RELATIVE PERCENT: 36.1 %
MCH RBC QN AUTO: 31.4 PG (ref 26–34)
MCHC RBC AUTO-ENTMCNC: 33 G/DL (ref 31–36)
MCV RBC AUTO: 95.3 FL (ref 80–100)
MONOCYTES ABSOLUTE: 0.4 K/UL (ref 0–1.3)
MONOCYTES RELATIVE PERCENT: 7.8 %
NEUTROPHILS ABSOLUTE: 2.7 K/UL (ref 1.7–7.7)
NEUTROPHILS RELATIVE PERCENT: 51.6 %
PDW BLD-RTO: 13.1 % (ref 12.4–15.4)
PLATELET # BLD: 177 K/UL (ref 135–450)
PMV BLD AUTO: 9 FL (ref 5–10.5)
POTASSIUM SERPL-SCNC: 4.3 MMOL/L (ref 3.5–5.1)
PROTHROMBIN TIME: 9.9 SEC (ref 10–13.2)
RBC # BLD: 4.61 M/UL (ref 4–5.2)
SODIUM BLD-SCNC: 142 MMOL/L (ref 136–145)
TOTAL PROTEIN: 7.8 G/DL (ref 6.4–8.2)
TROPONIN: <0.01 NG/ML
WBC # BLD: 5.2 K/UL (ref 4–11)

## 2019-12-09 PROCEDURE — 73590 X-RAY EXAM OF LOWER LEG: CPT

## 2019-12-09 PROCEDURE — 93005 ELECTROCARDIOGRAM TRACING: CPT | Performed by: EMERGENCY MEDICINE

## 2019-12-09 PROCEDURE — 99284 EMERGENCY DEPT VISIT MOD MDM: CPT

## 2019-12-09 PROCEDURE — 84484 ASSAY OF TROPONIN QUANT: CPT

## 2019-12-09 PROCEDURE — 85730 THROMBOPLASTIN TIME PARTIAL: CPT

## 2019-12-09 PROCEDURE — 85379 FIBRIN DEGRADATION QUANT: CPT

## 2019-12-09 PROCEDURE — 36415 COLL VENOUS BLD VENIPUNCTURE: CPT

## 2019-12-09 PROCEDURE — 85025 COMPLETE CBC W/AUTO DIFF WBC: CPT

## 2019-12-09 PROCEDURE — 93010 ELECTROCARDIOGRAM REPORT: CPT | Performed by: INTERNAL MEDICINE

## 2019-12-09 PROCEDURE — 80053 COMPREHEN METABOLIC PANEL: CPT

## 2019-12-09 PROCEDURE — 85610 PROTHROMBIN TIME: CPT

## 2019-12-09 PROCEDURE — 71046 X-RAY EXAM CHEST 2 VIEWS: CPT

## 2019-12-09 RX ORDER — HYDROCODONE BITARTRATE AND ACETAMINOPHEN 5; 325 MG/1; MG/1
1 TABLET ORAL EVERY 6 HOURS PRN
Qty: 12 TABLET | Refills: 0 | Status: SHIPPED | OUTPATIENT
Start: 2019-12-09 | End: 2019-12-12

## 2019-12-09 ASSESSMENT — ENCOUNTER SYMPTOMS
NAUSEA: 1
VOMITING: 0
SORE THROAT: 0
DIARRHEA: 0
SHORTNESS OF BREATH: 1

## 2019-12-09 ASSESSMENT — PAIN DESCRIPTION - DESCRIPTORS: DESCRIPTORS: BURNING

## 2019-12-09 ASSESSMENT — PAIN DESCRIPTION - ORIENTATION: ORIENTATION: LOWER;LEFT

## 2019-12-09 ASSESSMENT — PAIN SCALES - GENERAL: PAINLEVEL_OUTOF10: 9

## 2019-12-09 ASSESSMENT — PAIN DESCRIPTION - FREQUENCY: FREQUENCY: CONTINUOUS

## 2019-12-09 ASSESSMENT — PAIN DESCRIPTION - LOCATION: LOCATION: LEG

## 2019-12-09 ASSESSMENT — PAIN DESCRIPTION - PAIN TYPE: TYPE: ACUTE PAIN

## 2020-07-29 ENCOUNTER — HOSPITAL ENCOUNTER (OUTPATIENT)
Age: 55
Setting detail: OBSERVATION
Discharge: HOME | End: 2020-07-29
Attending: EMERGENCY MEDICINE | Admitting: HOSPITALIST
Payer: COMMERCIAL

## 2020-07-29 ENCOUNTER — APPOINTMENT (OUTPATIENT)
Dept: GENERAL RADIOLOGY | Age: 55
End: 2020-07-29
Payer: COMMERCIAL

## 2020-07-29 ENCOUNTER — APPOINTMENT (OUTPATIENT)
Dept: CT IMAGING | Age: 55
End: 2020-07-29
Payer: COMMERCIAL

## 2020-07-29 ENCOUNTER — APPOINTMENT (OUTPATIENT)
Dept: NUCLEAR MEDICINE | Age: 55
End: 2020-07-29
Payer: COMMERCIAL

## 2020-07-29 VITALS
HEIGHT: 66 IN | OXYGEN SATURATION: 100 % | BODY MASS INDEX: 25.71 KG/M2 | WEIGHT: 160 LBS | DIASTOLIC BLOOD PRESSURE: 62 MMHG | SYSTOLIC BLOOD PRESSURE: 92 MMHG | TEMPERATURE: 96 F | RESPIRATION RATE: 16 BRPM | HEART RATE: 70 BPM

## 2020-07-29 PROBLEM — R07.9 CHEST PAIN: Status: ACTIVE | Noted: 2020-07-29

## 2020-07-29 LAB
A/G RATIO: 1.6 (ref 1.1–2.2)
ALBUMIN SERPL-MCNC: 4.5 G/DL (ref 3.4–5)
ALP BLD-CCNC: 145 U/L (ref 40–129)
ALT SERPL-CCNC: 20 U/L (ref 10–40)
ANION GAP SERPL CALCULATED.3IONS-SCNC: 9 MMOL/L (ref 3–16)
AST SERPL-CCNC: 24 U/L (ref 15–37)
BASOPHILS ABSOLUTE: 0 K/UL (ref 0–0.2)
BASOPHILS RELATIVE PERCENT: 0.8 %
BILIRUB SERPL-MCNC: 0.3 MG/DL (ref 0–1)
BUN BLDV-MCNC: 15 MG/DL (ref 7–20)
CALCIUM SERPL-MCNC: 9.8 MG/DL (ref 8.3–10.6)
CHLORIDE BLD-SCNC: 107 MMOL/L (ref 99–110)
CO2: 25 MMOL/L (ref 21–32)
CREAT SERPL-MCNC: 0.6 MG/DL (ref 0.6–1.1)
D DIMER: <200 NG/ML DDU (ref 0–229)
EKG ATRIAL RATE: 70 BPM
EKG ATRIAL RATE: 71 BPM
EKG DIAGNOSIS: NORMAL
EKG DIAGNOSIS: NORMAL
EKG P AXIS: 65 DEGREES
EKG P AXIS: 68 DEGREES
EKG P-R INTERVAL: 130 MS
EKG P-R INTERVAL: 156 MS
EKG Q-T INTERVAL: 434 MS
EKG Q-T INTERVAL: 456 MS
EKG QRS DURATION: 66 MS
EKG QRS DURATION: 68 MS
EKG QTC CALCULATION (BAZETT): 471 MS
EKG QTC CALCULATION (BAZETT): 492 MS
EKG R AXIS: 68 DEGREES
EKG R AXIS: 78 DEGREES
EKG T AXIS: 85 DEGREES
EKG T AXIS: 96 DEGREES
EKG VENTRICULAR RATE: 70 BPM
EKG VENTRICULAR RATE: 71 BPM
EOSINOPHILS ABSOLUTE: 0.2 K/UL (ref 0–0.6)
EOSINOPHILS RELATIVE PERCENT: 4.6 %
GFR AFRICAN AMERICAN: >60
GFR NON-AFRICAN AMERICAN: >60
GLOBULIN: 2.8 G/DL
GLUCOSE BLD-MCNC: 105 MG/DL (ref 70–99)
HCT VFR BLD CALC: 41.8 % (ref 36–48)
HEMOGLOBIN: 14 G/DL (ref 12–16)
LV EF: 65 %
LVEF MODALITY: NORMAL
LYMPHOCYTES ABSOLUTE: 1.8 K/UL (ref 1–5.1)
LYMPHOCYTES RELATIVE PERCENT: 34.2 %
MCH RBC QN AUTO: 32.4 PG (ref 26–34)
MCHC RBC AUTO-ENTMCNC: 33.6 G/DL (ref 31–36)
MCV RBC AUTO: 96.5 FL (ref 80–100)
MONOCYTES ABSOLUTE: 0.5 K/UL (ref 0–1.3)
MONOCYTES RELATIVE PERCENT: 9.6 %
NEUTROPHILS ABSOLUTE: 2.7 K/UL (ref 1.7–7.7)
NEUTROPHILS RELATIVE PERCENT: 50.8 %
PDW BLD-RTO: 13.3 % (ref 12.4–15.4)
PLATELET # BLD: 169 K/UL (ref 135–450)
PMV BLD AUTO: 9 FL (ref 5–10.5)
POTASSIUM SERPL-SCNC: 3.8 MMOL/L (ref 3.5–5.1)
RBC # BLD: 4.33 M/UL (ref 4–5.2)
SODIUM BLD-SCNC: 141 MMOL/L (ref 136–145)
TOTAL PROTEIN: 7.3 G/DL (ref 6.4–8.2)
TROPONIN: <0.01 NG/ML
WBC # BLD: 5.3 K/UL (ref 4–11)

## 2020-07-29 PROCEDURE — 78452 HT MUSCLE IMAGE SPECT MULT: CPT

## 2020-07-29 PROCEDURE — 99204 OFFICE O/P NEW MOD 45 MIN: CPT | Performed by: INTERNAL MEDICINE

## 2020-07-29 PROCEDURE — 6370000000 HC RX 637 (ALT 250 FOR IP): Performed by: EMERGENCY MEDICINE

## 2020-07-29 PROCEDURE — 6360000002 HC RX W HCPCS: Performed by: INTERNAL MEDICINE

## 2020-07-29 PROCEDURE — 93010 ELECTROCARDIOGRAM REPORT: CPT | Performed by: INTERNAL MEDICINE

## 2020-07-29 PROCEDURE — 6360000004 HC RX CONTRAST MEDICATION: Performed by: EMERGENCY MEDICINE

## 2020-07-29 PROCEDURE — 3430000000 HC RX DIAGNOSTIC RADIOPHARMACEUTICAL: Performed by: INTERNAL MEDICINE

## 2020-07-29 PROCEDURE — 6370000000 HC RX 637 (ALT 250 FOR IP): Performed by: NURSE PRACTITIONER

## 2020-07-29 PROCEDURE — 96374 THER/PROPH/DIAG INJ IV PUSH: CPT

## 2020-07-29 PROCEDURE — 96376 TX/PRO/DX INJ SAME DRUG ADON: CPT

## 2020-07-29 PROCEDURE — 2580000003 HC RX 258: Performed by: NURSE PRACTITIONER

## 2020-07-29 PROCEDURE — G0378 HOSPITAL OBSERVATION PER HR: HCPCS

## 2020-07-29 PROCEDURE — 84484 ASSAY OF TROPONIN QUANT: CPT

## 2020-07-29 PROCEDURE — 6360000002 HC RX W HCPCS: Performed by: NURSE PRACTITIONER

## 2020-07-29 PROCEDURE — 2500000003 HC RX 250 WO HCPCS: Performed by: EMERGENCY MEDICINE

## 2020-07-29 PROCEDURE — 96375 TX/PRO/DX INJ NEW DRUG ADDON: CPT

## 2020-07-29 PROCEDURE — 71045 X-RAY EXAM CHEST 1 VIEW: CPT

## 2020-07-29 PROCEDURE — 36415 COLL VENOUS BLD VENIPUNCTURE: CPT

## 2020-07-29 PROCEDURE — 99236 HOSP IP/OBS SAME DATE HI 85: CPT | Performed by: INTERNAL MEDICINE

## 2020-07-29 PROCEDURE — 93005 ELECTROCARDIOGRAM TRACING: CPT | Performed by: EMERGENCY MEDICINE

## 2020-07-29 PROCEDURE — 93005 ELECTROCARDIOGRAM TRACING: CPT | Performed by: INTERNAL MEDICINE

## 2020-07-29 PROCEDURE — 2580000003 HC RX 258: Performed by: HOSPITALIST

## 2020-07-29 PROCEDURE — 6360000002 HC RX W HCPCS: Performed by: HOSPITALIST

## 2020-07-29 PROCEDURE — 85025 COMPLETE CBC W/AUTO DIFF WBC: CPT

## 2020-07-29 PROCEDURE — 6360000002 HC RX W HCPCS: Performed by: EMERGENCY MEDICINE

## 2020-07-29 PROCEDURE — A9502 TC99M TETROFOSMIN: HCPCS | Performed by: INTERNAL MEDICINE

## 2020-07-29 PROCEDURE — 71260 CT THORAX DX C+: CPT

## 2020-07-29 PROCEDURE — 99285 EMERGENCY DEPT VISIT HI MDM: CPT

## 2020-07-29 PROCEDURE — 93017 CV STRESS TEST TRACING ONLY: CPT

## 2020-07-29 PROCEDURE — 80053 COMPREHEN METABOLIC PANEL: CPT

## 2020-07-29 PROCEDURE — 85379 FIBRIN DEGRADATION QUANT: CPT

## 2020-07-29 RX ORDER — COLCHICINE 0.6 MG/1
0.6 CAPSULE ORAL ONCE
Status: COMPLETED | OUTPATIENT
Start: 2020-07-29 | End: 2020-07-29

## 2020-07-29 RX ORDER — ACETAMINOPHEN 325 MG/1
650 TABLET ORAL EVERY 6 HOURS PRN
Status: DISCONTINUED | OUTPATIENT
Start: 2020-07-29 | End: 2020-07-29 | Stop reason: HOSPADM

## 2020-07-29 RX ORDER — ASPIRIN 81 MG/1
81 TABLET, CHEWABLE ORAL DAILY
Status: DISCONTINUED | OUTPATIENT
Start: 2020-07-29 | End: 2020-07-29 | Stop reason: HOSPADM

## 2020-07-29 RX ORDER — SODIUM CHLORIDE 0.9 % (FLUSH) 0.9 %
10 SYRINGE (ML) INJECTION PRN
Status: DISCONTINUED | OUTPATIENT
Start: 2020-07-29 | End: 2020-07-29 | Stop reason: HOSPADM

## 2020-07-29 RX ORDER — ONDANSETRON 2 MG/ML
4 INJECTION INTRAMUSCULAR; INTRAVENOUS ONCE
Status: COMPLETED | OUTPATIENT
Start: 2020-07-29 | End: 2020-07-29

## 2020-07-29 RX ORDER — 0.9 % SODIUM CHLORIDE 0.9 %
500 INTRAVENOUS SOLUTION INTRAVENOUS ONCE
Status: COMPLETED | OUTPATIENT
Start: 2020-07-29 | End: 2020-07-29

## 2020-07-29 RX ORDER — ASPIRIN 81 MG/1
324 TABLET, CHEWABLE ORAL ONCE
Status: COMPLETED | OUTPATIENT
Start: 2020-07-29 | End: 2020-07-29

## 2020-07-29 RX ORDER — SODIUM CHLORIDE 0.9 % (FLUSH) 0.9 %
10 SYRINGE (ML) INJECTION EVERY 12 HOURS SCHEDULED
Status: DISCONTINUED | OUTPATIENT
Start: 2020-07-29 | End: 2020-07-29 | Stop reason: HOSPADM

## 2020-07-29 RX ORDER — PROMETHAZINE HYDROCHLORIDE 25 MG/1
12.5 TABLET ORAL EVERY 6 HOURS PRN
Status: DISCONTINUED | OUTPATIENT
Start: 2020-07-29 | End: 2020-07-29 | Stop reason: HOSPADM

## 2020-07-29 RX ORDER — MORPHINE SULFATE 2 MG/ML
2 INJECTION, SOLUTION INTRAMUSCULAR; INTRAVENOUS EVERY 4 HOURS PRN
Status: DISCONTINUED | OUTPATIENT
Start: 2020-07-29 | End: 2020-07-29 | Stop reason: HOSPADM

## 2020-07-29 RX ORDER — AMOXICILLIN AND CLAVULANATE POTASSIUM 875; 125 MG/1; MG/1
TABLET, FILM COATED ORAL DAILY
COMMUNITY
Start: 2020-05-28 | End: 2021-02-18

## 2020-07-29 RX ORDER — ALBUTEROL SULFATE 90 UG/1
AEROSOL, METERED RESPIRATORY (INHALATION) PRN
COMMUNITY
Start: 2020-07-23

## 2020-07-29 RX ORDER — POLYETHYLENE GLYCOL 3350 17 G/17G
17 POWDER, FOR SOLUTION ORAL DAILY PRN
Status: DISCONTINUED | OUTPATIENT
Start: 2020-07-29 | End: 2020-07-29 | Stop reason: HOSPADM

## 2020-07-29 RX ORDER — ACETAMINOPHEN 650 MG/1
650 SUPPOSITORY RECTAL EVERY 6 HOURS PRN
Status: DISCONTINUED | OUTPATIENT
Start: 2020-07-29 | End: 2020-07-29 | Stop reason: HOSPADM

## 2020-07-29 RX ORDER — KETOROLAC TROMETHAMINE 30 MG/ML
30 INJECTION, SOLUTION INTRAMUSCULAR; INTRAVENOUS ONCE
Status: COMPLETED | OUTPATIENT
Start: 2020-07-29 | End: 2020-07-29

## 2020-07-29 RX ORDER — IBUPROFEN 600 MG/1
600 TABLET ORAL EVERY 6 HOURS PRN
Qty: 30 TABLET | Refills: 0 | Status: SHIPPED | OUTPATIENT
Start: 2020-07-29 | End: 2021-02-18

## 2020-07-29 RX ORDER — MORPHINE SULFATE 4 MG/ML
3 INJECTION, SOLUTION INTRAMUSCULAR; INTRAVENOUS ONCE
Status: COMPLETED | OUTPATIENT
Start: 2020-07-29 | End: 2020-07-29

## 2020-07-29 RX ORDER — NADOLOL 40 MG/1
120 TABLET ORAL NIGHTLY
Status: DISCONTINUED | OUTPATIENT
Start: 2020-07-29 | End: 2020-07-29 | Stop reason: HOSPADM

## 2020-07-29 RX ORDER — MORPHINE SULFATE 2 MG/ML
2 INJECTION, SOLUTION INTRAMUSCULAR; INTRAVENOUS ONCE
Status: COMPLETED | OUTPATIENT
Start: 2020-07-29 | End: 2020-07-29

## 2020-07-29 RX ORDER — MORPHINE SULFATE 4 MG/ML
4 INJECTION, SOLUTION INTRAMUSCULAR; INTRAVENOUS ONCE
Status: COMPLETED | OUTPATIENT
Start: 2020-07-29 | End: 2020-07-29

## 2020-07-29 RX ORDER — ONDANSETRON 2 MG/ML
4 INJECTION INTRAMUSCULAR; INTRAVENOUS EVERY 6 HOURS PRN
Status: DISCONTINUED | OUTPATIENT
Start: 2020-07-29 | End: 2020-07-29 | Stop reason: HOSPADM

## 2020-07-29 RX ADMIN — MORPHINE SULFATE 4 MG: 4 INJECTION INTRAVENOUS at 04:20

## 2020-07-29 RX ADMIN — MORPHINE SULFATE 2 MG: 2 INJECTION, SOLUTION INTRAMUSCULAR; INTRAVENOUS at 11:28

## 2020-07-29 RX ADMIN — Medication 10 ML: at 11:16

## 2020-07-29 RX ADMIN — ASPIRIN 324 MG: 81 TABLET, CHEWABLE ORAL at 04:20

## 2020-07-29 RX ADMIN — TETROFOSMIN 9.8 MILLICURIE: 1.38 INJECTION, POWDER, LYOPHILIZED, FOR SOLUTION INTRAVENOUS at 12:35

## 2020-07-29 RX ADMIN — SODIUM CHLORIDE 500 ML: 9 INJECTION, SOLUTION INTRAVENOUS at 16:02

## 2020-07-29 RX ADMIN — MORPHINE SULFATE 2 MG: 2 INJECTION, SOLUTION INTRAMUSCULAR; INTRAVENOUS at 07:50

## 2020-07-29 RX ADMIN — ONDANSETRON HYDROCHLORIDE 4 MG: 2 INJECTION, SOLUTION INTRAMUSCULAR; INTRAVENOUS at 04:20

## 2020-07-29 RX ADMIN — TETROFOSMIN 32.7 MILLICURIE: 1.38 INJECTION, POWDER, LYOPHILIZED, FOR SOLUTION INTRAVENOUS at 13:55

## 2020-07-29 RX ADMIN — COLCHICINE 0.6 MG: 0.6 CAPSULE ORAL at 16:00

## 2020-07-29 RX ADMIN — MORPHINE SULFATE 3 MG: 4 INJECTION INTRAVENOUS at 08:13

## 2020-07-29 RX ADMIN — IOPAMIDOL 75 ML: 755 INJECTION, SOLUTION INTRAVENOUS at 04:38

## 2020-07-29 RX ADMIN — REGADENOSON 0.4 MG: 0.08 INJECTION, SOLUTION INTRAVENOUS at 13:55

## 2020-07-29 RX ADMIN — KETOROLAC TROMETHAMINE 30 MG: 30 INJECTION, SOLUTION INTRAMUSCULAR at 16:00

## 2020-07-29 RX ADMIN — HYDROMORPHONE HYDROCHLORIDE 0.5 MG: 1 INJECTION, SOLUTION INTRAMUSCULAR; INTRAVENOUS; SUBCUTANEOUS at 05:04

## 2020-07-29 ASSESSMENT — PAIN DESCRIPTION - ORIENTATION
ORIENTATION: LEFT

## 2020-07-29 ASSESSMENT — PAIN DESCRIPTION - DIRECTION
RADIATING_TOWARDS: L ARM

## 2020-07-29 ASSESSMENT — PAIN DESCRIPTION - PROGRESSION
CLINICAL_PROGRESSION: GRADUALLY IMPROVING
CLINICAL_PROGRESSION: NOT CHANGED
CLINICAL_PROGRESSION: RAPIDLY WORSENING
CLINICAL_PROGRESSION: NOT CHANGED

## 2020-07-29 ASSESSMENT — PAIN SCALES - GENERAL
PAINLEVEL_OUTOF10: 8
PAINLEVEL_OUTOF10: 10
PAINLEVEL_OUTOF10: 8
PAINLEVEL_OUTOF10: 7
PAINLEVEL_OUTOF10: 7
PAINLEVEL_OUTOF10: 9
PAINLEVEL_OUTOF10: 10
PAINLEVEL_OUTOF10: 10
PAINLEVEL_OUTOF10: 9
PAINLEVEL_OUTOF10: 8
PAINLEVEL_OUTOF10: 9
PAINLEVEL_OUTOF10: 10
PAINLEVEL_OUTOF10: 8
PAINLEVEL_OUTOF10: 10

## 2020-07-29 ASSESSMENT — PAIN DESCRIPTION - DESCRIPTORS
DESCRIPTORS: CONSTANT;PRESSURE;TIGHTNESS
DESCRIPTORS: CONSTANT;PRESSURE;TIGHTNESS
DESCRIPTORS: SHARP;PRESSURE
DESCRIPTORS: TIGHTNESS;PRESSURE
DESCRIPTORS: SHARP;PRESSURE
DESCRIPTORS: SHARP;HEAVINESS
DESCRIPTORS: SHARP;PRESSURE

## 2020-07-29 ASSESSMENT — PAIN DESCRIPTION - PAIN TYPE
TYPE: ACUTE PAIN

## 2020-07-29 ASSESSMENT — PAIN DESCRIPTION - FREQUENCY
FREQUENCY: CONTINUOUS

## 2020-07-29 ASSESSMENT — PAIN DESCRIPTION - LOCATION
LOCATION: CHEST

## 2020-07-29 ASSESSMENT — PAIN - FUNCTIONAL ASSESSMENT

## 2020-07-29 ASSESSMENT — PAIN DESCRIPTION - ONSET
ONSET: ON-GOING

## 2020-07-29 NOTE — PROGRESS NOTES
Discharge instructions reviewed with the patient. Verbalized understanding of all including prescribed medication, medication side effects/restrictions, and follow up care. Denies questions/concerns. Patient is alert/oriented, stable, and ambulatory at the time of discharge.

## 2020-07-29 NOTE — PROGRESS NOTES
Spoke to Barbara Godinez regarding consult. Spoke to patient , he is going out of town and will be back next week he says he will come in for a recheck then

## 2020-07-29 NOTE — CONSULTS
CAD (coronary artery disease), COPD (chronic obstructive pulmonary disease) ([facility] [address] History of blood transfusion, Kidney stone, Migraines, Mitral valve prolapse, MRSA (methicillin resistant staph aureus) culture positive, MVP (mitral valve prolapse), PONV (postoperative nausea and vomiting), Prolonged QT interval syndrome, S/P bronchoscopy, TIA (transient ischemic attack), and Vertigo. Surgical History:   has a past surgical history that includes Pacemaker insertion; Hysterectomy; Nasal sinus surgery (8/22/13); Cystoscopy (6/8/2016); Tonsillectomy; Upper gastrointestinal endoscopy (03/20/2017); Hip arthroscopy (Left, 02/08/2018); hip surgery; Cardiac surgery; Cardiac catheterization; other surgical history; pr hip arthroscopy, dx (Left, 11/1/2018); Breast enhancement surgery; Hysterectomy, total abdominal (1992); Colonoscopy (2012?); ERCP (05/08/2019); endoscopic ultrasound (upper) (05/08/2019); ERCP (N/A, 5/8/2019); Upper gastrointestinal endoscopy (N/A, 5/8/2019); and ERCP (5/8/2019). 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 Lives with SO. She reports that she quit smoking about 21 years ago. Her smoking use included cigarettes. She has a 0.75 pack-year smoking history. She has never used smokeless tobacco. She reports current alcohol use 2 glasses wine daily. She reports that she does not use drugs. Family History:  family history includes Colon Cancer in her father; Heart Disease in her father and mother; High Blood Pressure in her father. Home Medications:  Were reviewed and are listed in nursing record. and/or listed below  Prior to Admission medications    Medication Sig Start Date End Date Taking? Authorizing Provider   nadolol (CORGARD) 40 MG tablet Take 120 mg by mouth nightly    Yes Historical Provider, MD        Allergies:  Quinolones; Dexamethasone sodium phosphate;  Other; Codeine; Nitroglycerin; and Prednisone     Review of Systems:   12 point ROS negative in all areas as listed below except as in [place]  Constitutional, EENT, Cardiovascular, pulmonary, GI, , Musculoskeletal, skin, neurological, hematological, endocrine, Psychiatric    Physical Examination:    Vitals:    07/29/20 0804   BP: (!) 136/91   Pulse:    Resp:    Temp:    SpO2:     Weight: 160 lb (72.6 kg)         General Appearance:  Alert, cooperative, no distress, appears stated age   Head:  Normocephalic, without obvious abnormality, atraumatic   Eyes:  PERRL, conjunctiva/corneas clear       Nose: Nares normal, no drainage or sinus tenderness   Throat: Lips, mucosa, and tongue normal   Neck: Supple, symmetrical, trachea midline, no adenopathy, thyroid: not enlarged, symmetric, no tenderness/mass/nodules, no carotid bruit or JVD       Lungs:   Clear to auscultation bilaterally, respirations unlabored   Chest Wall:  ++tenderness left chest focal    Heart:  Regular rate and rhythm, S1, S2 normal, no murmur, rub or gallop   Abdomen:   Soft, non-tender, bowel sounds active all four quadrants,  no masses, no organomegaly           Extremities: Extremities normal, atraumatic, no cyanosis or edema   Pulses: 2+ and symmetric   Skin: Skin color, texture, turgor normal, no rashes or lesions   Pysch: Normal mood and affect   Neurologic: Normal gross motor and sensory exam.         Labs  CBC:   Lab Results   Component Value Date    WBC 5.3 07/29/2020    RBC 4.33 07/29/2020    RBC 4.61 03/09/2016    HGB 14.0 07/29/2020    HCT 41.8 07/29/2020    MCV 96.5 07/29/2020    RDW 13.3 07/29/2020     07/29/2020     CMP:    Lab Results   Component Value Date     07/29/2020    K 3.8 07/29/2020    K 3.6 05/08/2019     07/29/2020    CO2 25 07/29/2020    BUN 15 07/29/2020    CREATININE 0.6 07/29/2020    GFRAA >60 07/29/2020    GFRAA >60 03/18/2011    AGRATIO 1.6 07/29/2020    LABGLOM >60 07/29/2020    GLUCOSE 105 07/29/2020    PROT 7.3 07/29/2020    PROT 7.1 03/18/2011    CALCIUM 9.8 07/29/2020    BILITOT 0.3 2020    ALKPHOS 145 2020    AST 24 2020    ALT 20 2020     PT/INR:  No results found for: PTINR  Lab Results   Component Value Date    TROPONINI <0.01 2020     EKG 20 Electronic pacemaker T wave abnormality consider anterior ischemia    EK20  I have reviewed EKG with the following interpretation:  Impression:  See HPI Normal sinus rhythmST & T wave abnormality, consider anterior ischemiaProlonged QTAbnormal ECGNo previous ECGs available    Ambulatory Holter monitor on 2011 documented atrial paced rhythm rate ranging from 70-90 bpm, with average rate 72 bpm. No ventricular events were recorded, and only one supraventricular event was documented. 2D/CFD echo was requested to evaluate septal occluder device, but was apparently never done. ECG recorded on 01/10/2014 documented an atrial paced rhythm, rate 70 ppm,  msec, QRS 66 msec, and QT/QTc 435/470 msec. ECG recorded on 2018 documented an atrial paced rhythm, rate 72 bpm,  msec, QRS 78 msec, and QT/QTc 430/471 msec (the QT/QTc reported on the computer generated ECG measurements are incorrect).     Ambulatory 24-hour Holter monitor showed heart rates between 69 bpm 121 bpm. The average heart rate was 77 bpm. She did have 52 PVCs (0.2% burden). Echo 14 Study Conclusions   Left ventricle: The cavity size was normal. Wall thickness was normal. Systolic function was normal. The estimated ejection fraction was in the range of 55% to 60%. Wall motion was normal; there were no regional wall motion abnormalities. Left ventricular diastolic function parameters were normal. Right ventricle: Pacer wire noted    in right ventricle. Atrial septum: No defect or patent  foramen ovale was identified. A closure device in the    fossa ovalis region was present.       Ambulatory Holter monitor was recorded for 48 hours starting on 2019.  Sinus rhythm and atrial paced rhythm were documented, with history including congenital LQTS, s/p pacemaker, hx polymorphic VT, s/p ASD closure device, and MVP. Ruled out for MI and EKG with chronic anterior T changes without change. She has component of musculoskeletal pain on palpation of chest but different than admit pain. Recs:  1. I will order a lexiscan nuclear stress test to assess myocardial perfusion and LV function. 2. Continue baby asa qd and corgard 120mg qhs.  3. If nuc stress without concerning findings she can be d/c'd later today with f/u regular cards Dr. Pablito Burdick. She has no concerning murmur of MVP even though mentioned in history. This likely would not cause current clinical issue. Outpatient ECHO can be pursued as outpatient by regular cardiologist.       Patient Active Problem List   Diagnosis    Toe fracture, right    Dyspnea    Chest heaviness    Asthma exacerbation    Acute tracheobronchitis    Recurrent respiratory infection    Hip strain, left, initial encounter    Acute pain of left knee    Chondromalacia patellae of left knee    Primary osteoarthritis of left knee    Right upper quadrant abdominal pain    Chest pain       Thank you for allowing to us to participate in the [facility] 5. Further evaluation will be based upon the patient's clinical course and testing results.

## 2020-07-29 NOTE — ED PROVIDER NOTES
[facility] EMERGENCY DEPARTMENT    CHIEF COMPLAINT  Chest Pain (C/O left upper chest pain since yesterday morning 10/10)       HISTORY OF PRESENT ILLNESS  Benedict Qureshi is a 48 y.o. female with PMH including ASD s/p closure, congenital long QT syndrome c/b VTach s/p pacer, COPD, MVP, TIA and as below who presents to the ED with chest pain. Symptoms started yesterday morning. Left sided chest pain with radiation to left shoulder. \"Heaviness\" and sometimes sharp, worse with inspiration or movement, better with rest, 10/10 intensity. Also complains that \"everything smells funny\". Denies fever, cough, congestion. Complains of SOB, nausea. Denies emesis, diarrhea, abdominal pain. Denies leg swelling/calf pain. No recent travel. No known COVID19 exposure. Denies dysuria. Follows with Dr. Yaneth Monte at [facility]. The following is an excerpt from Dr. David Ferrari note from office visit 12/13/2019:     Jamal Joyner is 50 years old. She has a history of congenital Long QT Syndrome (LQTS). She has a complicated medical history. She had a seizure in 12 (age 25) during pregnancy. She was hospitalized in November 1988 (age 26) at [facility] after a syncope episode, and during cardiac telemetry monitoring, had polymorphic ventricular tachycardia, rate 200-240 bpm, with spontaneous termination. LQTS was noted. Empiric B-Blocker therapy was prescribed, but she continued to have syncope episodes. Left dorsal sympathectomy from the stellate ganglion to the sixth rib was done on 04/21/1994. A dual chamber pacemaker was placed on 06/02/1994 for symptomatic bradycardia during B-Blocker Rx. The pacemaker pulse generator was originally placed in a submammary, submuscular pocket. The pulse generator was replaced, and a new right atrial lead was placed on 10/29/1999. The atrial and ventricular leads were dissected from the pectoralis muscle, and an insulation defect was noted after dissection that could not be repaired.  The new pulse ARTHROSCOPY Left 2018    HIP SURGERY      HYSTERECTOMY      HYSTERECTOMY, TOTAL ABDOMINAL  1992    NASAL SINUS SURGERY  13    OTHER SURGICAL HISTORY      thoracic sympathectomy    PACEMAKER INSERTION      also revision x 3    MT HIP ARTHROSCOPY, DX Left 2018    LEFT HIP ARTHROSCOPY, LABRAL  DEBRIDEMENT, CHONDROPLASTY performed by Akil Welch MD at [address]  2017    UPPER GASTROINTESTINAL ENDOSCOPY N/A 2019    UPPER EUS W/ANES.  performed by Michel Chambers DO at [facility]     Family History   Problem Relation Age of Onset    Heart Disease Mother     Colon Cancer Father         colon    Heart Disease Father     High Blood Pressure Father      Social History     Socioeconomic History    Marital status:      Spouse name: Not on file    Number of children: Not on file    Years of education: Not on file    Highest education level: Not on file   Occupational History    Not on file   Social Needs    Financial resource strain: Not on file    Food insecurity     Worry: Not on file     Inability: Not on file    Transportation needs     Medical: Not on file     Non-medical: Not on file   Tobacco Use    Smoking status: Former Smoker     Packs/day: 0.25     Years: 3.00     Pack years: 0.75     Types: Cigarettes     Last attempt to quit: 1998     Years since quittin.6    Smokeless tobacco: Never Used   Substance and Sexual Activity    Alcohol use: Yes     Comment: 2 glasses wine or 1 beer/nightlynot drank in 7-10 days    Drug use: No    Sexual activity: Yes     Partners: Male   Lifestyle    Physical activity     Days per week: Not on file     Minutes per session: Not on file    Stress: Not on file   Relationships    Social connections     Talks on phone: Not on file     Gets together: Not on file     Attends Zoroastrian service: Not on file     Active member of club or organization: Not on file     Attends meetings of clubs or organizations: Not on file     Relationship status: Not on file    Intimate partner violence     Fear of current or ex partner: Not on file     Emotionally abused: Not on file     Physically abused: Not on file     Forced sexual activity: Not on file   Other Topics Concern    Not on file   Social History Narrative    Not on file     No current facility-administered medications for this encounter. Current Outpatient Medications   Medication Sig Dispense Refill    nadolol (CORGARD) 40 MG tablet Take 120 mg by mouth nightly        Allergies   Allergen Reactions    Quinolones      \"because of heart\"    Dexamethasone Sodium Phosphate Other (See Comments)     \"I feel hot all over\"    Other Hives     Adhesives      Codeine Nausea Only     Itchy nose    Nitroglycerin Other (See Comments)     Cardiologist states patient is not to have it, pt does not know why.  Prednisone      Other reaction(s): Flushing  \"I turn red and really hot\"       REVIEW OF SYSTEMS  10 systems reviewed, pertinent positives and negatives per HPI, otherwise noted to be negative. PHYSICAL EXAM  ED Triage Vitals [07/29/20 0347]   Enc Vitals Group      BP (!) 149/102      Pulse 71      Resp 17      Temp 98 °F (36.7 °C)      Temp Source Oral      SpO2 100 %      Weight 160 lb (72.6 kg)      Height 5' 6\" (1.676 m)      Head Circumference       Peak Flow       Pain Score       Pain Loc       Pain Edu? Excl. in [address]? General appearance: Awake and alert. Cooperative. Appears uncomfortable. HENT: Normocephalic. Atraumatic. Mucous membranes are moist.  Neck: Supple. Eyes: PERRL. EOMI. Heart/Chest: RRR. No murmurs. There is tenderness to palpation at the left anterior chest wall with no overlying skin changes. Lungs: Respirations unlabored. CTAB. Good air exchange. Abdomen: Soft. Non-tender. Non-distended. No rebound or guarding. Musculoskeletal: No extremity edema. No deformity. No tenderness in the extremities. All extremities neurovascularly intact. Skin: Warm and dry. No acute rashes. Neurological: Alert and oriented. CN II-XII intact. Strength 5/5 bilateral upper and lower extremities. Sensation intact to light touch. Gait normal.  Psychiatric: Mood/affect: normal    LABS  I have reviewed all labs for this visit.    Results for orders placed or performed during the hospital encounter of 07/29/20   CBC auto differential   Result Value Ref Range    WBC 5.3 4.0 - 11.0 K/uL    RBC 4.33 4.00 - 5.20 M/uL    Hemoglobin 14.0 12.0 - 16.0 g/dL    Hematocrit 41.8 36.0 - 48.0 %    MCV 96.5 80.0 - 100.0 fL    MCH 32.4 26.0 - 34.0 pg    MCHC 33.6 31.0 - 36.0 g/dL    RDW 13.3 12.4 - 15.4 %    Platelets 409 282 - 325 K/uL    MPV 9.0 5.0 - 10.5 fL    Neutrophils % 50.8 %    Lymphocytes % 34.2 %    Monocytes % 9.6 %    Eosinophils % 4.6 %    Basophils % 0.8 %    Neutrophils Absolute 2.7 1.7 - 7.7 K/uL    Lymphocytes Absolute 1.8 1.0 - 5.1 K/uL    Monocytes Absolute 0.5 0.0 - 1.3 K/uL    Eosinophils Absolute 0.2 0.0 - 0.6 K/uL    Basophils Absolute 0.0 0.0 - 0.2 K/uL   Comprehensive metabolic panel   Result Value Ref Range    Sodium 141 136 - 145 mmol/L    Potassium 3.8 3.5 - 5.1 mmol/L    Chloride 107 99 - 110 mmol/L    CO2 25 21 - 32 mmol/L    Anion Gap 9 3 - 16    Glucose 105 (H) 70 - 99 mg/dL    BUN 15 7 - 20 mg/dL    CREATININE 0.6 0.6 - 1.1 mg/dL    GFR Non-African American >60 >60    GFR African American >60 >60    Calcium 9.8 8.3 - 10.6 mg/dL    Total Protein 7.3 6.4 - 8.2 g/dL    Alb 4.5 3.4 - 5.0 g/dL    Albumin/Globulin Ratio 1.6 1.1 - 2.2    Total Bilirubin 0.3 0.0 - 1.0 mg/dL    Alkaline Phosphatase 145 (H) 40 - 129 U/L    ALT 20 10 - 40 U/L    AST 24 15 - 37 U/L    Globulin 2.8 g/dL   Troponin   Result Value Ref Range    Troponin <0.01 <0.01 ng/mL   D-dimer, quantitative   Result Value Ref Range    D-Dimer, Quant <200 0 - 229 ng/mL DDU       RADIOLOGY  I have reviewed all radiographic HYDROmorphone (DILAUDID) injection 0.5 mg (0.5 mg Intravenous Given 7/29/20 6452)        All questions were answered and the patient/family expressed understanding and agreement with the plan. PROCEDURES  None    CRITICAL CARE  N/A    CLINICAL IMPRESSION  1. Other chest pain        DISPOSITION   Charlene Kemp MD    Note: This chart was created using voice recognition dictation software. Efforts were made by me to ensure accuracy, however some errors may be present due to limitations of this technology and occasionally words are not transcribed correctly.         Adilia Meeks MD  07/29/20 5034       Adilia Meeks MD  07/29/20 1336

## 2020-07-29 NOTE — H&P
[facility] Medicine History & Physical      PCP: Marta Yepez MD    Date of Admission: 7/29/2020    Date of Service: Pt seen/examined on 7/29/2020     Chief Complaint:    Chief Complaint   Patient presents with    Chest Pain     C/O left upper chest pain since yesterday morning 10/10       History Of Present Illness: The patient is a 48 y.o. female with asthma/COPD who presented to the ED with complaint of left sided chest pain that started yesterday. States she woke up with it. The pain radiates to her back and her left shoulder. Patient describes the pain as a pressure in her chest, a dull pain. It was associated with nausea, dyspnea, pain with inspiration. Aggravated by movement. Alleviated by leaning forward with her head between her knees. At its worst the pain was a 10/10 on the pain scale. Pain is constant. She states she started using her nebulizer and antibiotic without improvement of symptoms. Denies any URI symptoms, cough, recent new exercises or heavy lifting, abd pain, vomiting, or diarrhea. The patient has had a cardiac work up in the past.  She had a stress 6-8 months ago in [place]. Never got the results    Cardiac risk factors:   - HTN denies  - HLD denies  - DM denies  - Tobacco abuse former (2 years)  - PHx or FHx of CAD denies    The patient denies any known history of connective tissue disease or aneurysms. The patient denies any recent surgery, hospitalization, immobilization, long car/plane trip, active malignancy, long bone fracture, oral estrogen use, or history of DVT/PE.     Past Medical History:        Diagnosis Date    Asthma     Atrial septal defect     had insertion of atrial septal occluder    Bursitis of left hip     CAD (coronary artery disease)     COPD (chronic obstructive pulmonary disease) (HCC)     History of blood transfusion     Kidney stone     Migraines     Mitral valve prolapse     MRSA (methicillin resistant staph aureus) culture positive 12/2015    Lungs. Diagnosed [facility] with bronchoscopy    MVP (mitral valve prolapse)     PONV (postoperative nausea and vomiting)     Prolonged QT interval syndrome     S/P bronchoscopy     TIA (transient ischemic attack)     Vertigo        Past Surgical History:        Procedure Laterality Date    BREAST ENHANCEMENT SURGERY      augmentation    CARDIAC CATHETERIZATION      CARDIAC SURGERY      septum occluder    COLONOSCOPY  2012? polyps    CYSTOSCOPY  6/8/2016    U-Dil    ENDOSCOPIC ULTRASOUND (UPPER)  05/08/2019    ERCP  05/08/2019    Sphincterotomy    ERCP N/A 5/8/2019    ERCP SPHINCTER/PAPILLOTOMY performed by Hailee Devi DO at [facility] ERCP  5/8/2019    ERCP STONE REMOVAL performed by Hailee Devi DO at [facility] ARTHROSCOPY Left 02/08/2018    HIP SURGERY      HYSTERECTOMY      HYSTERECTOMY, TOTAL ABDOMINAL  1992    NASAL SINUS SURGERY  8/22/13    OTHER SURGICAL HISTORY      thoracic sympathectomy    PACEMAKER INSERTION      also revision x 3    [place] HIP ARTHROSCOPY, DX Left 11/1/2018    LEFT HIP ARTHROSCOPY, LABRAL  DEBRIDEMENT, CHONDROPLASTY performed by Sophia Powell MD at [facility]  03/20/2017    UPPER GASTROINTESTINAL ENDOSCOPY N/A 5/8/2019    UPPER EUS W/ANES. performed by Hailee Devi DO at [facility]       Medications Prior to Admission:    Prior to Admission medications    Medication Sig Start Date End Date Taking? Authorizing Provider   nadolol (CORGARD) 40 MG tablet Take 120 mg by mouth nightly    Yes Historical Provider, MD       Allergies:  Quinolones; Dexamethasone sodium phosphate; Other; Codeine; Nitroglycerin; and Prednisone    Social History:  The patient currently lives with significant other. TOBACCO:   reports that she quit smoking about 21 years ago. Her smoking use included cigarettes.  She has a 0.75 pack-year smoking history. She has never used smokeless tobacco.  ETOH:   reports current alcohol use. Family History:   Positive as follows:        Problem Relation Age of Onset    Heart Disease Mother     Colon Cancer Father         colon    Heart Disease Father     High Blood Pressure Father        REVIEW OF SYSTEMS:       Constitutional: Negative for fever   HENT: Negative for sore throat   Respiratory: Negative  for cough + dyspnea  Cardiovascular: + chest pain, left shoulder pain  Gastrointestinal: Negative for vomiting, diarrhea + nausea  Genitourinary: Negative for dysuria  Musculoskeletal: Negative for arthralgias   Skin: Negative for rash   Neurological: Negative for syncope   Psychiatric/Behavorial: Negative for anxiety    PHYSICAL EXAM:    BP (!) 136/91   Pulse 70   Temp 97.2 °F (36.2 °C) (Oral)   Resp 18   Ht 5' 6\" (1.676 m)   Wt 160 lb (72.6 kg)   SpO2 100%   Breastfeeding No   BMI 25.82 kg/m²     Gen: No distress. Alert. Eyes: PERRL. No sclera icterus. No conjunctival injection. ENT: No discharge. Pharynx clear. Neck: No JVD. No Carotid Bruit. Trachea midline. Resp: No accessory muscle use. No crackles. No wheezes. No rhonchi. CV: Regular rate. Regular rhythm. No murmur. No rub. No edema. GI: Non-tender. Non-distended. Normal bowel sounds. No hernia. Skin: Warm and dry. No nodule on exposed extremities. No rash on exposed extremities. M/S: No cyanosis. No joint deformity. No clubbing. Neuro: Awake. Grossly nonfocal    Psych: Oriented x 3. No anxiety or agitation.      CARDIAC ENZYMES  Recent Labs     07/29/20  0355   TROPONINI <0.01       CULTURES  N/A    EKG:  I have reviewed the EKG with the following interpretation:   Electronic atrial pacemaker, T wave abnormality, consider anterior ischemia, Prolonged QT    RADIOLOGY  NM Cardiac Stress Test Nuclear Imaging   Final Result      CT CHEST PULMONARY EMBOLISM W CONTRAST   Final Result   No evidence of pulmonary embolism or acute pulmonary abnormality. Left pectoral trans venous dual-chamber cardiac pacer device. No obvious   abnormal fluid collection or inflammation about the cardiac pacer battery   pack given surrounding streak artifact. XR CHEST PORTABLE   Final Result   No evidence for acute cardiopulmonary process. COPD.               Conclusions          Summary     There is no evidence of stress induced ischemia. Hyperdynamic LV systolic     function with EF>65% with uniform wall motion. Low risk study. Pertinent chart review  Kierra is 50 years old. She has a history of congenital Long QT Syndrome (LQTS). She has a complicated medical history. She had a seizure in 12 (age 25) during pregnancy. She was hospitalized in November 1988 (age 26) at [facility] after a syncope episode, and during cardiac telemetry monitoring, had polymorphic ventricular tachycardia, rate 200-240 bpm, with spontaneous termination. LQTS was noted. Empiric B-Blocker therapy was prescribed, but she continued to have syncope episodes. Left dorsal sympathectomy from the stellate ganglion to the sixth rib was done on 04/21/1994. A dual chamber pacemaker was placed on 06/02/1994 for symptomatic bradycardia during B-Blocker Rx. The pacemaker pulse generator was originally placed in a submammary, submuscular pocket. The pulse generator was replaced, and a new right atrial lead was placed on 10/29/1999. The atrial and ventricular leads were dissected from the pectoralis muscle, and an insulation defect was noted after dissection that could not be repaired. The new pulse generator was placed in a pre-pectoral pocket. K[place] moved to [place], [place] in 2001. While in [facility], the pacemaker pulse generator was replaced for battery depletion on 11/21/2006. Due to an impending erosion, the pacemaker pocket was modified on 07/24/2008, returning the pulse generator to a submuscular pocket.  In December, 2008, 2D/CFD echocardiography noted L>R shunting, and WILBER on 12/11/2008 described a large ostium secundum atrial septal defect versus patent foramen ovale with L>R shunting. On 03/03/2009, the septal defect was closed with a [place] Helix Septal Occluder Device (Erica Starkey MD). Villa Marks have reviewed the chart on [place] [id number] and personally interviewed and examined patient, reviewed the data (labs and imaging) and after discussion with my PA formulated the plan. Agree with note with the following edits. HPI:     Patient is a 70-year-old white female with a history of asthma COPD congenital long QT syndrome status post pacemaker who came to the emergency room with chest pain. She has no prior history of coronary artery disease. Patient is in the left side of her chest with radiation to her back and shoulder. She described it as a 10 on 10. When I saw the patient she is able to have a normal conversation with me but complained of 10 on 10 chest pain. She says she cannot take nitroglycerin since she has a septal occluder for history of ASD. She sees a cardiologist at [address] [place] [address].  She describes the pain is constant. Serial troponins are negative. EKG showed T wave changes but these are old. I reviewed the patient's Past Medical History, Past Surgical History, Medications, and Allergies. Physical exam:    BP 94/62   Pulse 69   Temp 97.2 °F (36.2 °C) (Oral)   Resp 16   Ht 5' 6\" (1.676 m)   Wt 160 lb (72.6 kg)   SpO2 100%   Breastfeeding No   BMI 25.82 kg/m²     Gen: Mild distress. Alert. Eyes: PERRL. No sclera icterus. No conjunctival injection. ENT: No discharge. Pharynx clear. Neck: Trachea midline. Normal thyroid. Resp: No accessory muscle use. No crackles. No wheezes. No rhonchi. No dullness on percussion. Significant left-sided chest wall tenderness but she states her chest pain is different from this. CV: Regular rate. Regular rhythm. No murmur or rub. No edema. ASSESSMENT/PLAN:    Chest pain-she also has chest wall pain  - Admitted for cardiac evaluation to PCU  - monitor on tele  - Serial troponin negative, EKG as above  - cardiology consulted. - aspirin added. Continue Nadolol  - F/w Cardiologist.   -Robi Colon today    COPD  - stable. No AE. Atrial septal defect  - s/p septal occlusion    H/o Prolonged QT syndrome/V-tach  Syncope, symptomatic bradycardia  - has pacemaker  - continue Nadolol. DVT Prophylaxis: Lovenox  Diet: Diet NPO Effective Now  Code Status: Full Code    Dank Morse FNP-C  7/29/2020    NANCYDEYANIRA Henderson Friends 7/29/2020 11:52 AM       If the patient's stress test comes back negative, she can be discharged home today with outpatient follow-up with her primary cardiologist.      Patient stress test is normal.  She has noncardiac chest pain. I suspect musculoskeletal pain. Pericarditis is a possibility. She will be sent home on ibuprofen.       Dorothy Go 7/29/2020 4:29 PM

## 2020-07-29 NOTE — PROGRESS NOTES
Admission assessment complete- see flow sheet. Patient is A/Ox4. VSS. Awake in bed. Currently on 2 L NC, SpO2 WNL. Lung sounds clear. C/o SOB associated with chest pain, MD aware. Heart rhythm currently a-paced on telemetry monitor, HRs 70s. C/o gradually improving chest pain after 5 mg total IV Morphine given, currently rated a 7/10 described as tightness and pressure that is in her L chest and radiates to her L arm. Tenderness noted to palpation of mid chest. Chest pain is somewhat relieved by leaning forward, notified MD.     Call light explained and in reach. Patient agrees to use call light for assistance. Will continue to monitor.

## 2020-07-29 NOTE — PROGRESS NOTES
Patient arrived back to 301-1 in stable condition via wheelchair. C/o continued CP and requesting PRN pain medication if available. Chest pain is reduced when patient leans forward, sitting in bed. Primary RN notified.

## 2020-07-29 NOTE — FLOWSHEET NOTE
07/29/20 1515   Vital Signs   Temp 96.3 °F (35.7 °C)   Temp Source Oral   Pulse 71   Heart Rate Source Monitor   Resp 16   BP (!) 80/50  (asymptomatic)   BP Location Left upper arm   BP Upper/Lower Upper   Patient Position Right side;Supine   Level of Consciousness 0   MEWS Score 3   Pain Assessment   Pain Assessment 0-10   Pain Level 10   Patient's Stated Pain Goal No pain   Pain Type Acute pain   Pain Location Chest   Pain Descriptors Sharp;Pressure   Pain Orientation Left   Pain Radiating Towards n/a   Pain Frequency Continuous   Pain Onset On-going   Clinical Progression Not changed   Functional Pain Assessment Prevents or interferes some active activities and ADLs   Non-Pharmaceutical Pain Intervention(s) Rest   Oxygen Therapy   SpO2 100 %   O2 Device Nasal cannula   O2 Flow Rate (L/min) 2 L/min     Mesfin Suazo NP, notified of low b/p's. Awaiting further instruction/orders.

## 2020-07-29 NOTE — PROGRESS NOTES
Recliner Assessment  Patient is able to demonstrated the ability to move from a reclining position to an upright position within the recliner. 4 Eyes Skin Assessment     The patient is being assess for   Admission    I agree that 2 RN's have performed a thorough Head to Toe Skin Assessment on the patient. ALL assessment sites listed below have been assessed. Areas assessed by both nurses:   [x]   Head, Face, and Ears   [x]   Shoulders, Back, and Chest, Abdomen  [x]   Arms, Elbows, and Hands   [x]   Coccyx, Sacrum, and Ischium  [x]   Legs, Feet, and Heels        Old surgery scars    **SHARE this note so that the co-signing nurse is able to place an eSignature**    Co-signer eSignature: Electronically signed by Ceci Porter RN on 7/29/20 at 8:59 AM EDT    Does the Patient have Skin Breakdown?   No          Bala Prevention initiated:  NA   Wound Care Orders initiated:  NA      [facility] nurse consulted for Pressure Injury (Stage 3,4, Unstageable, DTI, NWPT, Complex wounds)and New or Established Ostomies:  NA      Primary Nurse eSignature: Electronically signed by Caty Hastings RN on 7/29/20 at 8:40 AM EDT

## 2020-07-29 NOTE — PROGRESS NOTES
I have reviewed and agree with all documentation, plan of care and assessments completed by Antonio Stewart 7/29/2020 6:26 PM

## 2020-11-05 ENCOUNTER — HOSPITAL ENCOUNTER (EMERGENCY)
Age: 55
Discharge: HOME | End: 2020-11-05
Attending: EMERGENCY MEDICINE
Payer: COMMERCIAL

## 2020-11-05 ENCOUNTER — APPOINTMENT (OUTPATIENT)
Dept: GENERAL RADIOLOGY | Age: 55
End: 2020-11-05
Payer: COMMERCIAL

## 2020-11-05 VITALS
RESPIRATION RATE: 20 BRPM | HEIGHT: 66 IN | OXYGEN SATURATION: 99 % | HEART RATE: 89 BPM | BODY MASS INDEX: 25.71 KG/M2 | WEIGHT: 160 LBS | TEMPERATURE: 98 F | DIASTOLIC BLOOD PRESSURE: 80 MMHG | SYSTOLIC BLOOD PRESSURE: 130 MMHG

## 2020-11-05 PROCEDURE — 73130 X-RAY EXAM OF HAND: CPT

## 2020-11-05 PROCEDURE — 73110 X-RAY EXAM OF WRIST: CPT

## 2020-11-05 PROCEDURE — 99283 EMERGENCY DEPT VISIT LOW MDM: CPT

## 2020-11-05 ASSESSMENT — PAIN DESCRIPTION - FREQUENCY
FREQUENCY: CONTINUOUS
FREQUENCY: INTERMITTENT
FREQUENCY: INTERMITTENT

## 2020-11-05 ASSESSMENT — PAIN - FUNCTIONAL ASSESSMENT: PAIN_FUNCTIONAL_ASSESSMENT: 0-10

## 2020-11-05 ASSESSMENT — PAIN DESCRIPTION - LOCATION
LOCATION: WRIST;HAND
LOCATION: WRIST

## 2020-11-05 ASSESSMENT — PAIN DESCRIPTION - PAIN TYPE
TYPE: ACUTE PAIN
TYPE: ACUTE PAIN

## 2020-11-05 ASSESSMENT — PAIN DESCRIPTION - PROGRESSION: CLINICAL_PROGRESSION: NOT CHANGED

## 2020-11-05 ASSESSMENT — PAIN DESCRIPTION - ORIENTATION
ORIENTATION: RIGHT
ORIENTATION: RIGHT

## 2020-11-05 ASSESSMENT — PAIN DESCRIPTION - ONSET: ONSET: SUDDEN

## 2020-11-05 ASSESSMENT — PAIN DESCRIPTION - DESCRIPTORS
DESCRIPTORS: ACHING
DESCRIPTORS: ACHING;DISCOMFORT

## 2020-11-05 ASSESSMENT — PAIN SCALES - GENERAL
PAINLEVEL_OUTOF10: 2
PAINLEVEL_OUTOF10: 7
PAINLEVEL_OUTOF10: 0

## 2020-11-05 NOTE — ED PROVIDER NOTES
[address] [facility]      Pt Name: Guy Torres  MRN: [id number]  [place] 1965  Date of evaluation: 11/5/2020  Provider: Robinson Ulrich MD    CHIEF COMPLAINT       Chief Complaint   Patient presents with    Wrist Pain     right wrist pain for  1 week. pt states she hit hand/wrist on door frame a week ago         HISTORY OF PRESENT ILLNESS   (Location/Symptom, Timing/Onset, Context/Setting, Quality, Duration, Modifying Factors, Severity)  Note limiting factors. Guy Torres is a 48 y.o. female with past medical history of multiple medical comorbidities here today for right wrist pain. Patient states that over the course of the last 2 to 3 weeks she has accidentally hit her right wrist numerous times. She denies any specific discrete injury but has been having pain along the ulnar aspect of the wrist for the last week. She states it was previously bruised but that is improved. Is worse if she moves her wrists. No numbness or tingling in the hand. Bruising has resolved and she is had no redness or swelling. No alleviating factors    HPI    Nursing Notes were reviewed. REVIEW OF SYSTEMS    (2-9 systems for level 4, 10 or more for level 5)     Review of Systems    Please see HPI for pertinent positive and negative review of system findings. A full 10 system ROS was performed and otherwise negative. PAST MEDICAL HISTORY     Past Medical History:   Diagnosis Date    Asthma     Atrial septal defect     had insertion of atrial septal occluder    Bursitis of left hip     CAD (coronary artery disease)     COPD (chronic obstructive pulmonary disease) (HCC)     History of blood transfusion     Kidney stone     Migraines     Mitral valve prolapse     MRSA (methicillin resistant staph aureus) culture positive 12/2015    Lungs.   Diagnosed [facility] with bronchoscopy    MVP (mitral valve prolapse)     PONV (postoperative nausea and vomiting)     Prolonged QT interval syndrome     S/P bronchoscopy     TIA (transient ischemic attack)     Vertigo          SURGICAL HISTORY       Past Surgical History:   Procedure Laterality Date    BREAST ENHANCEMENT SURGERY      augmentation    CARDIAC CATHETERIZATION      CARDIAC SURGERY      septum occluder    COLONOSCOPY  2012? polyps    CYSTOSCOPY  6/8/2016    [facility]    ENDOSCOPIC ULTRASOUND (UPPER)  05/08/2019    ERCP  05/08/2019    Sphincterotomy    ERCP N/A 5/8/2019    ERCP SPHINCTER/PAPILLOTOMY performed by Stacie Murillo DO at [facility] ERCP  5/8/2019    ERCP STONE REMOVAL performed by Stacie Murillo DO at [facility] ARTHROSCOPY Left 02/08/2018    HIP SURGERY      HYSTERECTOMY      HYSTERECTOMY, TOTAL ABDOMINAL  1992    NASAL SINUS SURGERY  8/22/13    OTHER SURGICAL HISTORY      thoracic sympathectomy    PACEMAKER INSERTION      also revision x 3    TX HIP ARTHROSCOPY, DX Left 11/1/2018    LEFT HIP ARTHROSCOPY, LABRAL  DEBRIDEMENT, CHONDROPLASTY performed by Marzena Hebert MD at [address]  03/20/2017    UPPER GASTROINTESTINAL ENDOSCOPY N/A 5/8/2019    UPPER EUS W/ANES. performed by Stacie Murillo DO at [address]       Previous Medications    ALBUTEROL SULFATE  (90 BASE) MCG/ACT INHALER    as needed    AMOXICILLIN-CLAVULANATE (AUGMENTIN) 875-125 MG PER TABLET    Take by mouth daily    IBUPROFEN (ADVIL;MOTRIN) 600 MG TABLET    Take 1 tablet by mouth every 6 hours as needed for Pain    NADOLOL (CORGARD) 40 MG TABLET    Take 120 mg by mouth daily        ALLERGIES     Quinolones; Dexamethasone sodium phosphate;  Other; Codeine; Nitroglycerin; and Prednisone    FAMILY HISTORY       Family History   Problem Relation Age of Onset    Heart Disease Mother     Colon Cancer Father         colon    Heart Disease Father     High Blood Pressure Father           SOCIAL HISTORY       Social History     Socioeconomic History    Marital status:      Spouse name: Not on file    Number of children: Not on file    Years of education: Not on file    Highest education level: Not on file   Occupational History    Not on file   Social Needs    Financial resource strain: Not on file    Food insecurity     Worry: Not on file     Inability: Not on file    Transportation needs     Medical: Not on file     Non-medical: Not on file   Tobacco Use    Smoking status: Former Smoker     Packs/day: 0.25     Years: 3.00     Pack years: 0.75     Types: Cigarettes     Last attempt to quit: 1998     Years since quittin.9    Smokeless tobacco: Never Used   Substance and Sexual Activity    Alcohol use: Yes     Comment: 2 glasses wine or 1 beer/nightlynot drank in 7-10 days    Drug use: No    Sexual activity: Yes     Partners: Male   Lifestyle    Physical activity     Days per week: Not on file     Minutes per session: Not on file    Stress: Not on file   Relationships    Social connections     Talks on phone: Not on file     Gets together: Not on file     Attends [employer] service: Not on file     Active member of club or organization: Not on file     Attends meetings of clubs or organizations: Not on file     Relationship status: Not on file    Intimate partner violence     Fear of current or ex partner: Not on file     Emotionally abused: Not on file     Physically abused: Not on file     Forced sexual activity: Not on file   Other Topics Concern    Not on file   Social History Narrative    Not on file       SCREENINGS               PHYSICAL EXAM    (up to 7 for level 4, 8 or more for level 5)     ED Triage Vitals [20 1048]   BP Temp Temp Source Pulse Resp SpO2 Height Weight   (!) 124/91 97.9 °F (36.6 °C) Oral 99 16 99 % 5' 6\" (1.676 m) 160 lb (72.6 kg)       Physical Exam      General appearance:  Cooperative. No acute distress.    Skin: Warm. Dry. Ears, nose, mouth and throat:  Oral mucosa moist,  Perfusion:  intact. Normal right 2+ radial pulse  Respiratory: Respirations nonlabored. Neurological:  Alert. Moves all extremities spontaneously. Intact sensation throughout the entire right hand  Musculoskeletal:   Tenderness to palpation to the proximal right fifth metacarpal but no swelling and no deformity. Mild tenderness palpation to the right mid wrist as well without specific bony tenderness. Normal flexion extension of the right wrist.  Normal pronation supination of the right upper extremity. Normal flexion of the FDS and FDP in all digits of the right hand. Normal extension of all digits of the right hand. No erythema or warmth. Psychiatric:  Normal mood      DIAGNOSTIC RESULTS       Labs Reviewed - No data to display    Interpretation per the Radiologist below, if obtained/available at the time of this note:    XR HAND RIGHT (MIN 3 VIEWS)   Final Result   Negative. No fracture or other acute abnormality. This includes the 5th   metacarpal.         XR WRIST RIGHT (MIN 3 VIEWS)   Final Result   Negative. All other labs/imaging were within normal range or not returned as of this dictation. EMERGENCY DEPARTMENT COURSE and DIFFERENTIAL DIAGNOSIS/MDM:   Vitals:    Vitals:    11/05/20 1048   BP: (!) 124/91   Pulse: 99   Resp: 16   Temp: 97.9 °F (36.6 °C)   TempSrc: Oral   SpO2: 99%   Weight: 160 lb (72.6 kg)   Height: 5' 6\" (1.676 m)       Patient with right bony tenderness to the right proximal fifth metacarpal.  X-rays negative. Some mild pain with range of motion of the right wrist.  Will be placed in a Velcro wrist splint. Recommend RICE. Likely bony contusion. No concern for joint infection and no ligamentous instability    MDM    CONSULTS     None    Critical Care:   None    REASSESSMENT          PROCEDURE     Unless otherwise noted below, none     Procedures      FINAL IMPRESSION      1.  Right wrist pain            DISPOSITION/PLAN   DISPOSITION Decision To Discharge 11/05/2020 11:29:25 AM        PATIENT REFERRED TO:  Lanny Juárez MD  [address] [place] [address] Dr. Hannah Flynn [address] [facility]  [phone]    Schedule an appointment as soon as possible for a visit       Manuel Scott MD  [place] [phone]      Orthopedic follow up As needed, If symptoms worsen      DISCHARGE MEDICATIONS:  New Prescriptions    No medications on file     Controlled Substances Monitoring:     RX Monitoring 5/5/2019   Attestation The Prescription Monitoring Report for this patient was reviewed today.    Periodic Controlled Substance Monitoring -       (Please note that portions of this note were completed with a voice recognition program.  Efforts were made to edit the dictations but occasionally words are mis-transcribed.)    Abbie Aquino MD (electronically signed)  Attending Emergency Physician            Kate Breaux MD  11/05/20 1344

## 2020-11-24 ENCOUNTER — HOSPITAL ENCOUNTER (EMERGENCY)
Age: 55
Discharge: HOME | End: 2020-11-24
Payer: COMMERCIAL

## 2020-11-24 ENCOUNTER — APPOINTMENT (OUTPATIENT)
Dept: GENERAL RADIOLOGY | Age: 55
End: 2020-11-24
Payer: COMMERCIAL

## 2020-11-24 VITALS
RESPIRATION RATE: 18 BRPM | DIASTOLIC BLOOD PRESSURE: 88 MMHG | SYSTOLIC BLOOD PRESSURE: 115 MMHG | WEIGHT: 170 LBS | BODY MASS INDEX: 27.32 KG/M2 | TEMPERATURE: 97.5 F | HEIGHT: 66 IN | OXYGEN SATURATION: 97 % | HEART RATE: 73 BPM

## 2020-11-24 LAB
A/G RATIO: 1.7 (ref 1.1–2.2)
ALBUMIN SERPL-MCNC: 4.7 G/DL (ref 3.4–5)
ALP BLD-CCNC: 133 U/L (ref 40–129)
ALT SERPL-CCNC: 21 U/L (ref 10–40)
ANION GAP SERPL CALCULATED.3IONS-SCNC: 10 MMOL/L (ref 3–16)
AST SERPL-CCNC: 26 U/L (ref 15–37)
BASOPHILS ABSOLUTE: 0 K/UL (ref 0–0.2)
BASOPHILS RELATIVE PERCENT: 0.8 %
BILIRUB SERPL-MCNC: 0.8 MG/DL (ref 0–1)
BUN BLDV-MCNC: 18 MG/DL (ref 7–20)
CALCIUM SERPL-MCNC: 10.1 MG/DL (ref 8.3–10.6)
CHLORIDE BLD-SCNC: 103 MMOL/L (ref 99–110)
CO2: 24 MMOL/L (ref 21–32)
CREAT SERPL-MCNC: 0.7 MG/DL (ref 0.6–1.1)
EKG ATRIAL RATE: 535 BPM
EKG DIAGNOSIS: NORMAL
EKG P AXIS: 65 DEGREES
EKG P-R INTERVAL: 132 MS
EKG Q-T INTERVAL: 460 MS
EKG QRS DURATION: 70 MS
EKG QTC CALCULATION (BAZETT): 496 MS
EKG R AXIS: 56 DEGREES
EKG T AXIS: -56 DEGREES
EKG VENTRICULAR RATE: 70 BPM
EOSINOPHILS ABSOLUTE: 0.1 K/UL (ref 0–0.6)
EOSINOPHILS RELATIVE PERCENT: 3.4 %
GFR AFRICAN AMERICAN: >60
GFR NON-AFRICAN AMERICAN: >60
GLOBULIN: 2.7 G/DL
GLUCOSE BLD-MCNC: 104 MG/DL (ref 70–99)
HCT VFR BLD CALC: 43.1 % (ref 36–48)
HEMOGLOBIN: 14.7 G/DL (ref 12–16)
LYMPHOCYTES ABSOLUTE: 1.4 K/UL (ref 1–5.1)
LYMPHOCYTES RELATIVE PERCENT: 34.7 %
MCH RBC QN AUTO: 32.4 PG (ref 26–34)
MCHC RBC AUTO-ENTMCNC: 34 G/DL (ref 31–36)
MCV RBC AUTO: 95.3 FL (ref 80–100)
MONOCYTES ABSOLUTE: 0.4 K/UL (ref 0–1.3)
MONOCYTES RELATIVE PERCENT: 9.9 %
NEUTROPHILS ABSOLUTE: 2.1 K/UL (ref 1.7–7.7)
NEUTROPHILS RELATIVE PERCENT: 51.2 %
PDW BLD-RTO: 13 % (ref 12.4–15.4)
PLATELET # BLD: 153 K/UL (ref 135–450)
PMV BLD AUTO: 8.8 FL (ref 5–10.5)
POTASSIUM REFLEX MAGNESIUM: 4 MMOL/L (ref 3.5–5.1)
RAPID INFLUENZA  B AGN: NEGATIVE
RAPID INFLUENZA A AGN: NEGATIVE
RBC # BLD: 4.52 M/UL (ref 4–5.2)
SODIUM BLD-SCNC: 137 MMOL/L (ref 136–145)
TOTAL PROTEIN: 7.4 G/DL (ref 6.4–8.2)
TROPONIN: <0.01 NG/ML
WBC # BLD: 4.1 K/UL (ref 4–11)

## 2020-11-24 PROCEDURE — 93010 ELECTROCARDIOGRAM REPORT: CPT | Performed by: INTERNAL MEDICINE

## 2020-11-24 PROCEDURE — 6360000002 HC RX W HCPCS: Performed by: PHYSICIAN ASSISTANT

## 2020-11-24 PROCEDURE — 84484 ASSAY OF TROPONIN QUANT: CPT

## 2020-11-24 PROCEDURE — 99285 EMERGENCY DEPT VISIT HI MDM: CPT

## 2020-11-24 PROCEDURE — 80053 COMPREHEN METABOLIC PANEL: CPT

## 2020-11-24 PROCEDURE — 96374 THER/PROPH/DIAG INJ IV PUSH: CPT

## 2020-11-24 PROCEDURE — 71045 X-RAY EXAM CHEST 1 VIEW: CPT

## 2020-11-24 PROCEDURE — 93005 ELECTROCARDIOGRAM TRACING: CPT | Performed by: PHYSICIAN ASSISTANT

## 2020-11-24 PROCEDURE — 85025 COMPLETE CBC W/AUTO DIFF WBC: CPT

## 2020-11-24 PROCEDURE — 87804 INFLUENZA ASSAY W/OPTIC: CPT

## 2020-11-24 PROCEDURE — U0003 INFECTIOUS AGENT DETECTION BY NUCLEIC ACID (DNA OR RNA); SEVERE ACUTE RESPIRATORY SYNDROME CORONAVIRUS 2 (SARS-COV-2) (CORONAVIRUS DISEASE [COVID-19]), AMPLIFIED PROBE TECHNIQUE, MAKING USE OF HIGH THROUGHPUT TECHNOLOGIES AS DESCRIBED BY CMS-2020-01-R: HCPCS

## 2020-11-24 PROCEDURE — 6370000000 HC RX 637 (ALT 250 FOR IP): Performed by: PHYSICIAN ASSISTANT

## 2020-11-24 PROCEDURE — 2580000003 HC RX 258: Performed by: PHYSICIAN ASSISTANT

## 2020-11-24 RX ORDER — ONDANSETRON 4 MG/1
4 TABLET, FILM COATED ORAL EVERY 8 HOURS PRN
Qty: 20 TABLET | Refills: 0 | Status: SHIPPED | OUTPATIENT
Start: 2020-11-24 | End: 2022-02-11

## 2020-11-24 RX ORDER — ONDANSETRON 2 MG/ML
4 INJECTION INTRAMUSCULAR; INTRAVENOUS EVERY 6 HOURS PRN
Status: DISCONTINUED | OUTPATIENT
Start: 2020-11-24 | End: 2020-11-24 | Stop reason: HOSPADM

## 2020-11-24 RX ORDER — ACETAMINOPHEN 500 MG
500 TABLET ORAL ONCE
Status: COMPLETED | OUTPATIENT
Start: 2020-11-24 | End: 2020-11-24

## 2020-11-24 RX ORDER — KETOROLAC TROMETHAMINE 30 MG/ML
15 INJECTION, SOLUTION INTRAMUSCULAR; INTRAVENOUS ONCE
Status: COMPLETED | OUTPATIENT
Start: 2020-11-24 | End: 2020-11-24

## 2020-11-24 RX ORDER — IBUPROFEN 400 MG/1
400 TABLET ORAL EVERY 6 HOURS PRN
Qty: 20 TABLET | Refills: 0 | Status: SHIPPED | OUTPATIENT
Start: 2020-11-24 | End: 2021-02-18

## 2020-11-24 RX ORDER — 0.9 % SODIUM CHLORIDE 0.9 %
1000 INTRAVENOUS SOLUTION INTRAVENOUS ONCE
Status: COMPLETED | OUTPATIENT
Start: 2020-11-24 | End: 2020-11-24

## 2020-11-24 RX ADMIN — SODIUM CHLORIDE 1000 ML: 9 INJECTION, SOLUTION INTRAVENOUS at 12:28

## 2020-11-24 RX ADMIN — KETOROLAC TROMETHAMINE 15 MG: 30 INJECTION, SOLUTION INTRAMUSCULAR at 13:54

## 2020-11-24 RX ADMIN — ACETAMINOPHEN 500 MG: 500 TABLET ORAL at 11:58

## 2020-11-24 RX ADMIN — ONDANSETRON HYDROCHLORIDE 4 MG: 2 INJECTION, SOLUTION INTRAMUSCULAR; INTRAVENOUS at 12:28

## 2020-11-24 ASSESSMENT — ENCOUNTER SYMPTOMS
GASTROINTESTINAL NEGATIVE: 1
COUGH: 1

## 2020-11-24 ASSESSMENT — PAIN DESCRIPTION - DESCRIPTORS
DESCRIPTORS: ACHING
DESCRIPTORS: ACHING;CONSTANT

## 2020-11-24 ASSESSMENT — PAIN SCALES - GENERAL
PAINLEVEL_OUTOF10: 8

## 2020-11-24 ASSESSMENT — PAIN DESCRIPTION - PAIN TYPE: TYPE: ACUTE PAIN

## 2020-11-24 ASSESSMENT — PAIN DESCRIPTION - LOCATION
LOCATION: BACK
LOCATION: HEAD

## 2020-11-24 NOTE — ED NOTES
Kate JOLLY at bedside educating pt on risk factors with refusal of lab work and xrays at this time.       Joe Garza RN  11/24/20 9983

## 2020-11-24 NOTE — ED NOTES
Ambulated patient in her room from wall to wall approximately 10 times and oxygen sats remained at 97% and pulse rate at 80 bpm. SHANAE Mondragon notified at [facility]  11/24/20 4638

## 2020-11-24 NOTE — ED PROVIDER NOTES
[address] ED  EMERGENCY DEPARTMENT ENCOUNTER        Pt Name: Irwin Mckinley  MRN: [id number]  [place] 1965  Date of evaluation: 11/24/2020  Provider: Cory Oconnor PA-C  PCP: Virginia Cueto MD    PATEL. I have evaluated this patient. My supervising physician was available for consultation. CHIEF COMPLAINT       Chief Complaint   Patient presents with    Concern For COVID-19    Generalized Body Aches       HISTORY OF PRESENT ILLNESS   (Location, Timing/Onset, Context/Setting, Quality, Duration, Modifying Factors, Severity, Associated Signs and Symptoms)  Note limiting factors. Irwin Mckinley is a 50 y.o. female with a past medical history of asthma, CAD, COPD, history of TIA brought in today for concern for \"Covid 19\". Patient reports generalized body aches. Onset of symptoms over the past several days. Duration of symptoms have been persistent since onset. Rates pain an 8 out of 10. No radiation of pain. Denies any known fevers chills nausea vomiting diarrhea or abdominal pain. Denies any shortness of breath or chest pain. Does admit to a productive cough. No aggravating or alleviating complaints. She has not tried anything at home for symptomatic relief. Denies any known sick contacts. Nothing seems to make symptoms better or worse at this time. She otherwise denies any other concerns. Nursing Notes were all reviewed and agreed with or any disagreements were addressed in the HPI. REVIEW OF SYSTEMS    (2-9 systems for level 4, 10 or more for level 5)     Review of Systems   Constitutional: Negative. HENT: Negative. Respiratory: Positive for cough. Cardiovascular: Negative. Gastrointestinal: Negative. Genitourinary: Negative. Musculoskeletal: Positive for myalgias. Neurological: Negative. Positives and Pertinent negatives as per HPI. Except as noted above in the ROS, all other systems were reviewed and negative.        PAST NOT CHANGED    Details   albuterol sulfate  (90 Base) MCG/ACT inhaler as neededHistorical Med      amoxicillin-clavulanate (AUGMENTIN) 875-125 MG per tablet Take by mouth dailyHistorical Med      !! ibuprofen (ADVIL;MOTRIN) 600 MG tablet Take 1 tablet by mouth every 6 hours as needed for Pain, Disp-30 tablet,R-0Normal      nadolol (CORGARD) 40 MG tablet Take 120 mg by mouth daily Historical Med       !! - Potential duplicate medications found. Please discuss with provider. ALLERGIES     Quinolones; Dexamethasone sodium phosphate; Other; Codeine; Nitroglycerin; and Prednisone    FAMILYHISTORY       Family History   Problem Relation Age of Onset    Heart Disease Mother     Colon Cancer Father         colon    Heart Disease Father     High Blood Pressure Father           SOCIAL HISTORY       Social History     Tobacco Use    Smoking status: Former Smoker     Packs/day: 0.25     Years: 3.00     Pack years: 0.75     Types: Cigarettes     Last attempt to quit: 1998     Years since quittin.9    Smokeless tobacco: Never Used   Substance Use Topics    Alcohol use: Yes     Comment: 2 glasses wine or 1 beer/nightlynot drank in 7-10 days    Drug use: No       SCREENINGS    [place] Coma Scale  Eye Opening: Spontaneous  Best Verbal Response: Oriented  Best Motor Response: Obeys commands  [place] Coma Scale Score: 15        PHYSICAL EXAM    (up to 7 for level 4, 8 or more for level 5)     ED Triage Vitals [20 1103]   BP Temp Temp Source Pulse Resp SpO2 Height Weight   114/77 97.5 °F (36.4 °C) Oral 79 16 100 % 5' 6\" (1.676 m) 170 lb (77.1 kg)       Physical Exam  Vitals signs and nursing note reviewed. Constitutional:       General: She is awake. She is not in acute distress. Appearance: Normal appearance. She is well-developed. She is not ill-appearing, toxic-appearing or diaphoretic. HENT:      Head: Normocephalic and atraumatic.       Nose: Nose normal.   Eyes:      General: Right eye: No discharge. Left eye: No discharge. Neck:      Musculoskeletal: Normal range of motion and neck supple. Cardiovascular:      Rate and Rhythm: Normal rate and regular rhythm. Pulses:           Radial pulses are 2+ on the right side and 2+ on the left side. Heart sounds: Normal heart sounds. No murmur. No gallop. Pulmonary:      Effort: Pulmonary effort is normal. No respiratory distress. Breath sounds: Normal breath sounds. No decreased breath sounds, wheezing, rhonchi or rales. Chest:      Chest wall: No tenderness. Abdominal:      General: Abdomen is flat. Bowel sounds are normal.      Palpations: Abdomen is soft. Tenderness: There is no abdominal tenderness. There is no right CVA tenderness, left CVA tenderness, guarding or rebound. Negative signs include Martinez's sign and Rovsing's sign. Musculoskeletal: Normal range of motion. General: No deformity. Right lower leg: No edema. Left lower leg: No edema. Skin:     General: Skin is warm and dry. Neurological:      General: No focal deficit present. Mental Status: She is alert and oriented to person, place, and time. GCS: GCS eye subscore is 4. GCS verbal subscore is 5. GCS motor subscore is 6. Psychiatric:         Behavior: Behavior normal. Behavior is cooperative.          DIAGNOSTIC RESULTS   LABS:    Labs Reviewed   COMPREHENSIVE METABOLIC PANEL W/ REFLEX TO MG FOR LOW K - Abnormal; Notable for the following components:       Result Value    Glucose 104 (*)     Alkaline Phosphatase 133 (*)     All other components within normal limits    Narrative:     Performed at:  [facility],  [place], [place]   Phone [phone]   RAPID INFLUENZA A/B ANTIGENS    Narrative:     Performed at:  [facility],  [place], [facility]   Phone [phone]   CBC WITH AUTO DIFFERENTIAL Narrative:     Performed at:  [facility] - [facility],  [place], [facility]   Phone [phone]   TROPONIN    Narrative:     Performed at:  [facility] - [facility],  [place], [facility]   Phone [phone]       All other labs were within normal range or not returned as of this dictation. EKG: All EKG's are interpreted by the Emergency Department Physician in the absence of a cardiologist.  Please see their note for interpretation of EKG. RADIOLOGY:   Non-plain film images such as CT, Ultrasound and MRI are read by the radiologist. Plain radiographic images are visualized and preliminarily interpreted by the ED Provider with the below findings:        Interpretation per the Radiologist below, if available at the time of this note:    XR CHEST PORTABLE   Final Result   No evidence of acute cardiopulmonary disease. No results found. PROCEDURES   Unless otherwise noted below, none     Procedures    CRITICAL CARE TIME   N/A    CONSULTS:  None      EMERGENCY DEPARTMENT COURSE and DIFFERENTIAL DIAGNOSIS/MDM:   Vitals:    Vitals:    11/24/20 1103 11/24/20 1105 11/24/20 1136 11/24/20 1229   BP: 114/77 112/80 115/85 115/88   Pulse: 79 82  73   Resp: 16 18  18   Temp: 97.5 °F (36.4 °C)      TempSrc: Oral      SpO2: 100% 97% 96% 97%   Weight: 170 lb (77.1 kg)      Height: 5' 6\" (1.676 m)          Patient was given the following medications:  Medications   acetaminophen (TYLENOL) tablet 500 mg (500 mg Oral Given 11/24/20 1158)   0.9 % sodium chloride bolus (0 mLs Intravenous Stopped 11/24/20 1353)   ketorolac (TORADOL) injection 15 mg (15 mg Intravenous Given 11/24/20 1354)           Patient brought in today by private vehicle for generalized body aches and \"concern for COVID-19\". On exam patient alert oriented afebrile breathing on room air satting at 97%.   Nontoxic in appearance and in no acute respiratory distress. Old labs and records reviewed at this time. Patient seen by myself and my attending was available for consultation. Rapid influenza is negative. CBC reveals no acute leukocytosis. Hemoglobin of 14.7. No acute electrolyte abnormalities. Kidney function unremarkable. Troponin is less than 0.01. EKG reviewed by my attending see note for dictation. COVID-19 test is pending at this time. Chest x-ray reveals no acute cardiopulmonary disease. Patient received Toradol fluids and Tylenol. Upon further evaluation patient reports some improvement of symptoms and feels comfortable going home. Patient ambulated here in the ED and maintained her oxygen saturation above 95%. She was not symptomatic. According to the [facility] COVID-19 risk of decompensation within 24 hours patient is considered low risk. Patient told to self quarantine until she receives her results. Patient told that we would call if her Covid test comes back positive. If she does not receive a call that her test is negative. Patient told to continue with ibuprofen as well as Zofran at home. Patient instructed to monitor her symptoms closely and to return immediately to the ED if she experiences any new or worsening symptoms including but not limited to chest pain shortness of breath intractable nausea or vomiting or any new or worsening symptoms. Patient verbalized understanding of this plan was comfortable and stable at time of discharge. I did feel comfortable sending this patient home with close follow-up instructions and strict return precautions. FINAL IMPRESSION      1. Suspected COVID-19 virus infection    2. Body aches    3.  Cough          DISPOSITION/PLAN   DISPOSITION Decision To Discharge 11/24/2020 02:13:23 PM      PATIENT REFERREDTO:  Karen Beltran MD  [address] [place] [address]   [address] [address] [facility]  [phone]    Schedule an appointment as soon as possible for a visit in 2 days  As needed, If symptoms herb RICHARDSON (CREEK) [facility] [place] [facility]  [address] [address]  [phone]  Schedule an appointment as soon as possible for a visit   As needed, If symptoms worsen      DISCHARGE MEDICATIONS:  Discharge Medication List as of 11/24/2020  2:42 PM      START taking these medications    Details   !! ibuprofen (ADVIL;MOTRIN) 400 MG tablet Take 1 tablet by mouth every 6 hours as needed for Pain, Disp-20 tablet,R-0Print      ondansetron (ZOFRAN) 4 MG tablet Take 1 tablet by mouth every 8 hours as needed for Nausea, Disp-20 tablet,R-0Print       !! - Potential duplicate medications found. Please discuss with provider.           DISCONTINUED MEDICATIONS:  Discharge Medication List as of 11/24/2020  2:42 PM                 (Please note that portions of this note were completed with a voice recognition program.  Efforts were made to edit the dictations but occasionally words are mis-transcribed.)    Jade Sepulveda PA-C (electronically signed)            Jade Sepulveda PA-C  11/24/20 7624

## 2020-11-25 LAB — SARS-COV-2, PCR: NOT DETECTED

## 2021-02-18 ENCOUNTER — HOSPITAL ENCOUNTER (EMERGENCY)
Age: 56
Discharge: HOME | End: 2021-02-18
Attending: EMERGENCY MEDICINE
Payer: COMMERCIAL

## 2021-02-18 VITALS
DIASTOLIC BLOOD PRESSURE: 78 MMHG | TEMPERATURE: 98.3 F | BODY MASS INDEX: 27 KG/M2 | HEART RATE: 78 BPM | OXYGEN SATURATION: 100 % | SYSTOLIC BLOOD PRESSURE: 128 MMHG | WEIGHT: 168 LBS | HEIGHT: 66 IN | RESPIRATION RATE: 18 BRPM

## 2021-02-18 DIAGNOSIS — R51.9 ACUTE NONINTRACTABLE HEADACHE, UNSPECIFIED HEADACHE TYPE: Primary | ICD-10-CM

## 2021-02-18 PROCEDURE — 96374 THER/PROPH/DIAG INJ IV PUSH: CPT

## 2021-02-18 PROCEDURE — 6370000000 HC RX 637 (ALT 250 FOR IP): Performed by: EMERGENCY MEDICINE

## 2021-02-18 PROCEDURE — 6360000002 HC RX W HCPCS: Performed by: EMERGENCY MEDICINE

## 2021-02-18 PROCEDURE — 96375 TX/PRO/DX INJ NEW DRUG ADDON: CPT

## 2021-02-18 PROCEDURE — 99283 EMERGENCY DEPT VISIT LOW MDM: CPT

## 2021-02-18 RX ORDER — BUTALBITAL, ACETAMINOPHEN AND CAFFEINE 50; 325; 40 MG/1; MG/1; MG/1
1 TABLET ORAL ONCE
Status: COMPLETED | OUTPATIENT
Start: 2021-02-18 | End: 2021-02-18

## 2021-02-18 RX ORDER — HYDROCODONE BITARTRATE AND ACETAMINOPHEN 5; 325 MG/1; MG/1
1 TABLET ORAL EVERY 6 HOURS PRN
COMMUNITY
End: 2022-02-15

## 2021-02-18 RX ORDER — BUTALBITAL, ACETAMINOPHEN AND CAFFEINE 50; 325; 40 MG/1; MG/1; MG/1
1 TABLET ORAL EVERY 6 HOURS PRN
Qty: 20 TABLET | Refills: 0 | Status: SHIPPED | OUTPATIENT
Start: 2021-02-18 | End: 2022-02-15

## 2021-02-18 RX ORDER — DIPHENHYDRAMINE HYDROCHLORIDE 50 MG/ML
25 INJECTION INTRAMUSCULAR; INTRAVENOUS ONCE
Status: COMPLETED | OUTPATIENT
Start: 2021-02-18 | End: 2021-02-18

## 2021-02-18 RX ORDER — IBUPROFEN 600 MG/1
600 TABLET ORAL EVERY 6 HOURS PRN
COMMUNITY

## 2021-02-18 RX ORDER — SULFAMETHOXAZOLE AND TRIMETHOPRIM 800; 160 MG/1; MG/1
1 TABLET ORAL 2 TIMES DAILY
COMMUNITY
End: 2022-02-11

## 2021-02-18 RX ORDER — PROCHLORPERAZINE EDISYLATE 5 MG/ML
10 INJECTION INTRAMUSCULAR; INTRAVENOUS ONCE
Status: COMPLETED | OUTPATIENT
Start: 2021-02-18 | End: 2021-02-18

## 2021-02-18 RX ADMIN — DIPHENHYDRAMINE HYDROCHLORIDE 25 MG: 50 INJECTION, SOLUTION INTRAMUSCULAR; INTRAVENOUS at 14:17

## 2021-02-18 RX ADMIN — BUTALBITAL, ACETAMINOPHEN, AND CAFFEINE 1 TABLET: 50; 325; 40 TABLET ORAL at 14:17

## 2021-02-18 RX ADMIN — PROCHLORPERAZINE EDISYLATE 10 MG: 5 INJECTION INTRAMUSCULAR; INTRAVENOUS at 14:17

## 2021-02-18 ASSESSMENT — ENCOUNTER SYMPTOMS
VOMITING: 0
SORE THROAT: 0
BACK PAIN: 0
DIARRHEA: 0
COUGH: 0
EYE DISCHARGE: 0
SHORTNESS OF BREATH: 0
NAUSEA: 1
ABDOMINAL PAIN: 0

## 2021-02-18 ASSESSMENT — PAIN DESCRIPTION - DESCRIPTORS: DESCRIPTORS: CONSTANT;THROBBING

## 2021-02-18 NOTE — ED NOTES
Pt given an ice pack via request. Pt resting with OU closed resps even and unlab     Lana Gan RN  02/18/21 0899

## 2021-02-18 NOTE — ED PROVIDER NOTES
[facility] Name: Vinita Mathews  MRN: [id number]  [place] 1965  Date of evaluation: 2/18/2021  Provider: Vannessa Arguelles MD  PCP: Bernie Crane MD  ED Attending: Vannessa Arguelles MD    [address] [place] [address]       Chief Complaint   Patient presents with    Headache     States R sided HA x approx 3-4 days       HISTORY OF PRESENT ILLNESS   (Location/Symptom, Timing/Onset, Context/Setting, Quality, Duration, Modifying Factors, Severity)  Note limiting factors. Vinita Mathews is a 50 y.o. female who presents to the emergency department complaining of 3 days of a right-sided headache. Patient has a history of migraine headaches with ocular migraines as well. Patient states that this was triggered by shopping several days ago. She has taken ibuprofen as well as the occasional Norco tablet without any significant relief. Patient describes the pain is moderate to severe sharp and throbbing. It is worse with light and sound. It is associated with some nausea as well as a congestion sensation in her face. The pain radiates behind her eyes. She denies any thunderclap, neck pain, neck stiffness. The headaches are consistent with previous headaches that she has had however they have not lasted quite this long. History is obtained from the patient. REVIEW OF SYSTEMS    (2-9 systems for level 4, 10 or more for level 5)     Review of Systems   Constitutional: Negative for chills and fever. HENT: Positive for congestion. Negative for sore throat. Eyes: Negative for discharge. Respiratory: Negative for cough and shortness of breath. Cardiovascular: Negative for chest pain. Gastrointestinal: Positive for nausea. Negative for abdominal pain, diarrhea and vomiting. Endocrine: Negative for polydipsia. Genitourinary: Negative for dysuria and urgency. Musculoskeletal: Negative for back pain, myalgias, neck pain and neck stiffness. Skin: Negative for rash. Neurological: Positive for headaches. Negative for weakness. Hematological: Does not bruise/bleed easily. Psychiatric/Behavioral: Negative for confusion. Positives and Pertinent negatives as per HPI. Except as noted above in the ROS, all other systems were reviewed and negative. PAST MEDICAL HISTORY     Past Medical History:   Diagnosis Date    Asthma     Atrial septal defect     had insertion of atrial septal occluder    Bursitis of left hip     CAD (coronary artery disease)     COPD (chronic obstructive pulmonary disease) (HCC)     History of blood transfusion     Kidney stone     Migraines     Mitral valve prolapse     MRSA (methicillin resistant staph aureus) culture positive 12/2015    Lungs. Diagnosed [facility] with bronchoscopy    MVP (mitral valve prolapse)     PONV (postoperative nausea and vomiting)     Prolonged QT interval syndrome     S/P bronchoscopy     TIA (transient ischemic attack)     Vertigo          SURGICAL HISTORY     Past Surgical History:   Procedure Laterality Date    BREAST ENHANCEMENT SURGERY      augmentation    CARDIAC CATHETERIZATION      CARDIAC SURGERY      septum occluder    COLONOSCOPY  2012?     polyps    CYSTOSCOPY  6/8/2016    U-Dil    ENDOSCOPIC ULTRASOUND (UPPER)  05/08/2019    ERCP  05/08/2019    Sphincterotomy    ERCP N/A 5/8/2019    ERCP SPHINCTER/PAPILLOTOMY performed by [facility] at [facility] ERCP  5/8/2019    ERCP STONE REMOVAL performed by [facility], [facility] at [facility] ARTHROSCOPY Left 02/08/2018    HIP SURGERY      HYSTERECTOMY      HYSTERECTOMY, TOTAL ABDOMINAL  1992    NASAL SINUS SURGERY  8/22/13    OTHER SURGICAL HISTORY      thoracic sympathectomy    PACEMAKER INSERTION      also revision x 3    IN HIP ARTHROSCOPY, DX Left 11/1/2018 LEFT HIP ARTHROSCOPY, LABRAL  DEBRIDEMENT, CHONDROPLASTY performed by Kim Bolden MD at [address]  03/20/2017    UPPER GASTROINTESTINAL ENDOSCOPY N/A 5/8/2019    UPPER EUS W/ANES. performed by Hannah Howe DO at [address]       Discharge Medication List as of 2/18/2021  3:23 PM      CONTINUE these medications which have NOT CHANGED    Details   HYDROcodone-acetaminophen (NORCO) 5-325 MG per tablet Take 1 tablet by mouth every 6 hours as needed for Pain. Historical Med      ibuprofen (ADVIL;MOTRIN) 600 MG tablet Take 600 mg by mouth every 6 hours as needed for PainHistorical Med      sulfamethoxazole-trimethoprim (BACTRIM DS;SEPTRA DS) 800-160 MG per tablet Take 1 tablet by mouth 2 times daily States she is on 2nd days dose for URI s/sHistorical Med      albuterol sulfate  (90 Base) MCG/ACT inhaler as neededHistorical Med      nadolol (CORGARD) 40 MG tablet Take 120 mg by mouth daily Historical Med      ondansetron (ZOFRAN) 4 MG tablet Take 1 tablet by mouth every 8 hours as needed for Nausea, Disp-20 tablet,R-0Print               ALLERGIES     Quinolones, Dexamethasone sodium phosphate, Other, Codeine, Nitroglycerin, and Prednisone    FAMILYHISTORY       Family History   Problem Relation Age of Onset    Heart Disease Mother     Colon Cancer Father         colon    Heart Disease Father     High Blood Pressure Father           SOCIAL HISTORY       Social History     Socioeconomic History    Marital status:      Spouse name: None    Number of children: None    Years of education: None    Highest education level: None   Occupational History    None   Social Needs    Financial resource strain: None    Food insecurity     Worry: None     Inability: None    Transportation needs     Medical: None     Non-medical: None   Tobacco Use    Smoking status: Former Smoker     Packs/day: 0.25 Breath sounds: Normal breath sounds. No wheezing. Abdominal:      General: Bowel sounds are normal. There is no distension. Palpations: Abdomen is soft. Tenderness: There is no abdominal tenderness. There is no guarding or rebound. Musculoskeletal: Normal range of motion. General: No tenderness. Lymphadenopathy:      Cervical: No cervical adenopathy. Skin:     General: Skin is warm and dry. Findings: No rash. Neurological:      Mental Status: She is alert and oriented to person, place, and time. Cranial Nerves: No cranial nerve deficit. Sensory: No sensory deficit. Motor: No weakness. Coordination: Coordination normal.      Gait: Gait normal.         DIAGNOSTIC RESULTS   LABS:    No results found for this visit on 02/18/21. All other labs were within normal range ornot returned as of this dictation. EKG: All EKG's are interpreted by the Emergency Department Physician who either signs or Co-signs this chart in the absence of a cardiologist.  Please see their note for interpretation of EKG.     RADIOLOGY:   Non-plain film images such as CT, Ultrasound and MRI are read by the radiologist.  Plain radiographic images are visualized and preliminarily interpreted by the ED Provider with the belowfindings:    Interpretation per the Radiologist below, if available at the time of this note:    No orders to display         PROCEDURES   Unless otherwise noted below, none     Procedures    CRITICAL CARE TIME   N/A    CONSULTS:  None      EMERGENCY DEPARTMENT COURSE and DIFFERENTIAL DIAGNOSIS/MDM:   Vitals:    Vitals:    02/18/21 1339 02/18/21 1528   BP: 108/82 128/78   Pulse: 72 78   Resp: 18 18   Temp: 97.6 °F (36.4 °C) 98.3 °F (36.8 °C)   TempSrc: Oral Oral   SpO2: 100%    Weight: 168 lb (76.2 kg)    Height: 5' 6\" (1.676 m)        Patient was given the following medications:  Medications butalbital-acetaminophen-caffeine (FIORICET, ESGIC) per tablet 1 tablet (1 tablet Oral Given 2/18/21 1417)   prochlorperazine (COMPAZINE) injection 10 mg (10 mg Intravenous Given 2/18/21 1417)   diphenhydrAMINE (BENADRYL) injection 25 mg (25 mg Intravenous Given 2/18/21 1417)       ED Course as of Feb 19 1628   Thu Feb 18, 2021   5723 Patient states that she feels \"much better\". Pain is down to 3/10 and she is comfortable with this level. Patient wants to go home. [TC]      ED Course User Index  [TC] Vannessa Arguelles MD     Patient symptoms are consistent with her previous headaches however has lasted somewhat longer than previous. It has some migraine type components to it given the aura as well as occasional ocular flashes that she has had with previous headaches. Patient was given Compazine Fioricet and Benadryl with significant improvement of her symptoms. She is asking to go home. At this point I have recommended that she follow-up closely with her primary care physician. I am prescribing her a limited course of Fioricet. Patient is agreeable with this plan. Differential diagnosis included but was not limited to: migraine, meningitis, subarachnoid hemorrhage, head injury/trauma, cluster headache, intracranial bleed/mass, stroke. The patient understands the importance of follow up and reasons to return. FINAL IMPRESSION      1. Acute nonintractable headache, unspecified headache type          DISPOSITION/PLAN   DISPOSITION Decision To Discharge 02/18/2021 03:19:49 PM      PATIENT REFERRED TO:  Edwin Shipley MD  [address] [place] [address] Dr. Louise Rivera [address] [place] [address]  [phone]    Schedule an appointment as soon as possible for a visit in 1 week      [place] [address].  [facility]  [address]  Go to   If symptoms worsen      DISCHARGE MEDICATIONS:  Discharge Medication List as of 2/18/2021  3:23 PM      START taking these medications    Details butalbital-acetaminophen-caffeine (FIORICET, ESGIC) -40 MG per tablet Take 1 tablet by mouth every 6 hours as needed for Headaches, Disp-20 tablet, R-0Normal             DISCONTINUED MEDICATIONS:  Discharge Medication List as of 2/18/2021  3:23 PM      STOP taking these medications       amoxicillin-clavulanate (AUGMENTIN) 875-125 MG per tablet Comments:   Reason for Stopping:                 Periodic Controlled Substance Monitoring: Possible medication side effects, risk of tolerance/dependence & alternative treatments discussed., No signs of potential drug abuse or diversion identified.  Roberto Harrell MD)    (Please note that portions of this note were completed with a voice recognition program.  Efforts were made to edit the dictations but occasionally words are mis-transcribed.)    Roberto Harrell MD(electronically signed)              Roberto Harrell MD  02/19/21 9954

## 2021-02-24 ENCOUNTER — APPOINTMENT (OUTPATIENT)
Dept: CT IMAGING | Age: 56
End: 2021-02-24
Payer: COMMERCIAL

## 2021-02-24 ENCOUNTER — HOSPITAL ENCOUNTER (EMERGENCY)
Age: 56
Discharge: HOME | End: 2021-02-25
Attending: STUDENT IN AN ORGANIZED HEALTH CARE EDUCATION/TRAINING PROGRAM
Payer: COMMERCIAL

## 2021-02-24 DIAGNOSIS — R51.9 HEADACHE, UNSPECIFIED HEADACHE TYPE: Primary | ICD-10-CM

## 2021-02-24 DIAGNOSIS — H53.2 DIPLOPIA: ICD-10-CM

## 2021-02-24 LAB
A/G RATIO: 2 (ref 1.1–2.2)
ALBUMIN SERPL-MCNC: 4.9 G/DL (ref 3.4–5)
ALP BLD-CCNC: 142 U/L (ref 40–129)
ALT SERPL-CCNC: 28 U/L (ref 10–40)
ANION GAP SERPL CALCULATED.3IONS-SCNC: 7 MMOL/L (ref 3–16)
AST SERPL-CCNC: 29 U/L (ref 15–37)
BASOPHILS ABSOLUTE: 0 K/UL (ref 0–0.2)
BASOPHILS RELATIVE PERCENT: 0.6 %
BILIRUB SERPL-MCNC: <0.2 MG/DL (ref 0–1)
BUN BLDV-MCNC: 18 MG/DL (ref 7–20)
CALCIUM SERPL-MCNC: 10.1 MG/DL (ref 8.3–10.6)
CHLORIDE BLD-SCNC: 106 MMOL/L (ref 99–110)
CO2: 27 MMOL/L (ref 21–32)
CREAT SERPL-MCNC: 0.8 MG/DL (ref 0.6–1.1)
EKG ATRIAL RATE: 73 BPM
EKG DIAGNOSIS: NORMAL
EKG P AXIS: 66 DEGREES
EKG P-R INTERVAL: 146 MS
EKG Q-T INTERVAL: 438 MS
EKG QRS DURATION: 68 MS
EKG QTC CALCULATION (BAZETT): 482 MS
EKG R AXIS: 57 DEGREES
EKG T AXIS: 67 DEGREES
EKG VENTRICULAR RATE: 73 BPM
EOSINOPHILS ABSOLUTE: 0.1 K/UL (ref 0–0.6)
EOSINOPHILS RELATIVE PERCENT: 1.9 %
GFR AFRICAN AMERICAN: >60
GFR NON-AFRICAN AMERICAN: >60
GLOBULIN: 2.4 G/DL
GLUCOSE BLD-MCNC: 111 MG/DL (ref 70–99)
HCT VFR BLD CALC: 43 % (ref 36–48)
HEMOGLOBIN: 14.6 G/DL (ref 12–16)
LYMPHOCYTES ABSOLUTE: 1.8 K/UL (ref 1–5.1)
LYMPHOCYTES RELATIVE PERCENT: 30.9 %
MCH RBC QN AUTO: 32.9 PG (ref 26–34)
MCHC RBC AUTO-ENTMCNC: 33.9 G/DL (ref 31–36)
MCV RBC AUTO: 97 FL (ref 80–100)
MONOCYTES ABSOLUTE: 0.6 K/UL (ref 0–1.3)
MONOCYTES RELATIVE PERCENT: 10.4 %
NEUTROPHILS ABSOLUTE: 3.2 K/UL (ref 1.7–7.7)
NEUTROPHILS RELATIVE PERCENT: 56.2 %
PDW BLD-RTO: 13.2 % (ref 12.4–15.4)
PLATELET # BLD: 181 K/UL (ref 135–450)
PMV BLD AUTO: 9.4 FL (ref 5–10.5)
POTASSIUM REFLEX MAGNESIUM: 4.1 MMOL/L (ref 3.5–5.1)
RBC # BLD: 4.43 M/UL (ref 4–5.2)
SEDIMENTATION RATE, ERYTHROCYTE: 2 MM/HR (ref 0–30)
SODIUM BLD-SCNC: 140 MMOL/L (ref 136–145)
TOTAL PROTEIN: 7.3 G/DL (ref 6.4–8.2)
TROPONIN: <0.01 NG/ML
WBC # BLD: 5.7 K/UL (ref 4–11)

## 2021-02-24 PROCEDURE — 70498 CT ANGIOGRAPHY NECK: CPT

## 2021-02-24 PROCEDURE — 6360000002 HC RX W HCPCS: Performed by: STUDENT IN AN ORGANIZED HEALTH CARE EDUCATION/TRAINING PROGRAM

## 2021-02-24 PROCEDURE — 96374 THER/PROPH/DIAG INJ IV PUSH: CPT

## 2021-02-24 PROCEDURE — 84484 ASSAY OF TROPONIN QUANT: CPT

## 2021-02-24 PROCEDURE — 93010 ELECTROCARDIOGRAM REPORT: CPT | Performed by: INTERNAL MEDICINE

## 2021-02-24 PROCEDURE — 93005 ELECTROCARDIOGRAM TRACING: CPT | Performed by: STUDENT IN AN ORGANIZED HEALTH CARE EDUCATION/TRAINING PROGRAM

## 2021-02-24 PROCEDURE — 86140 C-REACTIVE PROTEIN: CPT

## 2021-02-24 PROCEDURE — 85025 COMPLETE CBC W/AUTO DIFF WBC: CPT

## 2021-02-24 PROCEDURE — 85652 RBC SED RATE AUTOMATED: CPT

## 2021-02-24 PROCEDURE — 80053 COMPREHEN METABOLIC PANEL: CPT

## 2021-02-24 PROCEDURE — 6370000000 HC RX 637 (ALT 250 FOR IP): Performed by: STUDENT IN AN ORGANIZED HEALTH CARE EDUCATION/TRAINING PROGRAM

## 2021-02-24 PROCEDURE — 2580000003 HC RX 258: Performed by: STUDENT IN AN ORGANIZED HEALTH CARE EDUCATION/TRAINING PROGRAM

## 2021-02-24 PROCEDURE — 6360000004 HC RX CONTRAST MEDICATION: Performed by: STUDENT IN AN ORGANIZED HEALTH CARE EDUCATION/TRAINING PROGRAM

## 2021-02-24 PROCEDURE — 70450 CT HEAD/BRAIN W/O DYE: CPT

## 2021-02-24 PROCEDURE — 96361 HYDRATE IV INFUSION ADD-ON: CPT

## 2021-02-24 PROCEDURE — 99285 EMERGENCY DEPT VISIT HI MDM: CPT

## 2021-02-24 PROCEDURE — 96375 TX/PRO/DX INJ NEW DRUG ADDON: CPT

## 2021-02-24 RX ORDER — HYDROCODONE BITARTRATE AND ACETAMINOPHEN 5; 325 MG/1; MG/1
1 TABLET ORAL ONCE
Status: COMPLETED | OUTPATIENT
Start: 2021-02-24 | End: 2021-02-24

## 2021-02-24 RX ORDER — 0.9 % SODIUM CHLORIDE 0.9 %
1000 INTRAVENOUS SOLUTION INTRAVENOUS ONCE
Status: COMPLETED | OUTPATIENT
Start: 2021-02-24 | End: 2021-02-24

## 2021-02-24 RX ORDER — PROCHLORPERAZINE EDISYLATE 5 MG/ML
10 INJECTION INTRAMUSCULAR; INTRAVENOUS ONCE
Status: COMPLETED | OUTPATIENT
Start: 2021-02-24 | End: 2021-02-24

## 2021-02-24 RX ORDER — DIPHENHYDRAMINE HYDROCHLORIDE 50 MG/ML
25 INJECTION INTRAMUSCULAR; INTRAVENOUS ONCE
Status: COMPLETED | OUTPATIENT
Start: 2021-02-24 | End: 2021-02-24

## 2021-02-24 RX ADMIN — IOPAMIDOL 85 ML: 755 INJECTION, SOLUTION INTRAVENOUS at 18:53

## 2021-02-24 RX ADMIN — SODIUM CHLORIDE 1000 ML: 9 INJECTION, SOLUTION INTRAVENOUS at 18:21

## 2021-02-24 RX ADMIN — HYDROCODONE BITARTRATE AND ACETAMINOPHEN 1 TABLET: 5; 325 TABLET ORAL at 20:29

## 2021-02-24 RX ADMIN — PROCHLORPERAZINE EDISYLATE 10 MG: 5 INJECTION INTRAMUSCULAR; INTRAVENOUS at 18:21

## 2021-02-24 RX ADMIN — DIPHENHYDRAMINE HYDROCHLORIDE 25 MG: 50 INJECTION, SOLUTION INTRAMUSCULAR; INTRAVENOUS at 18:21

## 2021-02-24 ASSESSMENT — ENCOUNTER SYMPTOMS
ABDOMINAL PAIN: 0
PHOTOPHOBIA: 0
SHORTNESS OF BREATH: 0
BACK PAIN: 0
SORE THROAT: 0
VOMITING: 0
RHINORRHEA: 0
NAUSEA: 1
STRIDOR: 0
COUGH: 0

## 2021-02-24 ASSESSMENT — PAIN DESCRIPTION - DESCRIPTORS: DESCRIPTORS: CONSTANT

## 2021-02-24 ASSESSMENT — PAIN SCALES - GENERAL
PAINLEVEL_OUTOF10: 7
PAINLEVEL_OUTOF10: 10

## 2021-02-24 ASSESSMENT — PAIN DESCRIPTION - LOCATION: LOCATION: HEAD

## 2021-02-24 ASSESSMENT — PAIN DESCRIPTION - PAIN TYPE: TYPE: ACUTE PAIN

## 2021-02-24 NOTE — ED PROVIDER NOTES
[address] ED  EMERGENCY DEPARTMENT ENCOUNTER      Pt Name: Gerson Petersen  MRN: [id number]  [place] 1965  Date of evaluation: 2/24/2021  Provider: [address]       Chief Complaint   Patient presents with    Migraine     pt states has had migraine since 2/15, seen by optometrist today and sent to ED due to concern for giant cell arterities         HISTORY OF PRESENT ILLNESS   (Location/Symptom, Timing/Onset, Context/Setting, Quality, Duration, Modifying Factors, Severity)  Note limiting factors. Gerson Petersen is a 50 y.o. female who presents to the emergency department complaining of complaining of persistent headache since 2/15. Headache has been ongoing right sided throbbing severe. Does have a history of migraine headaches however states that she has not had these in years after having a pacemaker placed. Patient just does not feel well but is not able to describe other specific symptoms. Denies fevers chills neck pain or stiffness. Does have diplopia, and right-sided headache. Was not thunderclap in presentation has been progressive and constant now over the last 9 or so days. Was seen evaluated optometrist office earlier today and was sent here for concern for possible giant cell arteritis. No prior history. Pupils are dilated at optometrist office prior to arrival.  No focal deficits. No chest pain no palpitations no shortness of breath no abdominal pain. Does complain of mild nausea without emesis. Nursing Notes were reviewed.     PAST MEDICAL HISTORY     Past Medical History:   Diagnosis Date    Asthma     Atrial septal defect     had insertion of atrial septal occluder    Bursitis of left hip     CAD (coronary artery disease)     COPD (chronic obstructive pulmonary disease) (HCC)     History of blood transfusion     Kidney stone     Migraines     Mitral valve prolapse  MRSA (methicillin resistant staph aureus) culture positive 12/2015    Lungs. Diagnosed [facility] with bronchoscopy    MVP (mitral valve prolapse)     PONV (postoperative nausea and vomiting)     Prolonged QT interval syndrome     S/P bronchoscopy     TIA (transient ischemic attack)     Vertigo          SURGICAL HISTORY       Past Surgical History:   Procedure Laterality Date    BREAST ENHANCEMENT SURGERY      augmentation    CARDIAC CATHETERIZATION      CARDIAC SURGERY      septum occluder    COLONOSCOPY  2012? polyps    CYSTOSCOPY  6/8/2016    U-Dil    ENDOSCOPIC ULTRASOUND (UPPER)  05/08/2019    ERCP  05/08/2019    Sphincterotomy    ERCP N/A 5/8/2019    ERCP SPHINCTER/PAPILLOTOMY performed by Alecia To DO at [facility] ERCP  5/8/2019    ERCP STONE REMOVAL performed by Alecia To DO at [facility] ARTHROSCOPY Left 02/08/2018    HIP SURGERY      HYSTERECTOMY      HYSTERECTOMY, TOTAL ABDOMINAL  1992    NASAL SINUS SURGERY  8/22/13    OTHER SURGICAL HISTORY      thoracic sympathectomy    PACEMAKER INSERTION      also revision x 3    [place] HIP ARTHROSCOPY, DX Left 11/1/2018    LEFT HIP ARTHROSCOPY, LABRAL  DEBRIDEMENT, CHONDROPLASTY performed by Isa Greene MD at [facility]  03/20/2017    UPPER GASTROINTESTINAL ENDOSCOPY N/A 5/8/2019    UPPER EUS W/ANES. performed by Alecia To DO at [address]       Previous Medications    ALBUTEROL SULFATE  (90 BASE) MCG/ACT INHALER    as needed    BUTALBITAL-ACETAMINOPHEN-CAFFEINE (FIORICET, ESGIC) -40 MG PER TABLET    Take 1 tablet by mouth every 6 hours as needed for Headaches    HYDROCODONE-ACETAMINOPHEN (NORCO) 5-325 MG PER TABLET    Take 1 tablet by mouth every 6 hours as needed for Pain. IBUPROFEN (ADVIL;MOTRIN) 600 MG TABLET    Take 600 mg by mouth every 6 hours as needed for Pain    NADOLOL (CORGARD) 40 MG TABLET    Take 120 mg by mouth daily     ONDANSETRON (ZOFRAN) 4 MG TABLET    Take 1 tablet by mouth every 8 hours as needed for Nausea    SULFAMETHOXAZOLE-TRIMETHOPRIM (BACTRIM DS;SEPTRA DS) 800-160 MG PER TABLET    Take 1 tablet by mouth 2 times daily States she is on 2nd days dose for URI s/s       ALLERGIES     Quinolones, Dexamethasone sodium phosphate, Other, Codeine, Nitroglycerin, and Prednisone    FAMILY HISTORY       Family History   Problem Relation Age of Onset    Heart Disease Mother     Colon Cancer Father         colon    Heart Disease Father     High Blood Pressure Father           SOCIAL HISTORY       Social History     Socioeconomic History    Marital status:      Spouse name: None    Number of children: None    Years of education: None    Highest education level: None   Occupational History    None   Social Needs    Financial resource strain: None    Food insecurity     Worry: None     Inability: None    Transportation needs     Medical: None     Non-medical: None   Tobacco Use    Smoking status: Former Smoker     Packs/day: 0.25     Years: 3.00     Pack years: 0.75     Types: Cigarettes     Quit date: 1998     Years since quittin.2    Smokeless tobacco: Never Used   Substance and Sexual Activity    Alcohol use: Yes     Comment: wine occ    Drug use: No    Sexual activity: Yes     Partners: Male   Lifestyle    Physical activity     Days per week: None     Minutes per session: None    Stress: None   Relationships    Social connections     Talks on phone: None     Gets together: None     Attends Voodoo service: None     Active member of club or organization: None     Attends meetings of clubs or organizations: None     Relationship status: None    Intimate partner violence     Fear of current or ex partner: None Emotionally abused: None     Physically abused: None     Forced sexual activity: None   Other Topics Concern    None   Social History Narrative    None       SCREENINGS        Mi Coma Scale  Eye Opening: Spontaneous  Best Verbal Response: Oriented  Best Motor Response: Obeys commands  Mi Coma Scale Score: 15                   REVIEW OF SYSTEMS    (2-9 systems for level 4, 10 or more for level 5)   Review of Systems   Constitutional: Negative for chills and fever. HENT: Negative for congestion, rhinorrhea and sore throat. Eyes: Positive for visual disturbance. Negative for photophobia. Respiratory: Negative for cough, shortness of breath and stridor. Cardiovascular: Negative for chest pain, palpitations and leg swelling. Gastrointestinal: Positive for nausea. Negative for abdominal pain and vomiting. Genitourinary: Negative for decreased urine volume. Musculoskeletal: Negative for back pain, neck pain and neck stiffness. Skin: Negative for rash. Allergic/Immunologic: Negative for environmental allergies. Neurological: Positive for headaches. Negative for numbness. Hematological: Negative for adenopathy. Psychiatric/Behavioral: Negative for confusion. PHYSICAL EXAM    (up to 7 for level 4, 8 or more for level 5)     ED Triage Vitals [02/24/21 1710]   BP Temp Temp Source Pulse Resp SpO2 Height Weight   133/89 96.8 °F (36 °C) Oral 79 16 100 % 5' 6\" (1.676 m) 165 lb (74.8 kg)       Physical Exam  Constitutional:       General: She is not in acute distress. Appearance: She is not diaphoretic. HENT:      Head: Normocephalic and atraumatic. Eyes:      Comments: Pupils are dilated and sluggish   Neck:      Musculoskeletal: Normal range of motion and neck supple. No neck rigidity. Trachea: No tracheal deviation. Cardiovascular:      Rate and Rhythm: Normal rate and regular rhythm. Pulmonary:      Effort: Pulmonary effort is normal. No respiratory distress. Abdominal:      General: There is no distension. Palpations: Abdomen is soft. Musculoskeletal: Normal range of motion. Skin:     General: Skin is warm. Neurological:      Mental Status: She is oriented to person, place, and time. Comments: Normal strength normal sensation upper lower extremities. DIAGNOSTIC RESULTS     EKG: All EKG's are interpreted by the Emergency Department Physician who either signs or Co-signs this chart in the absence of a cardiologist.      The Ekg interpreted by me shows  normal sinus rhythm with a rate of 73, intermittently paced  Axis is   Normal  QTc is  normal  Intervals and Durations are unremarkable. ST Segments: Inversions V1-3, lead III    No significant change from prior EKG dated intermittently paced, T wave inversions anteriorly previously seen on EKG November 24, 2020        RADIOLOGY:   Non-plain film images such as CT, Ultrasound and MRI are read by the radiologist. Plain radiographic images are visualized and preliminarily interpreted by the emergency physician. Interpretation per the Radiologist below, if available at the time of this note:    CTA HEAD NECK W CONTRAST   Final Result   Unremarkable CTA of the head and neck. CT HEAD WO CONTRAST   Final Result   No acute intracranial abnormality.                LABS:  Labs Reviewed   COMPREHENSIVE METABOLIC PANEL W/ REFLEX TO MG FOR LOW K - Abnormal; Notable for the following components:       Result Value    Glucose 111 (*)     Alkaline Phosphatase 142 (*)     All other components within normal limits    Narrative:     Performed at:  [facility],  [place], [facility]   Phone [phone]   CBC WITH AUTO DIFFERENTIAL    Narrative:     Performed at:  [facility],  [place], [facility]   Phone [phone]   TROPONIN    Narrative:     Performed at: [facility]  [address],  [place], [facility]   Phone [phone]   SEDIMENTATION RATE    Narrative:     Performed at:  [facility]  [address],  [place], [facility]   Phone [phone]   C-REACTIVE PROTEIN       All other labs were within normal range or not returned as of this dictation. EMERGENCY DEPARTMENT COURSE and DIFFERENTIAL DIAGNOSIS/MDM:   Yuval Gomez is a 50 y.o. female who presents to the emergency department with the complaint of headache right side has been progressive ongoing for last 9 days. Does have some diplopia. Patient was seen by optometrist was sent here for evaluation due to concern for giant cell arteritis. Will obtain CBC CMP troponin due to patient's vague complaints as well. Does have heart history, pacemaker history. Low suspicion for meningitis as patient she is afebrile there is neck is supple there is no rigidity, nonfocal neuro exam.  Pupils are dilated, was dilated optometrist office prior to arrival.  She has no tenderness over the temporal artery. Will obtain CT head CTA head and neck for headache complaint as this is a different type headache that she is had in the past.  Will treat as migraine initially. Patient's sed rate to, CTA head CTA head and neck unremarkable. Patient does not have tenderness overlying the temporal artery and with a ESR of 2 low suspicion for temporal arteritis. Due to 9 days of constant migraine type symptoms, patient would benefit from admission and by evaluation by neurology. Did discuss with patient lumbar puncture however as patient is otherwise neuro intact now and symptoms ongoing for 9 days we will hold off and refer patient to [facility] for neuro evaluation for potential further work-up which may include further head imaging, lumbar puncture. This was discussed with patient. Patient agreeable to transfer. CRITICAL CARE TIME   Total Critical Care time was 0 minutes, excluding separately reportable procedures. There was a high probability of clinically significant/life threatening deterioration in the patient's condition which required my urgent intervention. Clinical concern   Intervention     CONSULTS:  IP CONSULT TO HOSPITALIST    PROCEDURES:  Unless otherwise noted below, none     Procedures        FINAL IMPRESSION      1. Intractable migraine without aura and with status migrainosus    2. Diplopia          DISPOSITION/PLAN   DISPOSITION Decision To Transfer 02/24/2021 07:50:13 PM      PATIENT REFERRED TO:  No follow-up provider specified. DISCHARGE MEDICATIONS:  New Prescriptions    No medications on file     Controlled Substances Monitoring:     RX Monitoring 2/18/2021   Attestation -   Periodic Controlled Substance Monitoring Possible medication side effects, risk of tolerance/dependence & alternative treatments discussed. ;No signs of potential drug abuse or diversion identified.        (Please note that portions of this note were completed with a voice recognition program.  Efforts were made to edit the dictations but occasionally words are mis-transcribed.)    Clint Celis DO (electronically signed)  Attending Emergency Physician            Clint Celis DO  02/25/21 0002

## 2021-02-24 NOTE — ED TRIAGE NOTES
Chief Complaint   Patient presents with    Migraine     pt states has had migraine since 2/15, seen by optometrist today and sent to ED due to concern for giant cell arterities

## 2021-02-25 VITALS
BODY MASS INDEX: 26.52 KG/M2 | HEIGHT: 66 IN | TEMPERATURE: 96.8 F | RESPIRATION RATE: 14 BRPM | DIASTOLIC BLOOD PRESSURE: 80 MMHG | SYSTOLIC BLOOD PRESSURE: 138 MMHG | WEIGHT: 165 LBS | HEART RATE: 72 BPM | OXYGEN SATURATION: 98 %

## 2021-02-25 LAB — C-REACTIVE PROTEIN: <3 MG/L (ref 0–5.1)

## 2021-02-25 PROCEDURE — 6360000002 HC RX W HCPCS: Performed by: EMERGENCY MEDICINE

## 2021-02-25 PROCEDURE — 2500000003 HC RX 250 WO HCPCS: Performed by: EMERGENCY MEDICINE

## 2021-02-25 PROCEDURE — 6370000000 HC RX 637 (ALT 250 FOR IP): Performed by: EMERGENCY MEDICINE

## 2021-02-25 PROCEDURE — 6370000000 HC RX 637 (ALT 250 FOR IP): Performed by: STUDENT IN AN ORGANIZED HEALTH CARE EDUCATION/TRAINING PROGRAM

## 2021-02-25 RX ORDER — ACETAMINOPHEN 325 MG/1
650 TABLET ORAL ONCE
Status: COMPLETED | OUTPATIENT
Start: 2021-02-25 | End: 2021-02-25

## 2021-02-25 RX ORDER — PROCHLORPERAZINE EDISYLATE 5 MG/ML
10 INJECTION INTRAMUSCULAR; INTRAVENOUS ONCE
Status: COMPLETED | OUTPATIENT
Start: 2021-02-25 | End: 2021-02-25

## 2021-02-25 RX ORDER — KETOROLAC TROMETHAMINE 30 MG/ML
15 INJECTION, SOLUTION INTRAMUSCULAR; INTRAVENOUS ONCE
Status: COMPLETED | OUTPATIENT
Start: 2021-02-25 | End: 2021-02-25

## 2021-02-25 RX ORDER — LIDOCAINE HYDROCHLORIDE 20 MG/ML
15 SOLUTION OROPHARYNGEAL ONCE
Status: COMPLETED | OUTPATIENT
Start: 2021-02-25 | End: 2021-02-25

## 2021-02-25 RX ORDER — LIDOCAINE HYDROCHLORIDE 20 MG/ML
5 INJECTION, SOLUTION INFILTRATION; PERINEURAL ONCE
Status: COMPLETED | OUTPATIENT
Start: 2021-02-25 | End: 2021-02-25

## 2021-02-25 RX ORDER — BUTALBITAL, ACETAMINOPHEN AND CAFFEINE 50; 325; 40 MG/1; MG/1; MG/1
1 TABLET ORAL ONCE
Status: DISCONTINUED | OUTPATIENT
Start: 2021-02-25 | End: 2021-02-26 | Stop reason: HOSPADM

## 2021-02-25 RX ADMIN — LIDOCAINE HYDROCHLORIDE 15 ML: 20 SOLUTION ORAL; TOPICAL at 22:07

## 2021-02-25 RX ADMIN — LIDOCAINE HYDROCHLORIDE 5 ML: 20 INJECTION, SOLUTION INFILTRATION; PERINEURAL at 21:20

## 2021-02-25 RX ADMIN — PROCHLORPERAZINE EDISYLATE 10 MG: 5 INJECTION INTRAMUSCULAR; INTRAVENOUS at 09:23

## 2021-02-25 RX ADMIN — KETOROLAC TROMETHAMINE 15 MG: 30 INJECTION, SOLUTION INTRAMUSCULAR; INTRAVENOUS at 09:23

## 2021-02-25 RX ADMIN — LIDOCAINE HYDROCHLORIDE 15 ML: 20 SOLUTION ORAL; TOPICAL at 21:20

## 2021-02-25 RX ADMIN — ACETAMINOPHEN 650 MG: 325 TABLET ORAL at 14:06

## 2021-02-25 RX ADMIN — KETOROLAC TROMETHAMINE 15 MG: 30 INJECTION, SOLUTION INTRAMUSCULAR; INTRAVENOUS at 19:27

## 2021-02-25 RX ADMIN — PROCHLORPERAZINE EDISYLATE 10 MG: 5 INJECTION INTRAMUSCULAR; INTRAVENOUS at 19:27

## 2021-02-25 ASSESSMENT — PAIN SCALES - GENERAL
PAINLEVEL_OUTOF10: 8
PAINLEVEL_OUTOF10: 8

## 2021-02-25 NOTE — ED NOTES
Patient sleeping with respirations even and easy. No s/s of discomfort of distress.   Will continue to monitor     Payal Richey RN  02/25/21 5624

## 2021-02-25 NOTE — ED NOTES
Patient sleeping with respirations even and easy. No s/s of discomfort or distress. Will continue to monitor.      Isha Kaplan RN  02/25/21 7849

## 2021-02-25 NOTE — ED NOTES
Pt asked to see RN, went into pt's room and she had a question about plan of care and asked to see MD. Dr. Santino Guerra went in to see pt.      Cole Bosworth, RN  02/24/21 3121

## 2021-02-25 NOTE — ED NOTES
Patient asking for pain medication for headache. States, \"my head hurts from chewing my lunch. \" Dr Malik Ackerman gave verbal order for 625 mg of Tylenol.      Dea Lima RN  02/25/21 4746

## 2021-02-26 NOTE — ED NOTES
Patient ambulated to the restroom without difficulty. States her headache is completely gone and wants to go home. Provider notified.      Greg Porter RN  02/25/21 1259

## 2021-02-26 NOTE — ED NOTES
Patient resting in bed, states she is much more comfortable after Dr Gennaro Carrasco performed procedure.      Ivan Mnuoz RN  02/25/21 [id number]

## 2021-02-26 NOTE — ED PROVIDER NOTES
[address]    This patient was initially evaluated by Dr Ricardo Acuna. Briefly, 50 y.o. female presented with intractable headache. At the time of signout, the following was pending:    - patient awaiting transfer to [facility]    On my reassessment, the patient has had recurrence of migraine, despite multiple pain medications. Patient consented verbally to sphenopalatine ganglion block, which was performed as below with improvement in headache symptoms. Will continue to await transfer as originally planned. At approximately 10:55 PM on 2/25/2021 patient indicated to nursing that her symptoms were entirely abated following the sphenopalatine ganglion nerve block. She is requesting discharge to home as her symptoms have abated. I reviewed the patient's work-up which included reassuring noncontrast head CT and CT angiography of the head and neck. Reassuring laboratory studies including a negative sedimentation rate. On my examination patient's neurological exam is nonfocal and the patient has no meningismus. The presentation is not consistent with meningitis, temporal arteritis, dural venous sinus thrombosis, or intracranial hemorrhage. Given reassuring exam, vital signs, diagnostics and given patient entirely asymptomatic, believe patient's request for discharge is reasonable. Discharged with close follow-up with PCP in several days. Patient states she has a neurologist she followed with previously with whom she will attempt to follow. If unable did provide the contact information for another neurologist in the [address] [place] [address] system. Usual strict return precautions for new or worsening symptoms communicated.     PROCEDURES      Sphenopalatine Ganglion Nerve Block    Indication: headache    Consent: verbal, by patient  Risks discussed: pain, damage to surrounding structures, epistaxis, dysrhythmia, headache, need for additional procedures, failure to achieved the desired result Procedure: Patient was placed in supine position with head in sniffing position. Cardiac monitoring was in place. 0.5 mL of 2% viscous lidocaine was instilled in each nostril, and the patient sniffed to draw the anesthetic agent posteriorly. Two cotton tip applicators were soaked in 2% lidocaine. One applicator was advanced along the superior border of the middle turbinate of each nostril until contacting the mucosa overlying the sphenopalatine ganglion nerve. The applicators were left in place for 10 minutes and then gently removed. The patient's headache symptoms were improved after the above interventions. The patient tolerated the procedure well, with no immediate complications. CLINICAL IMPRESSION  1. Headache, unspecified headache type    2. Diplopia        Blood pressure 116/80, pulse 76, temperature 96.8 °F (36 °C), temperature source Oral, resp. rate 14, height 5' 6\" (1.676 m), weight 165 lb (74.8 kg), SpO2 96 %, not currently breastfeeding.              Casi Conner MD  02/25/21 [id number]       Casi Conner MD  02/25/21 8535

## 2021-02-26 NOTE — ED NOTES
Verbal order received fro Ruth Alvarado for 10 mg of Compazine and 15 mg of Toradol.      Mia Joseph RN  02/25/21 [id number]

## 2021-02-26 NOTE — ED NOTES
..Reviewed discharge instructions with patient. Patient verbalized understanding. No distress noted. Ambulatory from department.      Loida Barr RN  02/25/21 3232

## 2022-02-11 ENCOUNTER — APPOINTMENT (OUTPATIENT)
Dept: GENERAL RADIOLOGY | Age: 57
End: 2022-02-11
Payer: COMMERCIAL

## 2022-02-11 ENCOUNTER — HOSPITAL ENCOUNTER (EMERGENCY)
Age: 57
Discharge: HOME | End: 2022-02-11
Attending: STUDENT IN AN ORGANIZED HEALTH CARE EDUCATION/TRAINING PROGRAM
Payer: COMMERCIAL

## 2022-02-11 VITALS
HEIGHT: 66 IN | OXYGEN SATURATION: 99 % | WEIGHT: 170 LBS | TEMPERATURE: 98.5 F | DIASTOLIC BLOOD PRESSURE: 80 MMHG | SYSTOLIC BLOOD PRESSURE: 126 MMHG | RESPIRATION RATE: 16 BRPM | HEART RATE: 72 BPM | BODY MASS INDEX: 27.32 KG/M2

## 2022-02-11 DIAGNOSIS — Z20.822 SUSPECTED COVID-19 VIRUS INFECTION: Primary | ICD-10-CM

## 2022-02-11 LAB
A/G RATIO: 1.7 (ref 1.1–2.2)
ALBUMIN SERPL-MCNC: 4.5 G/DL (ref 3.4–5)
ALP BLD-CCNC: 123 U/L (ref 40–129)
ALT SERPL-CCNC: 27 U/L (ref 10–40)
ANION GAP SERPL CALCULATED.3IONS-SCNC: 12 MMOL/L (ref 3–16)
AST SERPL-CCNC: 35 U/L (ref 15–37)
BASOPHILS ABSOLUTE: 0 K/UL (ref 0–0.2)
BASOPHILS RELATIVE PERCENT: 0.6 %
BILIRUB SERPL-MCNC: 0.4 MG/DL (ref 0–1)
BUN BLDV-MCNC: 16 MG/DL (ref 7–20)
CALCIUM SERPL-MCNC: 9.3 MG/DL (ref 8.3–10.6)
CHLORIDE BLD-SCNC: 104 MMOL/L (ref 99–110)
CO2: 25 MMOL/L (ref 21–32)
CREAT SERPL-MCNC: 0.7 MG/DL (ref 0.6–1.1)
EKG ATRIAL RATE: 71 BPM
EKG DIAGNOSIS: NORMAL
EKG P-R INTERVAL: 168 MS
EKG Q-T INTERVAL: 462 MS
EKG QRS DURATION: 72 MS
EKG QTC CALCULATION (BAZETT): 502 MS
EKG R AXIS: 42 DEGREES
EKG T AXIS: -64 DEGREES
EKG VENTRICULAR RATE: 71 BPM
EOSINOPHILS ABSOLUTE: 0.1 K/UL (ref 0–0.6)
EOSINOPHILS RELATIVE PERCENT: 2.7 %
GFR AFRICAN AMERICAN: >60
GFR NON-AFRICAN AMERICAN: >60
GLUCOSE BLD-MCNC: 101 MG/DL (ref 70–99)
HCT VFR BLD CALC: 38.2 % (ref 36–48)
HEMOGLOBIN: 12.8 G/DL (ref 12–16)
LYMPHOCYTES ABSOLUTE: 0.7 K/UL (ref 1–5.1)
LYMPHOCYTES RELATIVE PERCENT: 20.2 %
MCH RBC QN AUTO: 32.8 PG (ref 26–34)
MCHC RBC AUTO-ENTMCNC: 33.5 G/DL (ref 31–36)
MCV RBC AUTO: 97.9 FL (ref 80–100)
MONOCYTES ABSOLUTE: 0.4 K/UL (ref 0–1.3)
MONOCYTES RELATIVE PERCENT: 12.2 %
NEUTROPHILS ABSOLUTE: 2.1 K/UL (ref 1.7–7.7)
NEUTROPHILS RELATIVE PERCENT: 64.3 %
PDW BLD-RTO: 13.9 % (ref 12.4–15.4)
PLATELET # BLD: 110 K/UL (ref 135–450)
PMV BLD AUTO: 9.4 FL (ref 5–10.5)
POTASSIUM REFLEX MAGNESIUM: 4.1 MMOL/L (ref 3.5–5.1)
RBC # BLD: 3.9 M/UL (ref 4–5.2)
SODIUM BLD-SCNC: 141 MMOL/L (ref 136–145)
TOTAL PROTEIN: 7.2 G/DL (ref 6.4–8.2)
TROPONIN: <0.01 NG/ML
WBC # BLD: 3.3 K/UL (ref 4–11)

## 2022-02-11 PROCEDURE — 2580000003 HC RX 258: Performed by: STUDENT IN AN ORGANIZED HEALTH CARE EDUCATION/TRAINING PROGRAM

## 2022-02-11 PROCEDURE — 93010 ELECTROCARDIOGRAM REPORT: CPT | Performed by: INTERNAL MEDICINE

## 2022-02-11 PROCEDURE — 80053 COMPREHEN METABOLIC PANEL: CPT

## 2022-02-11 PROCEDURE — 85025 COMPLETE CBC W/AUTO DIFF WBC: CPT

## 2022-02-11 PROCEDURE — 99285 EMERGENCY DEPT VISIT HI MDM: CPT

## 2022-02-11 PROCEDURE — 93005 ELECTROCARDIOGRAM TRACING: CPT | Performed by: STUDENT IN AN ORGANIZED HEALTH CARE EDUCATION/TRAINING PROGRAM

## 2022-02-11 PROCEDURE — 84484 ASSAY OF TROPONIN QUANT: CPT

## 2022-02-11 PROCEDURE — 96374 THER/PROPH/DIAG INJ IV PUSH: CPT

## 2022-02-11 PROCEDURE — 36415 COLL VENOUS BLD VENIPUNCTURE: CPT

## 2022-02-11 PROCEDURE — 71045 X-RAY EXAM CHEST 1 VIEW: CPT

## 2022-02-11 PROCEDURE — 6360000002 HC RX W HCPCS: Performed by: STUDENT IN AN ORGANIZED HEALTH CARE EDUCATION/TRAINING PROGRAM

## 2022-02-11 PROCEDURE — 6370000000 HC RX 637 (ALT 250 FOR IP): Performed by: STUDENT IN AN ORGANIZED HEALTH CARE EDUCATION/TRAINING PROGRAM

## 2022-02-11 RX ORDER — IPRATROPIUM BROMIDE AND ALBUTEROL SULFATE 2.5; .5 MG/3ML; MG/3ML
1 SOLUTION RESPIRATORY (INHALATION) ONCE
Status: COMPLETED | OUTPATIENT
Start: 2022-02-11 | End: 2022-02-11

## 2022-02-11 RX ORDER — KETOROLAC TROMETHAMINE 30 MG/ML
15 INJECTION, SOLUTION INTRAMUSCULAR; INTRAVENOUS ONCE
Status: COMPLETED | OUTPATIENT
Start: 2022-02-11 | End: 2022-02-11

## 2022-02-11 RX ORDER — 0.9 % SODIUM CHLORIDE 0.9 %
1000 INTRAVENOUS SOLUTION INTRAVENOUS ONCE
Status: COMPLETED | OUTPATIENT
Start: 2022-02-11 | End: 2022-02-11

## 2022-02-11 RX ADMIN — KETOROLAC TROMETHAMINE 15 MG: 30 INJECTION, SOLUTION INTRAMUSCULAR at 10:11

## 2022-02-11 RX ADMIN — IPRATROPIUM BROMIDE AND ALBUTEROL SULFATE 1 AMPULE: 2.5; .5 SOLUTION RESPIRATORY (INHALATION) at 10:16

## 2022-02-11 RX ADMIN — SODIUM CHLORIDE 1000 ML: 9 INJECTION, SOLUTION INTRAVENOUS at 10:10

## 2022-02-11 ASSESSMENT — PAIN DESCRIPTION - PROGRESSION
CLINICAL_PROGRESSION: GRADUALLY WORSENING
CLINICAL_PROGRESSION: GRADUALLY IMPROVING

## 2022-02-11 ASSESSMENT — PAIN DESCRIPTION - DESCRIPTORS
DESCRIPTORS: ACHING
DESCRIPTORS: BURNING

## 2022-02-11 ASSESSMENT — PAIN DESCRIPTION - ONSET
ONSET: ON-GOING
ONSET: ON-GOING

## 2022-02-11 ASSESSMENT — PAIN DESCRIPTION - LOCATION
LOCATION: GENERALIZED
LOCATION: CHEST

## 2022-02-11 ASSESSMENT — ENCOUNTER SYMPTOMS
COUGH: 0
SORE THROAT: 1
NAUSEA: 0
RHINORRHEA: 0
BACK PAIN: 1
PHOTOPHOBIA: 0
STRIDOR: 0
SHORTNESS OF BREATH: 1
ABDOMINAL PAIN: 0
VOMITING: 0

## 2022-02-11 ASSESSMENT — PAIN SCALES - GENERAL
PAINLEVEL_OUTOF10: 3
PAINLEVEL_OUTOF10: 1

## 2022-02-11 ASSESSMENT — PAIN DESCRIPTION - FREQUENCY
FREQUENCY: CONTINUOUS
FREQUENCY: INTERMITTENT

## 2022-02-11 ASSESSMENT — PAIN DESCRIPTION - ORIENTATION: ORIENTATION: RIGHT;LEFT

## 2022-02-11 ASSESSMENT — PAIN DESCRIPTION - PAIN TYPE
TYPE: ACUTE PAIN
TYPE: ACUTE PAIN

## 2022-02-11 NOTE — ED PROVIDER NOTES
[facility]      Pt Name: Marsha Perez  MRN: [id number]  [place] 1965  Date of evaluation: 2/11/2022  Provider: Arnaldo Fam DO    CHIEF COMPLAINT       Chief Complaint   Patient presents with    Concern For COVID-19     Exposed to Covid 5 days ago with symptoms starting last night         HISTORY OF PRESENT ILLNESS   (Location/Symptom, Timing/Onset, Context/Setting, Quality, Duration, Modifying Factors, Severity)  Note limiting factors. Marsha Perez is a 49 y.o. female who presents to the emergency department complaining of patient presents with roughly 24 hours of symptoms concerning for Covid including sore throat congestion cough, myalgias. Patient is non-Covid vaccinated. Patient was exposed to COVID roughly 5 days ago. Does have a history of asthma, COPD. Reports cardiac history including long QT status post pacemaker. Patient does have left-sided chest wall and back pain. No anterior chest pain. Mild shortness of breath. Symptoms have been worsening since onset. No history of DVT or PE no lower extremity symmetry or posterior calf pain. Nursing Notes were reviewed. PAST MEDICAL HISTORY     Past Medical History:   Diagnosis Date    Asthma     Atrial septal defect     had insertion of atrial septal occluder    Bursitis of left hip     CAD (coronary artery disease)     COPD (chronic obstructive pulmonary disease) (HCC)     History of blood transfusion     Kidney stone     Migraines     Mitral valve prolapse     MRSA (methicillin resistant staph aureus) culture positive 12/2015    Lungs.   Diagnosed [facility] with bronchoscopy    MVP (mitral valve prolapse)     PONV (postoperative nausea and vomiting)     Prolonged QT interval syndrome     S/P bronchoscopy     TIA (transient ischemic attack)     Vertigo          SURGICAL HISTORY       Past Surgical History:   Procedure Laterality Date    BREAST ENHANCEMENT SURGERY      augmentation    CARDIAC CATHETERIZATION      CARDIAC SURGERY      septum occluder    COLONOSCOPY  2012? polyps    CYSTOSCOPY  6/8/2016    [facility]    ENDOSCOPIC ULTRASOUND (UPPER)  05/08/2019    ERCP  05/08/2019    Sphincterotomy    ERCP N/A 5/8/2019    ERCP SPHINCTER/PAPILLOTOMY performed by Jon Cervantes DO at [address] [facility] ERCP  5/8/2019    ERCP STONE REMOVAL performed by Jon Cervantes DO at [facility] ARTHROSCOPY Left 02/08/2018    HIP SURGERY      HYSTERECTOMY      HYSTERECTOMY, TOTAL ABDOMINAL  1992    NASAL SINUS SURGERY  8/22/13    OTHER SURGICAL HISTORY      thoracic sympathectomy    PACEMAKER INSERTION      also revision x 3    GA HIP ARTHROSCOPY, DX Left 11/1/2018    LEFT HIP ARTHROSCOPY, LABRAL  DEBRIDEMENT, CHONDROPLASTY performed by Margarita Fierro MD at [address]  03/20/2017    UPPER GASTROINTESTINAL ENDOSCOPY N/A 5/8/2019    UPPER EUS W/ANES. performed by Jon Cervantes DO at [facility]       Discharge Medication List as of 2/11/2022 12:25 PM      CONTINUE these medications which have NOT CHANGED    Details   HYDROcodone-acetaminophen (NORCO) 5-325 MG per tablet Take 1 tablet by mouth every 6 hours as needed for Pain. Historical Med      ibuprofen (ADVIL;MOTRIN) 600 MG tablet Take 600 mg by mouth every 6 hours as needed for PainHistorical Med      butalbital-acetaminophen-caffeine (FIORICET, ESGIC) -40 MG per tablet Take 1 tablet by mouth every 6 hours as needed for Headaches, Disp-20 tablet, R-0Normal      albuterol sulfate  (90 Base) MCG/ACT inhaler as neededHistorical Med      nadolol (CORGARD) 40 MG tablet Take 120 mg by mouth daily Historical Med             ALLERGIES     Quinolones, Dexamethasone sodium phosphate, Other, Codeine, Nitroglycerin, and Prednisone    FAMILY HISTORY       Family History   Problem Relation Age of Onset    Heart Disease Mother     Colon Cancer Father         colon    Heart Disease Father     High Blood Pressure Father           SOCIAL HISTORY       Social History     Socioeconomic History    Marital status:      Spouse name: None    Number of children: None    Years of education: None    Highest education level: None   Occupational History    None   Tobacco Use    Smoking status: Former Smoker     Packs/day: 0.25     Years: 3.00     Pack years: 0.75     Types: Cigarettes     Quit date: 1998     Years since quittin.1    Smokeless tobacco: Never Used   Vaping Use    Vaping Use: Never used   Substance and Sexual Activity    Alcohol use: Yes     Comment: wine occ    Drug use: No    Sexual activity: Yes     Partners: Male   Other Topics Concern    None   Social History Narrative    None     Social Determinants of Health     Financial Resource Strain:     Difficulty of Paying Living Expenses: Not on file   Food Insecurity:     Worried About Running Out of Food in the Last Year: Not on file    Werner of Food in the Last Year: Not on file   Transportation Needs:     Lack of Transportation (Medical): Not on file    Lack of Transportation (Non-Medical):  Not on file   Physical Activity:     Days of Exercise per Week: Not on file    Minutes of Exercise per Session: Not on file   Stress:     Feeling of Stress : Not on file   Social Connections:     Frequency of Communication with Friends and Family: Not on file    Frequency of Social Gatherings with Friends and Family: Not on file    Attends Voodoo Services: Not on file    Active Member of Clubs or Organizations: Not on file    Attends Club or Organization Meetings: Not on file    Marital Status: Not on file   Intimate Partner Violence:     Fear of Current or Ex-Partner: Not on file    Emotionally Abused: Not on file    Physically Abused: Not on file    Sexually Abused: Not on file   Housing Stability:     Unable to Pay for Housing in the Last Year: Not on file    Number of Places Lived in the Last Year: Not on file    Unstable Housing in the Last Year: Not on file       SCREENINGS        Mi Coma Scale  Eye Opening: Spontaneous  Best Verbal Response: Oriented  Best Motor Response: Obeys commands  Mi Coma Scale Score: 15                   REVIEW OF SYSTEMS    (2-9 systems for level 4, 10 or more for level 5)   Review of Systems   Constitutional: Positive for activity change, appetite change, chills and fatigue. Negative for fever. HENT: Positive for congestion and sore throat. Negative for rhinorrhea. Eyes: Negative for photophobia and visual disturbance. Respiratory: Positive for shortness of breath. Negative for cough and stridor. Cardiovascular: Positive for chest pain. Negative for palpitations and leg swelling. Gastrointestinal: Negative for abdominal pain, nausea and vomiting. Genitourinary: Negative for decreased urine volume. Musculoskeletal: Positive for back pain and myalgias. Negative for neck pain and neck stiffness. Skin: Negative for rash. Allergic/Immunologic: Negative for environmental allergies. Hematological: Negative for adenopathy. Psychiatric/Behavioral: Negative for confusion. PHYSICAL EXAM    (up to 7 for level 4, 8 or more for level 5)   RECENT VITALS:     Temp: 98.5 °F (36.9 °C),  Pulse: 72, Resp: 16, BP: 126/80, SpO2: 99 %    Physical Exam  Constitutional:       General: She is not in acute distress. Appearance: She is ill-appearing. She is not toxic-appearing or diaphoretic. HENT:      Head: Normocephalic and atraumatic. Eyes:      Pupils: Pupils are equal, round, and reactive to light. Neck:      Trachea: No tracheal deviation. Cardiovascular:      Rate and Rhythm: Normal rate and regular rhythm. Pulmonary:      Effort: Pulmonary effort is normal. No respiratory distress.       Comments: Trace rhonchi left lung dictation. EMERGENCY DEPARTMENT COURSE and DIFFERENTIAL DIAGNOSIS/MDM:   Jaylon Bills is a 49 y.o. female who presents to the emergency department with the complaint of COVID-like symptoms including body aches cough fatigue congestion sore throat and unvaccinated patient with known positive COVID exposure. On arrival patient's vital signs are stable with SPO2 in the upper 90s. She does not appear to be labored. Does sound hoarse. Patient appears more ill than her vitals. She does have some cardiac history in the past requiring pacemaker placement. Doubt ACS in this patient however due to overall presentation will will obtain troponin EKG as well as basic labs. Will treat symptomatically with IV fluids, Toradol. High likelihood patient is Covid positive. Send Covid test out. At this point patient was able to ambulate for roughly 1 minute at bedside without significant abnormality. Heart rate did rise to around 120 beats but recovered within a couple of minutes at bedside. SPO2 maintained. High likelihood patient has COVID she is lymphopenic, with known exposure nonvaccinated. Symptomatically she improved after fluids, Toradol RRR feel she is stable for discharge home with monitoring. Chest x-ray clear, EKG and troponin not concerning for a cardiac etiology of her symptoms. Otherwise her labs are reassuring. Patient was given typical return precautions for suspected Covid infection. CRITICAL CARE TIME   Total Critical Care time was 0 minutes, excluding separately reportable procedures. There was a high probability of clinically significant/life threatening deterioration in the patient's condition which required my urgent intervention. Clinical concern   Intervention     CONSULTS:  None    PROCEDURES:  Unless otherwise noted below, none     Procedures        FINAL IMPRESSION      1.  Suspected COVID-19 virus infection          DISPOSITION/PLAN   DISPOSITION  dc      PATIENT REFERRED [facility]  [address] [place] [address]  [phone]    If symptoms worsen    Eusebio Cook MD  [address] [place] [address] [address] [place] [address] [facility]  [phone]    Schedule an appointment as soon as possible for a visit in 2 days        DISCHARGE MEDICATIONS:  Discharge Medication List as of 2/11/2022 12:25 PM        Controlled Substances Monitoring:     RX Monitoring 2/18/2021   Attestation -   Periodic Controlled Substance Monitoring Possible medication side effects, risk of tolerance/dependence & alternative treatments discussed. ;No signs of potential drug abuse or diversion identified.        (Please note that portions of this note were completed with a voice recognition program.  Efforts were made to edit the dictations but occasionally words are mis-transcribed.)    Carole Urena DO (electronically signed)  Attending Emergency Physician            Carole Urena DO  02/11/22 [id number]

## 2022-02-11 NOTE — ED NOTES
AVS provided and reviewed with the patient. The patient verbalized understanding of care at home, follow up care, and emergent symptoms to return for. No questions or concerns verbalized at this time. The patient is alert, oriented, stable, and ambulatory out of the department at the time of discharge.        Linn Sandoval RN  02/11/22 4091

## 2022-02-15 ENCOUNTER — APPOINTMENT (OUTPATIENT)
Dept: GENERAL RADIOLOGY | Age: 57
End: 2022-02-15
Payer: COMMERCIAL

## 2022-02-15 ENCOUNTER — HOSPITAL ENCOUNTER (EMERGENCY)
Age: 57
Discharge: HOME | End: 2022-02-15
Attending: EMERGENCY MEDICINE
Payer: COMMERCIAL

## 2022-02-15 ENCOUNTER — APPOINTMENT (OUTPATIENT)
Dept: CT IMAGING | Age: 57
End: 2022-02-15
Payer: COMMERCIAL

## 2022-02-15 VITALS
SYSTOLIC BLOOD PRESSURE: 109 MMHG | BODY MASS INDEX: 27.32 KG/M2 | WEIGHT: 170 LBS | HEART RATE: 82 BPM | TEMPERATURE: 99.1 F | HEIGHT: 66 IN | OXYGEN SATURATION: 96 % | RESPIRATION RATE: 15 BRPM | DIASTOLIC BLOOD PRESSURE: 83 MMHG

## 2022-02-15 DIAGNOSIS — N39.0 ACUTE UTI: ICD-10-CM

## 2022-02-15 DIAGNOSIS — R07.9 CHEST PAIN, UNSPECIFIED TYPE: ICD-10-CM

## 2022-02-15 DIAGNOSIS — U07.1 ACUTE COVID-19: Primary | ICD-10-CM

## 2022-02-15 LAB
A/G RATIO: 1.6 (ref 1.1–2.2)
ALBUMIN SERPL-MCNC: 4.5 G/DL (ref 3.4–5)
ALP BLD-CCNC: 109 U/L (ref 40–129)
ALT SERPL-CCNC: 22 U/L (ref 10–40)
ANION GAP SERPL CALCULATED.3IONS-SCNC: 12 MMOL/L (ref 3–16)
AST SERPL-CCNC: 27 U/L (ref 15–37)
BACTERIA: ABNORMAL /HPF
BASOPHILS ABSOLUTE: 0 K/UL (ref 0–0.2)
BASOPHILS RELATIVE PERCENT: 0.6 %
BILIRUB SERPL-MCNC: 0.3 MG/DL (ref 0–1)
BILIRUBIN URINE: ABNORMAL
BLOOD, URINE: NEGATIVE
BUN BLDV-MCNC: 10 MG/DL (ref 7–20)
CALCIUM SERPL-MCNC: 9.4 MG/DL (ref 8.3–10.6)
CHLORIDE BLD-SCNC: 103 MMOL/L (ref 99–110)
CLARITY: ABNORMAL
CO2: 23 MMOL/L (ref 21–32)
COLOR: YELLOW
CREAT SERPL-MCNC: 0.9 MG/DL (ref 0.6–1.1)
D DIMER: 232 NG/ML DDU (ref 0–229)
EOSINOPHILS ABSOLUTE: 0 K/UL (ref 0–0.6)
EOSINOPHILS RELATIVE PERCENT: 0.6 %
EPITHELIAL CELLS, UA: ABNORMAL /HPF (ref 0–5)
GFR AFRICAN AMERICAN: >60
GFR NON-AFRICAN AMERICAN: >60
GLUCOSE BLD-MCNC: 130 MG/DL (ref 70–99)
GLUCOSE URINE: NEGATIVE MG/DL
HCT VFR BLD CALC: 41.8 % (ref 36–48)
HEMOGLOBIN: 14.5 G/DL (ref 12–16)
KETONES, URINE: 15 MG/DL
LEUKOCYTE ESTERASE, URINE: ABNORMAL
LYMPHOCYTES ABSOLUTE: 0.8 K/UL (ref 1–5.1)
LYMPHOCYTES RELATIVE PERCENT: 20.6 %
MCH RBC QN AUTO: 33.1 PG (ref 26–34)
MCHC RBC AUTO-ENTMCNC: 34.6 G/DL (ref 31–36)
MCV RBC AUTO: 95.7 FL (ref 80–100)
MICROSCOPIC EXAMINATION: YES
MONOCYTES ABSOLUTE: 0.3 K/UL (ref 0–1.3)
MONOCYTES RELATIVE PERCENT: 8.1 %
MUCUS: ABNORMAL /LPF
NEUTROPHILS ABSOLUTE: 2.7 K/UL (ref 1.7–7.7)
NEUTROPHILS RELATIVE PERCENT: 70.1 %
NITRITE, URINE: POSITIVE
PDW BLD-RTO: 13.8 % (ref 12.4–15.4)
PH UA: 6 (ref 5–8)
PLATELET # BLD: 122 K/UL (ref 135–450)
PMV BLD AUTO: 9.1 FL (ref 5–10.5)
POTASSIUM REFLEX MAGNESIUM: 3.9 MMOL/L (ref 3.5–5.1)
PRO-BNP: 190 PG/ML (ref 0–124)
PROTEIN UA: 100 MG/DL
RBC # BLD: 4.37 M/UL (ref 4–5.2)
RBC UA: ABNORMAL /HPF (ref 0–4)
SODIUM BLD-SCNC: 138 MMOL/L (ref 136–145)
SPECIFIC GRAVITY UA: 1.02 (ref 1–1.03)
TOTAL PROTEIN: 7.3 G/DL (ref 6.4–8.2)
TROPONIN: <0.01 NG/ML
URINE TYPE: ABNORMAL
UROBILINOGEN, URINE: 1 E.U./DL
WBC # BLD: 3.9 K/UL (ref 4–11)
WBC UA: >100 /HPF (ref 0–5)

## 2022-02-15 PROCEDURE — 81001 URINALYSIS AUTO W/SCOPE: CPT

## 2022-02-15 PROCEDURE — 71260 CT THORAX DX C+: CPT

## 2022-02-15 PROCEDURE — 85025 COMPLETE CBC W/AUTO DIFF WBC: CPT

## 2022-02-15 PROCEDURE — 6370000000 HC RX 637 (ALT 250 FOR IP)

## 2022-02-15 PROCEDURE — 83880 ASSAY OF NATRIURETIC PEPTIDE: CPT

## 2022-02-15 PROCEDURE — 93005 ELECTROCARDIOGRAM TRACING: CPT | Performed by: EMERGENCY MEDICINE

## 2022-02-15 PROCEDURE — 96365 THER/PROPH/DIAG IV INF INIT: CPT

## 2022-02-15 PROCEDURE — 2580000003 HC RX 258: Performed by: EMERGENCY MEDICINE

## 2022-02-15 PROCEDURE — 6360000002 HC RX W HCPCS: Performed by: EMERGENCY MEDICINE

## 2022-02-15 PROCEDURE — 99285 EMERGENCY DEPT VISIT HI MDM: CPT

## 2022-02-15 PROCEDURE — 84484 ASSAY OF TROPONIN QUANT: CPT

## 2022-02-15 PROCEDURE — 6360000004 HC RX CONTRAST MEDICATION: Performed by: EMERGENCY MEDICINE

## 2022-02-15 PROCEDURE — 71045 X-RAY EXAM CHEST 1 VIEW: CPT

## 2022-02-15 PROCEDURE — 36415 COLL VENOUS BLD VENIPUNCTURE: CPT

## 2022-02-15 PROCEDURE — 85379 FIBRIN DEGRADATION QUANT: CPT

## 2022-02-15 PROCEDURE — 96375 TX/PRO/DX INJ NEW DRUG ADDON: CPT

## 2022-02-15 PROCEDURE — 80053 COMPREHEN METABOLIC PANEL: CPT

## 2022-02-15 RX ORDER — ONDANSETRON 2 MG/ML
4 INJECTION INTRAMUSCULAR; INTRAVENOUS ONCE
Status: COMPLETED | OUTPATIENT
Start: 2022-02-15 | End: 2022-02-15

## 2022-02-15 RX ORDER — KETOROLAC TROMETHAMINE 30 MG/ML
15 INJECTION, SOLUTION INTRAMUSCULAR; INTRAVENOUS ONCE
Status: COMPLETED | OUTPATIENT
Start: 2022-02-15 | End: 2022-02-15

## 2022-02-15 RX ORDER — SULFAMETHOXAZOLE AND TRIMETHOPRIM 800; 160 MG/1; MG/1
1 TABLET ORAL 2 TIMES DAILY
Qty: 14 TABLET | Refills: 0 | Status: SHIPPED | OUTPATIENT
Start: 2022-02-15 | End: 2022-02-22

## 2022-02-15 RX ORDER — 0.9 % SODIUM CHLORIDE 0.9 %
1000 INTRAVENOUS SOLUTION INTRAVENOUS ONCE
Status: COMPLETED | OUTPATIENT
Start: 2022-02-15 | End: 2022-02-15

## 2022-02-15 RX ORDER — ACETAMINOPHEN 500 MG
TABLET ORAL
Status: COMPLETED
Start: 2022-02-15 | End: 2022-02-15

## 2022-02-15 RX ORDER — PROCHLORPERAZINE EDISYLATE 5 MG/ML
10 INJECTION INTRAMUSCULAR; INTRAVENOUS ONCE
Status: COMPLETED | OUTPATIENT
Start: 2022-02-15 | End: 2022-02-15

## 2022-02-15 RX ORDER — DIPHENHYDRAMINE HYDROCHLORIDE 50 MG/ML
25 INJECTION INTRAMUSCULAR; INTRAVENOUS ONCE
Status: COMPLETED | OUTPATIENT
Start: 2022-02-15 | End: 2022-02-15

## 2022-02-15 RX ORDER — ACETAMINOPHEN 500 MG
1000 TABLET ORAL ONCE
Status: COMPLETED | OUTPATIENT
Start: 2022-02-15 | End: 2022-02-15

## 2022-02-15 RX ADMIN — DIPHENHYDRAMINE HYDROCHLORIDE 25 MG: 50 INJECTION INTRAMUSCULAR; INTRAVENOUS at 18:33

## 2022-02-15 RX ADMIN — ONDANSETRON HYDROCHLORIDE 4 MG: 2 INJECTION, SOLUTION INTRAMUSCULAR; INTRAVENOUS at 17:33

## 2022-02-15 RX ADMIN — SODIUM CHLORIDE 1000 ML: 9 INJECTION, SOLUTION INTRAVENOUS at 17:32

## 2022-02-15 RX ADMIN — ACETAMINOPHEN 1000 MG: 500 TABLET ORAL at 18:33

## 2022-02-15 RX ADMIN — CEFTRIAXONE SODIUM 1000 MG: 1 INJECTION, POWDER, FOR SOLUTION INTRAMUSCULAR; INTRAVENOUS at 18:39

## 2022-02-15 RX ADMIN — PROCHLORPERAZINE EDISYLATE 10 MG: 5 INJECTION INTRAMUSCULAR; INTRAVENOUS at 18:34

## 2022-02-15 RX ADMIN — Medication 1000 MG: at 18:33

## 2022-02-15 RX ADMIN — KETOROLAC TROMETHAMINE 15 MG: 30 INJECTION, SOLUTION INTRAMUSCULAR at 17:33

## 2022-02-15 RX ADMIN — IOPAMIDOL 75 ML: 755 INJECTION, SOLUTION INTRAVENOUS at 18:19

## 2022-02-15 ASSESSMENT — PAIN SCALES - GENERAL
PAINLEVEL_OUTOF10: 9
PAINLEVEL_OUTOF10: 6
PAINLEVEL_OUTOF10: 6

## 2022-02-15 ASSESSMENT — PAIN DESCRIPTION - PAIN TYPE
TYPE: ACUTE PAIN
TYPE: ACUTE PAIN

## 2022-02-15 ASSESSMENT — PAIN DESCRIPTION - FREQUENCY: FREQUENCY: CONTINUOUS

## 2022-02-15 ASSESSMENT — PAIN DESCRIPTION - DESCRIPTORS
DESCRIPTORS: ACHING
DESCRIPTORS: ACHING

## 2022-02-15 ASSESSMENT — PAIN DESCRIPTION - LOCATION
LOCATION: GENERALIZED
LOCATION: GENERALIZED

## 2022-02-15 ASSESSMENT — HEART SCORE: ECG: 0

## 2022-02-15 ASSESSMENT — PAIN DESCRIPTION - ONSET: ONSET: ON-GOING

## 2022-02-15 NOTE — ED PROVIDER NOTES
[facility] EMERGENCY DEPARTMENT    CHIEF COMPLAINT  Positive For Covid-19 (covid+ on friday, c/o n/v/d, headache, bodyaches )       HISTORY OF PRESENT ILLNESS  Tyron Marcelino is a 49 y.o. female who presents to the ED with complaint of nausea, vomiting, diarrhea, myalgias, and headache. Of note, the patient was seen in the emergency department 2/11/2022 with complaints of sore throat, congestion, cough, and myalgias that started the day prior. This occurred after an exposure to COVID approximately 5 days before the emergency department presentation at that time. Patient is not vaccinated for COVID-19. The patient reports persistent symptoms. She complains of low-grade temperature but no true fever. Complains of chest pain, achy, no exacerbating or remitting factors, diffuse, moderate, associated with cough. Complains of shortness of breath. Complains of nausea, vomiting, diarrhea. Emesis and stools are nonbloody. Complains of dysuria. Complains of moderate headache, not worst, not first, diffuse, dull. No other complaints, modifying factors or associated symptoms. I have reviewed the following from the nursing documentation:    Past Medical History:   Diagnosis Date    Asthma     Atrial septal defect     had insertion of atrial septal occluder    Bursitis of left hip     CAD (coronary artery disease)     COPD (chronic obstructive pulmonary disease) (HCC)     History of blood transfusion     Kidney stone     Migraines     Mitral valve prolapse     MRSA (methicillin resistant staph aureus) culture positive 12/2015    Lungs.   Diagnosed [facility] with bronchoscopy    MVP (mitral valve prolapse)     PONV (postoperative nausea and vomiting)     Prolonged QT interval syndrome     S/P bronchoscopy     TIA (transient ischemic attack)     Vertigo      Past Surgical History:   Procedure Laterality Date    BREAST ENHANCEMENT SURGERY      augmentation    CARDIAC CATHETERIZATION      CARDIAC SURGERY      septum occluder    COLONOSCOPY  ? polyps    CYSTOSCOPY  2016    U-Dil    ENDOSCOPIC ULTRASOUND (UPPER)  2019    ERCP  2019    Sphincterotomy    ERCP N/A 2019    ERCP SPHINCTER/PAPILLOTOMY performed by Velvet Montes DO at [address] ERCP  2019    ERCP STONE REMOVAL performed by Velvet Montes DO at [facility]  ARTHROSCOPY Left 2018    HIP SURGERY      HYSTERECTOMY      HYSTERECTOMY, TOTAL ABDOMINAL  1992    NASAL SINUS SURGERY  13    OTHER SURGICAL HISTORY      thoracic sympathectomy    PACEMAKER INSERTION      also revision x 3    MO HIP ARTHROSCOPY, DX Left 2018    LEFT HIP ARTHROSCOPY, LABRAL  DEBRIDEMENT, CHONDROPLASTY performed by Adams Mathews MD at [address]  2017    UPPER GASTROINTESTINAL ENDOSCOPY N/A 2019    UPPER EUS W/ANES.  performed by Velvet Montes DO at [address]  [facility]     Family History   Problem Relation Age of Onset    Heart Disease Mother     Colon Cancer Father         colon    Heart Disease Father     High Blood Pressure Father      Social History     Socioeconomic History    Marital status:      Spouse name: Not on file    Number of children: Not on file    Years of education: Not on file    Highest education level: Not on file   Occupational History    Not on file   Tobacco Use    Smoking status: Former Smoker     Packs/day: 0.25     Years: 3.00     Pack years: 0.75     Types: Cigarettes     Quit date: 1998     Years since quittin.1    Smokeless tobacco: Never Used   Vaping Use    Vaping Use: Never used   Substance and Sexual Activity    Alcohol use: Yes     Comment: wine occ    Drug use: No    Sexual activity: Yes     Partners: Male   Other Topics Concern    Not on file   Social History Narrative    Not on file     Social Determinants of Health     Financial Resource Strain:     Difficulty of Paying Living Expenses: Not on file   Food Insecurity:     Worried About Running Out of Food in the Last Year: Not on file    Werner of Food in the Last Year: Not on file   Transportation Needs:     Lack of Transportation (Medical): Not on file    Lack of Transportation (Non-Medical):  Not on file   Physical Activity:     Days of Exercise per Week: Not on file    Minutes of Exercise per Session: Not on file   Stress:     Feeling of Stress : Not on file   Social Connections:     Frequency of Communication with Friends and Family: Not on file    Frequency of Social Gatherings with Friends and Family: Not on file    Attends Sikhism Services: Not on file    Active Member of [address] [place] [address] [employer] or Organizations: Not on file    Attends Club or Organization Meetings: Not on file    Marital Status: Not on file   Intimate Partner Violence:     Fear of Current or Ex-Partner: Not on file    Emotionally Abused: Not on file    Physically Abused: Not on file    Sexually Abused: Not on file   Housing Stability:     Unable to Pay for Housing in the Last Year: Not on file    Number of [place] in the Last Year: Not on file    Unstable Housing in the Last Year: Not on file     Current Facility-Administered Medications   Medication Dose Route Frequency Provider Last Rate Last Admin    cefTRIAXone (ROCEPHIN) 1000 mg IVPB in 50 mL D5W minibag  1,000 mg IntraVENous Once Shivam Falcon  mL/hr at 02/15/22 1839 1,000 mg at 02/15/22 1839     Current Outpatient Medications   Medication Sig Dispense Refill    ibuprofen (ADVIL;MOTRIN) 600 MG tablet Take 600 mg by mouth every 6 hours as needed for Pain      albuterol sulfate  (90 Base) MCG/ACT inhaler as needed      nadolol (CORGARD) 40 MG tablet Take 120 mg by mouth daily        Allergies   Allergen Reactions    Quinolones      \"because of heart\"    Dexamethasone Sodium Phosphate Other (See Comments)     \"I feel hot all over\"  Other Hives     Adhesives      Codeine Nausea Only     Itchy nose    Nitroglycerin Other (See Comments)     Cardiologist states patient is not to have it, pt does not know why. Patient states \"it can put me into cardiac arrest\"    Prednisone      Other reaction(s): Flushing  \"I turn red and really hot\"       REVIEW OF SYSTEMS  10 systems reviewed, pertinent positives and negatives per HPI, otherwise noted to be negative. PHYSICAL EXAM  ED Triage Vitals [02/15/22 1655]   BP Temp Temp Source Pulse Resp SpO2 Height Weight   99/71 99.1 °F (37.3 °C) Oral 72 18 95 % 5' 6\" (1.676 m) 170 lb (77.1 kg)     General appearance: Awake and alert. Cooperative. No acute distress. HENT: Normocephalic. Atraumatic. Mucous membranes are moist.  Neck: Supple. Eyes: PERRL. EOMI. Heart/Chest: RRR. Lungs: No increased work of breathing. Speaking in full sentences. Abdomen: Non-distended. Musculoskeletal: No extremity edema. No deformity. Skin: No acute rashes. Neurological: Alert and oriented. CN II-XII intact. Gait normal.  Psychiatric: Mood/affect: normal      LABS  I have reviewed all labs for this visit.    Results for orders placed or performed during the hospital encounter of 02/15/22   CBC Auto Differential   Result Value Ref Range    WBC 3.9 (L) 4.0 - 11.0 K/uL    RBC 4.37 4.00 - 5.20 M/uL    Hemoglobin 14.5 12.0 - 16.0 g/dL    Hematocrit 41.8 36.0 - 48.0 %    MCV 95.7 80.0 - 100.0 fL    MCH 33.1 26.0 - 34.0 pg    MCHC 34.6 31.0 - 36.0 g/dL    RDW 13.8 12.4 - 15.4 %    Platelets 297 (L) 304 - 450 K/uL    MPV 9.1 5.0 - 10.5 fL    Neutrophils % 70.1 %    Lymphocytes % 20.6 %    Monocytes % 8.1 %    Eosinophils % 0.6 %    Basophils % 0.6 %    Neutrophils Absolute 2.7 1.7 - 7.7 K/uL    Lymphocytes Absolute 0.8 (L) 1.0 - 5.1 K/uL    Monocytes Absolute 0.3 0.0 - 1.3 K/uL    Eosinophils Absolute 0.0 0.0 - 0.6 K/uL    Basophils Absolute 0.0 0.0 - 0.2 K/uL   Comprehensive Metabolic Panel w/ Reflex to MG   Result Value Ref Range    Sodium 138 136 - 145 mmol/L    Potassium reflex Magnesium 3.9 3.5 - 5.1 mmol/L    Chloride 103 99 - 110 mmol/L    CO2 23 21 - 32 mmol/L    Anion Gap 12 3 - 16    Glucose 130 (H) 70 - 99 mg/dL    BUN 10 7 - 20 mg/dL    CREATININE 0.9 0.6 - 1.1 mg/dL    GFR Non-African American >60 >60    GFR African American >60 >60    Calcium 9.4 8.3 - 10.6 mg/dL    Total Protein 7.3 6.4 - 8.2 g/dL    Albumin 4.5 3.4 - 5.0 g/dL    Albumin/Globulin Ratio 1.6 1.1 - 2.2    Total Bilirubin 0.3 0.0 - 1.0 mg/dL    Alkaline Phosphatase 109 40 - 129 U/L    ALT 22 10 - 40 U/L    AST 27 15 - 37 U/L   Troponin   Result Value Ref Range    Troponin <0.01 <0.01 ng/mL   Brain Natriuretic Peptide   Result Value Ref Range    Pro- (H) 0 - 124 pg/mL   D-dimer, quantitative   Result Value Ref Range    D-Dimer, Quant 232 (H) 0 - 229 ng/mL DDU   Urinalysis, reflex to microscopic   Result Value Ref Range    Color, UA Yellow Straw/Yellow    Clarity, UA SL CLOUDY (A) Clear    Glucose, Ur Negative Negative mg/dL    Bilirubin Urine SMALL (A) Negative    Ketones, Urine 15 (A) Negative mg/dL    Specific Gravity, UA 1.025 1.005 - 1.030    Blood, Urine Negative Negative    pH, UA 6.0 5.0 - 8.0    Protein,  (A) Negative mg/dL    Urobilinogen, Urine 1.0 <2.0 E.U./dL    Nitrite, Urine POSITIVE (A) Negative    Leukocyte Esterase, Urine LARGE (A) Negative    Microscopic Examination YES     Urine Type NotGiven    Microscopic Urinalysis   Result Value Ref Range    Mucus, UA Rare (A) None Seen /LPF    WBC, UA >100 (A) 0 - 5 /HPF    RBC, UA 3-4 0 - 4 /HPF    Epithelial Cells, UA 2-5 0 - 5 /HPF    Bacteria, UA 1+ (A) None Seen /HPF   EKG 12 Lead   Result Value Ref Range    Ventricular Rate 70 BPM    Atrial Rate 70 BPM    P-R Interval 148 ms    QRS Duration 74 ms    Q-T Interval 480 ms    QTc Calculation (Bazett) 518 ms    P Axis 62 degrees    R Axis 54 degrees    T Axis 257 degrees    Diagnosis       Electronic atrial pacemakerST & T wave abnormality, consider anterolateral ischemiaProlonged QTAbnormal ECGNo previous ECGs available       RADIOLOGY  I have reviewed all radiographic studies for this visit. CT CHEST PULMONARY EMBOLISM W CONTRAST   Preliminary Result   1. No evidence of pulmonary embolic disease. 2. Patchy ground-glass opacification primarily localized to the right upper   lobe. Imaging features can be seen with COVID-19 pneumonia, though are   nonspecific and can occur with a variety of infectious and noninfectious   processes. PneInd   3. Trace bilateral pleural effusions. Linear bands of atelectasis in the   lower lung fields bilaterally. XR CHEST PORTABLE   Final Result   Faint right upper lung opacity. Correlate with presentation. ECG  EKG interpreted by myself. Rate: normal  Rhythm: Electronic atrial pacemaker  Axis: normal  Intervals:  QRS 74   ST Segments: ST/T wave abnormality of the anterolateral leads, stable from prior  Comparison: Compared to 2/11/22, there is no significant change  Impression: Electronic atrial pacemaker with ST/T wave abnormality of the anterolateral leads, prolonged QTC       ED COURSE/MDM  Patient seen and evaluated. Old records reviewed. Labs and imaging reviewed and results discussed with patient/family to extent possible. This is a 72-year-old female recently diagnosed with COVID-19 presenting with complaints of nausea, vomiting, diarrhea, myalgias, headache, chest pain, shortness of breath, dysuria. The patient's constellation of symptoms are all consistent with her COVID-19 infection. With respect to the patient's complaint of chest pain:   The patient's EKG reveals no acute ischemic abnormalities. Troponin is within normal limits. The presentation is not consistent with pulmonary embolism based on negative CTPA (obtained after positive D-dimer).  Not consistent with esophageal rupture as patient is nontoxic and no history of multiple episodes of forceful vomiting. Not consistent with pneumothorax as negative chest x-ray. Not consistent with aortic dissection as pain not tearing, not sudden in onset, not migratory, pulses are symmetric, and there are no neurological deficits. As below, patient's HEART score calculates to low risk, and therefore supports early discharge with close PCP follow up. HEART SCORE:    History: +0 for low suspicion  EKG: +0 for normal EKG   Age: +1 for age 44-72 years  Risk factors (includes HLD, HTN, DM, tobacco use, obesity, and +FHx): +2 for known CAD or 3+ risk factors  Initial troponin: +0 for negative troponin    Heart score: 3. This falls under the following category: Score of 0-3, which indicates a very low risk for major adverse cardiac event and supports early discharge      With respect to the patient's complaint of headache: The headache is consistent with patient's previous headaches. It is not described as worst headache of life. It was not \"thunderclap\"/maximal in intensity at onset. There are no associated neurological deficits. As such, I do not believe the headache is consistent with subarachnoid hemorrhage, dural venous sinus thrombosis, or other intracranial hemorrhage and therefore do not believe the patient would benefit from further evaluation for such. Patient was treated with a 1 L IV normal saline bolus, 4 mg Zofran, 15 mg Toradol, 10 mg Compazine, and 25 mg diphenhydramine, with improvement in her symptoms. Plan is discharge to home with prescription for Bactrim for UTI. Quarantine per [facility] guidelines for COVID-19. Close follow-up with PCP in several days. Usual strict return precautions for new or worsening symptoms communicated.       During the patient's ED course, the patient was given:  Medications   cefTRIAXone (ROCEPHIN) 1000 mg IVPB in 50 mL D5W minibag (1,000 mg IntraVENous New Bag 2/15/22 6557)   0.9 % sodium chloride bolus (1,000 mLs IntraVENous New Bag 2/15/22 7434) ondansetron (ZOFRAN) injection 4 mg (4 mg IntraVENous Given 2/15/22 1733)   ketorolac (TORADOL) injection 15 mg (15 mg IntraVENous Given 2/15/22 1733)   iopamidol (ISOVUE-370) 76 % injection 75 mL (75 mLs IntraVENous Given 2/15/22 1819)   acetaminophen (TYLENOL) tablet 1,000 mg (1,000 mg Oral Given 2/15/22 1833)   prochlorperazine (COMPAZINE) injection 10 mg (10 mg IntraVENous Given 2/15/22 1834)   diphenhydrAMINE (BENADRYL) injection 25 mg (25 mg IntraVENous Given 2/15/22 1833)        All questions were answered and the patient/family expressed understanding and agreement with the plan. PROCEDURES  None    CRITICAL CARE  N/A    CLINICAL IMPRESSION  1. Acute COVID-19    2. Acute UTI    3. Chest pain, unspecified type        DISPOSITION   discharge     Earl Bradford MD    Note: This chart was created using voice recognition dictation software. Efforts were made by me to ensure accuracy, however some errors may be present due to limitations of this technology and occasionally words are not transcribed correctly.         Earl Bradford MD  02/15/22 6601

## 2022-02-16 ENCOUNTER — CARE COORDINATION (OUTPATIENT)
Dept: CARE COORDINATION | Age: 57
End: 2022-02-16

## 2022-02-16 LAB
EKG ATRIAL RATE: 70 BPM
EKG DIAGNOSIS: NORMAL
EKG P AXIS: 62 DEGREES
EKG P-R INTERVAL: 148 MS
EKG Q-T INTERVAL: 480 MS
EKG QRS DURATION: 74 MS
EKG QTC CALCULATION (BAZETT): 518 MS
EKG R AXIS: 54 DEGREES
EKG T AXIS: 257 DEGREES
EKG VENTRICULAR RATE: 70 BPM

## 2022-02-16 PROCEDURE — 93010 ELECTROCARDIOGRAM REPORT: CPT | Performed by: INTERNAL MEDICINE

## 2022-02-16 NOTE — ED NOTES
Discharge instructions and medications reviewed with patient. Patient verbalized understanding of medications and follow up. All questions answered at this time. IV removed, dressing applied, and bleeding controlled. Skin warm, pink, and dry. Patient alert and oriented x4. Pt ambulated to lobby with a stable gait. Patient discharged home with 1 prescriptions.       Jannie Mayer RN  02/15/22 2112

## 2022-02-16 NOTE — CARE COORDINATION
Patient contacted regarding COVID-19 diagnosis. Discussed COVID-19 related testing which was available at this time. Test results were positive. Patient informed of results, if available? Yes. Ambulatory Care Manager contacted the patient by telephone to perform post discharge assessment. Call within 2 business days of discharge: Yes. Verified name and  with patient as identifiers. Provided introduction to self, and explanation of the CTN/ACM role, and reason for call due to risk factors for infection and/or exposure to COVID-19. Symptoms reviewed with patient who verbalized the following symptoms: pain or aching joints, cough, no new symptoms and no worsening symptoms. Due to no new or worsening symptoms encounter was not routed to provider for escalation. Discussed follow-up appointments. If no appointment was previously scheduled, appointment scheduling offered: Yes. [facility] follow up appointment(s): No future appointments. Non-[facility] follow up appointment(s):   Patient will call DR Thien Vaughan today to inquire about monoclonal antibody therapy? She is day 5 of sx  Strict return precautions  Feeling better today SpO2 100% on RA  Has inhalers, nebulizer    Non-face-to-face services provided:  Obtained and reviewed discharge summary and/or continuity of care documents  Reviewed and followed up on pending diagnostic tests and treatments-covid  Education of patient/family/caregiver/guardian to support self-management-follow up w pcpc, monoclonal antibody therapy, fluids healthy diet, sx mgt, strict return precautions, my chart     Advance Care Planning:   Does patient have an Advance Directive:  not on file. Educated patient about risk for severe COVID-19 due to risk factors according to CDC guidelines. ACM reviewed discharge instructions, medical action plan and red flag symptoms with the patient who verbalized understanding. Discussed COVID vaccination status: Yes.  Education provided on COVID-19 vaccination as appropriate. Discussed exposure protocols and quarantine with CDC Guidelines. Patient was given an opportunity to verbalize any questions and concerns and agrees to contact ACM or health care provider for questions related to their healthcare. Reviewed and educated patient on any new and changed medications related to discharge diagnosis     Was patient discharged with a pulse oximeter? Has one at home, daughter nurse, patient has COPD. Discussed and confirmed pulse oximeter discharge instructions and when to notify provider or seek emergency care. ACM provided contact information. No further follow-up call identified based on severity of symptoms and risk factors.

## 2022-02-18 ENCOUNTER — HOSPITAL ENCOUNTER (OUTPATIENT)
Dept: NURSING | Age: 57
Setting detail: INFUSION SERIES
Discharge: HOME | End: 2022-02-18
Payer: COMMERCIAL

## 2022-02-18 VITALS
SYSTOLIC BLOOD PRESSURE: 98 MMHG | DIASTOLIC BLOOD PRESSURE: 71 MMHG | WEIGHT: 170 LBS | OXYGEN SATURATION: 92 % | HEART RATE: 72 BPM | TEMPERATURE: 98.4 F | HEIGHT: 66 IN | BODY MASS INDEX: 27.32 KG/M2 | RESPIRATION RATE: 16 BRPM

## 2022-02-18 PROCEDURE — 96417 CHEMO IV INFUS EACH ADDL SEQ: CPT

## 2022-02-18 PROCEDURE — 2580000003 HC RX 258: Performed by: FAMILY MEDICINE

## 2022-02-18 PROCEDURE — M0247 HC SOTROVIMAB INFUSION: HCPCS

## 2022-02-18 PROCEDURE — 6360000002 HC RX W HCPCS: Performed by: FAMILY MEDICINE

## 2022-02-18 PROCEDURE — 99203 OFFICE O/P NEW LOW 30 MIN: CPT

## 2022-02-18 RX ORDER — CELECOXIB 100 MG/1
100 CAPSULE ORAL 2 TIMES DAILY
COMMUNITY
End: 2022-09-14

## 2022-02-18 RX ORDER — PREGABALIN 100 MG/1
100 CAPSULE ORAL 2 TIMES DAILY
COMMUNITY
End: 2022-09-14

## 2022-02-18 RX ADMIN — SODIUM CHLORIDE 500 MG: 9 INJECTION, SOLUTION INTRAVENOUS at 09:01

## 2022-02-18 ASSESSMENT — PAIN - FUNCTIONAL ASSESSMENT: PAIN_FUNCTIONAL_ASSESSMENT: 0-10

## 2022-02-18 ASSESSMENT — PAIN SCALES - GENERAL
PAINLEVEL_OUTOF10: 9
PAINLEVEL_OUTOF10: 9

## 2022-02-18 ASSESSMENT — PAIN DESCRIPTION - DESCRIPTORS: DESCRIPTORS: ACHING;SHARP

## 2022-02-18 NOTE — PROGRESS NOTES
Pt resting quietly, c/o HA and pain in chest with coughing. Dry cough, pt playing on phone at this time.  No distress noted, NSR on monitor

## 2022-02-18 NOTE — PROGRESS NOTES
Pt discharged to home in stable condition. Verbal and written discharge instructions given pt verbalized understanding. Pt ambulatory to car in stable condition.

## 2022-03-14 ENCOUNTER — HOSPITAL ENCOUNTER (EMERGENCY)
Age: 57
Discharge: HOME | End: 2022-03-14
Payer: COMMERCIAL

## 2022-03-14 VITALS
DIASTOLIC BLOOD PRESSURE: 94 MMHG | SYSTOLIC BLOOD PRESSURE: 130 MMHG | OXYGEN SATURATION: 100 % | HEART RATE: 78 BPM | RESPIRATION RATE: 18 BRPM | TEMPERATURE: 97.8 F

## 2022-03-14 DIAGNOSIS — Z76.89 ENCOUNTER FOR PSYCHIATRIC ASSESSMENT: Primary | ICD-10-CM

## 2022-03-14 LAB
A/G RATIO: 1.7 (ref 1.1–2.2)
ACETAMINOPHEN LEVEL: <5 UG/ML (ref 10–30)
ALBUMIN SERPL-MCNC: 5 G/DL (ref 3.4–5)
ALP BLD-CCNC: 148 U/L (ref 40–129)
ALT SERPL-CCNC: 26 U/L (ref 10–40)
AMPHETAMINE SCREEN, URINE: ABNORMAL
ANION GAP SERPL CALCULATED.3IONS-SCNC: 10 MMOL/L (ref 3–16)
AST SERPL-CCNC: 29 U/L (ref 15–37)
BARBITURATE SCREEN URINE: ABNORMAL
BASOPHILS ABSOLUTE: 0.1 K/UL (ref 0–0.2)
BASOPHILS RELATIVE PERCENT: 0.9 %
BENZODIAZEPINE SCREEN, URINE: ABNORMAL
BILIRUB SERPL-MCNC: 0.3 MG/DL (ref 0–1)
BUN BLDV-MCNC: 11 MG/DL (ref 7–20)
CALCIUM SERPL-MCNC: 9.9 MG/DL (ref 8.3–10.6)
CANNABINOID SCREEN URINE: POSITIVE
CHLORIDE BLD-SCNC: 103 MMOL/L (ref 99–110)
CO2: 28 MMOL/L (ref 21–32)
COCAINE METABOLITE SCREEN URINE: ABNORMAL
CREAT SERPL-MCNC: 0.7 MG/DL (ref 0.6–1.1)
EOSINOPHILS ABSOLUTE: 0.1 K/UL (ref 0–0.6)
EOSINOPHILS RELATIVE PERCENT: 1.9 %
ETHANOL: NORMAL MG/DL (ref 0–0.08)
GFR AFRICAN AMERICAN: >60
GFR NON-AFRICAN AMERICAN: >60
GLUCOSE BLD-MCNC: 97 MG/DL (ref 70–99)
HCT VFR BLD CALC: 42.9 % (ref 36–48)
HEMOGLOBIN: 14.7 G/DL (ref 12–16)
INFLUENZA A: NOT DETECTED
INFLUENZA B: NOT DETECTED
LYMPHOCYTES ABSOLUTE: 1.1 K/UL (ref 1–5.1)
LYMPHOCYTES RELATIVE PERCENT: 15.6 %
Lab: ABNORMAL
MCH RBC QN AUTO: 33 PG (ref 26–34)
MCHC RBC AUTO-ENTMCNC: 34.2 G/DL (ref 31–36)
MCV RBC AUTO: 96.5 FL (ref 80–100)
METHADONE SCREEN, URINE: ABNORMAL
MONOCYTES ABSOLUTE: 0.5 K/UL (ref 0–1.3)
MONOCYTES RELATIVE PERCENT: 6.3 %
NEUTROPHILS ABSOLUTE: 5.4 K/UL (ref 1.7–7.7)
NEUTROPHILS RELATIVE PERCENT: 75.3 %
OPIATE SCREEN URINE: ABNORMAL
OXYCODONE URINE: ABNORMAL
PDW BLD-RTO: 15.1 % (ref 12.4–15.4)
PH UA: 7
PHENCYCLIDINE SCREEN URINE: ABNORMAL
PLATELET # BLD: 171 K/UL (ref 135–450)
PMV BLD AUTO: 9.8 FL (ref 5–10.5)
POTASSIUM REFLEX MAGNESIUM: 4.3 MMOL/L (ref 3.5–5.1)
PROPOXYPHENE SCREEN: ABNORMAL
RBC # BLD: 4.45 M/UL (ref 4–5.2)
SALICYLATE, SERUM: <0.3 MG/DL (ref 15–30)
SARS-COV-2 RNA, RT PCR: NOT DETECTED
SODIUM BLD-SCNC: 141 MMOL/L (ref 136–145)
TOTAL PROTEIN: 7.9 G/DL (ref 6.4–8.2)
WBC # BLD: 7.2 K/UL (ref 4–11)

## 2022-03-14 PROCEDURE — 99284 EMERGENCY DEPT VISIT MOD MDM: CPT

## 2022-03-14 PROCEDURE — 85025 COMPLETE CBC W/AUTO DIFF WBC: CPT

## 2022-03-14 PROCEDURE — 87636 SARSCOV2 & INF A&B AMP PRB: CPT

## 2022-03-14 PROCEDURE — 6370000000 HC RX 637 (ALT 250 FOR IP): Performed by: PHYSICIAN ASSISTANT

## 2022-03-14 PROCEDURE — 80179 DRUG ASSAY SALICYLATE: CPT

## 2022-03-14 PROCEDURE — 80307 DRUG TEST PRSMV CHEM ANLYZR: CPT

## 2022-03-14 PROCEDURE — 80053 COMPREHEN METABOLIC PANEL: CPT

## 2022-03-14 PROCEDURE — 36415 COLL VENOUS BLD VENIPUNCTURE: CPT

## 2022-03-14 PROCEDURE — 82077 ASSAY SPEC XCP UR&BREATH IA: CPT

## 2022-03-14 PROCEDURE — 80143 DRUG ASSAY ACETAMINOPHEN: CPT

## 2022-03-14 RX ORDER — HYDROXYZINE PAMOATE 25 MG/1
25 CAPSULE ORAL 3 TIMES DAILY PRN
Qty: 21 CAPSULE | Refills: 0 | Status: SHIPPED | OUTPATIENT
Start: 2022-03-14 | End: 2022-03-21

## 2022-03-14 RX ORDER — HYDROXYZINE 50 MG/1
25 TABLET, FILM COATED ORAL ONCE
Status: COMPLETED | OUTPATIENT
Start: 2022-03-14 | End: 2022-03-14

## 2022-03-14 RX ADMIN — HYDROXYZINE HYDROCHLORIDE 25 MG: 50 TABLET, FILM COATED ORAL at 12:32

## 2022-03-14 ASSESSMENT — ENCOUNTER SYMPTOMS
GASTROINTESTINAL NEGATIVE: 1
RESPIRATORY NEGATIVE: 1

## 2022-03-14 ASSESSMENT — PAIN SCALES - GENERAL: PAINLEVEL_OUTOF10: 10

## 2022-03-14 ASSESSMENT — PAIN - FUNCTIONAL ASSESSMENT: PAIN_FUNCTIONAL_ASSESSMENT: 0-10

## 2022-03-14 ASSESSMENT — PAIN DESCRIPTION - DESCRIPTORS: DESCRIPTORS: ACHING

## 2022-03-14 ASSESSMENT — PAIN DESCRIPTION - LOCATION: LOCATION: OTHER (COMMENT)

## 2022-03-14 ASSESSMENT — PAIN DESCRIPTION - PAIN TYPE: TYPE: CHRONIC PAIN

## 2022-03-14 NOTE — ED NOTES
Patient anxious and requested something for anxiety. Notified Dr. Carl Worley. New order for Vistaril. Vistaril given po per order see JEANNE Mccabe RN  03/14/22 2620

## 2022-03-14 NOTE — ED NOTES
Patient brought back from triage. Patient changed into safety gown and belongings locked into locker. Patient oriented to [facility] AN AFFILIATE OF [facility]. Will continue to monitor patient.       Constanza Hackett RN  03/14/22 5414

## 2022-03-14 NOTE — ED NOTES
Patient has order to discharge home. Patient given discharge instructions. Patient startes she understands. Patient left to home.       Yudelka Harper RN  03/14/22 8907

## 2022-03-14 NOTE — Clinical Note
Jose Meza was seen and treated in our emergency department on 3/14/2022. She may return to work on 03/15/2022. If you have any questions or concerns, please don't hesitate to call.       Gautam Cruz PA-C

## 2022-03-14 NOTE — DISCHARGE INSTR - COC
Continuity of Care Form    Patient Name: Coy Franks   :  1965  MRN:  [id number]    Admit date:  3/14/2022  Discharge date:  ***    Code Status Order: Prior   Advance Directives:      Admitting Physician:  No admitting provider for patient encounter. PCP: Елена Martinez MD    Discharging Nurse: [facility] Unit/Room#: B3/B3  Discharging Unit Phone Number: ***    Emergency Contact:   Extended Emergency Contact Information  Primary Emergency Contact: [facility]  Address: [address], [address]  Renee Severe of [address] [facility] Phone: [phone]  Mobile Phone: [phone]  Relation: Domestic Partner  Secondary Emergency Contact: Renetta Hendricks of [address] [facility] Phone: [phone]  Mobile Phone: [phone]  Relation: Parent    Past Surgical History:  Past Surgical History:   Procedure Laterality Date    BREAST ENHANCEMENT SURGERY      augmentation    CARDIAC CATHETERIZATION      CARDIAC SURGERY      septum occluder    COLONOSCOPY  ? polyps    CYSTOSCOPY  2016    U-Dil    ENDOSCOPIC ULTRASOUND (UPPER)  2019    ERCP  2019    Sphincterotomy    ERCP N/A 2019    ERCP SPHINCTER/PAPILLOTOMY performed by Malik Pastor DO at [facility]    ERCP  2019    ERCP STONE REMOVAL performed by Malik Pastor DO at [address] ARTHROSCOPY Left 2018    HIP SURGERY      HYSTERECTOMY      HYSTERECTOMY, TOTAL ABDOMINAL  1992    NASAL SINUS SURGERY  13    OTHER SURGICAL HISTORY      thoracic sympathectomy    PACEMAKER INSERTION      also revision x 3    ME HIP ARTHROSCOPY, DX Left 2018    LEFT HIP ARTHROSCOPY, LABRAL  DEBRIDEMENT, CHONDROPLASTY performed by Teressa Diaz MD at [address] [place] [address]  2017    UPPER GASTROINTESTINAL ENDOSCOPY N/A 2019    UPPER EUS W/ANES.  performed by Malik Pastor DO at [address] ENDOSCOPY       Immunization History:   Immunization History   Administered Date(s) Administered    Pneumococcal Polysaccharide (Bawfpytoo48) 2015       Active Problems:  Patient Active Problem List   Diagnosis Code    Toe fracture, right S92.911A    Dyspnea R06.00    Chest heaviness R07.89    Asthma exacerbation J45.901    Acute tracheobronchitis J20.9    Recurrent respiratory infection J98.8    Hip strain, left, initial encounter S76.012A    Acute pain of left knee M25.562    Chondromalacia patellae of left knee M22.42    Primary osteoarthritis of left knee M17.12    Right upper quadrant abdominal pain R10.11    Chest pain R07.9    Cardiac pacemaker in situ Z95.0    Congenital long QT syndrome I45.81    S/P atrial septal defect closure Z87.74    Intractable headache R51.9       Isolation/Infection:   Isolation            No Isolation          Patient Infection Status       Infection Onset Added Last Indicated Last Indicated By Review Planned Expiration Resolved Resolved By    None active    Resolved    COVID-19 (Rule Out) 22 COVID-19 & Influenza Combo (Ordered)   22 Rule-Out Test Resulted    COVID-19 (Rule Out) 20 COVID-19 (Ordered)   20 Rule-Out Test Resulted            Nurse Assessment:  Last Vital Signs: BP (!) 148/107   Pulse 74   Temp 97 °F (36.1 °C) (Tympanic)   Resp 20   SpO2 97%     Last documented pain score (0-10 scale): Pain Level: 10  Last Weight:   Wt Readings from Last 1 Encounters:   22 170 lb (77.1 kg)     Mental Status:  {IP PT MENTAL STATUS:}    IV Access:  { MARIBEL IV ACCESS:179238213}    Nursing Mobility/ADLs:  Walking   {P DME KJQV:411619693}  Transfer  {P DME LDFZ:970089056}  Bathing  {P DME HZH}  Dressing  {P DME SMNE:547780699}  Toileting  {P DME UAE}  Feeding  {[facility] [place] [facility] DME ANSN:346788769}  Med Admin  {[facility] [place] [facility] DME CHIT:610364280}  Med Delivery   {[facility] MED Delivery:627235155}    Wound Care Documentation and Therapy:        Elimination:  Continence: Bowel: {YES / XX:63931}  Bladder: {YES / LV:36086}  Urinary Catheter: {Urinary Catheter:473626227}   Colostomy/Ileostomy/Ileal Conduit: {YES / MI:09432}       Date of Last BM: ***  No intake or output data in the 24 hours ending 22 1508  No intake/output data recorded.     Safety Concerns:     [facility] Safety Concerns:41967}    Impairments/Disabilities:      [facility] Impairments/Disabilities:794976759}    Nutrition Therapy:  Current Nutrition Therapy:   [facility] Diet List:269159283}    Routes of Feeding: {CHP DME Other Feedings:340068280}  Liquids: {Slp liquid thickness:70841}  Daily Fluid Restriction: {CHP DME Yes amt example:494880748}  Last Modified Barium Swallow with Video (Video Swallowing Test): {Done Not Done MZDU:091912033}    Treatments at the Time of Hospital Discharge:   Respiratory Treatments: ***  Oxygen Therapy:  {Therapy; copd oxygen:13090}  Ventilator:    {MH CC Vent MWZH:486573929}    Rehab Therapies: {THERAPEUTIC INTERVENTION:4524843284}  Weight Bearing Status/Restrictions: [facility] Weight Bearin}  Other Medical Equipment (for information only, NOT a DME order):  {EQUIPMENT:606002041}  Other Treatments: ***    Patient's personal belongings (please select all that are sent with patient):  {[facility] DME Belongings:414744546}    RN SIGNATURE:  {Esignature:632957958}    CASE MANAGEMENT/SOCIAL WORK SECTION    Inpatient Status Date: ***    Readmission Risk Assessment Score:  Readmission Risk              Risk of Unplanned Readmission:  0           Discharging to Facility/ Agency   Name:   Address:  Phone:  Fax:    Dialysis Facility (if applicable)   Name:  Address:  Dialysis Schedule:  Phone:  Fax:    / signature: {Esignature:134955968}    PHYSICIAN SECTION    Prognosis: {Prognosis:5446160718}    Condition at Discharge: [address] [place] [address] Patient Condition:758924063}    Rehab Potential (if transferring to Rehab): {Prognosis:2903652765}    Recommended Labs or Other Treatments After Discharge: ***    Physician Certification: I certify the above information and transfer of Coy Franks  is necessary for the continuing treatment of the diagnosis listed and that she requires {Admit to Appropriate Level of Care:84111} for {GREATER/LESS:071522043} 30 days.      Update Admission H&P: {CHP DME Changes in JAWFH:067264453}    PHYSICIAN SIGNATURE:  {Esignature:288483272}

## 2022-03-14 NOTE — ED NOTES
Call placed to Dr. Joaquin England Psychiatrist on call to present patient. Message left. Awaiting call back.       Suma Mccabe RN  03/14/22 8082

## 2022-03-14 NOTE — ED PROVIDER NOTES
[address] ED  EMERGENCY DEPARTMENT ENCOUNTER        Pt Name: Neris Currie  MRN: [id number]  [place] 1965  Date of evaluation: 3/14/2022  Provider: Shawn Mishra PA-C  PCP: Manoj Garcia MD  Note Started: 10:54 AM EDT       PATEL. I have evaluated this patient. My supervising physician was available for consultation. CHIEF COMPLAINT       Chief Complaint   Patient presents with    Psychiatric Evaluation     Pt states that she has been in a relationship for 13 years and was told Thursday that she had to leave. 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 Pt states that she wishes she would go to sleep and not wake up and states that she has been drinking more than normal since Thursday. HISTORY OF PRESENT ILLNESS   (Location, Timing/Onset, Context/Setting, Quality, Duration, Modifying Factors, Severity, Associated Signs and Symptoms)  Note limiting factors. Chief Complaint: psychiatric evaluation     Neris Currei is a 49 y.o. female with a past medical history of asthma, CAD, COPD, history of TIA, mitral valve prolapse brought in today by private vehicle for psychiatric evaluation. Patient states she has been under a lot of stress over the past several days. States that her long-term relationship they recently . 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 States that she feels very anxious. States that she has been drinking more alcohol and also been smoking marijuana. States she has been drinking wine and vodka at home. Her last drink was about 1045 last night. She denies hallucinations or delusions. States \"at times she feels like she wants to go to sleep and not wake up\". Denies suicidal plan. Denies homicidal ideation. Context includes psychiatric evaluation. No aggravating symptoms. No alleviating symptoms.   She otherwise denies any other complaints. Nursing Notes were all reviewed and agreed with or any disagreements were addressed in the HPI. REVIEW OF SYSTEMS    (2-9 systems for level 4, 10 or more for level 5)     Review of Systems   Constitutional: Negative. Respiratory: Negative. Cardiovascular: Negative. Gastrointestinal: Negative. Genitourinary: Negative. Musculoskeletal: Negative. Skin: Negative. Neurological: Negative. Psychiatric/Behavioral: The patient is nervous/anxious. Positives and Pertinent negatives as per HPI. Except as noted above in the ROS, all other systems were reviewed and negative. PAST MEDICAL HISTORY     Past Medical History:   Diagnosis Date    Asthma     Atrial septal defect     had insertion of atrial septal occluder    Bursitis of left hip     CAD (coronary artery disease)     COPD (chronic obstructive pulmonary disease) (HCC)     History of blood transfusion     Kidney stone     Migraines     Mitral valve prolapse     MRSA (methicillin resistant staph aureus) culture positive 12/2015    Lungs. Diagnosed [facility] with bronchoscopy    MVP (mitral valve prolapse)     PONV (postoperative nausea and vomiting)     Prolonged QT interval syndrome     S/P bronchoscopy     TIA (transient ischemic attack)     Vertigo          SURGICAL HISTORY     Past Surgical History:   Procedure Laterality Date    BREAST ENHANCEMENT SURGERY      augmentation    CARDIAC CATHETERIZATION      CARDIAC SURGERY      septum occluder    COLONOSCOPY  2012?     polyps    CYSTOSCOPY  6/8/2016    U-Dil    ENDOSCOPIC ULTRASOUND (UPPER)  05/08/2019    ERCP  05/08/2019    Sphincterotomy    ERCP N/A 5/8/2019    ERCP SPHINCTER/PAPILLOTOMY performed by Hang Williamson DO at [facility] ERCP  5/8/2019    ERCP STONE REMOVAL performed by Hang Williamson DO at [facility] ARTHROSCOPY Left 02/08/2018    HIP SURGERY      HYSTERECTOMY      HYSTERECTOMY, TOTAL ABDOMINAL  1992    NASAL SINUS SURGERY  13    OTHER SURGICAL HISTORY      thoracic sympathectomy    PACEMAKER INSERTION      also revision x 3    [place] HIP ARTHROSCOPY, DX Left 2018    LEFT HIP ARTHROSCOPY, LABRAL  DEBRIDEMENT, CHONDROPLASTY performed by Kelly Harris MD at [address]  2017    UPPER GASTROINTESTINAL ENDOSCOPY N/A 2019    UPPER EUS W/ANES. performed by Angel Carrasco DO at [address]       Previous Medications    ALBUTEROL SULFATE  (90 BASE) MCG/ACT INHALER    as needed    CELECOXIB (CELEBREX) 100 MG CAPSULE    Take 100 mg by mouth 2 times daily    IBUPROFEN (ADVIL;MOTRIN) 600 MG TABLET    Take 600 mg by mouth every 6 hours as needed for Pain    NADOLOL (CORGARD) 40 MG TABLET    Take 120 mg by mouth daily     PREGABALIN (LYRICA) 100 MG CAPSULE    Take 100 mg by mouth 2 times daily. TRAZODONE (DESYREL) 25 MG TABS    Take 25 mg by mouth nightly as needed         ALLERGIES     Quinolones, Dexamethasone sodium phosphate, Other, Codeine, Nitroglycerin, and Prednisone    FAMILYHISTORY       Family History   Problem Relation Age of Onset    Heart Disease Mother     Colon Cancer Father         colon    Heart Disease Father     High Blood Pressure Father           SOCIAL HISTORY       Social History     Tobacco Use    Smoking status: Current Every Day Smoker     Packs/day: 0.25     Years: 3.00     Pack years: 0.75     Types: Cigarettes     Last attempt to quit: 1998     Years since quittin.2    Smokeless tobacco: Never Used   Vaping Use    Vaping Use: Never used   Substance Use Topics    Alcohol use:  Yes    Drug use: Yes     Types: Marijuana (Weed)       SCREENINGS    Mi Coma Scale  Eye Opening: Spontaneous  Best Verbal Response: Oriented  Best Motor Response: Obeys commands  Mi Coma Scale Score: 15        PHYSICAL EXAM    (up to 7 for level 4, 8 COMPREHENSIVE METABOLIC PANEL W/ REFLEX TO MG FOR LOW K - Abnormal; Notable for the following components:       Result Value    Alkaline Phosphatase 148 (*)     All other components within normal limits   SALICYLATE LEVEL - Abnormal; Notable for the following components:    Salicylate, Serum <0.4 (*)     All other components within normal limits   ACETAMINOPHEN LEVEL - Abnormal; Notable for the following components:    Acetaminophen Level <5 (*)     All other components within normal limits   URINE DRUG SCREEN - Abnormal; Notable for the following components:    Cannabinoid Scrn, Ur POSITIVE (*)     All other components within normal limits   COVID-19 & INFLUENZA COMBO   CBC WITH AUTO DIFFERENTIAL   ETHANOL       When ordered only abnormal lab results are displayed. All other labs were within normal range or not returned as of this dictation. EKG: When ordered, EKG's are interpreted by the Emergency Department Physician in the absence of a cardiologist.  Please see their note for interpretation of EKG. RADIOLOGY:   Non-plain film images such as CT, Ultrasound and MRI are read by the radiologist. Plain radiographic images are visualized and preliminarily interpreted by the ED Provider with the below findings:        Interpretation per the Radiologist below, if available at the time of this note:    No orders to display     No results found. PROCEDURES   Unless otherwise noted below, none     Procedures    CRITICAL CARE TIME       CONSULTS:  None      EMERGENCY DEPARTMENT COURSE and DIFFERENTIAL DIAGNOSIS/MDM:   Vitals:    Vitals:    03/14/22 1031   BP: (!) 148/107   Pulse: 74   Resp: 20   Temp: 97 °F (36.1 °C)   TempSrc: Tympanic   SpO2: 97%       Patient was given the following medications:  Medications   hydrOXYzine (ATARAX) tablet 25 mg (25 mg Oral Given 3/14/22 1232)           Patient brought in today by private vehicle with complaints for psychiatric evaluation.   On exam she is tearful, appears anxious she is alert oriented afebrile breathing on room air satting at 97%. Nontoxic. No acute respiratory distress. Old labs and records reviewed. Patient seen and evaluated by myself and my attending was available as needed. CBC shows no white count. Hemoglobin of 14.7. No acute electrolyte abnormalities. Kidney function unremarkable. Liver function unremarkable. Ethanol is negative. Salicylate and acetaminophen levels are negative. Urine drug screen positive for cannabis. Otherwise unremarkable. COVID and FLU are negative. At this time patient is medically cleared. Patient is requesting something for anxiety. I did give her Vistaril while here in the ER. Please see psychiatry's note for further evaluation and final disposition. Plan at this time will be to discharge home. She is requesting something for anxiety and states that the Vistaril did seem to help. Will prescribe 1 weeks worth of Vistaril. She is scheduled for outpatient follow-up. FINAL IMPRESSION      1. Encounter for psychiatric assessment          DISPOSITION/PLAN   DISPOSITION Decision To Discharge 03/14/2022 03:11:08 PM      PATIENT REFERRED TO:  No follow-up provider specified.     DISCHARGE MEDICATIONS:  New Prescriptions    HYDROXYZINE (VISTARIL) 25 MG CAPSULE    Take 1 capsule by mouth 3 times daily as needed for Itching       DISCONTINUED MEDICATIONS:  Discontinued Medications    No medications on file              (Please note that portions of this note were completed with a voice recognition program.  Efforts were made to edit the dictations but occasionally words are mis-transcribed.)    Daisha Dave PA-C (electronically signed)            Daisha Dave PA-C  03/14/22 3297
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
## 2022-03-14 NOTE — ED NOTES
,Collateral Contact:  Name: Andrez Krueger  Phone: [phone]  Relation to Patient: Friend  Last Contact with Patient: today (brought patient in and is in lobby)  Concerns: Reports patient has been under a lot of stress. Reports she is been with her and will not leave her alone. Reports she will stay with her if she is discharged until she receives treatment and is doing better. Does feel safe if she is discharged with referral to IOP.         Catherine Nava RN  03/14/22 4865

## 2022-03-14 NOTE — ED NOTES
Level of Care Disposition: Discharge        Patient was seen by ED provider and [facility] AN AFFILIATE [facility] staff. The case was presented to psychiatric provider on-call Dr. Freddie Mckeon. Based on the ED evaluation and information presented to the provider by this nurse , it is the recommendation of the on call psychiatric provider that the patient be discharged from a psychiatric standpoint with the following referrals: Safety Plan completed by patient.                   Kylee Pearson RN  03/14/22 9711

## 2022-03-15 ENCOUNTER — FOLLOWUP TELEPHONE ENCOUNTER (OUTPATIENT)
Dept: PSYCHIATRY | Age: 57
End: 2022-03-15

## 2022-04-26 ENCOUNTER — HOSPITAL ENCOUNTER (OUTPATIENT)
Age: 57
Discharge: HOME | End: 2022-04-26
Payer: COMMERCIAL

## 2022-04-26 ENCOUNTER — HOSPITAL ENCOUNTER (OUTPATIENT)
Dept: GENERAL RADIOLOGY | Age: 57
Discharge: HOME | End: 2022-04-26
Payer: COMMERCIAL

## 2022-04-26 DIAGNOSIS — J18.9 PNEUMONIA OF LEFT LOWER LOBE DUE TO INFECTIOUS ORGANISM: ICD-10-CM

## 2022-04-26 PROCEDURE — 71046 X-RAY EXAM CHEST 2 VIEWS: CPT

## 2022-09-14 ENCOUNTER — HOSPITAL ENCOUNTER (EMERGENCY)
Age: 57
Discharge: HOME | End: 2022-09-14
Attending: EMERGENCY MEDICINE
Payer: COMMERCIAL

## 2022-09-14 ENCOUNTER — APPOINTMENT (OUTPATIENT)
Dept: GENERAL RADIOLOGY | Age: 57
End: 2022-09-14
Payer: COMMERCIAL

## 2022-09-14 VITALS
HEART RATE: 82 BPM | DIASTOLIC BLOOD PRESSURE: 84 MMHG | HEIGHT: 66 IN | TEMPERATURE: 97.9 F | OXYGEN SATURATION: 97 % | SYSTOLIC BLOOD PRESSURE: 118 MMHG | RESPIRATION RATE: 17 BRPM | WEIGHT: 160 LBS | BODY MASS INDEX: 25.71 KG/M2

## 2022-09-14 DIAGNOSIS — B34.9 VIRAL SYNDROME: Primary | ICD-10-CM

## 2022-09-14 LAB
BILIRUBIN URINE: NEGATIVE
BLOOD, URINE: NEGATIVE
CLARITY: CLEAR
COLOR: YELLOW
GLUCOSE URINE: NEGATIVE MG/DL
KETONES, URINE: NEGATIVE MG/DL
LEUKOCYTE ESTERASE, URINE: NEGATIVE
MICROSCOPIC EXAMINATION: NORMAL
NITRITE, URINE: NEGATIVE
PH UA: 6 (ref 5–8)
PROTEIN UA: NEGATIVE MG/DL
SARS-COV-2, NAAT: NOT DETECTED
SPECIFIC GRAVITY UA: 1.01 (ref 1–1.03)
URINE REFLEX TO CULTURE: NORMAL
URINE TYPE: NORMAL
UROBILINOGEN, URINE: 0.2 E.U./DL

## 2022-09-14 PROCEDURE — 87635 SARS-COV-2 COVID-19 AMP PRB: CPT

## 2022-09-14 PROCEDURE — 6370000000 HC RX 637 (ALT 250 FOR IP): Performed by: EMERGENCY MEDICINE

## 2022-09-14 PROCEDURE — 99284 EMERGENCY DEPT VISIT MOD MDM: CPT

## 2022-09-14 PROCEDURE — 81003 URINALYSIS AUTO W/O SCOPE: CPT

## 2022-09-14 PROCEDURE — 71045 X-RAY EXAM CHEST 1 VIEW: CPT

## 2022-09-14 RX ORDER — IBUPROFEN 400 MG/1
400 TABLET ORAL ONCE
Status: COMPLETED | OUTPATIENT
Start: 2022-09-14 | End: 2022-09-14

## 2022-09-14 RX ORDER — VENLAFAXINE 50 MG/1
50 TABLET ORAL 3 TIMES DAILY
COMMUNITY

## 2022-09-14 RX ADMIN — IBUPROFEN 400 MG: 400 TABLET, FILM COATED ORAL at 02:03

## 2022-09-14 ASSESSMENT — PAIN - FUNCTIONAL ASSESSMENT
PAIN_FUNCTIONAL_ASSESSMENT: NONE - DENIES PAIN
PAIN_FUNCTIONAL_ASSESSMENT: NONE - DENIES PAIN

## 2022-09-14 ASSESSMENT — PAIN SCALES - GENERAL: PAINLEVEL_OUTOF10: 5

## 2022-09-14 NOTE — ED PROVIDER NOTES
Emergency Department Attending Note    Alicja Gonzales MD    Date of ED VIsit: 9/14/2022    CHIEF COMPLAINT  URI (C/O cough, congestion, and UTI since yesterday)      [address] [facility] Lyric Workman is a 49 y.o. female  With Vital signs of /84   Pulse 82   Temp 97.9 °F (36.6 °C) (Oral)   Resp 17   Ht 5' 6\" (1.676 m)   Wt 160 lb (72.6 kg)   SpO2 97%   BMI 25.82 kg/m²  who presents to the ED with a complaint of laryngitis and nasal congestion. Patient seen and evaluated in room 7. Patient is complaining of the above symptoms for the past couple of days. She has no complaint of chest pain or shortness of breath. Her primary complaint is that of laryngitis inability to speak clearly and she does not report any fever. No other complaints, modifying factors or associated symptoms. Patients Past medical history reviewed and listed below  Past Medical History:   Diagnosis Date    Asthma     Atrial septal defect     had insertion of atrial septal occluder    Bursitis of left hip     CAD (coronary artery disease)     COPD (chronic obstructive pulmonary disease) (HCC)     History of blood transfusion     Kidney stone     Migraines     Mitral valve prolapse     MRSA (methicillin resistant staph aureus) culture positive 12/2015    Lungs. Diagnosed [facility] with bronchoscopy    MVP (mitral valve prolapse)     PONV (postoperative nausea and vomiting)     Prolonged QT interval syndrome     S/P bronchoscopy     TIA (transient ischemic attack)     Vertigo      Past Surgical History:   Procedure Laterality Date    BREAST ENHANCEMENT SURGERY      augmentation    CARDIAC CATHETERIZATION      CARDIAC SURGERY      septum occluder    COLONOSCOPY  2012?     polyps    CYSTOSCOPY  6/8/2016    [facility]-Dil    ENDOSCOPIC ULTRASOUND (UPPER)  05/08/2019    ERCP  05/08/2019    Sphincterotomy    ERCP N/A 5/8/2019    ERCP SPHINCTER/PAPILLOTOMY performed by Katrin Alonso DO at [address] [address]    ERCP 2019    ERCP STONE REMOVAL performed by Azra Reyes DO at [address] ARTHROSCOPY Left 2018    HIP SURGERY      HYSTERECTOMY (CERVIX STATUS UNKNOWN)      HYSTERECTOMY, TOTAL ABDOMINAL (CERVIX REMOVED)      NASAL SINUS SURGERY  13    OTHER SURGICAL HISTORY      thoracic sympathectomy    PACEMAKER INSERTION      also revision x 3    VT HIP ARTHROSCOPY, DX Left 2018    LEFT HIP ARTHROSCOPY, LABRAL  DEBRIDEMENT, CHONDROPLASTY performed by Abhinav Gilmore MD at [address] [facility]  2017    UPPER GASTROINTESTINAL ENDOSCOPY N/A 2019    UPPER EUS W/ANES. performed by Azra Reyes DO at [address] [facility] [address]       I have reviewed the following from the nursing documentation.     Family History   Problem Relation Age of Onset    Heart Disease Mother     Colon Cancer Father         colon    Heart Disease Father     High Blood Pressure Father      Social History     Socioeconomic History    Marital status:      Spouse name: Not on file    Number of children: Not on file    Years of education: Not on file    Highest education level: Not on file   Occupational History    Not on file   Tobacco Use    Smoking status: Every Day     Packs/day: 0.25     Years: 3.00     Pack years: 0.75     Types: Cigarettes     Last attempt to quit: 1998     Years since quittin.7    Smokeless tobacco: Never   Vaping Use    Vaping Use: Never used   Substance and Sexual Activity    Alcohol use: Yes    Drug use: Yes     Types: Marijuana Darío Bath)    Sexual activity: Yes     Partners: Male   Other Topics Concern    Not on file   Social History Narrative    Not on file     Social Determinants of Health     Financial Resource Strain: Not on file   Food Insecurity: Not on file   Transportation Needs: Not on file   Physical Activity: Not on file   Stress: Not on file   Social Connections: Not on file   Intimate Partner Violence: Not on file Housing Stability: Not on file     No current facility-administered medications for this encounter. Current Outpatient Medications   Medication Sig Dispense Refill    venlafaxine (EFFEXOR) 50 MG tablet Take 50 mg by mouth 3 times daily      traZODone (DESYREL) 25 mg TABS Take 25 mg by mouth nightly as needed      ibuprofen (ADVIL;MOTRIN) 600 MG tablet Take 600 mg by mouth every 6 hours as needed for Pain      albuterol sulfate  (90 Base) MCG/ACT inhaler as needed      nadolol (CORGARD) 40 MG tablet Take 120 mg by mouth daily        Allergies   Allergen Reactions    Quinolones      \"because of heart\"    Dexamethasone Sodium Phosphate Other (See Comments)     \"I feel hot all over\"    Other Hives     Adhesives      Codeine Nausea Only     Itchy nose    Nitroglycerin Other (See Comments)     Cardiologist states patient is not to have it, pt does not know why. Patient states \"it can put me into cardiac arrest\"    Prednisone      Other reaction(s): Flushing  \"I turn red and really hot\"       REVIEW OF SYSTEMS  10 systems reviewed, pertinent positives per HPI otherwise noted to be negative     PHYSICAL EXAM  /84   Pulse 82   Temp 97.9 °F (36.6 °C) (Oral)   Resp 17   Ht 5' 6\" (1.676 m)   Wt 160 lb (72.6 kg)   SpO2 97%   BMI 25.82 kg/m²   GENERAL APPEARANCE: Awake and alert. Cooperative. In no obvious distress. HEAD: Normocephalic. Atraumatic. EYES: PERRL. EOM's grossly intact. ENT: Mucous membranes are pink and moist.  No exudate. She does have some voice loss  NECK: Supple. No nodes noted  HEART: RRR. No murmurs. LUNGS: Respirations unlabored. CTAB. Good air exchange. No wheezing noted  ABDOMEN: Soft. Non-distended. Non-tender. No masses. No organomegaly. No guarding or rebound. EXTREMITIES: No peripheral edema. Moves all extremities equally. All extremities neurovascularly intact. SKIN: Warm and dry. No acute rashes. NEUROLOGICAL: Alert and oriented. . Strength 5/5, sensation intact. Gait normal.   PSYCHIATRIC: Normal mood and affect. No HI or SI expressed to me. RADIOLOGY    If acquired see below     EKG:     If acquired see below       ED COURSE/MDM    Patient was treated with Motrin which gave her some relief but not any significant relief. She had a negative rapid COVID test as well as a negative chest x-ray. She appears to be viral in nature so should be diagnosed with a viral syndrome advised to use over-the-counter cough or cold formulas. ED Course as of 09/14/22 0249   Wed Sep 14, 2022   0147 Urinalysis with Reflex to Culture:    Color, UA Yellow   Clarity, UA Clear   Glucose, UA Negative   Bilirubin, Urine Negative   Ketones, Urine Negative   Specific Gravity, UA 1.010   Blood, Urine Negative   pH, UA 6.0   Protein, UA Negative   Urobilinogen, Urine 0.2   Nitrite, Urine Negative   Leukocyte Esterase, Urine Negative   Microscopic Examination Not Indicated   Urine Type NotGiven   Urine Reflex to Culture Not Indicated  Urinalysis was normal with no ketones no leukocyte esterase and no nitrites [DL]   0213 COVID-19, Rapid:    SARS-CoV-2, NAAT Not Detected  COVID-19 negative [DL]   0231 IMPRESSION:  Minimal basilar atelectasis and scarring as above. Otherwise no acute  process [DL]      ED Course User Index  [DL] Fonda Lanes, MD       The ED course and plan were reviewed and results discussed with the patient    The patient understood and agreed with the Discharge/transfer planning.     CLINICAL IMPRESSION and DISPOSITION    Juan Alberto Benavidez was stable and diagnosed with viral syndrome    Patient was treated with Motrin Fonda Lanes, MD  09/14/22 [id number]

## 2022-09-14 NOTE — DISCHARGE INSTRUCTIONS
Looks like you have a viral infection. I would treat the symptoms with over-the-counter cough and cold formula.   If symptoms do not get better in 1 to 2 days I would check in with your primary care physician

## 2022-10-15 ENCOUNTER — HOSPITAL ENCOUNTER (EMERGENCY)
Age: 57
Discharge: HOME | End: 2022-10-15
Attending: EMERGENCY MEDICINE
Payer: COMMERCIAL

## 2022-10-15 ENCOUNTER — APPOINTMENT (OUTPATIENT)
Dept: GENERAL RADIOLOGY | Age: 57
End: 2022-10-15
Payer: COMMERCIAL

## 2022-10-15 VITALS
TEMPERATURE: 97.7 F | HEART RATE: 72 BPM | RESPIRATION RATE: 18 BRPM | OXYGEN SATURATION: 98 % | SYSTOLIC BLOOD PRESSURE: 106 MMHG | DIASTOLIC BLOOD PRESSURE: 68 MMHG

## 2022-10-15 DIAGNOSIS — R07.9 CHEST PAIN, UNSPECIFIED TYPE: Primary | ICD-10-CM

## 2022-10-15 LAB
A/G RATIO: 1.8 (ref 1.1–2.2)
ALBUMIN SERPL-MCNC: 4.4 G/DL (ref 3.4–5)
ALP BLD-CCNC: 114 U/L (ref 40–129)
ALT SERPL-CCNC: 56 U/L (ref 10–40)
ANION GAP SERPL CALCULATED.3IONS-SCNC: 11 MMOL/L (ref 3–16)
AST SERPL-CCNC: 85 U/L (ref 15–37)
BASOPHILS ABSOLUTE: 0 K/UL (ref 0–0.2)
BASOPHILS RELATIVE PERCENT: 0.5 %
BILIRUB SERPL-MCNC: 0.4 MG/DL (ref 0–1)
BUN BLDV-MCNC: 12 MG/DL (ref 7–20)
CALCIUM SERPL-MCNC: 9.7 MG/DL (ref 8.3–10.6)
CHLORIDE BLD-SCNC: 105 MMOL/L (ref 99–110)
CO2: 25 MMOL/L (ref 21–32)
CREAT SERPL-MCNC: 0.7 MG/DL (ref 0.6–1.1)
EOSINOPHILS ABSOLUTE: 0.1 K/UL (ref 0–0.6)
EOSINOPHILS RELATIVE PERCENT: 2.5 %
GFR AFRICAN AMERICAN: >60
GFR NON-AFRICAN AMERICAN: >60
GLUCOSE BLD-MCNC: 114 MG/DL (ref 70–99)
HCT VFR BLD CALC: 40.5 % (ref 36–48)
HEMOGLOBIN: 14 G/DL (ref 12–16)
LYMPHOCYTES ABSOLUTE: 1.1 K/UL (ref 1–5.1)
LYMPHOCYTES RELATIVE PERCENT: 18.7 %
MAGNESIUM: 1.6 MG/DL (ref 1.8–2.4)
MCH RBC QN AUTO: 34.3 PG (ref 26–34)
MCHC RBC AUTO-ENTMCNC: 34.4 G/DL (ref 31–36)
MCV RBC AUTO: 99.5 FL (ref 80–100)
MONOCYTES ABSOLUTE: 0.5 K/UL (ref 0–1.3)
MONOCYTES RELATIVE PERCENT: 8.7 %
NEUTROPHILS ABSOLUTE: 4.2 K/UL (ref 1.7–7.7)
NEUTROPHILS RELATIVE PERCENT: 69.6 %
PDW BLD-RTO: 13.4 % (ref 12.4–15.4)
PLATELET # BLD: 174 K/UL (ref 135–450)
PMV BLD AUTO: 8.8 FL (ref 5–10.5)
POTASSIUM REFLEX MAGNESIUM: 4.2 MMOL/L (ref 3.5–5.1)
RBC # BLD: 4.07 M/UL (ref 4–5.2)
SODIUM BLD-SCNC: 141 MMOL/L (ref 136–145)
TOTAL PROTEIN: 6.9 G/DL (ref 6.4–8.2)
TROPONIN: <0.01 NG/ML
TROPONIN: <0.01 NG/ML
WBC # BLD: 6 K/UL (ref 4–11)

## 2022-10-15 PROCEDURE — 80053 COMPREHEN METABOLIC PANEL: CPT

## 2022-10-15 PROCEDURE — 96374 THER/PROPH/DIAG INJ IV PUSH: CPT

## 2022-10-15 PROCEDURE — 96361 HYDRATE IV INFUSION ADD-ON: CPT

## 2022-10-15 PROCEDURE — 6370000000 HC RX 637 (ALT 250 FOR IP): Performed by: EMERGENCY MEDICINE

## 2022-10-15 PROCEDURE — 36415 COLL VENOUS BLD VENIPUNCTURE: CPT

## 2022-10-15 PROCEDURE — 85025 COMPLETE CBC W/AUTO DIFF WBC: CPT

## 2022-10-15 PROCEDURE — 71045 X-RAY EXAM CHEST 1 VIEW: CPT

## 2022-10-15 PROCEDURE — 96360 HYDRATION IV INFUSION INIT: CPT

## 2022-10-15 PROCEDURE — 84484 ASSAY OF TROPONIN QUANT: CPT

## 2022-10-15 PROCEDURE — 6360000002 HC RX W HCPCS: Performed by: EMERGENCY MEDICINE

## 2022-10-15 PROCEDURE — 2580000003 HC RX 258: Performed by: EMERGENCY MEDICINE

## 2022-10-15 PROCEDURE — 83735 ASSAY OF MAGNESIUM: CPT

## 2022-10-15 PROCEDURE — 93005 ELECTROCARDIOGRAM TRACING: CPT | Performed by: EMERGENCY MEDICINE

## 2022-10-15 PROCEDURE — 99285 EMERGENCY DEPT VISIT HI MDM: CPT

## 2022-10-15 RX ORDER — ACETAMINOPHEN 500 MG
1000 TABLET ORAL ONCE
Status: COMPLETED | OUTPATIENT
Start: 2022-10-15 | End: 2022-10-15

## 2022-10-15 RX ORDER — 0.9 % SODIUM CHLORIDE 0.9 %
500 INTRAVENOUS SOLUTION INTRAVENOUS ONCE
Status: COMPLETED | OUTPATIENT
Start: 2022-10-15 | End: 2022-10-15

## 2022-10-15 RX ORDER — ASPIRIN 81 MG/1
324 TABLET, CHEWABLE ORAL ONCE
Status: COMPLETED | OUTPATIENT
Start: 2022-10-15 | End: 2022-10-15

## 2022-10-15 RX ORDER — ONDANSETRON 2 MG/ML
4 INJECTION INTRAMUSCULAR; INTRAVENOUS ONCE
Status: COMPLETED | OUTPATIENT
Start: 2022-10-15 | End: 2022-10-15

## 2022-10-15 RX ORDER — LANOLIN ALCOHOL/MO/W.PET/CERES
400 CREAM (GRAM) TOPICAL DAILY
Status: DISCONTINUED | OUTPATIENT
Start: 2022-10-16 | End: 2022-10-16 | Stop reason: HOSPADM

## 2022-10-15 RX ADMIN — ACETAMINOPHEN 1000 MG: 500 TABLET ORAL at 20:35

## 2022-10-15 RX ADMIN — ASPIRIN 324 MG: 81 TABLET, CHEWABLE ORAL at 19:34

## 2022-10-15 RX ADMIN — SODIUM CHLORIDE 500 ML: 9 INJECTION, SOLUTION INTRAVENOUS at 19:35

## 2022-10-15 RX ADMIN — ONDANSETRON HYDROCHLORIDE 4 MG: 2 INJECTION, SOLUTION INTRAMUSCULAR; INTRAVENOUS at 20:36

## 2022-10-15 RX ADMIN — Medication 400 MG: at 21:51

## 2022-10-15 ASSESSMENT — PAIN DESCRIPTION - LOCATION: LOCATION: CHEST

## 2022-10-15 ASSESSMENT — PAIN SCALES - GENERAL: PAINLEVEL_OUTOF10: 8

## 2022-10-15 ASSESSMENT — PAIN - FUNCTIONAL ASSESSMENT: PAIN_FUNCTIONAL_ASSESSMENT: 0-10

## 2022-10-15 NOTE — ED PROVIDER NOTES
Emergency Department Attending Note    Allison Funez MD    Date of ED VIsit: 10/15/2022    CHIEF COMPLAINT  Chest Pain (Onset of chestpain while cooking dinner just pta. Became nauseated, lightheaded, vomited x 3, pale. Called EMS. Has pacer for QT syndrome. Pacer spikes noted.  )      HISTORY OF PRESENT ILLNESS  Norma Almaraz is a 49 y.o. female  With Vital signs of BP (!) 127/90   Pulse 70   Temp 97.7 °F (36.5 °C) (Oral)   Resp 20   SpO2 98%  who presents to the ED with a complaint of chest pain. Patient seen and evaluated in room 2. Patient comes in complaining if she had a bout of diaphoresis followed by nausea followed by chest pain or pressure this evening prompting her to come into the emergency department for evaluation. She still has the pressure she says she is short of breath but she does not appear to be short of breath and she is satting 98% on room air. She has a history of long QT with a resultant pacemaker in place. She is apparently under a lot of stress with her son who is in the ICU. Sancho Bryant No other complaints, modifying factors or associated symptoms. HEART SCORE:    History: +1 for moderate suspicion  EKG: +0 for normal EKG   Age: +1 for age 44-72 years  Risk factors (includes HLD, HTN, DM, tobacco use, obesity, and +FHx): +1 for 1-2 risk factors  Initial troponin: +0 for negative troponin    Heart score: 3  .   This falls under the following category: Score of 0-3, which indicates a very low risk for major adverse cardiac event and supports early discharge      Patients Past medical history reviewed and listed below  Past Medical History:   Diagnosis Date    Asthma     Atrial septal defect     had insertion of atrial septal occluder    Bursitis of left hip     CAD (coronary artery disease)     COPD (chronic obstructive pulmonary disease) (HCC)     History of blood transfusion     Kidney stone     Migraines     Mitral valve prolapse     MRSA (methicillin resistant staph aureus) culture positive 12/2015    Lungs. Diagnosed [facility] with bronchoscopy    MVP (mitral valve prolapse)     PONV (postoperative nausea and vomiting)     Prolonged QT interval syndrome     S/P bronchoscopy     TIA (transient ischemic attack)     Vertigo      Past Surgical History:   Procedure Laterality Date    BREAST ENHANCEMENT SURGERY      augmentation    CARDIAC CATHETERIZATION      CARDIAC SURGERY      septum occluder    COLONOSCOPY  2012? polyps    CYSTOSCOPY  6/8/2016    [facility]il    ENDOSCOPIC ULTRASOUND (UPPER)  05/08/2019    ERCP  05/08/2019    Sphincterotomy    ERCP N/A 5/8/2019    ERCP SPHINCTER/PAPILLOTOMY performed by Jackson Gilbert DO at [address] [address]    ERCP  5/8/2019    ERCP STONE REMOVAL performed by Jackson Gilbert DO at [address] ARTHROSCOPY Left 02/08/2018    HIP SURGERY      HYSTERECTOMY (CERVIX STATUS UNKNOWN)      HYSTERECTOMY, TOTAL ABDOMINAL (CERVIX REMOVED)  1992    NASAL SINUS SURGERY  8/22/13    OTHER SURGICAL HISTORY      thoracic sympathectomy    PACEMAKER INSERTION      also revision x 3    FL HIP ARTHROSCOPY, DX Left 11/1/2018    LEFT HIP ARTHROSCOPY, LABRAL  DEBRIDEMENT, CHONDROPLASTY performed by Alena Pool MD at [address]  03/20/2017    UPPER GASTROINTESTINAL ENDOSCOPY N/A 5/8/2019    UPPER EUS W/ANES. performed by Jackson Gilbert DO at [address] [address]       I have reviewed the following from the nursing documentation.     Family History   Problem Relation Age of Onset    Heart Disease Mother     Colon Cancer Father         colon    Heart Disease Father     High Blood Pressure Father      Social History     Socioeconomic History    Marital status:      Spouse name: Not on file    Number of children: Not on file    Years of education: Not on file    Highest education level: Not on file   Occupational History    Not on file   Tobacco Use    Smoking status: Every Day Packs/day: 0.25     Years: 3.00     Pack years: 0.75     Types: Cigarettes     Last attempt to quit: 1998     Years since quittin.8    Smokeless tobacco: Never   Vaping Use    Vaping Use: Never used   Substance and Sexual Activity    Alcohol use: Yes    Drug use: Yes     Types: Marijuana Taylorradha Goodrich)    Sexual activity: Yes     Partners: Male   Other Topics Concern    Not on file   Social History Narrative    Not on file     Social Determinants of Health     Financial Resource Strain: Not on file   Food Insecurity: Not on file   Transportation Needs: Not on file   Physical Activity: Not on file   Stress: Not on file   Social Connections: Not on file   Intimate Partner Violence: Not on file   Housing Stability: Not on file     Current Facility-Administered Medications   Medication Dose Route Frequency Provider Last Rate Last Admin    aspirin chewable tablet 324 mg  324 mg Oral Once Emerita Salazar MD        0.9 % sodium chloride bolus  500 mL IntraVENous Once Emerita Salazar MD         Current Outpatient Medications   Medication Sig Dispense Refill    venlafaxine (EFFEXOR) 50 MG tablet Take 50 mg by mouth 3 times daily      traZODone (DESYREL) 25 mg TABS Take 25 mg by mouth nightly as needed      ibuprofen (ADVIL;MOTRIN) 600 MG tablet Take 600 mg by mouth every 6 hours as needed for Pain      albuterol sulfate  (90 Base) MCG/ACT inhaler as needed      nadolol (CORGARD) 40 MG tablet Take 120 mg by mouth daily        Allergies   Allergen Reactions    Quinolones      \"because of heart\"    Dexamethasone Sodium Phosphate Other (See Comments)     \"I feel hot all over\"    Other Hives     Adhesives      Codeine Nausea Only     Itchy nose    Nitroglycerin Other (See Comments)     Cardiologist states patient is not to have it, pt does not know why.  Patient states \"it can put me into cardiac arrest\"    Prednisone      Other reaction(s): Flushing  \"I turn red and really hot\"       REVIEW OF SYSTEMS  10 systems reviewed, pertinent positives per HPI otherwise noted to be negative     PHYSICAL EXAM  BP (!) 127/90   Pulse 70   Temp 97.7 °F (36.5 °C) (Oral)   Resp 20   SpO2 98%   GENERAL APPEARANCE: Awake and alert. Cooperative. In no obvious distress. She is not diaphoretic either. HEAD: Normocephalic. Atraumatic. EYES: PERRL. EOM's grossly intact. ENT: Mucous membranes are pink and moist.   NECK: Supple. HEART: RRR. No murmurs. LUNGS: Respirations unlabored. CTAB. Good air exchange. ABDOMEN: Soft. Non-distended. Non-tender. No masses. No organomegaly. No guarding or rebound. No areas of tenderness  EXTREMITIES: No peripheral edema. Moves all extremities equally. All extremities neurovascularly intact. SKIN: Warm and dry. No acute rashes. NEUROLOGICAL: Alert and oriented. No neurologic symptoms. No focality during her examination. Strength 5/5, sensation intact. Gait normal.   PSYCHIATRIC: Normal mood and affect. No HI or SI expressed to me. RADIOLOGY    If acquired see below     EKG:     If acquired see below       ED COURSE/MDM    Patient felt better after a while after and her negative work-up I think allows her to go home safely. ED Course as of 10/15/22 2209   Sat Oct 15, 2022   1906 EKG: Indication: Chest pain, it shows a rhythm of atrially paced rhythm at a rate of 70, with nonspecific ST-Twave changes .   Intervals are normal and the axis is normal. This  interpreted by me without a cardiologist.    [DL]   1924 CBC with Auto Differential(!):    WBC 6.0   RBC 4.07   Hemoglobin Quant 14.0   Hematocrit 40.5   MCV 99.5   MCH 34.3(!)   MCHC 34.4   RDW 13.4   Platelet Count 434   MPV 8.8   Neutrophils % 69.6   Lymphocyte % 18.7   Monocytes % 8.7   Eosinophils % 2.5   Basophils % 0.5   Neutrophils Absolute 4.2   Lymphocytes Absolute 1.1   Monocytes Absolute 0.5   Eosinophils Absolute 0.1   Basophils Absolute 0.0  CBC reveals a white count of 6.0 and H&H of 14 and 40 with a platelet count of 174 [DL]   1925 XR CHEST PORTABLE  FINDINGS:  Cardiomediastinal silhouette and pulmonary vasculature are normal. No  consolidation, pleural effusion, or pneumothorax. Stable left chest  pacemaker. IMPRESSION:  No acute abnormality. [DL]   1943 CMP w/ Reflex to MG(!):    Sodium 141   Potassium 4.2   Chloride 105   CO2 25   Anion Gap 11   Glucose, Random 114(!)   BUN,BUNPL 12   Creatinine 0.7   GFR Non- >60   GFR  >60   CALCIUM, SERUM, 209863 9.7   Total Protein 6.9   Albumin 4.4   Albumin/Globulin Ratio 1.8   Bilirubin 0.4   Alk Phos 114   ALT 56(!)   AST 85(!)  Comprehensive metabolic panel was normal except for glucose of 114 mild elevated transaminases [DL]   1943 Troponin:    Troponin <0.01  Troponin less than 0.01 [DL]   2107 Magnesium(!):    Magnesium 1.60(!)  Magnesium of 1.6 [DL]   2209 Troponin:    Troponin <0.01  Second troponin was negative at less than 0.01 [DL]      ED Course User Index  [DL] Verito Prieto MD       The ED course and plan were reviewed and results discussed with the patient    The patient understood and agreed with the Discharge/transfer planning.     CLINICAL IMPRESSION and DISPOSITION    Juan Alberto Benavidez was stable and diagnosed with chest pain        Verito Prieto MD  10/15/22 3898

## 2022-10-16 LAB
EKG ATRIAL RATE: 70 BPM
EKG DIAGNOSIS: NORMAL
EKG P AXIS: 55 DEGREES
EKG P-R INTERVAL: 184 MS
EKG Q-T INTERVAL: 436 MS
EKG QRS DURATION: 72 MS
EKG QTC CALCULATION (BAZETT): 470 MS
EKG R AXIS: 57 DEGREES
EKG T AXIS: 30 DEGREES
EKG VENTRICULAR RATE: 70 BPM

## 2022-10-16 PROCEDURE — 93010 ELECTROCARDIOGRAM REPORT: CPT | Performed by: INTERNAL MEDICINE

## 2022-10-16 NOTE — ED NOTES
D/C: Order noted for d/c. Pt confirmed d/c paperwork does have correct name. Discharge and education instructions reviewed with patient. Teach-back successful. Pt verbalized understanding and signed d/c papers. Pt denied questions at this time. No acute distress noted. Patient instructed to follow-up as noted - return to emergency department if symptoms worsen. Patient verbalized understanding. Discharged per EDMD with discharge instructions. Pt discharged to private vehicle. Patient stable upon departure. Thanked patient for choosing [facility] for care. Provider aware of patient pain at time of discharge.        Ministerio Hartley, RN  10/15/22 5916

## 2022-10-16 NOTE — ED NOTES
Patient requesting something for headache and nausea at this time. Dr. Trujillo Govern notified.       Madhu Buchanan RN  10/15/22 2029

## 2022-11-19 ENCOUNTER — HOSPITAL ENCOUNTER (EMERGENCY)
Age: 57
Discharge: HOME | End: 2022-11-19
Attending: EMERGENCY MEDICINE
Payer: COMMERCIAL

## 2022-11-19 ENCOUNTER — APPOINTMENT (OUTPATIENT)
Dept: GENERAL RADIOLOGY | Age: 57
End: 2022-11-19
Payer: COMMERCIAL

## 2022-11-19 VITALS
SYSTOLIC BLOOD PRESSURE: 118 MMHG | RESPIRATION RATE: 18 BRPM | OXYGEN SATURATION: 98 % | BODY MASS INDEX: 24.91 KG/M2 | HEART RATE: 80 BPM | TEMPERATURE: 98.3 F | WEIGHT: 155 LBS | DIASTOLIC BLOOD PRESSURE: 70 MMHG | HEIGHT: 66 IN

## 2022-11-19 DIAGNOSIS — J10.1 INFLUENZA A: Primary | ICD-10-CM

## 2022-11-19 LAB
RAPID INFLUENZA  B AGN: NEGATIVE
RAPID INFLUENZA A AGN: POSITIVE
S PYO AG THROAT QL: NEGATIVE
SARS-COV-2, NAAT: NOT DETECTED

## 2022-11-19 PROCEDURE — 99284 EMERGENCY DEPT VISIT MOD MDM: CPT

## 2022-11-19 PROCEDURE — 6360000002 HC RX W HCPCS: Performed by: EMERGENCY MEDICINE

## 2022-11-19 PROCEDURE — 71045 X-RAY EXAM CHEST 1 VIEW: CPT

## 2022-11-19 PROCEDURE — 87081 CULTURE SCREEN ONLY: CPT

## 2022-11-19 PROCEDURE — 87635 SARS-COV-2 COVID-19 AMP PRB: CPT

## 2022-11-19 PROCEDURE — 96372 THER/PROPH/DIAG INJ SC/IM: CPT

## 2022-11-19 PROCEDURE — 87804 INFLUENZA ASSAY W/OPTIC: CPT

## 2022-11-19 PROCEDURE — 87880 STREP A ASSAY W/OPTIC: CPT

## 2022-11-19 RX ORDER — KETOROLAC TROMETHAMINE 30 MG/ML
15 INJECTION, SOLUTION INTRAMUSCULAR; INTRAVENOUS ONCE
Status: COMPLETED | OUTPATIENT
Start: 2022-11-19 | End: 2022-11-19

## 2022-11-19 RX ADMIN — KETOROLAC TROMETHAMINE 15 MG: 30 INJECTION, SOLUTION INTRAMUSCULAR; INTRAVENOUS at 09:38

## 2022-11-19 ASSESSMENT — ENCOUNTER SYMPTOMS
SHORTNESS OF BREATH: 0
RHINORRHEA: 1
BACK PAIN: 0
VOMITING: 0
DIARRHEA: 0
NAUSEA: 0
COUGH: 1
WHEEZING: 0
PHOTOPHOBIA: 0
ABDOMINAL DISTENTION: 0

## 2022-11-19 ASSESSMENT — PAIN DESCRIPTION - ORIENTATION: ORIENTATION: RIGHT;LEFT

## 2022-11-19 ASSESSMENT — PAIN DESCRIPTION - LOCATION: LOCATION: THROAT

## 2022-11-19 ASSESSMENT — PAIN SCALES - GENERAL
PAINLEVEL_OUTOF10: 7
PAINLEVEL_OUTOF10: 7

## 2022-11-19 ASSESSMENT — PAIN - FUNCTIONAL ASSESSMENT
PAIN_FUNCTIONAL_ASSESSMENT: 0-10
PAIN_FUNCTIONAL_ASSESSMENT: NONE - DENIES PAIN

## 2022-11-19 ASSESSMENT — PAIN DESCRIPTION - DESCRIPTORS: DESCRIPTORS: SORE

## 2022-11-19 NOTE — DISCHARGE INSTRUCTIONS
Stay hydrated. Drink plenty of fluids during recommend Tylenol and ibuprofen as prescribed on the bottle for your symptoms. Return to emergency department if you develop any difficulty breathing any significant chest pain loss of consciousness or other emergent concerns we discussed.

## 2022-11-19 NOTE — ED PROVIDER NOTES
Emergency Department Provider Note  Location: [address] EMERGENCY DEPARTMENT  11/19/2022     Patient Identification  Carmen Millard is a 56 y.o. female    Chief Complaint  URI (C/o intermittent harsh NP cough with sore throat and fever)          HPI  (History provided by patient)  Patient is a 81-year-old female history of prolonged QT status post pacemaker presents with cough congestion rhinorrhea body aches fevers and chills since Thursday. Reports a fever up to 102 yesterday. Tolerating p.o. adequate urine output. No significant neck pain no rashes. She does report generalized slow onset headache as well. No exacerbating alleviating factors to symptoms. Denies any known sick contacts. Denies vaccination for SARS-CoV-2 or influenza. I have reviewed the following nursing documentation:  Allergies: Allergies   Allergen Reactions    Quinolones      \"because of heart\"    Dexamethasone Sodium Phosphate Other (See Comments)     \"I feel hot all over\"    Other Hives     Adhesives      Codeine Nausea Only     Itchy nose    Nitroglycerin Other (See Comments)     Cardiologist states patient is not to have it, pt does not know why. Patient states \"it can put me into cardiac arrest\"    Prednisone      Other reaction(s): Flushing  \"I turn red and really hot\"       Past medical history:  has a past medical history of Asthma, Atrial septal defect, Bursitis of left hip, CAD (coronary artery disease), COPD (chronic obstructive pulmonary disease) ([facility] [address] History of blood transfusion, Kidney stone, Migraines, Mitral valve prolapse, MRSA (methicillin resistant staph aureus) culture positive (12/2015), MVP (mitral valve prolapse), PONV (postoperative nausea and vomiting), Prolonged QT interval syndrome, S/P bronchoscopy, TIA (transient ischemic attack), and Vertigo. Past surgical history:  has a past surgical history that includes Pacemaker insertion; Hysterectomy; Nasal sinus surgery (8/22/13);  Cystoscopy (6/8/2016); Tonsillectomy; Upper gastrointestinal endoscopy (03/20/2017); Hip arthroscopy (Left, 02/08/2018); hip surgery; Cardiac surgery; Cardiac catheterization; other surgical history; pr hip arthroscopy, dx (Left, 11/1/2018); Breast enhancement surgery; Hysterectomy, total abdominal (1992); Colonoscopy (2012?); ERCP (05/08/2019); endoscopic ultrasound (upper) (05/08/2019); ERCP (N/A, 5/8/2019); Upper gastrointestinal endoscopy (N/A, 5/8/2019); and ERCP (5/8/2019). Home medications:   Prior to Admission medications    Medication Sig Start Date End Date Taking? Authorizing Provider   venlafaxine (EFFEXOR) 50 MG tablet Take 50 mg by mouth 3 times daily    Historical Provider, MD   traZODone (DESYREL) 25 mg TABS Take 25 mg by mouth nightly as needed    Historical Provider, MD   ibuprofen (ADVIL;MOTRIN) 600 MG tablet Take 600 mg by mouth every 6 hours as needed for Pain    Historical Provider, MD   albuterol sulfate  (90 Base) MCG/ACT inhaler as needed 7/23/20   Historical Provider, MD   nadolol (CORGARD) 40 MG tablet Take 120 mg by mouth daily     Historical Provider, MD       Social history:  reports that she quit smoking about 23 years ago. Her smoking use included cigarettes. She has a 0.75 pack-year smoking history. She has never used smokeless tobacco. She reports current alcohol use. She reports current drug use. Drug: Marijuana Navi Shoemaker). Family history:    Family History   Problem Relation Age of Onset    Heart Disease Mother     Colon Cancer Father         colon    Heart Disease Father     High Blood Pressure Father          ROS  Review of Systems   Constitutional:  Positive for chills and fever. HENT:  Positive for congestion and rhinorrhea. Eyes:  Negative for photophobia and visual disturbance. Respiratory:  Positive for cough. Negative for shortness of breath and wheezing. Cardiovascular:  Negative for chest pain and palpitations.    Gastrointestinal:  Negative for abdominal distention, diarrhea, nausea and vomiting. Genitourinary:  Negative for dysuria and hematuria. Musculoskeletal:  Positive for myalgias. Negative for back pain and neck pain. Skin:  Negative for rash and wound. Neurological:  Negative for syncope, weakness and headaches. Psychiatric/Behavioral:  Negative for agitation and confusion. Exam  ED Triage Vitals   BP Temp Temp src Pulse Resp SpO2 Height Weight   -- -- -- -- -- -- -- --       Physical Exam  Vitals and nursing note reviewed. Constitutional:       General: She is not in acute distress. Appearance: She is well-developed. HENT:      Head: Normocephalic and atraumatic. Nose: Nose normal. No congestion. Eyes:      General: No scleral icterus. Extraocular Movements: Extraocular movements intact. Neck:      Comments: No meningismus  Cardiovascular:      Rate and Rhythm: Normal rate and regular rhythm. Heart sounds: No murmur heard. Pulmonary:      Effort: Pulmonary effort is normal.      Breath sounds: Wheezing present. No rhonchi or rales. Comments: Course BSBL  Abdominal:      General: There is no distension. Palpations: Abdomen is soft. Tenderness: There is no abdominal tenderness. Musculoskeletal:         General: No deformity. Normal range of motion. Cervical back: Normal range of motion and neck supple. No rigidity or tenderness. Lymphadenopathy:      Cervical: No cervical adenopathy. Skin:     General: Skin is warm. Findings: No rash. Neurological:      Mental Status: She is alert and oriented to person, place, and time. Motor: No abnormal muscle tone.       Coordination: Coordination normal.   Psychiatric:         Mood and Affect: Mood normal.         Behavior: Behavior normal.         ED Course    ED Medication Orders (From admission, onward)      Start Ordered     Status Ordering Provider    11/19/22 1000 11/19/22 0932  ketorolac (TORADOL) injection 15 mg  ONCE Yesica HARDEN, [facility]                Radiology  XR CHEST PORTABLE    Result Date: 11/19/2022  EXAMINATION: ONE XRAY VIEW OF THE CHEST 11/19/2022 9:21 am COMPARISON: 10/15/2022 HISTORY: Acute cough and congestion with fever. FINDINGS: Normal cardiomediastinal silhouette. Left chest pacemaker. Upper lobe predominant emphysema. Mild atelectasis in the mid right lung. No consolidation, pleural effusion, or pneumothorax. Mild atelectasis in the mid right lung. Bedside Ultrasound  No results found. Labs  Results for orders placed or performed during the hospital encounter of 11/19/22   COVID-19, Rapid    Specimen: Nasopharyngeal Swab   Result Value Ref Range    SARS-CoV-2, NAAT Not Detected Not Detected   Rapid influenza A/B antigens    Specimen: Nasopharyngeal   Result Value Ref Range    Rapid Influenza A Ag POSITIVE (A) Negative    Rapid Influenza B Ag Negative Negative   Strep screen group a throat    Specimen: Throat   Result Value Ref Range    Rapid Strep A Screen Negative Negative         Procedures  Procedures      MDM  Patient seen and evaluated. Relevant records reviewed. - Patient is 56 y.o. female presented for viral URI symptoms as noted above  - Exam showed no acute distress vitals within normal limits. Coarse breath sounds and occasional faint expiratory wheezes otherwise unremarkable physical exam.  - Workup here notable for positive influenza A which is consistent with her symptoms. There is no acute infiltrate on chest x-ray. I have low concern for sepsis meningitis secondary pneumonia at this point  - Patient treated with Toradol. Discussed hydration supportive care follow-up and return precautions  - I have a low concern for  other emergent process, and do not see indication for further work-up in the ER, as it is unlikely  and poses more risk than benefit. - I discussed the results, including any incidental findings, with patient. Questions answered. We agreed to discharge. Patient/family agreeable to plan and express understanding of plan. Clinical Impression:  1. Influenza A          Disposition:  Discharge to home in good condition. Blood pressure 111/84, pulse 72, temperature 98.3 °F (36.8 °C), temperature source Oral, resp. rate 20, height 5' 6\" (1.676 m), weight 155 lb (70.3 kg), SpO2 98 %, not currently breastfeeding. Patient was given scripts for the following medications. I counseled patient how to take these medications. New Prescriptions    No medications on file       Disposition referral (if applicable):  Maria Luisa Teran MD  [address] [place] [address] Dr. Bingham [address] [facility]  [phone]    Schedule an appointment as soon as possible for a visit in 3 days  As needed, If symptoms worsen    IRandy, josy the primary attending of record and contributed the majority of evaluation and treatment of emergent care for this encounter. Total critical care time is 0 minutes, which excludes separately billable procedures and updating family. Time spent is specifically for management of the presenting complaint and symptoms initially, direct bedside care, reevaluation, review of records, and consultation. There was a high probability of clinically significant life-threatening deterioration in the patient's condition, which required my urgent intervention. This chart was generated in part by using Dragon Dictation system and may contain errors related to that system including errors in grammar, punctuation, and spelling, as well as words and phrases that may be inappropriate. If there are any questions or concerns please feel free to contact the dictating provider for clarification.      Flavia Saavedra MD  [address] MD  11/19/22 4029

## 2022-11-19 NOTE — ED NOTES
Pt alert and without s/s distress, resps even and unlab.  Denies N/V/D, CP, SOB or further c/o's     Bernett Barthel, RN  11/19/22 9510

## 2022-11-22 LAB — S PYO THROAT QL CULT: NORMAL

## 2022-12-22 ENCOUNTER — APPOINTMENT (OUTPATIENT)
Dept: GENERAL RADIOLOGY | Age: 57
End: 2022-12-22
Payer: COMMERCIAL

## 2022-12-22 ENCOUNTER — HOSPITAL ENCOUNTER (EMERGENCY)
Age: 57
Discharge: HOME | End: 2022-12-22
Attending: EMERGENCY MEDICINE
Payer: COMMERCIAL

## 2022-12-22 VITALS
OXYGEN SATURATION: 98 % | WEIGHT: 145 LBS | TEMPERATURE: 97.8 F | RESPIRATION RATE: 14 BRPM | HEART RATE: 80 BPM | SYSTOLIC BLOOD PRESSURE: 116 MMHG | HEIGHT: 66 IN | BODY MASS INDEX: 23.3 KG/M2 | DIASTOLIC BLOOD PRESSURE: 84 MMHG

## 2022-12-22 DIAGNOSIS — J04.0 LARYNGITIS: Primary | ICD-10-CM

## 2022-12-22 DIAGNOSIS — K29.00 ACUTE GASTRITIS WITHOUT HEMORRHAGE, UNSPECIFIED GASTRITIS TYPE: ICD-10-CM

## 2022-12-22 DIAGNOSIS — R10.84 GENERALIZED ABDOMINAL PAIN: ICD-10-CM

## 2022-12-22 LAB
BILIRUBIN URINE: NEGATIVE
BLOOD, URINE: NEGATIVE
CLARITY: CLEAR
COLOR: YELLOW
GLUCOSE URINE: NEGATIVE MG/DL
KETONES, URINE: NEGATIVE MG/DL
LEUKOCYTE ESTERASE, URINE: NEGATIVE
MICROSCOPIC EXAMINATION: NORMAL
NITRITE, URINE: NEGATIVE
PH UA: 7 (ref 5–8)
PROTEIN UA: NEGATIVE MG/DL
SPECIFIC GRAVITY UA: 1.01 (ref 1–1.03)
URINE REFLEX TO CULTURE: NORMAL
URINE TYPE: NORMAL
UROBILINOGEN, URINE: 0.2 E.U./DL

## 2022-12-22 PROCEDURE — 71046 X-RAY EXAM CHEST 2 VIEWS: CPT

## 2022-12-22 PROCEDURE — 99284 EMERGENCY DEPT VISIT MOD MDM: CPT

## 2022-12-22 PROCEDURE — 81003 URINALYSIS AUTO W/O SCOPE: CPT

## 2022-12-22 PROCEDURE — 6370000000 HC RX 637 (ALT 250 FOR IP): Performed by: EMERGENCY MEDICINE

## 2022-12-22 RX ORDER — ACETAMINOPHEN 160 MG/5ML
650 SOLUTION ORAL ONCE
Status: COMPLETED | OUTPATIENT
Start: 2022-12-22 | End: 2022-12-22

## 2022-12-22 RX ORDER — SUCRALFATE 1 G/1
1 TABLET ORAL 3 TIMES DAILY
COMMUNITY
Start: 2022-12-22

## 2022-12-22 RX ORDER — METOCLOPRAMIDE 10 MG/1
10 TABLET ORAL DAILY
COMMUNITY

## 2022-12-22 RX ADMIN — ACETAMINOPHEN 650 MG: 650 SOLUTION ORAL at 14:26

## 2022-12-22 RX ADMIN — ALUMINUM HYDROXIDE, MAGNESIUM HYDROXIDE, AND SIMETHICONE: 200; 200; 20 SUSPENSION ORAL at 14:26

## 2022-12-22 ASSESSMENT — LIFESTYLE VARIABLES: HOW OFTEN DO YOU HAVE A DRINK CONTAINING ALCOHOL: NEVER

## 2022-12-22 ASSESSMENT — PAIN DESCRIPTION - FREQUENCY: FREQUENCY: CONTINUOUS

## 2022-12-22 ASSESSMENT — PAIN DESCRIPTION - LOCATION: LOCATION: THROAT

## 2022-12-22 ASSESSMENT — PAIN - FUNCTIONAL ASSESSMENT
PAIN_FUNCTIONAL_ASSESSMENT: 0-10
PAIN_FUNCTIONAL_ASSESSMENT: ACTIVITIES ARE NOT PREVENTED

## 2022-12-22 ASSESSMENT — PAIN DESCRIPTION - ONSET: ONSET: ON-GOING

## 2022-12-22 ASSESSMENT — PAIN DESCRIPTION - DESCRIPTORS: DESCRIPTORS: BURNING;TIGHTNESS

## 2022-12-22 ASSESSMENT — PAIN SCALES - GENERAL: PAINLEVEL_OUTOF10: 10

## 2022-12-22 NOTE — ED PROVIDER NOTES
[facility] EMERGENCY DEPARTMENT      CHIEF COMPLAINT  Post-op Problem       HISTORY OF PRESENT ILLNESS  Ely Miramontes is a 56 y.o. female  who presents to the ED complaining of sore throat, cough, abdominal bloating and left sided rib pain posteriorly after EGD and sigmoidoscopy. This was performed yesterday by Dr. Yara Alexandra. Patient states that she has been having the worsening pain. She states that she is hoarse but also has been recovering from flu past 3 and half weeks. She has not had vomiting. She states that she is to hold her previous Pfannenstiel incision area when she urinates due to pain but denies any actual dysuria or hematuria. No known fevers. She had biopsy of the stomach and duodenum performed. The scope showed gastritis and she is currently on omeprazole and she was also prescribed Reglan yesterday but she states that she cannot really even get it down. No other complaints, modifying factors or associated symptoms. I have reviewed the following from the nursing documentation. Past Medical History:   Diagnosis Date    Asthma     Atrial septal defect     had insertion of atrial septal occluder    Bursitis of left hip     CAD (coronary artery disease)     COPD (chronic obstructive pulmonary disease) (HCC)     History of blood transfusion     Kidney stone     Migraines     Mitral valve prolapse     MRSA (methicillin resistant staph aureus) culture positive 12/2015    Lungs. Diagnosed [facility] with bronchoscopy    MVP (mitral valve prolapse)     PONV (postoperative nausea and vomiting)     Prolonged QT interval syndrome     S/P bronchoscopy     TIA (transient ischemic attack)     Vertigo      Past Surgical History:   Procedure Laterality Date    BREAST ENHANCEMENT SURGERY      augmentation    CARDIAC CATHETERIZATION      CARDIAC SURGERY      septum occluder    COLONOSCOPY  2012?     polyps    CYSTOSCOPY  6/8/2016    [facility]    ENDOSCOPIC ULTRASOUND (UPPER)  05/08/2019    ERCP 2019    Sphincterotomy    ERCP N/A 2019    ERCP SPHINCTER/PAPILLOTOMY performed by Marilu Nayak DO at [facility]    ERCP  2019    ERCP STONE REMOVAL performed by Marilu Nayak DO at [address] ARTHROSCOPY Left 2018    HIP SURGERY      HYSTERECTOMY (CERVIX STATUS UNKNOWN)      HYSTERECTOMY, TOTAL ABDOMINAL (CERVIX REMOVED)      NASAL SINUS SURGERY  13    OTHER SURGICAL HISTORY      thoracic sympathectomy    PACEMAKER INSERTION      also revision x 3    LA HIP ARTHROSCOPY, DX Left 2018    LEFT HIP ARTHROSCOPY, LABRAL  DEBRIDEMENT, CHONDROPLASTY performed by Mario Alberto Wiseman MD at [address]  2017    UPPER GASTROINTESTINAL ENDOSCOPY N/A 2019    UPPER EUS W/ANES.  performed by Marilu Nayak DO at [facility] ENDOSCOPY     Family History   Problem Relation Age of Onset    Heart Disease Mother     Colon Cancer Father         colon    Heart Disease Father     High Blood Pressure Father      Social History     Socioeconomic History    Marital status:      Spouse name: Not on file    Number of children: Not on file    Years of education: Not on file    Highest education level: Not on file   Occupational History    Not on file   Tobacco Use    Smoking status: Former     Packs/day: 0.25     Years: 3.00     Pack years: 0.75     Types: Cigarettes     Quit date: 1998     Years since quittin.0    Smokeless tobacco: Never   Vaping Use    Vaping Use: Never used   Substance and Sexual Activity    Alcohol use: Yes     Comment: socially    Drug use: Yes     Types: Marijuana Christopher Waddell)    Sexual activity: Yes     Partners: Male   Other Topics Concern    Not on file   Social History Narrative    Not on file     Social Determinants of Health     Financial Resource Strain: Not on file   Food Insecurity: Not on file   Transportation Needs: Not on file   Physical Activity: Not on file Stress: Not on file   Social Connections: Not on file   Intimate Partner Violence: Not on file   Housing Stability: Not on file     No current facility-administered medications for this encounter. Current Outpatient Medications   Medication Sig Dispense Refill    metoclopramide (REGLAN) 10 MG tablet Take 10 mg by mouth daily      sucralfate (CARAFATE) 1 GM tablet Take 1 tablet by mouth in the morning, at noon, and at bedtime      venlafaxine (EFFEXOR) 50 MG tablet Take 50 mg by mouth 3 times daily      traZODone (DESYREL) 25 mg TABS Take 25 mg by mouth nightly as needed      ibuprofen (ADVIL;MOTRIN) 600 MG tablet Take 600 mg by mouth every 6 hours as needed for Pain      albuterol sulfate  (90 Base) MCG/ACT inhaler as needed      nadolol (CORGARD) 40 MG tablet Take 120 mg by mouth daily        Allergies   Allergen Reactions    Quinolones      \"because of heart\"    Dexamethasone Sodium Phosphate Other (See Comments)     \"I feel hot all over\"    Other Hives     Adhesives      Codeine Nausea Only     Itchy nose    Nitroglycerin Other (See Comments)     Cardiologist states patient is not to have it, pt does not know why. Patient states \"it can put me into cardiac arrest\"    Prednisone      Other reaction(s): Flushing  \"I turn red and really hot\"       REVIEW OF SYSTEMS  10 systems reviewed, pertinent positives per HPI otherwise noted to be negative. PHYSICAL EXAM  BP (!) 119/91   Pulse 83   Temp 97.8 °F (36.6 °C) (Oral)   Resp 16   Ht 5' 6\" (1.676 m)   Wt 145 lb (65.8 kg)   SpO2 98%   BMI 23.40 kg/m²    GENERAL APPEARANCE: Awake and alert. Cooperative. No acute distress. HENT: Normocephalic. Atraumatic. Mucous membranes are moist.  Mild posterior oropharyngeal erythema without exudate. Uvula midline and nonedematous. No unilateral swelling or fullness of the tonsillar pillars. NECK: Supple. No cervical lymphadenopathy. No nuchal rigidity. EYES: PERRL. EOM's grossly intact.   HEART/CHEST: RRR. No murmurs. Mild tenderness to the left posterior lateral chest wall without any crepitus or bony step-offs  LUNGS: Respirations unlabored. CTAB. Good air exchange. Speaking comfortably in full sentences. ABDOMEN: Mild epigastric as well as suprapubic tenderness. Soft. Non-distended. No masses. No organomegaly. No guarding or rebound. MUSCULOSKELETAL: No extremity edema. Compartments soft. No deformity. No tenderness in the extremities. All extremities neurovascularly intact. SKIN: Warm and dry. No acute rashes. NEUROLOGICAL: Alert and oriented. CN's 2-12 intact. No gross facial drooping. No gross focal deficits. PSYCHIATRIC: Normal mood and affect. LABS  I have reviewed all labs for this visit. Results for orders placed or performed during the hospital encounter of 12/22/22   Urinalysis with Reflex to Culture    Specimen: Urine, clean catch   Result Value Ref Range    Color, UA Yellow Straw/Yellow    Clarity, UA Clear Clear    Glucose, Ur Negative Negative mg/dL    Bilirubin Urine Negative Negative    Ketones, Urine Negative Negative mg/dL    Specific Gravity, UA 1.010 1.005 - 1.030    Blood, Urine Negative Negative    pH, UA 7.0 5.0 - 8.0    Protein, UA Negative Negative mg/dL    Urobilinogen, Urine 0.2 <2.0 E.U./dL    Nitrite, Urine Negative Negative    Leukocyte Esterase, Urine Negative Negative    Microscopic Examination Not Indicated     Urine Type NotGiven     Urine Reflex to Culture Not Indicated        RADIOLOGY  XR CHEST (2 VW)    Result Date: 12/22/2022  EXAMINATION: TWO XRAY VIEWS OF THE CHEST 12/22/2022 2:30 pm COMPARISON: Chest x-ray dated 11/19/2022 HISTORY: ORDERING SYSTEM PROVIDED HISTORY: left rib pain, cough TECHNOLOGIST PROVIDED HISTORY: Reason for exam:->left rib pain, cough Reason for Exam: left rib pain and cough FINDINGS: Clear lungs. Linear atelectasis in the left lingula. No pleural effusion or pneumothorax. Cardiomediastinal silhouette is unremarkable.   Left-sided cardiac device with leads in the right atrium and right ventricle. Visualized osseous structures are unremarkable. Lungs are clear. ED COURSE/MDM  Patient seen and evaluated. Old records reviewed. Labs and imaging reviewed and results discussed with patient. Patient presenting with complaining of pain after EGD and sigmoidoscopy yesterday by GI. She does have some evidence of laryngitis clinically but is tolerating's her secretions. She was given GI cocktail with improvement. She is able to have liquid Tylenol which was given for symptom control with some improvement. She is still complaining of some generalized abdominal discomfort and bloating feeling. Advised her to instead of taking regular diet that was initially recommended, to perhaps trial just clear liquids or even just soft foods for the next 24 to 48 hours until symptoms subside and then advance her diet as tolerated. She is also call her GI provider tomorrow to update on symptoms and any further management. At this time I felt that it was reasonable to discharge patient home with close outpatient follow-up. Patient was in agreement of that plan. Reasons to return to the ER were discussed and all questions answered at time of discharge. I, Dr. Linda Brenner MD, am the primary clinician of record. Is this patient to be included in the SEP-1 Core Measure? No   Exclusion criteria - the patient is NOT to be included for SEP-1 Core Measure due to: Infection is not suspected     During the patient's ED course, the patient was given:  Medications   aluminum & magnesium hydroxide-simethicone (MAALOX) 30 mL, lidocaine viscous hcl (XYLOCAINE) 5 mL (GI COCKTAIL) ( Oral Given 12/22/22 1426)   acetaminophen (TYLENOL) 160 MG/5ML solution 650 mg (650 mg Oral Given 12/22/22 1426)        CLINICAL IMPRESSION  1. Laryngitis    2. Acute gastritis without hemorrhage, unspecified gastritis type    3.  Generalized abdominal pain        Blood pressure (!) 119/91, pulse 83, temperature 97.8 °F (36.6 °C), temperature source Oral, resp. rate 16, height 5' 6\" (1.676 m), weight 145 lb (65.8 kg), SpO2 98 %, not currently breastfeeding. Kera Fenton was discharged to home in stable condition. Patient was given scripts for the following medications. I counseled patient how to take these medications. New Prescriptions    No medications on file       Follow-up with:  Glenna Hastings MD  [address] [place] [address] Dr. Bingham [address] [place] [address]  [phone]    Schedule an appointment as soon as possible for a visit in 2 days  For john Bustos Do, MD  [facility], [address] [place] [address] [phone]    Call in 1 day      DISCLAIMER: This chart was created using Dragon dictation software. Efforts were made by me to ensure accuracy, however some errors may be present due to limitations of this technology and occasionally words are not transcribed correctly.        Nela Dia MD  12/22/22 4181

## 2022-12-22 NOTE — ED NOTES
AVS provided and reviewed with the patient. The patient verbalized understanding of care at home, follow up care, and emergent symptoms to return for. No questions or concerns verbalized at this time. The patient is alert, oriented, stable, and ambulatory out of the department at the time of discharge.        Droys Santoro RN  12/22/22 7372

## 2022-12-22 NOTE — ED TRIAGE NOTES
Complaint of throat pain after EGD yesterday. Physician called in [address] for the patient which has not been taken yet.

## 2023-01-19 DIAGNOSIS — Z79.899 LONG TERM USE OF DRUG: Primary | ICD-10-CM

## 2023-03-02 ENCOUNTER — HOSPITAL ENCOUNTER (OUTPATIENT)
Age: 58
Discharge: HOME | End: 2023-03-02
Payer: COMMERCIAL

## 2023-03-02 ENCOUNTER — HOSPITAL ENCOUNTER (OUTPATIENT)
Dept: GENERAL RADIOLOGY | Age: 58
Discharge: HOME | End: 2023-03-02
Payer: COMMERCIAL

## 2023-03-02 DIAGNOSIS — M95.2 FRONTAL BONE DEFORMITY: ICD-10-CM

## 2023-03-02 LAB
A/G RATIO: 1.8 (ref 1.1–2.2)
ALBUMIN SERPL-MCNC: 4.5 G/DL (ref 3.4–5)
ALP BLD-CCNC: 105 U/L (ref 40–129)
ALT SERPL-CCNC: 14 U/L (ref 10–40)
ANION GAP SERPL CALCULATED.3IONS-SCNC: 9 MMOL/L (ref 3–16)
AST SERPL-CCNC: 22 U/L (ref 15–37)
BASOPHILS ABSOLUTE: 0 K/UL (ref 0–0.2)
BASOPHILS RELATIVE PERCENT: 0.4 %
BILIRUB SERPL-MCNC: 0.5 MG/DL (ref 0–1)
BUN BLDV-MCNC: 9 MG/DL (ref 7–20)
CALCIUM SERPL-MCNC: 10.2 MG/DL (ref 8.3–10.6)
CHLORIDE BLD-SCNC: 105 MMOL/L (ref 99–110)
CO2: 27 MMOL/L (ref 21–32)
CREAT SERPL-MCNC: 0.8 MG/DL (ref 0.6–1.1)
EOSINOPHILS ABSOLUTE: 0 K/UL (ref 0–0.6)
EOSINOPHILS RELATIVE PERCENT: 0 %
GFR SERPL CREATININE-BSD FRML MDRD: >60 ML/MIN/{1.73_M2}
GLUCOSE FASTING: 105 MG/DL (ref 70–99)
HCT VFR BLD CALC: 41.3 % (ref 36–48)
HEMOGLOBIN: 14 G/DL (ref 12–16)
LYMPHOCYTES ABSOLUTE: 1.1 K/UL (ref 1–5.1)
LYMPHOCYTES RELATIVE PERCENT: 19.2 %
MCH RBC QN AUTO: 33 PG (ref 26–34)
MCHC RBC AUTO-ENTMCNC: 33.8 G/DL (ref 31–36)
MCV RBC AUTO: 97.6 FL (ref 80–100)
MONOCYTES ABSOLUTE: 0.6 K/UL (ref 0–1.3)
MONOCYTES RELATIVE PERCENT: 10 %
NEUTROPHILS ABSOLUTE: 4.1 K/UL (ref 1.7–7.7)
NEUTROPHILS RELATIVE PERCENT: 70.4 %
PDW BLD-RTO: 14.8 % (ref 12.4–15.4)
PLATELET # BLD: 144 K/UL (ref 135–450)
PMV BLD AUTO: 8.5 FL (ref 5–10.5)
POTASSIUM SERPL-SCNC: 4.3 MMOL/L (ref 3.5–5.1)
RBC # BLD: 4.23 M/UL (ref 4–5.2)
SODIUM BLD-SCNC: 141 MMOL/L (ref 136–145)
TOTAL PROTEIN: 7 G/DL (ref 6.4–8.2)
TSH SERPL DL<=0.05 MIU/L-ACNC: 2.42 UIU/ML (ref 0.27–4.2)
WBC # BLD: 5.8 K/UL (ref 4–11)

## 2023-03-02 PROCEDURE — 84443 ASSAY THYROID STIM HORMONE: CPT

## 2023-03-02 PROCEDURE — 85025 COMPLETE CBC W/AUTO DIFF WBC: CPT

## 2023-03-02 PROCEDURE — 83036 HEMOGLOBIN GLYCOSYLATED A1C: CPT

## 2023-03-02 PROCEDURE — 70260 X-RAY EXAM OF SKULL: CPT

## 2023-03-02 PROCEDURE — 80053 COMPREHEN METABOLIC PANEL: CPT

## 2023-03-03 LAB
ESTIMATED AVERAGE GLUCOSE: 93.9 MG/DL
HBA1C MFR BLD: 4.9 %

## 2023-03-10 ENCOUNTER — APPOINTMENT (OUTPATIENT)
Dept: GENERAL RADIOLOGY | Age: 58
DRG: 720 | End: 2023-03-10
Payer: COMMERCIAL

## 2023-03-10 ENCOUNTER — HOSPITAL ENCOUNTER (EMERGENCY)
Age: 58
Discharge: HOME | DRG: 720 | End: 2023-03-10
Attending: EMERGENCY MEDICINE
Payer: COMMERCIAL

## 2023-03-10 ENCOUNTER — APPOINTMENT (OUTPATIENT)
Dept: CT IMAGING | Age: 58
DRG: 720 | End: 2023-03-10
Payer: COMMERCIAL

## 2023-03-10 VITALS
BODY MASS INDEX: 23.3 KG/M2 | WEIGHT: 145 LBS | TEMPERATURE: 99.8 F | DIASTOLIC BLOOD PRESSURE: 59 MMHG | SYSTOLIC BLOOD PRESSURE: 94 MMHG | HEIGHT: 66 IN | OXYGEN SATURATION: 97 % | HEART RATE: 70 BPM | RESPIRATION RATE: 14 BRPM

## 2023-03-10 DIAGNOSIS — R51.9 ACUTE NONINTRACTABLE HEADACHE, UNSPECIFIED HEADACHE TYPE: Primary | ICD-10-CM

## 2023-03-10 DIAGNOSIS — M25.50 ARTHRALGIA, UNSPECIFIED JOINT: ICD-10-CM

## 2023-03-10 LAB
A/G RATIO: 1.2 (ref 1.1–2.2)
ALBUMIN SERPL-MCNC: 4.1 G/DL (ref 3.4–5)
ALP BLD-CCNC: 121 U/L (ref 40–129)
ALT SERPL-CCNC: 14 U/L (ref 10–40)
ANION GAP SERPL CALCULATED.3IONS-SCNC: 15 MMOL/L (ref 3–16)
APPEARANCE CSF: CLEAR
APPEARANCE CSF: CLEAR
AST SERPL-CCNC: 18 U/L (ref 15–37)
BASOPHILS ABSOLUTE: 0.1 K/UL (ref 0–0.2)
BASOPHILS RELATIVE PERCENT: 0.3 %
BILIRUB SERPL-MCNC: 0.7 MG/DL (ref 0–1)
BUN BLDV-MCNC: 12 MG/DL (ref 7–20)
CALCIUM SERPL-MCNC: 9.9 MG/DL (ref 8.3–10.6)
CHLORIDE BLD-SCNC: 99 MMOL/L (ref 99–110)
CLOT EVALUATION CSF: ABNORMAL
CLOT EVALUATION CSF: ABNORMAL
CO2: 21 MMOL/L (ref 21–32)
COLOR CSF: COLORLESS
COLOR CSF: COLORLESS
CREAT SERPL-MCNC: 1 MG/DL (ref 0.6–1.1)
EOSINOPHILS ABSOLUTE: 0 K/UL (ref 0–0.6)
EOSINOPHILS RELATIVE PERCENT: 0 %
GFR SERPL CREATININE-BSD FRML MDRD: >60 ML/MIN/{1.73_M2}
GLUCOSE BLD-MCNC: 133 MG/DL (ref 70–99)
GLUCOSE, CSF: 80 MG/DL (ref 40–80)
HCT VFR BLD CALC: 43.9 % (ref 36–48)
HEMOGLOBIN: 15 G/DL (ref 12–16)
LYMPHOCYTES ABSOLUTE: 0.5 K/UL (ref 1–5.1)
LYMPHOCYTES RELATIVE PERCENT: 3.4 %
MCH RBC QN AUTO: 33 PG (ref 26–34)
MCHC RBC AUTO-ENTMCNC: 34.1 G/DL (ref 31–36)
MCV RBC AUTO: 97 FL (ref 80–100)
MONOCYTES ABSOLUTE: 1.5 K/UL (ref 0–1.3)
MONOCYTES RELATIVE PERCENT: 9.4 %
NEUTROPHILS ABSOLUTE: 14 K/UL (ref 1.7–7.7)
NEUTROPHILS RELATIVE PERCENT: 86.9 %
NO DIFFERENTIAL CSF: ABNORMAL
NO DIFFERENTIAL CSF: ABNORMAL
PDW BLD-RTO: 14.2 % (ref 12.4–15.4)
PLATELET # BLD: 128 K/UL (ref 135–450)
PMV BLD AUTO: 9.1 FL (ref 5–10.5)
POTASSIUM REFLEX MAGNESIUM: 3.6 MMOL/L (ref 3.5–5.1)
PROTEIN CSF: 30 MG/DL (ref 15–45)
RAPID INFLUENZA  B AGN: NEGATIVE
RAPID INFLUENZA A AGN: NEGATIVE
RBC # BLD: 4.53 M/UL (ref 4–5.2)
RBC CSF: 1 /CUMM
RBC CSF: 1 /CUMM
SARS-COV-2, NAAT: NOT DETECTED
SODIUM BLD-SCNC: 135 MMOL/L (ref 136–145)
TOTAL PROTEIN: 7.5 G/DL (ref 6.4–8.2)
TUBE NUMBER CSF: ABNORMAL
TUBE NUMBER CSF: ABNORMAL
WBC # BLD: 16.1 K/UL (ref 4–11)
WBC CSF: 0 /CUMM (ref 0–5)
WBC CSF: 0 /CUMM (ref 0–5)

## 2023-03-10 PROCEDURE — 6360000002 HC RX W HCPCS: Performed by: EMERGENCY MEDICINE

## 2023-03-10 PROCEDURE — 87635 SARS-COV-2 COVID-19 AMP PRB: CPT

## 2023-03-10 PROCEDURE — 80053 COMPREHEN METABOLIC PANEL: CPT

## 2023-03-10 PROCEDURE — 96375 TX/PRO/DX INJ NEW DRUG ADDON: CPT

## 2023-03-10 PROCEDURE — 2580000003 HC RX 258: Performed by: EMERGENCY MEDICINE

## 2023-03-10 PROCEDURE — 87804 INFLUENZA ASSAY W/OPTIC: CPT

## 2023-03-10 PROCEDURE — 71045 X-RAY EXAM CHEST 1 VIEW: CPT

## 2023-03-10 PROCEDURE — 82945 GLUCOSE OTHER FLUID: CPT

## 2023-03-10 PROCEDURE — 009U3ZX DRAINAGE OF SPINAL CANAL, PERCUTANEOUS APPROACH, DIAGNOSTIC: ICD-10-PCS | Performed by: EMERGENCY MEDICINE

## 2023-03-10 PROCEDURE — 96365 THER/PROPH/DIAG IV INF INIT: CPT

## 2023-03-10 PROCEDURE — 99284 EMERGENCY DEPT VISIT MOD MDM: CPT

## 2023-03-10 PROCEDURE — 6370000000 HC RX 637 (ALT 250 FOR IP): Performed by: EMERGENCY MEDICINE

## 2023-03-10 PROCEDURE — 87070 CULTURE OTHR SPECIMN AEROBIC: CPT

## 2023-03-10 PROCEDURE — 62272 THER SPI PNXR DRG CSF: CPT

## 2023-03-10 PROCEDURE — 89050 BODY FLUID CELL COUNT: CPT

## 2023-03-10 PROCEDURE — 85025 COMPLETE CBC W/AUTO DIFF WBC: CPT

## 2023-03-10 PROCEDURE — 87205 SMEAR GRAM STAIN: CPT

## 2023-03-10 PROCEDURE — 70450 CT HEAD/BRAIN W/O DYE: CPT

## 2023-03-10 PROCEDURE — 36415 COLL VENOUS BLD VENIPUNCTURE: CPT

## 2023-03-10 PROCEDURE — 84157 ASSAY OF PROTEIN OTHER: CPT

## 2023-03-10 RX ORDER — 0.9 % SODIUM CHLORIDE 0.9 %
1000 INTRAVENOUS SOLUTION INTRAVENOUS ONCE
Status: COMPLETED | OUTPATIENT
Start: 2023-03-10 | End: 2023-03-10

## 2023-03-10 RX ORDER — KETOROLAC TROMETHAMINE 30 MG/ML
30 INJECTION, SOLUTION INTRAMUSCULAR; INTRAVENOUS ONCE
Status: COMPLETED | OUTPATIENT
Start: 2023-03-10 | End: 2023-03-10

## 2023-03-10 RX ORDER — PROCHLORPERAZINE EDISYLATE 5 MG/ML
10 INJECTION INTRAMUSCULAR; INTRAVENOUS ONCE
Status: COMPLETED | OUTPATIENT
Start: 2023-03-10 | End: 2023-03-10

## 2023-03-10 RX ORDER — DIPHENHYDRAMINE HYDROCHLORIDE 50 MG/ML
25 INJECTION INTRAMUSCULAR; INTRAVENOUS ONCE
Status: COMPLETED | OUTPATIENT
Start: 2023-03-10 | End: 2023-03-10

## 2023-03-10 RX ORDER — BUTALBITAL, ACETAMINOPHEN AND CAFFEINE 300; 40; 50 MG/1; MG/1; MG/1
1 CAPSULE ORAL EVERY 6 HOURS PRN
Qty: 20 CAPSULE | Refills: 0 | Status: ON HOLD | OUTPATIENT
Start: 2023-03-10 | End: 2023-03-11

## 2023-03-10 RX ORDER — BUTALBITAL, ACETAMINOPHEN AND CAFFEINE 50; 325; 40 MG/1; MG/1; MG/1
2 TABLET ORAL ONCE
Status: COMPLETED | OUTPATIENT
Start: 2023-03-10 | End: 2023-03-10

## 2023-03-10 RX ORDER — MAGNESIUM SULFATE 1 G/100ML
1000 INJECTION INTRAVENOUS ONCE
Status: COMPLETED | OUTPATIENT
Start: 2023-03-10 | End: 2023-03-10

## 2023-03-10 RX ADMIN — KETOROLAC TROMETHAMINE 30 MG: 30 INJECTION, SOLUTION INTRAMUSCULAR at 10:52

## 2023-03-10 RX ADMIN — SODIUM CHLORIDE 1000 ML: 9 INJECTION, SOLUTION INTRAVENOUS at 13:39

## 2023-03-10 RX ADMIN — MAGNESIUM SULFATE HEPTAHYDRATE 1000 MG: 1 INJECTION, SOLUTION INTRAVENOUS at 12:38

## 2023-03-10 RX ADMIN — BUTALBITAL, ACETAMINOPHEN, AND CAFFEINE 2 TABLET: 50; 325; 40 TABLET ORAL at 12:36

## 2023-03-10 RX ADMIN — PROCHLORPERAZINE EDISYLATE 10 MG: 5 INJECTION INTRAMUSCULAR; INTRAVENOUS at 10:52

## 2023-03-10 RX ADMIN — DIPHENHYDRAMINE HYDROCHLORIDE 25 MG: 50 INJECTION, SOLUTION INTRAMUSCULAR; INTRAVENOUS at 10:52

## 2023-03-10 RX ADMIN — SODIUM CHLORIDE 1000 ML: 9 INJECTION, SOLUTION INTRAVENOUS at 10:52

## 2023-03-10 ASSESSMENT — PAIN SCALES - GENERAL
PAINLEVEL_OUTOF10: 10
PAINLEVEL_OUTOF10: 7
PAINLEVEL_OUTOF10: 7
PAINLEVEL_OUTOF10: 10

## 2023-03-10 ASSESSMENT — PAIN - FUNCTIONAL ASSESSMENT
PAIN_FUNCTIONAL_ASSESSMENT: PREVENTS OR INTERFERES WITH MANY ACTIVE NOT PASSIVE ACTIVITIES
PAIN_FUNCTIONAL_ASSESSMENT: PREVENTS OR INTERFERES SOME ACTIVE ACTIVITIES AND ADLS
PAIN_FUNCTIONAL_ASSESSMENT: 0-10

## 2023-03-10 ASSESSMENT — PAIN DESCRIPTION - ONSET
ONSET: ON-GOING
ONSET: ON-GOING

## 2023-03-10 ASSESSMENT — PAIN DESCRIPTION - LOCATION
LOCATION: HEAD
LOCATION: HEAD

## 2023-03-10 ASSESSMENT — PAIN DESCRIPTION - ORIENTATION: ORIENTATION: RIGHT;LEFT

## 2023-03-10 ASSESSMENT — PAIN DESCRIPTION - PAIN TYPE
TYPE: ACUTE PAIN
TYPE: ACUTE PAIN

## 2023-03-10 ASSESSMENT — PAIN DESCRIPTION - FREQUENCY
FREQUENCY: CONTINUOUS
FREQUENCY: CONTINUOUS

## 2023-03-10 ASSESSMENT — LIFESTYLE VARIABLES: HOW OFTEN DO YOU HAVE A DRINK CONTAINING ALCOHOL: NEVER

## 2023-03-10 ASSESSMENT — PAIN DESCRIPTION - DESCRIPTORS
DESCRIPTORS: ACHING;POUNDING
DESCRIPTORS: SHARP;THROBBING

## 2023-03-10 NOTE — ED PROVIDER NOTES
[place] [place]      CHIEF COMPLAINT  Headache       HISTORY OF PRESENT ILLNESS  Kylee Maynard is a 56 y.o. female  who presents to the ED complaining of headache and feeling pain. States came home Wednesday from work and hips hurt. Now with head pain, high fever yesterday. \"Everything hurts' including neck, shoulders, elbows, and all the way down body. States headache feels like \"spasms\" that come in band from back of head to front. No n/v.  No vision changes. Is sensitive to light. No cough/dyspnea. No chest pain. Took APAP, ibuprofen, no improvement with OTC meds. No other complaints, modifying factors or associated symptoms. I have reviewed the following from the nursing documentation. Past Medical History:   Diagnosis Date    Asthma     Atrial septal defect     had insertion of atrial septal occluder    Bursitis of left hip     CAD (coronary artery disease)     COPD (chronic obstructive pulmonary disease) (HCC)     History of blood transfusion     Kidney stone     Migraines     Mitral valve prolapse     MRSA (methicillin resistant staph aureus) culture positive 12/2015    Lungs. Diagnosed [facility] with bronchoscopy    MVP (mitral valve prolapse)     PONV (postoperative nausea and vomiting)     Prolonged QT interval syndrome     S/P bronchoscopy     TIA (transient ischemic attack)     Vertigo      Past Surgical History:   Procedure Laterality Date    BREAST ENHANCEMENT SURGERY      augmentation    CARDIAC CATHETERIZATION      CARDIAC SURGERY      septum occluder    COLONOSCOPY  2012?     polyps    CYSTOSCOPY  6/8/2016    [facility]    ENDOSCOPIC ULTRASOUND (UPPER)  05/08/2019    ERCP  05/08/2019    Sphincterotomy    ERCP N/A 5/8/2019    ERCP SPHINCTER/PAPILLOTOMY performed by Lon Rangel DO at [address]    ERCP  5/8/2019    ERCP STONE REMOVAL performed by Lon Rangel DO at [address] ARTHROSCOPY Left 02/08/2018    HIP SURGERY HYSTERECTOMY (CERVIX STATUS UNKNOWN)      HYSTERECTOMY, TOTAL ABDOMINAL (CERVIX REMOVED)      NASAL SINUS SURGERY  13    OTHER SURGICAL HISTORY      thoracic sympathectomy    PACEMAKER INSERTION      also revision x 3    [place] HIP ARTHROSCOPY, DX Left 2018    LEFT HIP ARTHROSCOPY, LABRAL  DEBRIDEMENT, CHONDROPLASTY performed by Tigre Ty MD at [address] [place] [address]  2017    UPPER GASTROINTESTINAL ENDOSCOPY N/A 2019    UPPER EUS W/ANES. performed by Danielle Busch DO at [facility]     Family History   Problem Relation Age of Onset    Heart Disease Mother     Colon Cancer Father         colon    Heart Disease Father     High Blood Pressure Father      Social History     Socioeconomic History    Marital status:      Spouse name: Not on file    Number of children: Not on file    Years of education: Not on file    Highest education level: Not on file   Occupational History    Not on file   Tobacco Use    Smoking status: Former     Packs/day: 0.25     Years: 3.00     Pack years: 0.75     Types: Cigarettes     Quit date: 1998     Years since quittin.2    Smokeless tobacco: Never   Vaping Use    Vaping Use: Never used   Substance and Sexual Activity    Alcohol use: Yes     Comment: socially    Drug use: Yes     Types: Marijuana [place])    Sexual activity: Yes     Partners: Male   Other Topics Concern    Not on file   Social History Narrative    Not on file     Social Determinants of Health     Financial Resource Strain: Not on file   Food Insecurity: Not on file   Transportation Needs: Not on file   Physical Activity: Not on file   Stress: Not on file   Social Connections: Not on file   Intimate Partner Violence: Not on file   Housing Stability: Not on file     No current facility-administered medications for this encounter.      Current Outpatient Medications   Medication Sig Dispense Refill    butalbital-APAP-caffeine (FIORICET) -40 MG CAPS per capsule Take 1 capsule by mouth every 6 hours as needed for Headaches or Migraine Max Daily Amount: 4 capsules 20 capsule 0    metoclopramide (REGLAN) 10 MG tablet Take 10 mg by mouth daily      sucralfate (CARAFATE) 1 GM tablet Take 1 tablet by mouth in the morning, at noon, and at bedtime      venlafaxine (EFFEXOR) 50 MG tablet Take 50 mg by mouth 3 times daily      traZODone (DESYREL) 25 mg TABS Take 25 mg by mouth nightly as needed      ibuprofen (ADVIL;MOTRIN) 600 MG tablet Take 600 mg by mouth every 6 hours as needed for Pain      albuterol sulfate  (90 Base) MCG/ACT inhaler as needed      nadolol (CORGARD) 40 MG tablet Take 120 mg by mouth daily        Allergies   Allergen Reactions    Quinolones      \"because of heart\"    Dexamethasone Sodium Phosphate Other (See Comments)     \"I feel hot all over\"    Other Hives     Adhesives      Codeine Nausea Only     Itchy nose    Nitroglycerin Other (See Comments)     Cardiologist states patient is not to have it, pt does not know why. Patient states \"it can put me into cardiac arrest\"    Prednisone      Other reaction(s): Flushing  \"I turn red and really hot\"       PHYSICAL EXAM  BP (!) 94/59   Pulse 70   Temp 99.8 °F (37.7 °C) (Oral)   Resp 14   Ht 5' 6\" (1.676 m)   Wt 145 lb (65.8 kg)   SpO2 97%   BMI 23.40 kg/m²    GENERAL APPEARANCE: Awake and alert. Cooperative. No acute distress. HENT: Normocephalic. Atraumatic. Mucous membranes are moist.  No drooling or stridor. NECK: Supple. No cervical lymphadenopathy. Patient with pain with flexion of the neck and also lateral rotation. EYES: PERRL. EOM's grossly intact. HEART/CHEST: RRR. No murmurs. LUNGS: Respirations unlabored. CTAB. Good air exchange. Speaking comfortably in full sentences. ABDOMEN: No tenderness. Soft. Non-distended. No masses. No organomegaly. No guarding or rebound. MUSCULOSKELETAL: No extremity edema. Compartments soft. No deformity.   No tenderness in the extremities. All extremities neurovascularly intact. SKIN: Warm and dry. No acute rashes. NEUROLOGICAL: Alert and oriented. CN's 2-12 intact. No gross facial drooping. No gross focal deficits. PSYCHIATRIC: Normal mood and affect. LABS  I have reviewed all labs for this visit.    Results for orders placed or performed during the hospital encounter of 03/10/23   COVID-19, Rapid    Specimen: Nasopharyngeal Swab   Result Value Ref Range    SARS-CoV-2, NAAT Not Detected Not Detected   Rapid influenza A/B antigens    Specimen: Nasopharyngeal   Result Value Ref Range    Rapid Influenza A Ag Negative Negative    Rapid Influenza B Ag Negative Negative   CBC with Auto Differential   Result Value Ref Range    WBC 16.1 (H) 4.0 - 11.0 K/uL    RBC 4.53 4.00 - 5.20 M/uL    Hemoglobin 15.0 12.0 - 16.0 g/dL    Hematocrit 43.9 36.0 - 48.0 %    MCV 97.0 80.0 - 100.0 fL    MCH 33.0 26.0 - 34.0 pg    MCHC 34.1 31.0 - 36.0 g/dL    RDW 14.2 12.4 - 15.4 %    Platelets 505 (L) 538 - 450 K/uL    MPV 9.1 5.0 - 10.5 fL    Neutrophils % 86.9 %    Lymphocytes % 3.4 %    Monocytes % 9.4 %    Eosinophils % 0.0 %    Basophils % 0.3 %    Neutrophils Absolute 14.0 (H) 1.7 - 7.7 K/uL    Lymphocytes Absolute 0.5 (L) 1.0 - 5.1 K/uL    Monocytes Absolute 1.5 (H) 0.0 - 1.3 K/uL    Eosinophils Absolute 0.0 0.0 - 0.6 K/uL    Basophils Absolute 0.1 0.0 - 0.2 K/uL   CMP w/ Reflex to MG   Result Value Ref Range    Sodium 135 (L) 136 - 145 mmol/L    Potassium reflex Magnesium 3.6 3.5 - 5.1 mmol/L    Chloride 99 99 - 110 mmol/L    CO2 21 21 - 32 mmol/L    Anion Gap 15 3 - 16    Glucose 133 (H) 70 - 99 mg/dL    BUN 12 7 - 20 mg/dL    Creatinine 1.0 0.6 - 1.1 mg/dL    Est, Glom Filt Rate >60 >60    Calcium 9.9 8.3 - 10.6 mg/dL    Total Protein 7.5 6.4 - 8.2 g/dL    Albumin 4.1 3.4 - 5.0 g/dL    Albumin/Globulin Ratio 1.2 1.1 - 2.2    Total Bilirubin 0.7 0.0 - 1.0 mg/dL    Alkaline Phosphatase 121 40 - 129 U/L    ALT 14 10 - 40 U/L    AST 18 15 - 37 U/L   Glucose CSF   Result Value Ref Range    Glucose, CSF 80 40 - 80 mg/dL    Appearance, CSF Clear    Protein CSF   Result Value Ref Range    Protein, CSF 30 15 - 45 mg/dL   Cell Count with Differential, CSF   Result Value Ref Range    Color, CSF Colorless Colorless    Tube Number + CELL CT + DIFF-CSF Tube 1     Clot Evaluation CSF see below     WBC, CSF 0 0 - 5 /cumm    RBC, CSF 1 (A) 0 /cumm    No differential CSF see below    Cell Count with Differential, CSF   Result Value Ref Range    Color, CSF Colorless Colorless    Appearance, CSF Clear Clear    Tube Number + CELL CT + DIFF-CSF Tube 4     Clot Evaluation CSF see below     WBC, CSF 0 0 - 5 /cumm    RBC, CSF 1 (A) 0 /cumm    No differential CSF see below        RADIOLOGY  XR SKULL (MIN 4 VIEWS)    Result Date: 3/2/2023  EXAMINATION: THREE XRAY VIEWS OF THE SKULL 3/2/2023 9:12 am COMPARISON: None. HISTORY: ORDERING SYSTEM PROVIDED HISTORY: Frontal bone deformity TECHNOLOGIST PROVIDED HISTORY: Reason for Exam: cranial deformity,painful FINDINGS: Paranasal sinuses appear clear. No fracture identified. No destructive bony abnormality. No bony lesion evident. Unremarkable, consider CT for further evaluation     CT Head W/O Contrast    Result Date: 3/10/2023  EXAMINATION: CT OF THE HEAD WITHOUT CONTRAST  3/10/2023 10:48 am TECHNIQUE: CT of the head was performed without the administration of intravenous contrast. Automated exposure control, iterative reconstruction, and/or weight based adjustment of the mA/kV was utilized to reduce the radiation dose to as low as reasonably achievable. COMPARISON: None. HISTORY: ORDERING SYSTEM PROVIDED HISTORY: headache TECHNOLOGIST PROVIDED HISTORY: Reason for exam:->headache Has a \"code stroke\" or \"stroke alert\" been called? ->No Decision Support Exception - unselect if not a suspected or confirmed emergency medical condition->Emergency Medical Condition (MA) Is the patient pregnant?->No Reason for Exam: Headache FINDINGS: BRAIN/VENTRICLES: There is no acute intracranial hemorrhage, mass effect or midline shift. No abnormal extra-axial fluid collection. The gray-white differentiation is maintained without evidence of an acute infarct. There is no evidence of hydrocephalus. ORBITS: The visualized portion of the orbits demonstrate no acute abnormality. SINUSES: Maxillary sinus surgical changes identified. Mild-to-moderate mucosal thickening identified in the ethmoid air cells. Mastoid air cells are clear SOFT TISSUES/SKULL:  No acute abnormality of the visualized skull or soft tissues. No acute intracranial abnormality. If patient's symptoms are persistent or worsen, a follow-up head CT or MRI of the brain may be obtained. XR CHEST PORTABLE    Result Date: 3/10/2023  EXAMINATION: ONE XRAY VIEW OF THE CHEST 3/10/2023 10:49 am COMPARISON: 12/22/2022 HISTORY: ORDERING SYSTEM PROVIDED HISTORY: fever TECHNOLOGIST PROVIDED HISTORY: Reason for exam:->fever Reason for Exam: Fever FINDINGS: Transvenous pacer remains in place. The lungs are without acute focal process. There is no effusion or pneumothorax. The cardiomediastinal silhouette is stable. The osseous structures are stable. No acute process. ED COURSE/MDM  Patient presenting for evaluation of headache and generalized joint pain. She was having some neck discomfort and certainly concern is for possible acute meningitis based on her symptoms. She was given Toradol, Compazine and Benadryl with some improvement, followed by Fioricet with continued significant proven of her headache. CT scan showed no acute finding including no evidence of mass or concerning findings such as hemorrhage. I do lower suspicion for subarachnoid hemorrhage but certainly this is also consideration. After discussing the risks and benefits of a lumbar puncture to evaluate further for possible acute meningitis, I did perform LP, please see below procedure note. Patient tolerated this very well. No white blood cells seen on her cell count of her CSF fluid and normal glucose and protein, therefore no evidence at this time of acute meningitis of either viral or bacterial cause. Rapid flu and COVID both negative. She does have a leukocytosis noted although she does tell me that she has had leukocytosis previously. However, given her acute symptoms certainly a viral type illness is a consideration of cause. At this time, we discussed discharge home with continuation of medication for her headache and close follow-up with her primary care provider. She felt very comfortable with this plan. Reasons to return to the ER were discussed at length and all questions answered prior to discharge      I have personally reviewed chest xray images and radiology confirms the interpretation:  No confluent infiltrate, pulmonary edema, pleural effusion or pneumothorax appreciated. Procedures:Lumbar puncture    PROCEDURE:  LUMBAR PUNCTURE  The procedure was explained to Zollie Mohs or their surrogate with an opportunity to ask questions, and the risks, benefits, and alternatives were discussed. The consent form was signed. The proper pre-procedural policies were followed. Zollie Mohs was sat up and draped over a bedside table. The L4 interspace was identified and marked. The lumbar region was prepped and draped to maintain a sterile field. A local anesthetic was used to anesthetize the tissues surrounding and including the L4 interspace. A 22 gauge spinal needle was inserted through the L4 interspace and 4mL of clear CSF was sent to the lab for analysis. Zollie Mohs rested after the procedure. There were no complications during the procedure. Sepsis:  Is this patient to be included in the SEP-1 Core Measure due to severe sepsis or septic shock?    No   Exclusion criteria - the patient is NOT to be included for SEP-1 Core Measure due to:  Viral etiology found or highly suspected (including COVID-19) without concomitant bacterial infection     Labs and imaging reviewed and results discussed with patient. Patient was reassessed as noted above . Plan of care discussed with patient. Patient in agreement with plan. Strict return precautions have been given. I, Dr. Vik Campbell MD, am the primary clinician of record. During the patient's ED course, the patient was given:  Medications   0.9 % sodium chloride bolus (0 mLs IntraVENous Stopped 3/10/23 1339)   ketorolac (TORADOL) injection 30 mg (30 mg IntraVENous Given 3/10/23 1052)   prochlorperazine (COMPAZINE) injection 10 mg (10 mg IntraVENous Given 3/10/23 1052)   diphenhydrAMINE (BENADRYL) injection 25 mg (25 mg IntraVENous Given 3/10/23 1052)   magnesium sulfate 1000 mg in dextrose 5% 100 mL IVPB (0 mg IntraVENous Stopped 3/10/23 1339)   butalbital-acetaminophen-caffeine (FIORICET, ESGIC) per tablet 2 tablet (2 tablets Oral Given 3/10/23 1236)   0.9 % sodium chloride bolus (0 mLs IntraVENous Stopped 3/10/23 1427)        CLINICAL IMPRESSION  1. Acute nonintractable headache, unspecified headache type    2. Arthralgia, unspecified joint        Blood pressure (!) 94/59, pulse 70, temperature 99.8 °F (37.7 °C), temperature source Oral, resp. rate 14, height 5' 6\" (1.676 m), weight 145 lb (65.8 kg), SpO2 97 %, not currently breastfeeding. Jessica Renner was discharged to home in stable condition. Patient was given scripts for the following medications. I counseled patient how to take these medications.    Discharge Medication List as of 3/10/2023  3:44 PM        START taking these medications    Details   butalbital-APAP-caffeine (FIORICET) -40 MG CAPS per capsule Take 1 capsule by mouth every 6 hours as needed for Headaches or Migraine Max Daily Amount: 4 capsules, Disp-20 capsule, R-0Normal             Follow-up with:  Marcella Jimenez MD  [address] [place] [address] Dr. Frantz Rowan [id number]  [phone]    Schedule an appointment as soon as possible for a visit in 2 days  For recheck    [id number] [facility] Emergency Department  [address] [place] [address]  [phone]    If symptoms worsen    DISCLAIMER: This chart was created using Dragon dictation software. Efforts were made by me to ensure accuracy, however some errors may be present due to limitations of this technology and occasionally words are not transcribed correctly.         Chad Guardado MD  03/10/23 8448

## 2023-03-10 NOTE — ED NOTES
Lumbar puncture completed by Dr. Jinny Tineo with this nurse at bedside. The patient tolerated the procedure well. Specimens sent to lab.        Inocente Esquivel RN  03/10/23 [id number]

## 2023-03-10 NOTE — ED NOTES
Patient's family at bedside requesting the patient be transferred. Dr. Maria Eugenia Camacho previously came to the room to speak with the family and they were no longer there. Dr. Maria Eugenia Camacho updated and states she will come to the room as soon as possible. Patient and significant other updated.       Sheba Santiago RN  03/10/23 130 Althea Dale RN  03/10/23 [id number] 2

## 2023-03-10 NOTE — ED NOTES
AVS provided and reviewed with the patient. The patient verbalized understanding of care at home, follow up care, and emergent symptoms to return for. No questions or concerns verbalized at this time. The patient is alert, oriented, stable, and ambulatory out of the department at the time of discharge.        Johnson Galvin RN  03/10/23 9251

## 2023-03-11 ENCOUNTER — APPOINTMENT (OUTPATIENT)
Dept: CT IMAGING | Age: 58
DRG: 720 | End: 2023-03-11
Payer: COMMERCIAL

## 2023-03-11 ENCOUNTER — HOSPITAL ENCOUNTER (INPATIENT)
Age: 58
LOS: 4 days | Discharge: HOME | DRG: 720 | End: 2023-03-15
Attending: STUDENT IN AN ORGANIZED HEALTH CARE EDUCATION/TRAINING PROGRAM | Admitting: INTERNAL MEDICINE
Payer: COMMERCIAL

## 2023-03-11 ENCOUNTER — APPOINTMENT (OUTPATIENT)
Dept: GENERAL RADIOLOGY | Age: 58
DRG: 720 | End: 2023-03-11
Payer: COMMERCIAL

## 2023-03-11 DIAGNOSIS — E86.0 DEHYDRATION: ICD-10-CM

## 2023-03-11 DIAGNOSIS — R79.89 ELEVATED PROCALCITONIN: ICD-10-CM

## 2023-03-11 DIAGNOSIS — N30.00 ACUTE CYSTITIS WITHOUT HEMATURIA: Primary | ICD-10-CM

## 2023-03-11 DIAGNOSIS — R41.82 ALTERED MENTAL STATUS, UNSPECIFIED ALTERED MENTAL STATUS TYPE: ICD-10-CM

## 2023-03-11 DIAGNOSIS — R51.9 ACUTE INTRACTABLE HEADACHE, UNSPECIFIED HEADACHE TYPE: ICD-10-CM

## 2023-03-11 PROBLEM — I95.9 HYPOTENSION: Status: ACTIVE | Noted: 2023-03-11

## 2023-03-11 PROBLEM — N39.0 UTI (URINARY TRACT INFECTION): Status: ACTIVE | Noted: 2023-03-11

## 2023-03-11 PROBLEM — E87.1 HYPONATREMIA: Status: ACTIVE | Noted: 2023-03-11

## 2023-03-11 LAB
A/G RATIO: 1.4 (ref 1.1–2.2)
ALBUMIN SERPL-MCNC: 3.6 G/DL (ref 3.4–5)
ALP BLD-CCNC: 138 U/L (ref 40–129)
ALT SERPL-CCNC: 42 U/L (ref 10–40)
AMMONIA: 32 UMOL/L (ref 11–51)
AMPHETAMINE SCREEN, URINE: ABNORMAL
ANION GAP SERPL CALCULATED.3IONS-SCNC: 12 MMOL/L (ref 3–16)
AST SERPL-CCNC: 77 U/L (ref 15–37)
BACTERIA: ABNORMAL /HPF
BARBITURATE SCREEN URINE: POSITIVE
BASOPHILS ABSOLUTE: 0 K/UL (ref 0–0.2)
BASOPHILS RELATIVE PERCENT: 0.2 %
BENZODIAZEPINE SCREEN, URINE: ABNORMAL
BILIRUB SERPL-MCNC: 0.8 MG/DL (ref 0–1)
BILIRUBIN URINE: NEGATIVE
BLOOD, URINE: ABNORMAL
BUN BLDV-MCNC: 11 MG/DL (ref 7–20)
CALCIUM SERPL-MCNC: 9 MG/DL (ref 8.3–10.6)
CANNABINOID SCREEN URINE: POSITIVE
CHLORIDE BLD-SCNC: 100 MMOL/L (ref 99–110)
CHP ED QC CHECK: NORMAL
CLARITY: CLEAR
CO2: 18 MMOL/L (ref 21–32)
COCAINE METABOLITE SCREEN URINE: ABNORMAL
COLOR: YELLOW
CREAT SERPL-MCNC: 0.7 MG/DL (ref 0.6–1.1)
CSF CULTURE: NORMAL
EKG ATRIAL RATE: 83 BPM
EKG DIAGNOSIS: NORMAL
EKG P AXIS: -81 DEGREES
EKG P-R INTERVAL: 142 MS
EKG Q-T INTERVAL: 416 MS
EKG QRS DURATION: 78 MS
EKG QTC CALCULATION (BAZETT): 488 MS
EKG R AXIS: -30 DEGREES
EKG T AXIS: 264 DEGREES
EKG VENTRICULAR RATE: 83 BPM
EOSINOPHILS ABSOLUTE: 0 K/UL (ref 0–0.6)
EOSINOPHILS RELATIVE PERCENT: 0 %
EPITHELIAL CELLS, UA: ABNORMAL /HPF (ref 0–5)
ETHANOL: NORMAL MG/DL (ref 0–0.08)
FENTANYL SCREEN, URINE: ABNORMAL
GFR SERPL CREATININE-BSD FRML MDRD: >60 ML/MIN/{1.73_M2}
GLUCOSE BLD-MCNC: 101 MG/DL (ref 70–99)
GLUCOSE BLD-MCNC: 99 MG/DL
GLUCOSE BLD-MCNC: 99 MG/DL (ref 70–99)
GLUCOSE URINE: NEGATIVE MG/DL
GRAM STAIN RESULT: NORMAL
HCT VFR BLD CALC: 40.1 % (ref 36–48)
HEMOGLOBIN: 13.7 G/DL (ref 12–16)
INFLUENZA A: NOT DETECTED
INFLUENZA B: NOT DETECTED
KETONES, URINE: NEGATIVE MG/DL
LACTIC ACID: 1.6 MMOL/L (ref 0.4–2)
LEUKOCYTE ESTERASE, URINE: ABNORMAL
LYMPHOCYTES ABSOLUTE: 0.4 K/UL (ref 1–5.1)
LYMPHOCYTES RELATIVE PERCENT: 4.4 %
Lab: ABNORMAL
MCH RBC QN AUTO: 33.8 PG (ref 26–34)
MCHC RBC AUTO-ENTMCNC: 34.1 G/DL (ref 31–36)
MCV RBC AUTO: 99.2 FL (ref 80–100)
METHADONE SCREEN, URINE: ABNORMAL
MICROSCOPIC EXAMINATION: YES
MONOCYTES ABSOLUTE: 0.8 K/UL (ref 0–1.3)
MONOCYTES RELATIVE PERCENT: 8.5 %
MUCUS: ABNORMAL /LPF
NEUTROPHILS ABSOLUTE: 8 K/UL (ref 1.7–7.7)
NEUTROPHILS RELATIVE PERCENT: 86.9 %
NITRITE, URINE: POSITIVE
OPIATE SCREEN URINE: ABNORMAL
OXYCODONE URINE: ABNORMAL
PDW BLD-RTO: 14.6 % (ref 12.4–15.4)
PERFORMED ON: NORMAL
PH UA: 6.5
PH UA: 6.5 (ref 5–8)
PHENCYCLIDINE SCREEN URINE: ABNORMAL
PLATELET # BLD: 99 K/UL (ref 135–450)
PMV BLD AUTO: 10.2 FL (ref 5–10.5)
POTASSIUM REFLEX MAGNESIUM: 4.1 MMOL/L (ref 3.5–5.1)
PROCALCITONIN: 2.27 NG/ML (ref 0–0.15)
PROTEIN UA: ABNORMAL MG/DL
RBC # BLD: 4.04 M/UL (ref 4–5.2)
RBC UA: ABNORMAL /HPF (ref 0–4)
SARS-COV-2 RNA, RT PCR: NOT DETECTED
SODIUM BLD-SCNC: 130 MMOL/L (ref 136–145)
SPECIFIC GRAVITY UA: <=1.005 (ref 1–1.03)
TOTAL PROTEIN: 6.1 G/DL (ref 6.4–8.2)
TROPONIN: <0.01 NG/ML
TSH REFLEX: 1.09 UIU/ML (ref 0.27–4.2)
URINE REFLEX TO CULTURE: YES
URINE TYPE: ABNORMAL
UROBILINOGEN, URINE: 0.2 E.U./DL
WBC # BLD: 9.2 K/UL (ref 4–11)
WBC UA: ABNORMAL /HPF (ref 0–5)

## 2023-03-11 PROCEDURE — 96375 TX/PRO/DX INJ NEW DRUG ADDON: CPT

## 2023-03-11 PROCEDURE — 99222 1ST HOSP IP/OBS MODERATE 55: CPT | Performed by: NURSE PRACTITIONER

## 2023-03-11 PROCEDURE — 84484 ASSAY OF TROPONIN QUANT: CPT

## 2023-03-11 PROCEDURE — 93010 ELECTROCARDIOGRAM REPORT: CPT | Performed by: INTERNAL MEDICINE

## 2023-03-11 PROCEDURE — 74177 CT ABD & PELVIS W/CONTRAST: CPT

## 2023-03-11 PROCEDURE — 6370000000 HC RX 637 (ALT 250 FOR IP): Performed by: NURSE PRACTITIONER

## 2023-03-11 PROCEDURE — 82077 ASSAY SPEC XCP UR&BREATH IA: CPT

## 2023-03-11 PROCEDURE — 87636 SARSCOV2 & INF A&B AMP PRB: CPT

## 2023-03-11 PROCEDURE — 2060000000 HC ICU INTERMEDIATE R&B

## 2023-03-11 PROCEDURE — 6360000002 HC RX W HCPCS: Performed by: STUDENT IN AN ORGANIZED HEALTH CARE EDUCATION/TRAINING PROGRAM

## 2023-03-11 PROCEDURE — 36415 COLL VENOUS BLD VENIPUNCTURE: CPT

## 2023-03-11 PROCEDURE — 85025 COMPLETE CBC W/AUTO DIFF WBC: CPT

## 2023-03-11 PROCEDURE — 6360000002 HC RX W HCPCS: Performed by: NURSE PRACTITIONER

## 2023-03-11 PROCEDURE — 87088 URINE BACTERIA CULTURE: CPT

## 2023-03-11 PROCEDURE — 82140 ASSAY OF AMMONIA: CPT

## 2023-03-11 PROCEDURE — 2580000003 HC RX 258: Performed by: STUDENT IN AN ORGANIZED HEALTH CARE EDUCATION/TRAINING PROGRAM

## 2023-03-11 PROCEDURE — 84145 PROCALCITONIN (PCT): CPT

## 2023-03-11 PROCEDURE — 2580000003 HC RX 258: Performed by: NURSE PRACTITIONER

## 2023-03-11 PROCEDURE — 93005 ELECTROCARDIOGRAM TRACING: CPT | Performed by: STUDENT IN AN ORGANIZED HEALTH CARE EDUCATION/TRAINING PROGRAM

## 2023-03-11 PROCEDURE — 96367 TX/PROPH/DG ADDL SEQ IV INF: CPT

## 2023-03-11 PROCEDURE — 80307 DRUG TEST PRSMV CHEM ANLYZR: CPT

## 2023-03-11 PROCEDURE — 96365 THER/PROPH/DIAG IV INF INIT: CPT

## 2023-03-11 PROCEDURE — 87186 SC STD MICRODIL/AGAR DIL: CPT

## 2023-03-11 PROCEDURE — 96366 THER/PROPH/DIAG IV INF ADDON: CPT

## 2023-03-11 PROCEDURE — 83605 ASSAY OF LACTIC ACID: CPT

## 2023-03-11 PROCEDURE — 99285 EMERGENCY DEPT VISIT HI MDM: CPT

## 2023-03-11 PROCEDURE — 84443 ASSAY THYROID STIM HORMONE: CPT

## 2023-03-11 PROCEDURE — 70450 CT HEAD/BRAIN W/O DYE: CPT

## 2023-03-11 PROCEDURE — 81001 URINALYSIS AUTO W/SCOPE: CPT

## 2023-03-11 PROCEDURE — 87086 URINE CULTURE/COLONY COUNT: CPT

## 2023-03-11 PROCEDURE — 6370000000 HC RX 637 (ALT 250 FOR IP): Performed by: STUDENT IN AN ORGANIZED HEALTH CARE EDUCATION/TRAINING PROGRAM

## 2023-03-11 PROCEDURE — 80053 COMPREHEN METABOLIC PANEL: CPT

## 2023-03-11 PROCEDURE — 71045 X-RAY EXAM CHEST 1 VIEW: CPT

## 2023-03-11 PROCEDURE — 6360000004 HC RX CONTRAST MEDICATION: Performed by: STUDENT IN AN ORGANIZED HEALTH CARE EDUCATION/TRAINING PROGRAM

## 2023-03-11 RX ORDER — BUTALBITAL, ACETAMINOPHEN AND CAFFEINE 50; 325; 40 MG/1; MG/1; MG/1
1 TABLET ORAL ONCE
Status: COMPLETED | OUTPATIENT
Start: 2023-03-11 | End: 2023-03-11

## 2023-03-11 RX ORDER — METOCLOPRAMIDE 10 MG/1
10 TABLET ORAL DAILY
Status: DISCONTINUED | OUTPATIENT
Start: 2023-03-12 | End: 2023-03-15 | Stop reason: HOSPADM

## 2023-03-11 RX ORDER — SODIUM CHLORIDE 0.9 % (FLUSH) 0.9 %
5-40 SYRINGE (ML) INJECTION EVERY 12 HOURS SCHEDULED
Status: DISCONTINUED | OUTPATIENT
Start: 2023-03-11 | End: 2023-03-15 | Stop reason: HOSPADM

## 2023-03-11 RX ORDER — TRAZODONE HYDROCHLORIDE 50 MG/1
25 TABLET ORAL NIGHTLY PRN
Status: DISCONTINUED | OUTPATIENT
Start: 2023-03-11 | End: 2023-03-12

## 2023-03-11 RX ORDER — 0.9 % SODIUM CHLORIDE 0.9 %
1000 INTRAVENOUS SOLUTION INTRAVENOUS ONCE
Status: COMPLETED | OUTPATIENT
Start: 2023-03-11 | End: 2023-03-11

## 2023-03-11 RX ORDER — SODIUM CHLORIDE 9 MG/ML
30 INJECTION, SOLUTION INTRAVENOUS ONCE
Status: COMPLETED | OUTPATIENT
Start: 2023-03-11 | End: 2023-03-11

## 2023-03-11 RX ORDER — ACETAMINOPHEN 325 MG/1
650 TABLET ORAL EVERY 6 HOURS PRN
Status: DISCONTINUED | OUTPATIENT
Start: 2023-03-11 | End: 2023-03-15 | Stop reason: HOSPADM

## 2023-03-11 RX ORDER — SUCRALFATE 1 G/1
1 TABLET ORAL 3 TIMES DAILY
Status: DISCONTINUED | OUTPATIENT
Start: 2023-03-11 | End: 2023-03-15 | Stop reason: HOSPADM

## 2023-03-11 RX ORDER — ONDANSETRON 4 MG/1
4 TABLET, ORALLY DISINTEGRATING ORAL EVERY 8 HOURS PRN
Status: DISCONTINUED | OUTPATIENT
Start: 2023-03-11 | End: 2023-03-15 | Stop reason: HOSPADM

## 2023-03-11 RX ORDER — ENOXAPARIN SODIUM 100 MG/ML
40 INJECTION SUBCUTANEOUS EVERY 24 HOURS
Status: DISCONTINUED | OUTPATIENT
Start: 2023-03-11 | End: 2023-03-15 | Stop reason: HOSPADM

## 2023-03-11 RX ORDER — SODIUM CHLORIDE 9 MG/ML
INJECTION, SOLUTION INTRAVENOUS PRN
Status: DISCONTINUED | OUTPATIENT
Start: 2023-03-11 | End: 2023-03-15 | Stop reason: HOSPADM

## 2023-03-11 RX ORDER — PROCHLORPERAZINE EDISYLATE 5 MG/ML
10 INJECTION INTRAMUSCULAR; INTRAVENOUS ONCE
Status: COMPLETED | OUTPATIENT
Start: 2023-03-11 | End: 2023-03-11

## 2023-03-11 RX ORDER — POLYETHYLENE GLYCOL 3350 17 G/17G
17 POWDER, FOR SOLUTION ORAL DAILY PRN
Status: DISCONTINUED | OUTPATIENT
Start: 2023-03-11 | End: 2023-03-15 | Stop reason: HOSPADM

## 2023-03-11 RX ORDER — SODIUM CHLORIDE 0.9 % (FLUSH) 0.9 %
5-40 SYRINGE (ML) INJECTION PRN
Status: DISCONTINUED | OUTPATIENT
Start: 2023-03-11 | End: 2023-03-15 | Stop reason: HOSPADM

## 2023-03-11 RX ORDER — VENLAFAXINE 25 MG/1
50 TABLET ORAL 3 TIMES DAILY
Status: DISCONTINUED | OUTPATIENT
Start: 2023-03-11 | End: 2023-03-13

## 2023-03-11 RX ORDER — ONDANSETRON 2 MG/ML
4 INJECTION INTRAMUSCULAR; INTRAVENOUS EVERY 6 HOURS PRN
Status: DISCONTINUED | OUTPATIENT
Start: 2023-03-11 | End: 2023-03-15 | Stop reason: HOSPADM

## 2023-03-11 RX ORDER — SODIUM CHLORIDE 9 MG/ML
INJECTION, SOLUTION INTRAVENOUS CONTINUOUS
Status: DISCONTINUED | OUTPATIENT
Start: 2023-03-11 | End: 2023-03-14

## 2023-03-11 RX ORDER — ACETAMINOPHEN 650 MG/1
650 SUPPOSITORY RECTAL EVERY 6 HOURS PRN
Status: DISCONTINUED | OUTPATIENT
Start: 2023-03-11 | End: 2023-03-15 | Stop reason: HOSPADM

## 2023-03-11 RX ORDER — DIPHENHYDRAMINE HYDROCHLORIDE 50 MG/ML
12.5 INJECTION INTRAMUSCULAR; INTRAVENOUS ONCE
Status: COMPLETED | OUTPATIENT
Start: 2023-03-11 | End: 2023-03-11

## 2023-03-11 RX ADMIN — SODIUM CHLORIDE: 9 INJECTION, SOLUTION INTRAVENOUS at 15:28

## 2023-03-11 RX ADMIN — SODIUM CHLORIDE 1000 ML: 9 INJECTION, SOLUTION INTRAVENOUS at 10:05

## 2023-03-11 RX ADMIN — CEFTRIAXONE SODIUM 1000 MG: 1 INJECTION, POWDER, FOR SOLUTION INTRAMUSCULAR; INTRAVENOUS at 15:30

## 2023-03-11 RX ADMIN — IOPAMIDOL 75 ML: 755 INJECTION, SOLUTION INTRAVENOUS at 08:09

## 2023-03-11 RX ADMIN — VENLAFAXINE 50 MG: 25 TABLET ORAL at 20:14

## 2023-03-11 RX ADMIN — PROCHLORPERAZINE EDISYLATE 10 MG: 5 INJECTION INTRAMUSCULAR; INTRAVENOUS at 07:38

## 2023-03-11 RX ADMIN — VANCOMYCIN HYDROCHLORIDE 1250 MG: 10 INJECTION, POWDER, LYOPHILIZED, FOR SOLUTION INTRAVENOUS at 10:42

## 2023-03-11 RX ADMIN — ACETAMINOPHEN 650 MG: 325 TABLET ORAL at 18:02

## 2023-03-11 RX ADMIN — SUCRALFATE 1 G: 1 TABLET ORAL at 20:14

## 2023-03-11 RX ADMIN — ENOXAPARIN SODIUM 40 MG: 100 INJECTION SUBCUTANEOUS at 18:02

## 2023-03-11 RX ADMIN — BUTALBITAL, ACETAMINOPHEN, AND CAFFEINE 1 TABLET: 50; 325; 40 TABLET ORAL at 11:39

## 2023-03-11 RX ADMIN — CEFEPIME 2000 MG: 2 INJECTION, POWDER, FOR SOLUTION INTRAVENOUS at 10:06

## 2023-03-11 RX ADMIN — SODIUM CHLORIDE 30 ML/KG/HR: 9 INJECTION, SOLUTION INTRAVENOUS at 07:26

## 2023-03-11 RX ADMIN — DIPHENHYDRAMINE HYDROCHLORIDE 12.5 MG: 50 INJECTION, SOLUTION INTRAMUSCULAR; INTRAVENOUS at 07:38

## 2023-03-11 RX ADMIN — TRAZODONE HYDROCHLORIDE 25 MG: 50 TABLET ORAL at 20:18

## 2023-03-11 ASSESSMENT — LIFESTYLE VARIABLES: HOW OFTEN DO YOU HAVE A DRINK CONTAINING ALCOHOL: MONTHLY OR LESS

## 2023-03-11 ASSESSMENT — PAIN SCALES - GENERAL
PAINLEVEL_OUTOF10: 9
PAINLEVEL_OUTOF10: 8

## 2023-03-11 ASSESSMENT — PAIN DESCRIPTION - LOCATION: LOCATION: GENERALIZED

## 2023-03-11 ASSESSMENT — PAIN - FUNCTIONAL ASSESSMENT: PAIN_FUNCTIONAL_ASSESSMENT: 0-10

## 2023-03-11 NOTE — ED NOTES
Pt transported to floor at this time via wheelchair, RN gave bedside report to PCU RN.      Shana Petit RN  03/11/23 0340

## 2023-03-11 NOTE — ED NOTES
1053-Call placed to [facility] for consult to [facility] Hospitalist placed by Dr. Jayme Walsh. Diamond Espinosa  03/11/23 1105  1112-Call back received from Dr. Jimbo Lozoya [facility] Hospitalist spoke with Dr. Jayme Walsh regarding consult.       Diamond Espinosa  03/11/23 1127

## 2023-03-11 NOTE — PROGRESS NOTES
Patient admitted to PCU . Patient stable. Admission questions complete. Refused 4-eyes assessment due to prior SA. Case management notified. Patient A/O x4. Patient reports no skin issues, pain, or discomfort. Call light in reach.

## 2023-03-11 NOTE — ED PROVIDER NOTES
Roberto [facility] ENCOUNTER        Patient Name: Manjula Silveira  MRN: [id number]  [place] 1965  Date of evaluation: 3/11/2023  Provider: Gary Hou MD  PCP: Shauna Moore MD  Note Started: 7:18 AM EST 3/11/23      CHIEF COMPLAINT  Altered Mental Status         HISTORY & PHYSICAL     HISTORY OF PRESENT ILLNESS  History from : Patient    Limitations to history : None    Manjula Silveira is a 56 y.o. female  has a past medical history of Asthma, Atrial septal defect, Bursitis of left hip, CAD (coronary artery disease), COPD (chronic obstructive pulmonary disease) ([facility] [address] History of blood transfusion, Kidney stone, Migraines, Mitral valve prolapse, MRSA (methicillin resistant staph aureus) culture positive (12/2015), MVP (mitral valve prolapse), PONV (postoperative nausea and vomiting), Prolonged QT interval syndrome, S/P bronchoscopy, TIA (transient ischemic attack), and Vertigo. , who presents to the ED complaining of headache and altered mental status. Patient has been having symptoms for 3 days. She reports generalized body aches. Patient also reports headache starting in the back of the head, rating to the front and down the neck. She denies numbness, weakness, tingling, vision changes. She reports she has had fevers, Tmax 102. She denies dysuria, cough, vomiting, diarrhea. She does report some generalized weakness. She denies any change in her headache compared to yesterday. She denies any back pain. Patient reports that last night she was found by her son walking around the house naked and confused. Patient denies any confusion at this time. Old records reviewed:  Patient was seen at outside emergency department yesterday for headache and fever, at that time she had not had any altered mentation. Flu and COVID swab were negative. Patient did have leukocytosis to 16.1. No anemia or thrombocytopenia. No significant electrolyte abnormalities or evidence of kidney dysfunction.  Liver function testing unremarkable.  Patient did have a lumbar puncture performed which did not show evidence of infection or findings concerning for subarachnoid hemorrhage.  Patient did have a CT scan of the head which showed no acute intracranial abnormality and a chest x-ray which showed no acute process.    No other complaints, modifying factors or associated symptoms.  Nursing Notes were all reviewed and agreed with or any disagreements were addressed in the HPI.    I have reviewed the following from the nursing documentation.    Past Medical History:   Diagnosis Date    Asthma     Atrial septal defect     had insertion of atrial septal occluder    Bursitis of left hip     CAD (coronary artery disease)     COPD (chronic obstructive pulmonary disease) (HCC)     History of blood transfusion     Kidney stone     Migraines     Mitral valve prolapse     MRSA (methicillin resistant staph aureus) culture positive 12/2015    Lungs.  Diagnosed [facility] with bronchoscopy    MVP (mitral valve prolapse)     PONV (postoperative nausea and vomiting)     Prolonged QT interval syndrome     S/P bronchoscopy     TIA (transient ischemic attack)     Vertigo      Past Surgical History:   Procedure Laterality Date    BREAST ENHANCEMENT SURGERY      augmentation    CARDIAC CATHETERIZATION      CARDIAC SURGERY      septum occluder    COLONOSCOPY  2012?    polyps    CYSTOSCOPY  6/8/2016    [facility]    ENDOSCOPIC ULTRASOUND (UPPER)  05/08/2019    ERCP  05/08/2019    Sphincterotomy    ERCP N/A 5/8/2019    ERCP SPHINCTER/PAPILLOTOMY performed by Gael Rosenberg DO at [facility]COPY    ERCP  5/8/2019    ERCP STONE REMOVAL performed by Gael Rosenberg DO at [facility]    HIP ARTHROSCOPY Left 02/08/2018    HIP SURGERY      HYSTERECTOMY (CERVIX STATUS UNKNOWN)      HYSTERECTOMY, TOTAL ABDOMINAL (CERVIX REMOVED)  1992    NASAL SINUS SURGERY  8/22/13    OTHER SURGICAL HISTORY      thoracic sympathectomy    PACEMAKER  INSERTION      also revision x 3    IA ARTHROSCOPY HIP DIAGNOSTIC W/WO SYNOVIAL BYP SPX Left 11/1/2018    LEFT HIP ARTHROSCOPY, LABRAL  DEBRIDEMENT, CHONDROPLASTY performed by Faiza Wilder MD at [address] [place] [address]  03/20/2017    UPPER GASTROINTESTINAL ENDOSCOPY N/A 5/8/2019    UPPER EUS W/ANES. performed by Kurt Lea DO at [facility]     Family History   Problem Relation Age of Onset    Heart Disease Mother     Colon Cancer Father         colon    Heart Disease Father     High Blood Pressure Father      Social History     Socioeconomic History    Marital status:      Spouse name: Not on file    Number of children: Not on file    Years of education: Not on file    Highest education level: Not on file   Occupational History    Not on file   Tobacco Use    Smoking status: Every Day     Packs/day: 0.25     Years: 3.00     Pack years: 0.75     Types: Cigarettes    Smokeless tobacco: Never   Vaping Use    Vaping Use: Every day    Substances: Nicotine    Devices: Refillable tank   Substance and Sexual Activity    Alcohol use: Not Currently     Comment: socially    Drug use: Not Currently    Sexual activity: Not Currently     Partners: Male   Other Topics Concern    Not on file   Social History Narrative    Not on file     Social Determinants of Health     Financial Resource Strain: Not on file   Food Insecurity: Not on file   Transportation Needs: Not on file   Physical Activity: Not on file   Stress: Not on file   Social Connections: Not on file   Intimate Partner Violence: Not on file   Housing Stability: Not on file     No current facility-administered medications for this encounter.      Current Outpatient Medications   Medication Sig Dispense Refill    venlafaxine (EFFEXOR XR) 75 MG extended release capsule Take 3 capsules by mouth daily (with breakfast) 90 capsule 0    traZODone (DESYREL) 150 MG tablet Take 1 tablet by mouth nightly as needed for Depression 30 tablet 0    albuterol (PROVENTIL) (2.5 MG/3ML) 0.083% nebulizer solution Take 3 mLs by nebulization every 6 hours as needed for Wheezing 120 each 0    cefUROXime (CEFTIN) 500 MG tablet Take 1 tablet by mouth every 12 hours for 8 doses 8 tablet 0    ibuprofen (ADVIL;MOTRIN) 600 MG tablet Take 600 mg by mouth every 6 hours as needed for Pain      albuterol sulfate  (90 Base) MCG/ACT inhaler as needed      nadolol (CORGARD) 40 MG tablet Take 120 mg by mouth daily        Allergies   Allergen Reactions    Quinolones      \"because of heart\"    Dexamethasone Sodium Phosphate Other (See Comments)     \"I feel hot all over\"    Other Hives     Adhesives      Codeine Nausea Only     Itchy nose    Nitroglycerin Other (See Comments)     Cardiologist states patient is not to have it, pt does not know why. Patient states \"it can put me into cardiac arrest\"    Prednisone      Other reaction(s): Flushing  \"I turn red and really hot\"       REVIEW OF SYSTEMS  All systems reviewed, pertinent positives per HPI otherwise noted to be negative. PHYSICAL EXAM  ED Triage Vitals [03/11/23 0651]   BP Temp Temp Source Heart Rate Resp SpO2 Height Weight   (!) 83/57 98.9 °F (37.2 °C) Oral 85 19 95 % 5' 6\" (1.676 m) 145 lb (65.8 kg)     GENERAL APPEARANCE: Awake, oriented. Cooperative. HENT: Normocephalic. Atraumatic. Mucous membranes are moist  NECK: Supple. Full range of motion of the neck without stiffness. Patient continues to have pain with movements of her head  EYES: PERRL. EOM's intact. HEART/CHEST: RRR. No murmurs. LUNGS: Respirations unlabored. CTAB. Good air exchange. Speaking comfortably in full sentences. ABDOMEN: No tenderness. Soft. Non-distended. No masses. No organomegaly. No guarding or rebound. MUSCULOSKELETAL: No extremity edema. Compartments soft. No deformity. No tenderness in the extremities. All extremities neurovascularly intact. SKIN: Warm and dry. No acute rashes.   Lumbar puncture site without evidence of erythema or drainage  NEUROLOGICAL: Alert and oriented x4. CN's 2-12 intact. No gross facial drooping. Strength 5/5, sensation intact. GCS 15, NIH stroke scale of 0  PSYCHIATRIC: Normal mood and affect      WORKUP     LABS  I have reviewed all labs for this visit.    Results for orders placed or performed during the hospital encounter of 03/11/23   COVID-19 & Influenza Combo    Specimen: Nasopharyngeal Swab   Result Value Ref Range    SARS-CoV-2 RNA, RT PCR NOT DETECTED NOT DETECTED    INFLUENZA A NOT DETECTED NOT DETECTED    INFLUENZA B NOT DETECTED NOT DETECTED   Strep screen group a throat    Specimen: Throat   Result Value Ref Range    Rapid Strep A Screen Negative Negative   CBC with Auto Differential   Result Value Ref Range    WBC 9.2 4.0 - 11.0 K/uL    RBC 4.04 4.00 - 5.20 M/uL    Hemoglobin 13.7 12.0 - 16.0 g/dL    Hematocrit 40.1 36.0 - 48.0 %    MCV 99.2 80.0 - 100.0 fL    MCH 33.8 26.0 - 34.0 pg    MCHC 34.1 31.0 - 36.0 g/dL    RDW 14.6 12.4 - 15.4 %    Platelets 99 (L) 707 - 450 K/uL    MPV 10.2 5.0 - 10.5 fL    Neutrophils % 86.9 %    Lymphocytes % 4.4 %    Monocytes % 8.5 %    Eosinophils % 0.0 %    Basophils % 0.2 %    Neutrophils Absolute 8.0 (H) 1.7 - 7.7 K/uL    Lymphocytes Absolute 0.4 (L) 1.0 - 5.1 K/uL    Monocytes Absolute 0.8 0.0 - 1.3 K/uL    Eosinophils Absolute 0.0 0.0 - 0.6 K/uL    Basophils Absolute 0.0 0.0 - 0.2 K/uL   CMP w/ Reflex to MG   Result Value Ref Range    Sodium 130 (L) 136 - 145 mmol/L    Potassium reflex Magnesium 4.1 3.5 - 5.1 mmol/L    Chloride 100 99 - 110 mmol/L    CO2 18 (L) 21 - 32 mmol/L    Anion Gap 12 3 - 16    Glucose 101 (H) 70 - 99 mg/dL    BUN 11 7 - 20 mg/dL    Creatinine 0.7 0.6 - 1.1 mg/dL    Est, Glom Filt Rate >60 >60    Calcium 9.0 8.3 - 10.6 mg/dL    Total Protein 6.1 (L) 6.4 - 8.2 g/dL    Albumin 3.6 3.4 - 5.0 g/dL    Albumin/Globulin Ratio 1.4 1.1 - 2.2    Total Bilirubin 0.8 0.0 - 1.0 mg/dL    Alkaline Phosphatase 138 (H) 40 - 129 U/L    ALT 42 (H) 10 - 40 U/L    AST 77 (H) 15 - 37 U/L   Lactic Acid Now and in 2 Hours   Result Value Ref Range    Lactic Acid 1.6 0.4 - 2.0 mmol/L   ETOH   Result Value Ref Range    Ethanol Lvl None Detected mg/dL   Ammonia (select if history of liver disease or alcohol abuse)   Result Value Ref Range    Ammonia 32 11 - 51 umol/L   TSH with Reflex   Result Value Ref Range    TSH 1.09 0.27 - 4.20 uIU/mL   Procalcitonin   Result Value Ref Range    Procalcitonin 2.27 (H) 0.00 - 0.15 ng/mL   Troponin   Result Value Ref Range    Troponin <0.01 <0.01 ng/mL   Urinalysis with Reflex to Culture    Specimen: Urine   Result Value Ref Range    Color, UA Yellow Straw/Yellow    Clarity, UA Clear Clear    Glucose, Ur Negative Negative mg/dL    Bilirubin Urine Negative Negative    Ketones, Urine Negative Negative mg/dL    Specific Gravity, UA <=1.005 1.005 - 1.030    Blood, Urine LARGE (A) Negative    pH, UA 6.5 5.0 - 8.0    Protein, UA TRACE (A) Negative mg/dL    Urobilinogen, Urine 0.2 <2.0 E.U./dL    Nitrite, Urine POSITIVE (A) Negative    Leukocyte Esterase, Urine SMALL (A) Negative    Microscopic Examination YES     Urine Type Not Given,     Urine Reflex to Culture Yes    Urine Drug Screen   Result Value Ref Range    Amphetamine Screen, Urine Neg Negative <1000ng/mL    Barbiturate Screen, Ur POSITIVE (A) Negative <200 ng/mL    Benzodiazepine Screen, Urine Neg Negative <200 ng/mL    Cannabinoid Scrn, Ur POSITIVE (A) Negative <50 ng/mL    Cocaine Metabolite Screen, Urine Neg Negative <300 ng/mL    Opiate Scrn, Ur Neg Negative <300 ng/mL    PCP Screen, Urine Neg Negative <25 ng/mL    Methadone Screen, Urine Neg Negative <300 ng/mL    Oxycodone Urine Neg Negative <100 ng/ml    FENTANYL SCREEN, URINE Neg Negative <5 ng/mL    pH, UA 6.5     Drug Screen Comment: see below    Microscopic Urinalysis   Result Value Ref Range    Mucus, UA 1+ (A) None Seen /LPF    WBC, UA 21-50 (A) 0 - 5 /HPF    RBC, UA 11-20 (A) 0 - 4 /HPF    Epithelial Cells, UA 2-5 0 - 5 /HPF    Bacteria, UA 3+ (A) None Seen /HPF       ECG  The EKG, as interpreted by myself, in the emergency department in the absence of a cardiologist.  normal sinus rhythm with a rate of 83  Axis is   Left axis deviation  QTc is  488  Intervals and Durations are unremarkable. ST Segments: no acute change  No significant change from prior EKG dated 10/15/22      RADIOLOGY  Non-plain film images such as CT, Ultrasound and MRI are read by the radiologist. Plain radiographic images are visualized and preliminarily interpreted by the ED Provider with the below findings:    Chest x-ray my interpretation showed no evidence of pneumonia or pneumothorax. Showed possible small left-sided pleural effusion and bibasilar atelectasis    Interpretation per the Radiologist below, if available at the time of this note:    CT CHEST ABDOMEN PELVIS W CONTRAST Additional Contrast? None   Final Result   Head:      No acute intracranial abnormality. There is evidence of prior sinus surgery   with residual mucosal thickening in the sinuses. Chest :      No pneumonia or edema. Mosaic attenuation is seen lungs suggesting small   airways disease or air trapping      Abdomen and pelvis      Heterogeneous enhancement of the left kidney with urothelial thickening on   the left suggesting underlying pyelonephritis. Nonobstructing stone is seen   in the left kidney         CT HEAD WO CONTRAST   Final Result   Head:      No acute intracranial abnormality. There is evidence of prior sinus surgery   with residual mucosal thickening in the sinuses. Chest :      No pneumonia or edema. Mosaic attenuation is seen lungs suggesting small   airways disease or air trapping      Abdomen and pelvis      Heterogeneous enhancement of the left kidney with urothelial thickening on   the left suggesting underlying pyelonephritis.   Nonobstructing stone is seen   in the left kidney         XR CHEST PORTABLE Final Result   Atelectasis at the lung bases. No pneumonia or edema             EMERGENCY DEPARTMENT COURSE and DIFFERENTIAL DIAGNOSIS/MDM:   Patient seen and evaluated. Old records reviewed and pertinent information included in HPI. Labs and imaging reviewed and results discussed with patient. Overall ill appearing patient, presenting for headache. Patient very hypertensive. Patient seen the day prior and had lumbar puncture which showed no evidence of meningitis or intracranial hemorrhage. History obtained from patient. Physical exam remarkable for intact neurologic exam.     Patient's pertinent external medical records: Records Reviewed : External ED Note patient seen eval for emergency department today prior, findings summarized in HPI    Chronic Medical Conditions that may contribute to presentation today:  has a past medical history of Asthma, Atrial septal defect, Bursitis of left hip, CAD (coronary artery disease), COPD (chronic obstructive pulmonary disease) ([facility] [address] History of blood transfusion, Kidney stone, Migraines, Mitral valve prolapse, MRSA (methicillin resistant staph aureus) culture positive (12/2015), MVP (mitral valve prolapse), PONV (postoperative nausea and vomiting), Prolonged QT interval syndrome, S/P bronchoscopy, TIA (transient ischemic attack), and Vertigo.      Social Determinants affecting Dx or Tx: ;   Social History     Socioeconomic History    Marital status:      Spouse name: Not on file    Number of children: Not on file    Years of education: Not on file    Highest education level: Not on file   Occupational History    Not on file   Tobacco Use    Smoking status: Every Day     Packs/day: 0.25     Years: 3.00     Pack years: 0.75     Types: Cigarettes    Smokeless tobacco: Never   Vaping Use    Vaping Use: Every day    Substances: Nicotine    Devices: Refillable tank   Substance and Sexual Activity    Alcohol use: Not Currently     Comment: socially    Drug use: Not Currently    Sexual activity: Not Currently     Partners: Male   Other Topics Concern    Not on file   Social History Narrative    Not on file     Social Determinants of Health     Financial Resource Strain: Not on file   Food Insecurity: Not on file   Transportation Needs: Not on file   Physical Activity: Not on file   Stress: Not on file   Social Connections: Not on file   Intimate Partner Violence: Not on file   Housing Stability: Not on file         Differential diagnosis includes but is not limited to:Hypoxemia, ischemic encephalopathy, hepatic encephalopathy, seizure or postictal state, hypoglycemia and hyperglycemia,  hypotension and hypoperfusion, electrolytes disturbances, Infectious processes such as pneumonia or urinary tract infection, Substance use or withdrawal, Medication adverse event or interaction, CVA, subdural hematoma, meningitis, encephalitis, thyroid disease, arrhythmia, MI or CHF, hyperthermia or hypothermia, Dehydration, sleep deprivation    WORKUP INTERPRETATION:  ED Course as of 03/19/23 2251   Sat Mar 11, 2023   0724 Patient hypotensive on arrival.  Patient receiving IV fluids (30 cc/kg bolus). [ER]   0746 Patient is not hypoglycemic [ER]   0746 Thrombocytopenia to 99, mildly worsened compared to previous yesterday.  Leukocytosis from yesterday has resolved, currently 9.2.  No anemia. [ER]   0746 Patient does meet SIRS criteria due to reported fever and tachypnea.  Lactate within normal limits [ER]   0746 Ethanol level negative.  Patient did report to nursing daily alcohol use until 2 weeks ago. [ER]   0747 Hyponatremia 130, no other significant electrolyte abnormalities or evidence of kidney dysfunction.  Patient is receiving fluids. [ER]   0747 Liver function testing does show transaminitis, new compared to yesterday [ER]   0752 Troponin within normal limits [ER]   0807 Chest x-ray my interpretation showed no evidence of pneumonia or pneumothorax.  Showed possible small left-sided  pleural effusion and bibasilar atelectasis  --------------  Radiology IMPRESSION:  Atelectasis at the lung bases. No pneumonia or edema [ER]   0821 COVID and flu swab negative [ER]   0821 The Ekg interpreted by me shows  normal sinus rhythm with a rate of 83  Axis is   Left axis deviation  QTc is  488  Intervals and Durations are unremarkable. ST Segments: no acute change  No significant change from prior EKG dated 10/15/22 [ER]   0855 CT Head/chest/Abd/Pelvis: IMPRESSION:  Head:     No acute intracranial abnormality. There is evidence of prior sinus surgery with residual mucosal thickening in the sinuses. Chest :     No pneumonia or edema. Mosaic attenuation is seen lungs suggesting small airways disease or air trapping     Abdomen and pelvis     Heterogeneous enhancement of the left kidney with urothelial thickening on the left suggesting underlying pyelonephritis. Nonobstructing stone is seen in the left kidney [ER]   0923 Ammonia level within normal limits [ER]   0943 Urinalysis does show evidence of infection. We will treat with antibiotics [ER]   0951 Urine drug screen positive for barbiturates and cannabinoids. Patient was treated with Fioricet yesterday. [ER]   [id number] Bedside ultrasound showed collapsible IVC. Will give another liter of fluids. [ER]   [id number] proCalcitonin is significantly elevated at 2.27, patient is receiving empiric antibiotics. [ER]   0953 TSH within normal limits [ER]   0958 [employer] showed episodes of atrial tachycardia but per [employer] no other acute abnormalities [ER]      ED Course User Index  [ER] Brendan Kemp MD        CONSULTS:     -Management of the patient was discussed with hospitalist-presentation, work-up, and management discussed with hospitalist accepted the patient for admission. Initially spoke to hospitalist team over at [facility] due to patient having persistent headache, however they felt patient was best to remain at [facility].   [facility] hospital team accepted the patient. SCREENINGS:       [place] Coma Scale  Eye Opening: Spontaneous  Best Verbal Response: Oriented  Best Motor Response: Obeys commands  [place] Coma Scale Score: 15                CIWA Assessment  BP: 138/79  Heart Rate: 71           Is this patient to be included in the SEP-1 Core Measure due to severe sepsis or septic shock? No   Exclusion criteria - the patient is NOT to be included for SEP-1 Core Measure due to:  2+ SIRS criteria are not met      TREATMENT:  During the patient's ED course, the patient was given:  Medications   0.9 % sodium chloride infusion (0 mL/kg/hr × 65.8 kg IntraVENous Stopped 3/11/23 1011)   diphenhydrAMINE (BENADRYL) injection 12.5 mg (12.5 mg IntraVENous Given 3/11/23 0738)   prochlorperazine (COMPAZINE) injection 10 mg (10 mg IntraVENous Given 3/11/23 0738)   iopamidol (ISOVUE-370) 76 % injection 75 mL (75 mLs IntraVENous Given 3/11/23 0809)   cefepime (MAXIPIME) 2,000 mg in sodium chloride 0.9 % 100 mL IVPB (mini-bag) (0 mg IntraVENous Stopped 3/11/23 1039)   vancomycin (VANCOCIN) 1,250 mg in sodium chloride 0.9 % 250 mL IVPB (0 mg IntraVENous Stopped 3/11/23 1215)   0.9 % sodium chloride bolus (0 mLs IntraVENous Stopped 3/11/23 1341)   butalbital-acetaminophen-caffeine (FIORICET, ESGIC) per tablet 1 tablet (1 tablet Oral Given 3/11/23 1139)   magnesium sulfate 1000 mg in dextrose 5% 100 mL IVPB (0 mg IntraVENous Stopped 3/12/23 1037)   potassium chloride 10 mEq/100 mL IVPB (Peripheral Line) (10 mEq IntraVENous New Bag 3/12/23 1047)        REASSESSMENT:  On reassessment, patient blood pressure has improved with fluid administration. Patient does appear improved. .  Work-up remarkable for UTI. Patient does not meet SIRS criteria but does have significantly elevated procalcitonin. Patient received empiric antibiotics. Patient does have fluids which has improved hypotension.   Do feel the patient warrants admission for further management and work-up, patient agreeable to admission. Patient excepted for admission by hospitalist team    CRITICAL CARE TIME     I personally spent a total of 35 minutes of critical care time in obtaining history, performing a physical exam, bedside monitoring of interventions, collecting and interpreting tests and discussion with consultants but excluding time spent performing procedures, treating other patients and teaching time. FINAL IMPRESSION      1. Acute cystitis without hematuria    2. Altered mental status, unspecified altered mental status type    3. Dehydration    4. Elevated procalcitonin    5. Acute intractable headache, unspecified headache type          DISPOSITION/PLAN   Disposition: DISPOSITION Admitted 03/11/2023 12:16:17 PM    Condition: stable     DISCLAIMER: This chart was created using Dragon dictation software. Efforts were made by me to ensure accuracy, however some errors may be present due to limitations of this technology and occasionally words are not transcribed correctly.     MD Denise Toth MD  03/19/23 9535

## 2023-03-11 NOTE — H&P
Hospital Medicine History & Physical      PCP: Mervat Maradiaga MD    Date of Admission: 3/11/2023    Date of Service: Pt seen/examined on 3/11/2023     Chief Complaint:    Chief Complaint   Patient presents with    Altered Mental Status       History Of Present Illness: The patient is a 56 y.o. female with h/o atrial septal defect, CAD, COPD, migraines, mitral valve prolapse, who presents to [facility] with c/o altered mental status. She reports yesterday she was having fevers, body aches, neck pain. She went to the ED. They did a lumbar puncture. It was thought to be possibly viral.  She was discharged home. Returned to the ED today. She states she was naked in her house, going around flushing toilets. She does not remember this. She is alert and oriented now. She does note dysuria, urinary frequency. Some intermittent flank pain on both sides. BP hypotensive. Labs with hyponatremia, elevated LFT's. Tox positive for Cannabis. UA positive. CT head no acute abnormality. CT chest no edema or edema. CT abdomen/pelvis with urothelial thickening on left suggesting underlying pyelonephritis, non-obstructing stone left kidney. Admitted to PCU on tele. Past Medical History:        Diagnosis Date    Asthma     Atrial septal defect     had insertion of atrial septal occluder    Bursitis of left hip     CAD (coronary artery disease)     COPD (chronic obstructive pulmonary disease) (HCC)     History of blood transfusion     Kidney stone     Migraines     Mitral valve prolapse     MRSA (methicillin resistant staph aureus) culture positive 12/2015    Lungs.   Diagnosed [facility] with bronchoscopy    MVP (mitral valve prolapse)     PONV (postoperative nausea and vomiting)     Prolonged QT interval syndrome     S/P bronchoscopy     TIA (transient ischemic attack)     Vertigo        Past Surgical History:        Procedure Laterality Date    BREAST ENHANCEMENT SURGERY      augmentation    CARDIAC CATHETERIZATION      CARDIAC SURGERY      septum occluder    COLONOSCOPY  2012? polyps    CYSTOSCOPY  6/8/2016    U-Dil    ENDOSCOPIC ULTRASOUND (UPPER)  05/08/2019    ERCP  05/08/2019    Sphincterotomy    ERCP N/A 5/8/2019    ERCP SPHINCTER/PAPILLOTOMY performed by Leandro Patton DO at [address]    ERCP  5/8/2019    ERCP STONE REMOVAL performed by Leandro Patton DO at [address] ARTHROSCOPY Left 02/08/2018    HIP SURGERY      HYSTERECTOMY (CERVIX STATUS UNKNOWN)      HYSTERECTOMY, TOTAL ABDOMINAL (CERVIX REMOVED)  1992    NASAL SINUS SURGERY  8/22/13    OTHER SURGICAL HISTORY      thoracic sympathectomy    PACEMAKER INSERTION      also revision x 3    MT HIP ARTHROSCOPY, DX Left 11/1/2018    LEFT HIP ARTHROSCOPY, LABRAL  DEBRIDEMENT, CHONDROPLASTY performed by Adan Montana MD at [address] [facility]  03/20/2017    UPPER GASTROINTESTINAL ENDOSCOPY N/A 5/8/2019    UPPER EUS W/ANES. performed by Leandro Pattno DO at [address] [facility] ENDOSCOPY       Medications Prior to Admission:    Prior to Admission medications    Medication Sig Start Date End Date Taking?  Authorizing Provider   butalbital-APAP-caffeine (FIORICET) -40 MG CAPS per capsule Take 1 capsule by mouth every 6 hours as needed for Headaches or Migraine Max Daily Amount: 4 capsules 3/10/23 3/17/23  Bhavesh Johnston MD   metoclopramide (REGLAN) 10 MG tablet Take 10 mg by mouth daily    Historical Provider, MD   sucralfate (CARAFATE) 1 GM tablet Take 1 tablet by mouth in the morning, at noon, and at bedtime 12/22/22   Historical Provider, MD   venlafaxine (EFFEXOR) 50 MG tablet Take 50 mg by mouth 3 times daily    Historical Provider, MD   traZODone (DESYREL) 25 mg TABS Take 25 mg by mouth nightly as needed    Historical Provider, MD   ibuprofen (ADVIL;MOTRIN) 600 MG tablet Take 600 mg by mouth every 6 hours as needed for Pain    Historical Provider, MD   albuterol sulfate  (90 Base) MCG/ACT inhaler as needed 7/23/20   Historical Provider, MD   nadolol (CORGARD) 40 MG tablet Take 120 mg by mouth daily     Historical Provider, MD       Allergies:  Quinolones, Dexamethasone sodium phosphate, Other, Codeine, Nitroglycerin, and Prednisone    Social History:  The patient currently lives at home. TOBACCO:   reports that she quit smoking about 24 years ago. Her smoking use included cigarettes. She has a 0.75 pack-year smoking history. She has never used smokeless tobacco.  ETOH:   reports current alcohol use. Family History:   Positive as follows:        Problem Relation Age of Onset    Heart Disease Mother     Colon Cancer Father         colon    Heart Disease Father     High Blood Pressure Father        REVIEW OF SYSTEMS:       Constitutional: +fever, fatigue, malaise   Respiratory: Negative  for dyspnea, cough   Cardiovascular: Negative for chest pain   Gastrointestinal: Negative for vomiting, diarrhea   Genitourinary: +dysuria, urinary frequency  Musculoskeletal: +myalgias  Skin: Negative for rash   Neurological: Negative for syncope   Psychiatric/Behavorial: Negative for anxiety    PHYSICAL EXAM:    BP (!) 82/56   Pulse 74   Temp 98.9 °F (37.2 °C) (Oral)   Resp 22   Ht 5' 6\" (1.676 m)   Wt 145 lb (65.8 kg)   SpO2 98%   BMI 23.40 kg/m²     Gen: No distress. Alert. Appears fatigued  Eyes: PERRL. No sclera icterus. No conjunctival injection. ENT: No discharge. Pharynx clear. Neck: No JVD. No Carotid Bruit. Trachea midline. Resp: No accessory muscle use. No crackles. No wheezes. No rhonchi. CV: Regular rate. Regular rhythm. No murmur. No rub. No edema. GI: Non-tender. Non-distended. Normal bowel sounds. No hernia. Skin: Warm and dry. No nodule on exposed extremities. No rash on exposed extremities. M/S: No cyanosis. No joint deformity. No clubbing. Neuro: Awake. Grossly nonfocal    Psych: Oriented x 3. No anxiety or agitation.      CBC:   Recent Labs 03/10/23  1023 03/11/23  0712   WBC 16.1* 9.2   HGB 15.0 13.7   HCT 43.9 40.1   MCV 97.0 99.2   * 99*     BMP:   Recent Labs     03/10/23  1023 03/11/23  0712   * 130*   K 3.6 4.1   CL 99 100   CO2 21 18*   BUN 12 11   CREATININE 1.0 0.7     LIVER PROFILE:   Recent Labs     03/10/23  1023 03/11/23  0712   AST 18 77*   ALT 14 42*   BILITOT 0.7 0.8   ALKPHOS 121 138*     UA:  Recent Labs     03/11/23  0919   COLORU Yellow   PHUR 6.5  6.5   WBCUA 21-50*   RBCUA 11-20*   MUCUS 1+*   BACTERIA 3+*   CLARITYU Clear   SPECGRAV <=1.005   LEUKOCYTESUR SMALL*   UROBILINOGEN 0.2   BILIRUBINUR Negative   BLOODU LARGE*   GLUCOSEU Negative         CARDIAC ENZYMES  Recent Labs     03/11/23 0712   TROPONINI <0.01       CULTURES  COVID/flu: not detected  Urine: pending     EKG:  I have reviewed the EKG with the following interpretation:   Unusual P axis, possible ectopic atrial rhythm, Left axis deviation, Low voltage QRS, Nonspecific ST and T wave abnormality, Prolonged QT    CT CHEST ABDOMEN PELVIS W CONTRAST Additional Contrast? None   Final Result   Head:      No acute intracranial abnormality. There is evidence of prior sinus surgery   with residual mucosal thickening in the sinuses. Chest :      No pneumonia or edema. Mosaic attenuation is seen lungs suggesting small   airways disease or air trapping      Abdomen and pelvis      Heterogeneous enhancement of the left kidney with urothelial thickening on   the left suggesting underlying pyelonephritis. Nonobstructing stone is seen   in the left kidney         CT HEAD WO CONTRAST   Final Result   Head:      No acute intracranial abnormality. There is evidence of prior sinus surgery   with residual mucosal thickening in the sinuses. Chest :      No pneumonia or edema.   Mosaic attenuation is seen lungs suggesting small   airways disease or air trapping      Abdomen and pelvis      Heterogeneous enhancement of the left kidney with urothelial thickening on   the left suggesting underlying pyelonephritis. Nonobstructing stone is seen   in the left kidney         XR CHEST PORTABLE   Final Result   Atelectasis at the lung bases. No pneumonia or edema           Echo 8/26/2020  Study Conclusions     - Left ventricle: The cavity size is normal. Wall thickness is     normal. The calculated ejection fraction was 59%. Normal     diastolic function. The stroke volume is 68ml. The stroke index     is 37ml/m^2. The global longitudinal strain was -18%. Ejection     fraction (EF) was calculated using 3D full volume imaging method. - Inferior vena cava: The vessel was normal in size; the     respirophasic diameter changes were in the normal range (&gt;= 50%);     findings are consistent with normal central venous pressure. Principal Problem:    UTI (urinary tract infection)  Resolved Problems:    * No resolved hospital problems. *        ASSESSMENT/PLAN:  Sepsis  -hypotension, WBC  -IVF's. Rocephin D#1    UTI  Pyelonephritis   -Rocephin D#1  -IVF's  -urine cx pending     Hyponatremia   -likely due to fluid volume depletion  -IVF's. Monitor labs    Elevated LFT's  -monitor LFT's    Hypotension  -IVF's. Monitor BP closely    COPD  -stable. No AE  -albuterol prn    CAD  -on aspirin.      Atrial septal defect  -S/p septal occluder device    Mitral valve prolapse     H/o Bradycardia, Long QT syndrome, Ventricular tachycardia  -has pacemaker    DVT Prophylaxis: Lovenox  Diet: No diet orders on file  Code Status: Prior    Janice Waldrop [facility]  3/11/2023

## 2023-03-12 PROBLEM — R41.0 DELIRIUM: Status: ACTIVE | Noted: 2023-03-12

## 2023-03-12 LAB
A/G RATIO: 1.5 (ref 1.1–2.2)
ALBUMIN SERPL-MCNC: 3 G/DL (ref 3.4–5)
ALP BLD-CCNC: 125 U/L (ref 40–129)
ALT SERPL-CCNC: 29 U/L (ref 10–40)
ANION GAP SERPL CALCULATED.3IONS-SCNC: 12 MMOL/L (ref 3–16)
AST SERPL-CCNC: 28 U/L (ref 15–37)
BASOPHILS ABSOLUTE: 0 K/UL (ref 0–0.2)
BASOPHILS RELATIVE PERCENT: 0.1 %
BILIRUB SERPL-MCNC: 0.4 MG/DL (ref 0–1)
BUN BLDV-MCNC: 7 MG/DL (ref 7–20)
CALCIUM SERPL-MCNC: 8.1 MG/DL (ref 8.3–10.6)
CHLORIDE BLD-SCNC: 106 MMOL/L (ref 99–110)
CO2: 16 MMOL/L (ref 21–32)
CREAT SERPL-MCNC: 0.6 MG/DL (ref 0.6–1.1)
EOSINOPHILS ABSOLUTE: 0 K/UL (ref 0–0.6)
EOSINOPHILS RELATIVE PERCENT: 0.1 %
GFR SERPL CREATININE-BSD FRML MDRD: >60 ML/MIN/{1.73_M2}
GLUCOSE BLD-MCNC: 88 MG/DL (ref 70–99)
HCT VFR BLD CALC: 34 % (ref 36–48)
HEMOGLOBIN: 11.5 G/DL (ref 12–16)
LYMPHOCYTES ABSOLUTE: 1 K/UL (ref 1–5.1)
LYMPHOCYTES RELATIVE PERCENT: 7.8 %
MAGNESIUM: 1.7 MG/DL (ref 1.8–2.4)
MCH RBC QN AUTO: 33.3 PG (ref 26–34)
MCHC RBC AUTO-ENTMCNC: 33.8 G/DL (ref 31–36)
MCV RBC AUTO: 98.8 FL (ref 80–100)
MONOCYTES ABSOLUTE: 1.3 K/UL (ref 0–1.3)
MONOCYTES RELATIVE PERCENT: 10.6 %
NEUTROPHILS ABSOLUTE: 10.2 K/UL (ref 1.7–7.7)
NEUTROPHILS RELATIVE PERCENT: 81.4 %
PDW BLD-RTO: 15.3 % (ref 12.4–15.4)
PLATELET # BLD: 94 K/UL (ref 135–450)
PMV BLD AUTO: 10.7 FL (ref 5–10.5)
POTASSIUM REFLEX MAGNESIUM: 2.9 MMOL/L (ref 3.5–5.1)
RBC # BLD: 3.44 M/UL (ref 4–5.2)
SODIUM BLD-SCNC: 134 MMOL/L (ref 136–145)
TOTAL PROTEIN: 5 G/DL (ref 6.4–8.2)
WBC # BLD: 12.6 K/UL (ref 4–11)

## 2023-03-12 PROCEDURE — 6370000000 HC RX 637 (ALT 250 FOR IP): Performed by: INTERNAL MEDICINE

## 2023-03-12 PROCEDURE — 2580000003 HC RX 258: Performed by: NURSE PRACTITIONER

## 2023-03-12 PROCEDURE — 6360000002 HC RX W HCPCS: Performed by: NURSE PRACTITIONER

## 2023-03-12 PROCEDURE — 83735 ASSAY OF MAGNESIUM: CPT

## 2023-03-12 PROCEDURE — 6370000000 HC RX 637 (ALT 250 FOR IP): Performed by: NURSE PRACTITIONER

## 2023-03-12 PROCEDURE — 85025 COMPLETE CBC W/AUTO DIFF WBC: CPT

## 2023-03-12 PROCEDURE — 2060000000 HC ICU INTERMEDIATE R&B

## 2023-03-12 PROCEDURE — 6370000000 HC RX 637 (ALT 250 FOR IP)

## 2023-03-12 PROCEDURE — 80053 COMPREHEN METABOLIC PANEL: CPT

## 2023-03-12 PROCEDURE — 6360000002 HC RX W HCPCS: Performed by: INTERNAL MEDICINE

## 2023-03-12 PROCEDURE — 36415 COLL VENOUS BLD VENIPUNCTURE: CPT

## 2023-03-12 RX ORDER — POTASSIUM CHLORIDE 20 MEQ/1
40 TABLET, EXTENDED RELEASE ORAL PRN
Status: DISCONTINUED | OUTPATIENT
Start: 2023-03-12 | End: 2023-03-15 | Stop reason: HOSPADM

## 2023-03-12 RX ORDER — POTASSIUM CHLORIDE 750 MG/1
40 TABLET, EXTENDED RELEASE ORAL DAILY
Status: DISCONTINUED | OUTPATIENT
Start: 2023-03-12 | End: 2023-03-15 | Stop reason: HOSPADM

## 2023-03-12 RX ORDER — MAGNESIUM SULFATE 1 G/100ML
1000 INJECTION INTRAVENOUS ONCE
Status: COMPLETED | OUTPATIENT
Start: 2023-03-12 | End: 2023-03-12

## 2023-03-12 RX ORDER — POTASSIUM CHLORIDE 7.45 MG/ML
10 INJECTION INTRAVENOUS ONCE
Status: COMPLETED | OUTPATIENT
Start: 2023-03-12 | End: 2023-03-12

## 2023-03-12 RX ORDER — POTASSIUM CHLORIDE 7.45 MG/ML
10 INJECTION INTRAVENOUS PRN
Status: DISCONTINUED | OUTPATIENT
Start: 2023-03-12 | End: 2023-03-15 | Stop reason: HOSPADM

## 2023-03-12 RX ORDER — VENLAFAXINE HYDROCHLORIDE 75 MG/1
225 CAPSULE, EXTENDED RELEASE ORAL
Status: DISCONTINUED | OUTPATIENT
Start: 2023-03-13 | End: 2023-03-15 | Stop reason: HOSPADM

## 2023-03-12 RX ORDER — MAGNESIUM SULFATE IN WATER 40 MG/ML
2000 INJECTION, SOLUTION INTRAVENOUS PRN
Status: DISCONTINUED | OUTPATIENT
Start: 2023-03-12 | End: 2023-03-15 | Stop reason: HOSPADM

## 2023-03-12 RX ADMIN — SODIUM CHLORIDE: 9 INJECTION, SOLUTION INTRAVENOUS at 00:01

## 2023-03-12 RX ADMIN — VENLAFAXINE 50 MG: 25 TABLET ORAL at 16:05

## 2023-03-12 RX ADMIN — ACETAMINOPHEN 650 MG: 325 TABLET ORAL at 00:01

## 2023-03-12 RX ADMIN — SUCRALFATE 1 G: 1 TABLET ORAL at 09:21

## 2023-03-12 RX ADMIN — MAGNESIUM SULFATE HEPTAHYDRATE 1000 MG: 1 INJECTION, SOLUTION INTRAVENOUS at 09:44

## 2023-03-12 RX ADMIN — ACETAMINOPHEN 650 MG: 325 TABLET ORAL at 10:40

## 2023-03-12 RX ADMIN — SUCRALFATE 1 G: 1 TABLET ORAL at 20:28

## 2023-03-12 RX ADMIN — VENLAFAXINE 50 MG: 25 TABLET ORAL at 20:28

## 2023-03-12 RX ADMIN — POTASSIUM CHLORIDE 10 MEQ: 7.46 INJECTION, SOLUTION INTRAVENOUS at 10:47

## 2023-03-12 RX ADMIN — SODIUM CHLORIDE: 9 INJECTION, SOLUTION INTRAVENOUS at 16:10

## 2023-03-12 RX ADMIN — ONDANSETRON 4 MG: 4 TABLET, ORALLY DISINTEGRATING ORAL at 16:04

## 2023-03-12 RX ADMIN — TRAZODONE HYDROCHLORIDE 150 MG: 100 TABLET ORAL at 20:28

## 2023-03-12 RX ADMIN — SODIUM CHLORIDE: 9 INJECTION, SOLUTION INTRAVENOUS at 09:42

## 2023-03-12 RX ADMIN — POTASSIUM CHLORIDE 40 MEQ: 750 TABLET, EXTENDED RELEASE ORAL at 09:21

## 2023-03-12 RX ADMIN — ACETAMINOPHEN 650 MG: 325 TABLET ORAL at 16:45

## 2023-03-12 RX ADMIN — CEFTRIAXONE SODIUM 1000 MG: 1 INJECTION, POWDER, FOR SOLUTION INTRAMUSCULAR; INTRAVENOUS at 16:11

## 2023-03-12 RX ADMIN — VENLAFAXINE 50 MG: 25 TABLET ORAL at 09:23

## 2023-03-12 RX ADMIN — Medication 10 ML: at 09:21

## 2023-03-12 RX ADMIN — SUCRALFATE 1 G: 1 TABLET ORAL at 16:05

## 2023-03-12 RX ADMIN — METOCLOPRAMIDE 10 MG: 10 TABLET ORAL at 09:21

## 2023-03-12 ASSESSMENT — PAIN DESCRIPTION - LOCATION: LOCATION: HEAD

## 2023-03-12 ASSESSMENT — PAIN - FUNCTIONAL ASSESSMENT: PAIN_FUNCTIONAL_ASSESSMENT: ACTIVITIES ARE NOT PREVENTED

## 2023-03-12 ASSESSMENT — PAIN SCALES - GENERAL: PAINLEVEL_OUTOF10: 3

## 2023-03-12 ASSESSMENT — PAIN DESCRIPTION - DESCRIPTORS: DESCRIPTORS: ACHING

## 2023-03-12 ASSESSMENT — PAIN DESCRIPTION - ORIENTATION: ORIENTATION: MID

## 2023-03-12 NOTE — PROGRESS NOTES
Patient assessed and AM medications given. Patient denies any further needs at this time. Call light within reach.
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
## 2023-03-12 NOTE — FLOWSHEET NOTE
03/11/23 2002   Vital Signs   Temp 99.1 °F (37.3 °C)   Temp Source Oral   Heart Rate 74   Heart Rate Source Monitor   Resp 16   /60   MAP (Calculated) 74   BP Location Right upper arm   Level of Consciousness 0   MEWS Score 1   Pt assessment complete. Pt alert and oriented x 4. Lung sounds clear. Pt  a-paced per monitor with HR of 74. Nightly medications given. PRN trazadone given for sleep. IV fluids infusing at 125ml/hr. Denies needs at this time. Call light within reach.
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
## 2023-03-12 NOTE — PROGRESS NOTES
Family continues to call claiming patient is going to attempt suicide. Psych consult notified. Patient denies any ideation. Admits to SA hx, depression, alcoholism, past drug use, and present drug use. Admits to being sad about recent break-up. continues to denies SI.
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
## 2023-03-12 NOTE — ACP (ADVANCE CARE PLANNING)
Advance Care Planning     General Advance Care Planning (ACP) Conversation    Date of Conversation: 3/11/2023  Conducted with: Patient with Decision Making Capacity    Healthcare Decision Maker:    Primary Decision Maker: Thais Cardona - Child - [phone]    Secondary Decision Maker: maya foreman - Child - [phone]  Click here to complete Healthcare Decision Makers including selection of the Healthcare Decision Maker Relationship (ie \"Primary\").   Today we documented Decision Maker(s) consistent with Legal Next of Kin hierarchy.    Content/Action Overview:  DECLINED ACP Conversation - will revisit periodically  Reviewed DNR/DNI and patient elects Full Code (Attempt Resuscitation)   Patient is aware to alert staff if wishes to complete AD.    Length of Voluntary ACP Conversation in minutes:  <16 minutes (Non-Billable)    RENAE Talbert

## 2023-03-12 NOTE — FLOWSHEET NOTE
03/12/23 0002   Vital Signs   Temp 98.5 °F (36.9 °C)   Temp Source Oral   Heart Rate 89   Heart Rate Source Monitor   Resp 16   /71   MAP (Calculated) 85   BP Location Right upper arm   Level of Consciousness 0   MEWS Score 1   PRN tylenol given for headache. VSS. Normal saline infusing at 125ml/hr. No other needs at this time. Call light within reach.

## 2023-03-13 PROBLEM — E44.0 MODERATE PROTEIN-CALORIE MALNUTRITION (HCC): Status: ACTIVE | Noted: 2023-03-13

## 2023-03-13 LAB
A/G RATIO: 1.5 (ref 1.1–2.2)
ALBUMIN SERPL-MCNC: 3 G/DL (ref 3.4–5)
ALP BLD-CCNC: 126 U/L (ref 40–129)
ALT SERPL-CCNC: 24 U/L (ref 10–40)
ANION GAP SERPL CALCULATED.3IONS-SCNC: 9 MMOL/L (ref 3–16)
AST SERPL-CCNC: 18 U/L (ref 15–37)
BASOPHILS ABSOLUTE: 0 K/UL (ref 0–0.2)
BASOPHILS RELATIVE PERCENT: 0.1 %
BILIRUB SERPL-MCNC: 0.4 MG/DL (ref 0–1)
BUN BLDV-MCNC: 5 MG/DL (ref 7–20)
CALCIUM SERPL-MCNC: 8.3 MG/DL (ref 8.3–10.6)
CHLORIDE BLD-SCNC: 109 MMOL/L (ref 99–110)
CO2: 19 MMOL/L (ref 21–32)
CREAT SERPL-MCNC: <0.5 MG/DL (ref 0.6–1.1)
EOSINOPHILS ABSOLUTE: 0 K/UL (ref 0–0.6)
EOSINOPHILS RELATIVE PERCENT: 0.1 %
GFR SERPL CREATININE-BSD FRML MDRD: >60 ML/MIN/{1.73_M2}
GLUCOSE BLD-MCNC: 97 MG/DL (ref 70–99)
HCT VFR BLD CALC: 31.9 % (ref 36–48)
HEMOGLOBIN: 11.1 G/DL (ref 12–16)
LYMPHOCYTES ABSOLUTE: 1.1 K/UL (ref 1–5.1)
LYMPHOCYTES RELATIVE PERCENT: 10.1 %
MAGNESIUM: 1.8 MG/DL (ref 1.8–2.4)
MCH RBC QN AUTO: 33.6 PG (ref 26–34)
MCHC RBC AUTO-ENTMCNC: 34.7 G/DL (ref 31–36)
MCV RBC AUTO: 97 FL (ref 80–100)
MONOCYTES ABSOLUTE: 1 K/UL (ref 0–1.3)
MONOCYTES RELATIVE PERCENT: 9.5 %
NEUTROPHILS ABSOLUTE: 8.7 K/UL (ref 1.7–7.7)
NEUTROPHILS RELATIVE PERCENT: 80.2 %
ORGANISM: ABNORMAL
PDW BLD-RTO: 15.3 % (ref 12.4–15.4)
PLATELET # BLD: 99 K/UL (ref 135–450)
PMV BLD AUTO: 10.2 FL (ref 5–10.5)
POTASSIUM REFLEX MAGNESIUM: 3 MMOL/L (ref 3.5–5.1)
RBC # BLD: 3.29 M/UL (ref 4–5.2)
SODIUM BLD-SCNC: 137 MMOL/L (ref 136–145)
TOTAL PROTEIN: 5 G/DL (ref 6.4–8.2)
URINE CULTURE, ROUTINE: ABNORMAL
WBC # BLD: 10.8 K/UL (ref 4–11)

## 2023-03-13 PROCEDURE — 2060000000 HC ICU INTERMEDIATE R&B

## 2023-03-13 PROCEDURE — 2580000003 HC RX 258: Performed by: NURSE PRACTITIONER

## 2023-03-13 PROCEDURE — 83735 ASSAY OF MAGNESIUM: CPT

## 2023-03-13 PROCEDURE — 99232 SBSQ HOSP IP/OBS MODERATE 35: CPT | Performed by: INTERNAL MEDICINE

## 2023-03-13 PROCEDURE — 80053 COMPREHEN METABOLIC PANEL: CPT

## 2023-03-13 PROCEDURE — 6360000002 HC RX W HCPCS: Performed by: NURSE PRACTITIONER

## 2023-03-13 PROCEDURE — 6360000002 HC RX W HCPCS: Performed by: INTERNAL MEDICINE

## 2023-03-13 PROCEDURE — 36415 COLL VENOUS BLD VENIPUNCTURE: CPT

## 2023-03-13 PROCEDURE — 6370000000 HC RX 637 (ALT 250 FOR IP): Performed by: INTERNAL MEDICINE

## 2023-03-13 PROCEDURE — 85025 COMPLETE CBC W/AUTO DIFF WBC: CPT

## 2023-03-13 PROCEDURE — 6370000000 HC RX 637 (ALT 250 FOR IP)

## 2023-03-13 PROCEDURE — 6370000000 HC RX 637 (ALT 250 FOR IP): Performed by: NURSE PRACTITIONER

## 2023-03-13 RX ORDER — ALBUTEROL SULFATE 2.5 MG/3ML
2.5 SOLUTION RESPIRATORY (INHALATION) EVERY 6 HOURS PRN
Status: DISCONTINUED | OUTPATIENT
Start: 2023-03-13 | End: 2023-03-13

## 2023-03-13 RX ORDER — CETIRIZINE HYDROCHLORIDE 10 MG/1
10 TABLET ORAL DAILY
Status: DISCONTINUED | OUTPATIENT
Start: 2023-03-13 | End: 2023-03-15 | Stop reason: HOSPADM

## 2023-03-13 RX ORDER — BENZONATATE 100 MG/1
100 CAPSULE ORAL 3 TIMES DAILY PRN
Status: DISCONTINUED | OUTPATIENT
Start: 2023-03-13 | End: 2023-03-15 | Stop reason: HOSPADM

## 2023-03-13 RX ORDER — ALBUTEROL SULFATE 90 UG/1
2 AEROSOL, METERED RESPIRATORY (INHALATION) EVERY 4 HOURS PRN
Status: DISCONTINUED | OUTPATIENT
Start: 2023-03-13 | End: 2023-03-14

## 2023-03-13 RX ADMIN — TRAZODONE HYDROCHLORIDE 150 MG: 100 TABLET ORAL at 21:04

## 2023-03-13 RX ADMIN — ENOXAPARIN SODIUM 40 MG: 100 INJECTION SUBCUTANEOUS at 16:58

## 2023-03-13 RX ADMIN — CETIRIZINE HYDROCHLORIDE 10 MG: 10 TABLET ORAL at 16:58

## 2023-03-13 RX ADMIN — ACETAMINOPHEN 650 MG: 325 TABLET ORAL at 01:04

## 2023-03-13 RX ADMIN — POTASSIUM CHLORIDE 10 MEQ: 7.46 INJECTION, SOLUTION INTRAVENOUS at 14:32

## 2023-03-13 RX ADMIN — METOCLOPRAMIDE 10 MG: 10 TABLET ORAL at 08:18

## 2023-03-13 RX ADMIN — POTASSIUM CHLORIDE 40 MEQ: 750 TABLET, EXTENDED RELEASE ORAL at 08:18

## 2023-03-13 RX ADMIN — POTASSIUM CHLORIDE 10 MEQ: 7.46 INJECTION, SOLUTION INTRAVENOUS at 11:44

## 2023-03-13 RX ADMIN — BENZONATATE 100 MG: 100 CAPSULE ORAL at 14:35

## 2023-03-13 RX ADMIN — POTASSIUM CHLORIDE 10 MEQ: 7.46 INJECTION, SOLUTION INTRAVENOUS at 06:35

## 2023-03-13 RX ADMIN — ACETAMINOPHEN 650 MG: 325 TABLET ORAL at 14:35

## 2023-03-13 RX ADMIN — SUCRALFATE 1 G: 1 TABLET ORAL at 08:18

## 2023-03-13 RX ADMIN — SODIUM CHLORIDE: 9 INJECTION, SOLUTION INTRAVENOUS at 01:02

## 2023-03-13 RX ADMIN — Medication 10 ML: at 08:19

## 2023-03-13 RX ADMIN — VENLAFAXINE HYDROCHLORIDE 225 MG: 75 CAPSULE, EXTENDED RELEASE ORAL at 08:18

## 2023-03-13 RX ADMIN — SODIUM CHLORIDE: 9 INJECTION, SOLUTION INTRAVENOUS at 08:20

## 2023-03-13 RX ADMIN — ACETAMINOPHEN 650 MG: 325 TABLET ORAL at 08:18

## 2023-03-13 RX ADMIN — CEFTRIAXONE SODIUM 1000 MG: 1 INJECTION, POWDER, FOR SOLUTION INTRAMUSCULAR; INTRAVENOUS at 14:37

## 2023-03-13 RX ADMIN — POTASSIUM CHLORIDE 10 MEQ: 7.46 INJECTION, SOLUTION INTRAVENOUS at 16:56

## 2023-03-13 RX ADMIN — ACETAMINOPHEN 650 MG: 325 TABLET ORAL at 21:03

## 2023-03-13 RX ADMIN — POTASSIUM CHLORIDE 10 MEQ: 7.46 INJECTION, SOLUTION INTRAVENOUS at 08:10

## 2023-03-13 ASSESSMENT — PAIN DESCRIPTION - FREQUENCY
FREQUENCY: CONTINUOUS
FREQUENCY: CONTINUOUS

## 2023-03-13 ASSESSMENT — PAIN - FUNCTIONAL ASSESSMENT: PAIN_FUNCTIONAL_ASSESSMENT: ACTIVITIES ARE NOT PREVENTED

## 2023-03-13 ASSESSMENT — PAIN DESCRIPTION - ORIENTATION
ORIENTATION: LOWER
ORIENTATION: LOWER
ORIENTATION: MID

## 2023-03-13 ASSESSMENT — PAIN DESCRIPTION - LOCATION
LOCATION: GENERALIZED
LOCATION: HEAD
LOCATION: GENERALIZED

## 2023-03-13 ASSESSMENT — PAIN DESCRIPTION - DESCRIPTORS
DESCRIPTORS: ACHING
DESCRIPTORS: ACHING;DISCOMFORT
DESCRIPTORS: DISCOMFORT
DESCRIPTORS: ACHING
DESCRIPTORS: ACHING

## 2023-03-13 ASSESSMENT — PAIN DESCRIPTION - PAIN TYPE
TYPE: ACUTE PAIN
TYPE: ACUTE PAIN

## 2023-03-13 ASSESSMENT — PAIN SCALES - GENERAL
PAINLEVEL_OUTOF10: 3
PAINLEVEL_OUTOF10: 4
PAINLEVEL_OUTOF10: 3
PAINLEVEL_OUTOF10: 3
PAINLEVEL_OUTOF10: 8
PAINLEVEL_OUTOF10: 0
PAINLEVEL_OUTOF10: 0

## 2023-03-13 ASSESSMENT — PAIN DESCRIPTION - ONSET
ONSET: ON-GOING
ONSET: ON-GOING

## 2023-03-13 NOTE — CARE COORDINATION
INTERDISCIPLINARY PLAN OF CARE CONFERENCE    Date/Time: 3/13/2023 10:53 AM  Completed by: YUKO Garcia  Case Management Department  Phone: [phone] Fax: [phone]   Case Management      Patient Name:  Jillian Wall  YOB: 1965  Admitting Diagnosis: UTI (urinary tract infection) [N39.0]     Admit Date/Time:  3/11/2023  6:40 AM    Chart reviewed. Interdisciplinary team contacted or reviewed plan related to patient progress and discharge plans. Disciplines included Case Management, Nursing, and Dietitian. Current Status:ongoing   PT/OT recommendation for discharge plan of care: n/a    Expected D/C Disposition:  Home  Confirmed plan with patient and/or family Yes confirmed with: (name) pt  Met with:pt    Discharge Plan Comments: Chart review completed. Met with pt at bedside. Pt confirmed she will return home when able. She is currently on o2 and doesn't have this at home.  She stated if she would need home o2, she wants to use [employer]; denied a DME list.     Home O2 in place on admit: No

## 2023-03-13 NOTE — ACP (ADVANCE CARE PLANNING)
Met with Pt for Advance directives consult. Pt wanting to talk over with her children first. Left copy of AD forms with Pt and informed her of 's availability to help complete when ready.     Masoud Hanna

## 2023-03-13 NOTE — PROGRESS NOTES
Comprehensive Nutrition Assessment    Type and Reason for Visit:  Initial, Positive Nutrition Screen (+MST=3)    Nutrition Recommendations/Plan:   Continue current regular diet  Add Ensure High Protein w/ meals  Continue to monitor appetite & PO intake  Continue to monitor GI symptoms, weight trends, & nutrition related lab values     Malnutrition Assessment:  Malnutrition Status: Moderate malnutrition (03/13/23 1127)    Context:  Acute Illness     Findings of the 6 clinical characteristics of malnutrition:  Energy Intake:  75% or less of estimated energy requirements for 7 or more days  Weight Loss:  Unable to assess     Body Fat Loss:  Mild body fat loss Triceps   Muscle Mass Loss:  Mild muscle mass loss Clavicles (pectoralis & deltoids), Calf (gastrocnemius)  Fluid Accumulation:  No significant fluid accumulation     Strength:  Not Performed    Nutrition Assessment:    Pt is nutritionally compromised AEB poor appetite & PO intake PTA. Pt is at risk for further nutritional compromise d/t ongoing poor PO intake, muscle & fat wasting, & altered nutrition related lab values. Will continue current regular diet. Will add Ensure High Protein w/ meals. Nutrition Related Findings:    Pt is alert & oriented x4. Skin is warm & dry; pt reports some issues w/ chewing & swallowing - pt has trouble chewing & swallowing starchier foods b/c of dry texture & lack of salvia; pt's bowels are active & abdomen is soft/nontender; pt reported that she has been experiencing some N/V - PTA there were some meals where she would throw up immediately after eating. Pt reported a poor appetite & PO intake PTA that's been ongoing for ~1 year; pt said that she typically only eats 1 meal/day & that she snacks a lot on raw vegetables; pt said that for meals she eats foods like green beans, chicken, mashed potatoes, & stuffed peppers; she currently lives w/ her son & they both cook/grocery shop.  When asked about ONS, pt said that she has never tried them before but that she was willing to. During assessment pt kept making comments about calories & sugar content of foods; she said if she was going to drink an ONS that she wanted the a low calorie, low sugar option; pt was very fixated on calories & sugar, stating that she is always checking calorie contents of food - pt denied any hx of past disordered eating; pt said that a normal weight for her recently is between 149 & 153 lbs; pt said she weighed ~180 lb a year ago but has lost about 30 lbs - she stated that some of the weight loss has been intentional & that she felt \"too heavy\" at 180 lb. Pt's PO intake during admission has still been poor; she said the meals are not good; pt stated that she ordered meat loaf yesterday but that what they brought her was \"not meatloaf\" so she didn't eat it Wound Type: None       Current Nutrition Intake & Therapies:    Average Meal Intake: 0%, 51-75%, %  Average Supplements Intake: None Ordered  ADULT DIET; Regular    Anthropometric Measures:  Height: 5' 6\" (167.6 cm)  Ideal Body Weight (IBW): 130 lbs (59 kg)    Admission Body Weight: 154 lb 10 oz (70.1 kg) (3/12/23 (standing scale))  Current Body Weight: 156 lb 1 oz (70.8 kg) (3/13/23 (standing scale)), 120 % IBW. Weight Source: Standing Scale  Current BMI (kg/m2): 25.2  Usual Body Weight:  (Unable to assess d/t lack of weight hx in EMR)                       BMI Categories: Overweight (BMI 25.0-29. 9)    Estimated Daily Nutrient Needs:  Energy Requirements Based On: Kcal/kg  Weight Used for Energy Requirements: Current  Energy (kcal/day): 1628 - 1770 kcal/day (23 - 25 kcal/kg CBW)  Weight Used for Protein Requirements: Ideal  Protein (g/day): 76 - 83 g PRO/day (1.3 - 1.4 g PRO/kg IBW)  Method Used for Fluid Requirements: 1 ml/kcal  Fluid (ml/day): 1628 - 1770 mL/day    Nutrition Diagnosis:   Moderate malnutrition related to inadequate protein-energy intake, biting/chewing (masticatory) difficulty, swallowing difficulty as evidenced by intake 0-25%, intake 26-50%, weight loss, mild muscle loss, mild loss of subcutaneous fat, nausea, vomiting, lab values    Nutrition Interventions:   Food and/or Nutrient Delivery: Continue Current Diet, Start Oral Nutrition Supplement  Nutrition Education/Counseling: No recommendation at this time  Coordination of Nutrition Care: Continue to monitor while inpatient       Goals:     Goals: PO intake 75% or greater, by next RD assessment       Nutrition Monitoring and Evaluation:   Behavioral-Environmental Outcomes: Beliefs and Attitutes  Food/Nutrient Intake Outcomes: Food and Nutrient Intake, Supplement Intake  Physical Signs/Symptoms Outcomes: Biochemical Data, Nutrition Focused Physical Findings, Chewing or Swallowing, Nausea or Vomiting    Discharge Planning:    Continue current diet     [address]: [phone]
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
## 2023-03-13 NOTE — PLAN OF CARE
Problem: ABCDS Injury Assessment  Goal: Absence of physical injury  Outcome: Progressing     Problem: Safety - Adult  Goal: Free from fall injury  Outcome: Progressing   Will continue to monitor and care per plan protocol.

## 2023-03-14 ENCOUNTER — APPOINTMENT (OUTPATIENT)
Dept: GENERAL RADIOLOGY | Age: 58
DRG: 720 | End: 2023-03-14
Payer: COMMERCIAL

## 2023-03-14 PROBLEM — E87.6 HYPOKALEMIA: Status: ACTIVE | Noted: 2023-03-14

## 2023-03-14 PROBLEM — J30.9 ALLERGIC RHINITIS: Status: ACTIVE | Noted: 2023-03-14

## 2023-03-14 LAB
A/G RATIO: 1.6 (ref 1.1–2.2)
ALBUMIN SERPL-MCNC: 3.1 G/DL (ref 3.4–5)
ALP BLD-CCNC: 139 U/L (ref 40–129)
ALT SERPL-CCNC: 22 U/L (ref 10–40)
ANION GAP SERPL CALCULATED.3IONS-SCNC: 11 MMOL/L (ref 3–16)
AST SERPL-CCNC: 17 U/L (ref 15–37)
BASOPHILS ABSOLUTE: 0 K/UL (ref 0–0.2)
BASOPHILS RELATIVE PERCENT: 0.2 %
BILIRUB SERPL-MCNC: 0.4 MG/DL (ref 0–1)
BUN BLDV-MCNC: 5 MG/DL (ref 7–20)
CALCIUM SERPL-MCNC: 8.4 MG/DL (ref 8.3–10.6)
CHLORIDE BLD-SCNC: 106 MMOL/L (ref 99–110)
CO2: 20 MMOL/L (ref 21–32)
CREAT SERPL-MCNC: <0.5 MG/DL (ref 0.6–1.1)
EOSINOPHILS ABSOLUTE: 0 K/UL (ref 0–0.6)
EOSINOPHILS RELATIVE PERCENT: 0 %
GFR SERPL CREATININE-BSD FRML MDRD: >60 ML/MIN/{1.73_M2}
GLUCOSE BLD-MCNC: 95 MG/DL (ref 70–99)
HCT VFR BLD CALC: 30.9 % (ref 36–48)
HEMOGLOBIN: 10.7 G/DL (ref 12–16)
LYMPHOCYTES ABSOLUTE: 0.7 K/UL (ref 1–5.1)
LYMPHOCYTES RELATIVE PERCENT: 10.7 %
MCH RBC QN AUTO: 33.8 PG (ref 26–34)
MCHC RBC AUTO-ENTMCNC: 34.6 G/DL (ref 31–36)
MCV RBC AUTO: 97.8 FL (ref 80–100)
MONOCYTES ABSOLUTE: 0.7 K/UL (ref 0–1.3)
MONOCYTES RELATIVE PERCENT: 9.7 %
NEUTROPHILS ABSOLUTE: 5.5 K/UL (ref 1.7–7.7)
NEUTROPHILS RELATIVE PERCENT: 79.4 %
PDW BLD-RTO: 15.2 % (ref 12.4–15.4)
PLATELET # BLD: 112 K/UL (ref 135–450)
PMV BLD AUTO: 9.6 FL (ref 5–10.5)
POTASSIUM REFLEX MAGNESIUM: 3.6 MMOL/L (ref 3.5–5.1)
RBC # BLD: 3.16 M/UL (ref 4–5.2)
S PYO AG THROAT QL: NEGATIVE
SODIUM BLD-SCNC: 137 MMOL/L (ref 136–145)
TOTAL PROTEIN: 5.1 G/DL (ref 6.4–8.2)
WBC # BLD: 7 K/UL (ref 4–11)

## 2023-03-14 PROCEDURE — 80053 COMPREHEN METABOLIC PANEL: CPT

## 2023-03-14 PROCEDURE — 87880 STREP A ASSAY W/OPTIC: CPT

## 2023-03-14 PROCEDURE — 6370000000 HC RX 637 (ALT 250 FOR IP): Performed by: INTERNAL MEDICINE

## 2023-03-14 PROCEDURE — 6360000002 HC RX W HCPCS: Performed by: NURSE PRACTITIONER

## 2023-03-14 PROCEDURE — 6360000002 HC RX W HCPCS: Performed by: INTERNAL MEDICINE

## 2023-03-14 PROCEDURE — 94640 AIRWAY INHALATION TREATMENT: CPT

## 2023-03-14 PROCEDURE — 99233 SBSQ HOSP IP/OBS HIGH 50: CPT | Performed by: INTERNAL MEDICINE

## 2023-03-14 PROCEDURE — 2700000000 HC OXYGEN THERAPY PER DAY

## 2023-03-14 PROCEDURE — 85025 COMPLETE CBC W/AUTO DIFF WBC: CPT

## 2023-03-14 PROCEDURE — 2580000003 HC RX 258: Performed by: NURSE PRACTITIONER

## 2023-03-14 PROCEDURE — 94761 N-INVAS EAR/PLS OXIMETRY MLT: CPT

## 2023-03-14 PROCEDURE — 6370000000 HC RX 637 (ALT 250 FOR IP): Performed by: NURSE PRACTITIONER

## 2023-03-14 PROCEDURE — 2060000000 HC ICU INTERMEDIATE R&B

## 2023-03-14 PROCEDURE — 87081 CULTURE SCREEN ONLY: CPT

## 2023-03-14 PROCEDURE — 36415 COLL VENOUS BLD VENIPUNCTURE: CPT

## 2023-03-14 PROCEDURE — 71046 X-RAY EXAM CHEST 2 VIEWS: CPT

## 2023-03-14 PROCEDURE — 6370000000 HC RX 637 (ALT 250 FOR IP)

## 2023-03-14 RX ORDER — ALBUTEROL SULFATE 2.5 MG/3ML
2.5 SOLUTION RESPIRATORY (INHALATION) EVERY 4 HOURS PRN
Status: DISCONTINUED | OUTPATIENT
Start: 2023-03-14 | End: 2023-03-15 | Stop reason: HOSPADM

## 2023-03-14 RX ORDER — CEFUROXIME AXETIL 500 MG/1
500 TABLET ORAL EVERY 12 HOURS SCHEDULED
Status: DISCONTINUED | OUTPATIENT
Start: 2023-03-14 | End: 2023-03-15 | Stop reason: HOSPADM

## 2023-03-14 RX ORDER — METHYLPREDNISOLONE 4 MG/1
4 TABLET ORAL 2 TIMES DAILY
Status: DISCONTINUED | OUTPATIENT
Start: 2023-03-14 | End: 2023-03-15 | Stop reason: HOSPADM

## 2023-03-14 RX ORDER — HYDROCODONE BITARTRATE AND ACETAMINOPHEN 5; 325 MG/1; MG/1
1 TABLET ORAL EVERY 4 HOURS PRN
Status: DISCONTINUED | OUTPATIENT
Start: 2023-03-14 | End: 2023-03-15 | Stop reason: HOSPADM

## 2023-03-14 RX ORDER — IPRATROPIUM BROMIDE AND ALBUTEROL SULFATE 2.5; .5 MG/3ML; MG/3ML
1 SOLUTION RESPIRATORY (INHALATION) 4 TIMES DAILY
Status: DISCONTINUED | OUTPATIENT
Start: 2023-03-14 | End: 2023-03-15 | Stop reason: HOSPADM

## 2023-03-14 RX ADMIN — POTASSIUM CHLORIDE 40 MEQ: 750 TABLET, EXTENDED RELEASE ORAL at 09:32

## 2023-03-14 RX ADMIN — METOCLOPRAMIDE 10 MG: 10 TABLET ORAL at 09:32

## 2023-03-14 RX ADMIN — Medication 1 LOZENGE: at 20:39

## 2023-03-14 RX ADMIN — ONDANSETRON 4 MG: 4 TABLET, ORALLY DISINTEGRATING ORAL at 20:54

## 2023-03-14 RX ADMIN — ENOXAPARIN SODIUM 40 MG: 100 INJECTION SUBCUTANEOUS at 17:37

## 2023-03-14 RX ADMIN — TRAZODONE HYDROCHLORIDE 150 MG: 100 TABLET ORAL at 20:39

## 2023-03-14 RX ADMIN — ALBUTEROL SULFATE 2.5 MG: 2.5 SOLUTION RESPIRATORY (INHALATION) at 05:39

## 2023-03-14 RX ADMIN — CETIRIZINE HYDROCHLORIDE 10 MG: 10 TABLET ORAL at 09:32

## 2023-03-14 RX ADMIN — Medication 10 ML: at 20:43

## 2023-03-14 RX ADMIN — HYDROCODONE BITARTRATE AND ACETAMINOPHEN 1 TABLET: 5; 325 TABLET ORAL at 11:35

## 2023-03-14 RX ADMIN — CEFUROXIME AXETIL 500 MG: 500 TABLET ORAL at 20:39

## 2023-03-14 RX ADMIN — CEFTRIAXONE SODIUM 1000 MG: 1 INJECTION, POWDER, FOR SOLUTION INTRAMUSCULAR; INTRAVENOUS at 15:36

## 2023-03-14 RX ADMIN — METHYLPREDNISOLONE 4 MG: 4 TABLET ORAL at 17:36

## 2023-03-14 RX ADMIN — HYDROCODONE BITARTRATE AND ACETAMINOPHEN 1 TABLET: 5; 325 TABLET ORAL at 16:20

## 2023-03-14 RX ADMIN — HYDROCODONE BITARTRATE AND ACETAMINOPHEN 1 TABLET: 5; 325 TABLET ORAL at 20:39

## 2023-03-14 RX ADMIN — Medication 1 LOZENGE: at 17:36

## 2023-03-14 RX ADMIN — SUCRALFATE 1 G: 1 TABLET ORAL at 20:39

## 2023-03-14 RX ADMIN — VENLAFAXINE HYDROCHLORIDE 225 MG: 75 CAPSULE, EXTENDED RELEASE ORAL at 09:32

## 2023-03-14 RX ADMIN — IPRATROPIUM BROMIDE AND ALBUTEROL SULFATE 1 AMPULE: 2.5; .5 SOLUTION RESPIRATORY (INHALATION) at 20:15

## 2023-03-14 ASSESSMENT — PAIN SCALES - GENERAL
PAINLEVEL_OUTOF10: 5
PAINLEVEL_OUTOF10: 7
PAINLEVEL_OUTOF10: 5
PAINLEVEL_OUTOF10: 8
PAINLEVEL_OUTOF10: 7

## 2023-03-14 ASSESSMENT — PAIN DESCRIPTION - FREQUENCY: FREQUENCY: CONTINUOUS

## 2023-03-14 ASSESSMENT — PAIN DESCRIPTION - ORIENTATION
ORIENTATION: LEFT;RIGHT
ORIENTATION: MID

## 2023-03-14 ASSESSMENT — PAIN DESCRIPTION - LOCATION
LOCATION: HEAD;EAR
LOCATION: HEAD
LOCATION: THROAT;HEAD
LOCATION: THROAT
LOCATION: THROAT

## 2023-03-14 ASSESSMENT — PAIN - FUNCTIONAL ASSESSMENT
PAIN_FUNCTIONAL_ASSESSMENT: ACTIVITIES ARE NOT PREVENTED
PAIN_FUNCTIONAL_ASSESSMENT: ACTIVITIES ARE NOT PREVENTED

## 2023-03-14 ASSESSMENT — PAIN DESCRIPTION - DESCRIPTORS
DESCRIPTORS: ACHING;SORE;SHARP
DESCRIPTORS: ACHING;SORE;PRESSURE
DESCRIPTORS: SORE
DESCRIPTORS: SORE;ACHING

## 2023-03-14 ASSESSMENT — PAIN DESCRIPTION - ONSET: ONSET: ON-GOING

## 2023-03-14 ASSESSMENT — PAIN SCALES - WONG BAKER
WONGBAKER_NUMERICALRESPONSE: 2
WONGBAKER_NUMERICALRESPONSE: 2

## 2023-03-14 ASSESSMENT — PAIN DESCRIPTION - PAIN TYPE: TYPE: ACUTE PAIN

## 2023-03-14 NOTE — PROGRESS NOTES
RT Inhaler-Nebulizer Bronchodilator Protocol Note    There is a bronchodilator order in the chart from a provider indicating to follow the RT Bronchodilator Protocol and there is an Initiate RT Inhaler-Nebulizer Bronchodilator Protocol order as well (see protocol at bottom of note). CXR Findings:  No results found. The findings from the last RT Protocol Assessment were as follows:   History Pulmonary Disease: (P) Chronic pulmonary disease  Respiratory Pattern: (P) Regular pattern and RR 12-20 bpm  Breath Sounds: (P) Clear breath sounds  Cough: (P) Strong, spontaneous, non-productive  Indication for Bronchodilator Therapy: (P) On home bronchodilators  Bronchodilator Assessment Score: (P) 2    Aerosolized bronchodilator medication orders have been revised according to the RT Inhaler-Nebulizer Bronchodilator Protocol below. Respiratory Therapist to perform RT Therapy Protocol Assessment initially then follow the protocol. Repeat RT Therapy Protocol Assessment PRN for score 0-3 or on second treatment, BID, and PRN for scores above 3. No Indications - adjust the frequency to every 6 hours PRN wheezing or bronchospasm, if no treatments needed after 48 hours then discontinue using Per Protocol order mode. If indication present, adjust the RT bronchodilator orders based on the Bronchodilator Assessment Score as indicated below. Use Inhaler orders unless patient has one or more of the following: on home nebulizer, not able to hold breath for 10 seconds, is not alert and oriented, cannot activate and use MDI correctly, or respiratory rate 25 breaths per minute or more, then use the equivalent nebulizer order(s) with same Frequency and PRN reasons based on the score. If a patient is on this medication at home then do not decrease Frequency below that used at home.     0-3 - enter or revise RT bronchodilator order(s) to equivalent RT Bronchodilator order with Frequency of every 4 hours PRN for wheezing or increased work of breathing using Per Protocol order mode. 4-6 - enter or revise RT Bronchodilator order(s) to two equivalent RT bronchodilator orders with one order with BID Frequency and one order with Frequency of every 4 hours PRN wheezing or increased work of breathing using Per Protocol order mode. 7-10 - enter or revise RT Bronchodilator order(s) to two equivalent RT bronchodilator orders with one order with TID Frequency and one order with Frequency of every 4 hours PRN wheezing or increased work of breathing using Per Protocol order mode. 11-13 - enter or revise RT Bronchodilator order(s) to one equivalent RT bronchodilator order with QID Frequency and an Albuterol order with Frequency of every 4 hours PRN wheezing or increased work of breathing using Per Protocol order mode. Greater than 13 - enter or revise RT Bronchodilator order(s) to one equivalent RT bronchodilator order with every 4 hours Frequency and an Albuterol order with Frequency of every 2 hours PRN wheezing or increased work of breathing using Per Protocol order mode.        Electronically signed by Isak Gilbert RCP on 3/13/2023 at 9:04 PM

## 2023-03-14 NOTE — PROGRESS NOTES
Pt is lying in bed with their eyes closed. Respirations are easy and even. Call light within reach bed in lowest position with the wheels locked. Will continue to monitor.  Mickey Santamaria RN
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
## 2023-03-14 NOTE — PROGRESS NOTES
Patient resting in bed, AM assessment completed. Lungs decreased. BS+. IVF at 50/hr. Patient 90% on RA. No acute distress noted. Call light in reach. Patient denies any needs. Will continue to monitor.

## 2023-03-15 VITALS
HEART RATE: 71 BPM | OXYGEN SATURATION: 94 % | HEIGHT: 66 IN | WEIGHT: 155.3 LBS | TEMPERATURE: 98.2 F | BODY MASS INDEX: 24.96 KG/M2 | RESPIRATION RATE: 18 BRPM | SYSTOLIC BLOOD PRESSURE: 138 MMHG | DIASTOLIC BLOOD PRESSURE: 79 MMHG

## 2023-03-15 PROBLEM — J44.1 CHRONIC OBSTRUCTIVE PULMONARY DISEASE WITH ACUTE EXACERBATION (HCC): Status: ACTIVE | Noted: 2023-03-15

## 2023-03-15 PROCEDURE — 6370000000 HC RX 637 (ALT 250 FOR IP): Performed by: NURSE PRACTITIONER

## 2023-03-15 PROCEDURE — 6370000000 HC RX 637 (ALT 250 FOR IP): Performed by: INTERNAL MEDICINE

## 2023-03-15 PROCEDURE — 2580000003 HC RX 258: Performed by: NURSE PRACTITIONER

## 2023-03-15 PROCEDURE — 6370000000 HC RX 637 (ALT 250 FOR IP)

## 2023-03-15 PROCEDURE — 94640 AIRWAY INHALATION TREATMENT: CPT

## 2023-03-15 PROCEDURE — 6360000002 HC RX W HCPCS: Performed by: INTERNAL MEDICINE

## 2023-03-15 PROCEDURE — 94761 N-INVAS EAR/PLS OXIMETRY MLT: CPT

## 2023-03-15 PROCEDURE — 99238 HOSP IP/OBS DSCHRG MGMT 30/<: CPT | Performed by: INTERNAL MEDICINE

## 2023-03-15 RX ORDER — CEFUROXIME AXETIL 500 MG/1
500 TABLET ORAL EVERY 12 HOURS SCHEDULED
Qty: 8 TABLET | Refills: 0 | Status: SHIPPED | OUTPATIENT
Start: 2023-03-15 | End: 2023-03-19

## 2023-03-15 RX ORDER — TRAZODONE HYDROCHLORIDE 150 MG/1
150 TABLET ORAL NIGHTLY PRN
Qty: 30 TABLET | Refills: 0 | Status: SHIPPED | OUTPATIENT
Start: 2023-03-15

## 2023-03-15 RX ORDER — HYDROCODONE BITARTRATE AND ACETAMINOPHEN 5; 325 MG/1; MG/1
1 TABLET ORAL EVERY 8 HOURS PRN
Qty: 9 TABLET | Refills: 0 | Status: SHIPPED | OUTPATIENT
Start: 2023-03-15 | End: 2023-03-18

## 2023-03-15 RX ORDER — ALBUTEROL SULFATE 2.5 MG/3ML
2.5 SOLUTION RESPIRATORY (INHALATION) EVERY 6 HOURS PRN
Qty: 120 EACH | Refills: 0 | Status: SHIPPED | OUTPATIENT
Start: 2023-03-15

## 2023-03-15 RX ORDER — VENLAFAXINE HYDROCHLORIDE 75 MG/1
225 CAPSULE, EXTENDED RELEASE ORAL
Qty: 90 CAPSULE | Refills: 0 | Status: SHIPPED | OUTPATIENT
Start: 2023-03-16 | End: 2023-04-15

## 2023-03-15 RX ADMIN — METOCLOPRAMIDE 10 MG: 10 TABLET ORAL at 08:21

## 2023-03-15 RX ADMIN — METHYLPREDNISOLONE 4 MG: 4 TABLET ORAL at 08:25

## 2023-03-15 RX ADMIN — CEFUROXIME AXETIL 500 MG: 500 TABLET ORAL at 08:25

## 2023-03-15 RX ADMIN — POTASSIUM CHLORIDE 40 MEQ: 750 TABLET, EXTENDED RELEASE ORAL at 08:21

## 2023-03-15 RX ADMIN — HYDROCODONE BITARTRATE AND ACETAMINOPHEN 1 TABLET: 5; 325 TABLET ORAL at 00:50

## 2023-03-15 RX ADMIN — HYDROCODONE BITARTRATE AND ACETAMINOPHEN 1 TABLET: 5; 325 TABLET ORAL at 09:34

## 2023-03-15 RX ADMIN — IPRATROPIUM BROMIDE AND ALBUTEROL SULFATE 1 AMPULE: 2.5; .5 SOLUTION RESPIRATORY (INHALATION) at 11:14

## 2023-03-15 RX ADMIN — Medication 10 ML: at 08:21

## 2023-03-15 RX ADMIN — CETIRIZINE HYDROCHLORIDE 10 MG: 10 TABLET ORAL at 08:21

## 2023-03-15 RX ADMIN — ALBUTEROL SULFATE 2.5 MG: 2.5 SOLUTION RESPIRATORY (INHALATION) at 00:54

## 2023-03-15 RX ADMIN — IPRATROPIUM BROMIDE AND ALBUTEROL SULFATE 1 AMPULE: 2.5; .5 SOLUTION RESPIRATORY (INHALATION) at 07:33

## 2023-03-15 RX ADMIN — ACETAMINOPHEN 650 MG: 325 TABLET ORAL at 08:23

## 2023-03-15 RX ADMIN — HYDROCODONE BITARTRATE AND ACETAMINOPHEN 1 TABLET: 5; 325 TABLET ORAL at 05:11

## 2023-03-15 RX ADMIN — Medication 1 LOZENGE: at 00:50

## 2023-03-15 RX ADMIN — VENLAFAXINE HYDROCHLORIDE 225 MG: 75 CAPSULE, EXTENDED RELEASE ORAL at 08:21

## 2023-03-15 ASSESSMENT — PAIN DESCRIPTION - LOCATION
LOCATION: GENERALIZED
LOCATION: HEAD
LOCATION: THROAT;HEAD
LOCATION: GENERALIZED

## 2023-03-15 ASSESSMENT — PAIN SCALES - GENERAL
PAINLEVEL_OUTOF10: 0
PAINLEVEL_OUTOF10: 8
PAINLEVEL_OUTOF10: 3
PAINLEVEL_OUTOF10: 7
PAINLEVEL_OUTOF10: 9

## 2023-03-15 ASSESSMENT — PAIN DESCRIPTION - DESCRIPTORS
DESCRIPTORS: DISCOMFORT;ACHING
DESCRIPTORS: ACHING
DESCRIPTORS: ACHING;SORE
DESCRIPTORS: ACHING

## 2023-03-15 ASSESSMENT — PAIN DESCRIPTION - FREQUENCY: FREQUENCY: CONTINUOUS

## 2023-03-15 ASSESSMENT — PAIN DESCRIPTION - PAIN TYPE: TYPE: ACUTE PAIN

## 2023-03-15 ASSESSMENT — PAIN DESCRIPTION - ORIENTATION: ORIENTATION: LOWER

## 2023-03-15 NOTE — FLOWSHEET NOTE
03/15/23 0045   Vital Signs   Temp 98 °F (36.7 °C)   Temp Source Oral   Heart Rate 78   Heart Rate Source Monitor   Resp 18   BP (!) 144/82   MAP (Calculated) 103   BP Location Left upper arm   BP Method Automatic   Patient Position Sitting   Level of Consciousness 0   MEWS Score 1   Pain Assessment   Pain Assessment 0-10   Pain Level 8   Patient's Stated Pain Goal 0 - No pain   Pain Location Throat;Head   Pain Descriptors Aching; Sore   Functional Pain Assessment Activities are not prevented   Pain Type Acute pain   Pain Frequency Continuous   Non-Pharmaceutical Pain Intervention(s) Ice   Opioid-Induced Sedation   POSS Score 1   Oxygen Therapy   SpO2 93 %   O2 Device None (Room air)   Height and Weight   Weight 155 lb 4.8 oz (70.4 kg)   Weight Method Standing scale   BMI (Calculated) 25.1   Patient vital signs taken. Patient has c/o pain at an \"8\" in her head and throat. Norco given PRN per order. Patient requesting respiratory for PRN nebulizer treatment. Respiratory called. Patient has call light within reach and uses appropriately.

## 2023-03-15 NOTE — PROGRESS NOTES
RT Inhaler-Nebulizer Bronchodilator Protocol Note    There is a bronchodilator order in the chart from a provider indicating to follow the RT Bronchodilator Protocol and there is an Initiate RT Inhaler-Nebulizer Bronchodilator Protocol order as well (see protocol at bottom of note). CXR Findings:  No results found. The findings from the last RT Protocol Assessment were as follows:   History Pulmonary Disease: (P) Chronic pulmonary disease  Respiratory Pattern: (P) Regular pattern and RR 12-20 bpm  Breath Sounds: (P) Slightly diminished and/or crackles  Cough: (P) Strong, spontaneous, non-productive  Indication for Bronchodilator Therapy: (P) On home bronchodilators  Bronchodilator Assessment Score: (P) 4    Aerosolized bronchodilator medication orders have been revised according to the RT Inhaler-Nebulizer Bronchodilator Protocol below. Respiratory Therapist to perform RT Therapy Protocol Assessment initially then follow the protocol. Repeat RT Therapy Protocol Assessment PRN for score 0-3 or on second treatment, BID, and PRN for scores above 3. No Indications - adjust the frequency to every 6 hours PRN wheezing or bronchospasm, if no treatments needed after 48 hours then discontinue using Per Protocol order mode. If indication present, adjust the RT bronchodilator orders based on the Bronchodilator Assessment Score as indicated below. Use Inhaler orders unless patient has one or more of the following: on home nebulizer, not able to hold breath for 10 seconds, is not alert and oriented, cannot activate and use MDI correctly, or respiratory rate 25 breaths per minute or more, then use the equivalent nebulizer order(s) with same Frequency and PRN reasons based on the score. If a patient is on this medication at home then do not decrease Frequency below that used at home.     0-3 - enter or revise RT bronchodilator order(s) to equivalent RT Bronchodilator order with Frequency of every 4 hours PRN for wheezing or increased work of breathing using Per Protocol order mode. 4-6 - enter or revise RT Bronchodilator order(s) to two equivalent RT bronchodilator orders with one order with BID Frequency and one order with Frequency of every 4 hours PRN wheezing or increased work of breathing using Per Protocol order mode. 7-10 - enter or revise RT Bronchodilator order(s) to two equivalent RT bronchodilator orders with one order with TID Frequency and one order with Frequency of every 4 hours PRN wheezing or increased work of breathing using Per Protocol order mode. 11-13 - enter or revise RT Bronchodilator order(s) to one equivalent RT bronchodilator order with QID Frequency and an Albuterol order with Frequency of every 4 hours PRN wheezing or increased work of breathing using Per Protocol order mode. Greater than 13 - enter or revise RT Bronchodilator order(s) to one equivalent RT bronchodilator order with every 4 hours Frequency and an Albuterol order with Frequency of every 2 hours PRN wheezing or increased work of breathing using Per Protocol order mode.        Electronically signed by Karthik Adams RCP on 3/14/2023 at 8:17 PM

## 2023-03-15 NOTE — DISCHARGE INSTRUCTIONS
Urinary Tract Infection (UTI) in Women: Care Instructions  Overview     A urinary tract infection, or UTI, is a general term for an infection anywhere between the kidneys and the urethra (where urine comes out). Most UTIs are bladder infections. They often cause pain or burning when you urinate. UTIs are caused by bacteria and can be cured with antibiotics. Be sure to complete your treatment so that the infection does not get worse. Follow-up care is a key part of your treatment and safety. Be sure to make and go to all appointments, and call your doctor if you are having problems. It's also a good idea to know your test results and keep a list of the medicines you take. How can you care for yourself at home? Take your antibiotics as directed. Do not stop taking them just because you feel better. You need to take the full course of antibiotics. Drink extra water and other fluids for the next day or two. This will help make the urine less concentrated and help wash out the bacteria that are causing the infection. (If you have kidney, heart, or liver disease and have to limit fluids, talk with your doctor before you increase the amount of fluids you drink.)  Avoid drinks that are carbonated or have caffeine. They can irritate the bladder. Urinate often. Try to empty your bladder each time. To relieve pain, take a hot bath or lay a heating pad set on low over your lower belly or genital area. Never go to sleep with a heating pad in place. To prevent UTIs  Drink plenty of water each day. This helps you urinate often, which clears bacteria from your system. (If you have kidney, heart, or liver disease and have to limit fluids, talk with your doctor before you increase the amount of fluids you drink.)  Urinate when you need to. If you are sexually active, urinate right after you have sex. Change sanitary pads often.   Avoid douches, bubble baths, feminine hygiene sprays, and other feminine hygiene products that have deodorants. After going to the bathroom, wipe from front to back. When should you call for help? Call your doctor now or seek immediate medical care if:    Symptoms such as fever, chills, nausea, or vomiting get worse or appear for the first time. You have new pain in your back just below your rib cage. This is called flank pain. There is new blood or pus in your urine. You have any problems with your antibiotic medicine. Watch closely for changes in your health, and be sure to contact your doctor if:    You are not getting better after taking an antibiotic for 2 days. Your symptoms go away but then come back. Where can you learn more? Go to http://www.woods.com/ and enter K848 to learn more about \"Urinary Tract Infection (UTI) in Women: Care Instructions. \"  Current as of: June 16, 2022               Content Version: 13.5  © 1932-7291 [employer]. Care instructions adapted under license by [facility] ([facility]). If you have questions about a medical condition or this instruction, always ask your healthcare professional. [address] any warranty or liability for your use of this information.

## 2023-03-15 NOTE — PROGRESS NOTES
Shift assessment complete see flow sheet respirations easy and even. Patient denies any pain or needs at this time. Bed is locked in the lowest position with call light within reach.
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
## 2023-03-15 NOTE — CARE COORDINATION
DISCHARGE ORDER  Date/Time 3/15/2023 10:56 AM  Completed by: Alejandro Cuevas RN, Case Management    Patient Name: Wilma Ellison      : 1965  Admitting Diagnosis: UTI (urinary tract infection) [N39.0]      Admit order Date and Status:INPT 3/11/23  (verify MD's last order for status of admission)      Noted discharge order. If applicable PT/OT recommendation at Discharge: n/a  DME recommendation by PT/OT:n/a  Confirmed discharge plan : Yes  with whom__with pt_____________  If pt confirmed DC plan does family need to be contacted by CM No if yes who__pt states she will call her son. ____  Discharge Plan: Pt plans to return home at discharge. Pt is IPTA and denies needs. Date of Last IMM Given: n/a    Reviewed chart. Role of discharge planner explained and patient verbalized understanding. Discharge order is noted. Has Home O2 in place on admit:  No  Informed of need to bring portable home O2 tank on day of discharge for nursing to connect prior to leaving:   Not Indicated  Verbalized agreement/Understanding:   Not Indicated  Pt is being d/c'd to home today. Pt's O2 sats are 94% on roomair. Discharge timeout done with Rishi Band. All discharge needs and concerns addressed.

## 2023-03-15 NOTE — PROGRESS NOTES
RT Inhaler-Nebulizer Bronchodilator Protocol Note    There is a bronchodilator order in the chart from a provider indicating to follow the RT Bronchodilator Protocol and there is an Initiate RT Inhaler-Nebulizer Bronchodilator Protocol order as well (see protocol at bottom of note). CXR Findings:  No results found. The findings from the last RT Protocol Assessment were as follows:   History Pulmonary Disease: (P) Chronic pulmonary disease  Respiratory Pattern: (P) Regular pattern and RR 12-20 bpm  Breath Sounds: (P) Slightly diminished and/or crackles  Cough: (P) Strong, spontaneous, non-productive  Indication for Bronchodilator Therapy: (P) On home bronchodilators  Bronchodilator Assessment Score: (P) 4    Aerosolized bronchodilator medication orders have been revised according to the RT Inhaler-Nebulizer Bronchodilator Protocol below. Respiratory Therapist to perform RT Therapy Protocol Assessment initially then follow the protocol. Repeat RT Therapy Protocol Assessment PRN for score 0-3 or on second treatment, BID, and PRN for scores above 3. No Indications - adjust the frequency to every 6 hours PRN wheezing or bronchospasm, if no treatments needed after 48 hours then discontinue using Per Protocol order mode. If indication present, adjust the RT bronchodilator orders based on the Bronchodilator Assessment Score as indicated below. Use Inhaler orders unless patient has one or more of the following: on home nebulizer, not able to hold breath for 10 seconds, is not alert and oriented, cannot activate and use MDI correctly, or respiratory rate 25 breaths per minute or more, then use the equivalent nebulizer order(s) with same Frequency and PRN reasons based on the score. If a patient is on this medication at home then do not decrease Frequency below that used at home.     0-3 - enter or revise RT bronchodilator order(s) to equivalent RT Bronchodilator order with Frequency of every 4 hours PRN for wheezing or increased work of breathing using Per Protocol order mode. 4-6 - enter or revise RT Bronchodilator order(s) to two equivalent RT bronchodilator orders with one order with BID Frequency and one order with Frequency of every 4 hours PRN wheezing or increased work of breathing using Per Protocol order mode. 7-10 - enter or revise RT Bronchodilator order(s) to two equivalent RT bronchodilator orders with one order with TID Frequency and one order with Frequency of every 4 hours PRN wheezing or increased work of breathing using Per Protocol order mode. 11-13 - enter or revise RT Bronchodilator order(s) to one equivalent RT bronchodilator order with QID Frequency and an Albuterol order with Frequency of every 4 hours PRN wheezing or increased work of breathing using Per Protocol order mode. Greater than 13 - enter or revise RT Bronchodilator order(s) to one equivalent RT bronchodilator order with every 4 hours Frequency and an Albuterol order with Frequency of every 2 hours PRN wheezing or increased work of breathing using Per Protocol order mode.        Electronically signed by Aleksandar oR RCP on 3/15/2023 at 7:35 AM

## 2023-03-15 NOTE — PROGRESS NOTES
Shift assessment is complete, see flow sheet. Patient has c/o throat and head pain at a \"7\". PRN Norco and throat lozenge given per order. Patient given water per request. Patient has call light within reach and uses appropriately.

## 2023-03-15 NOTE — PROGRESS NOTES
DC instructions have been reviewed with patient with a verbal understanding. IV removed with no complications. Patient had all of her belongings except her shoes, according to admission she did not have any. Patient stated she will call to verify if she found them. Patient denied any needs prior to leaving. Patient left with her narcotic prescription.

## 2023-03-15 NOTE — CARE COORDINATION
INTERDISCIPLINARY PLAN OF CARE CONFERENCE    Date/Time: 3/15/2023 10:10 AM  Completed by: Ashutosh Cardenas RN, Case Management      Patient Name:  Rukhsana Jade  YOB: 1965  Admitting Diagnosis: UTI (urinary tract infection) [N39.0]     Admit Date/Time:  3/11/2023  6:40 AM    Chart reviewed. Interdisciplinary team contacted or reviewed plan related to patient progress and discharge plans. Disciplines included Case Management, Nursing, and Dietitian. Current Status:INPT 3/11/23  PT/OT recommendation for discharge plan of care: n/a    Expected D/C Disposition:  Home  Confirmed plan with patient and/or family Yes confirmed with: (name) pt  Met with:pt  Discharge Plan Comments: CM reviewed chart. Pt plans to return home at discharge. Pt currently on roomair with O2 sat 94% this morning. CM following.     Home O2 in place on admit: No  Pt informed of need to bring portable home O2 tank on day of discharge for nursing to connect prior to leaving:  Not Indicated  Verbalized agreement/Understanding:  Not Indicated

## 2023-03-16 LAB — S PYO THROAT QL CULT: NORMAL

## 2023-03-17 ENCOUNTER — APPOINTMENT (OUTPATIENT)
Dept: CT IMAGING | Age: 58
End: 2023-03-17
Payer: COMMERCIAL

## 2023-03-17 ENCOUNTER — APPOINTMENT (OUTPATIENT)
Dept: GENERAL RADIOLOGY | Age: 58
End: 2023-03-17
Payer: COMMERCIAL

## 2023-03-17 ENCOUNTER — HOSPITAL ENCOUNTER (EMERGENCY)
Age: 58
Discharge: ANOTHER HOSPITAL | End: 2023-03-18
Attending: EMERGENCY MEDICINE
Payer: COMMERCIAL

## 2023-03-17 DIAGNOSIS — R51.9 ACUTE NONINTRACTABLE HEADACHE, UNSPECIFIED HEADACHE TYPE: Primary | ICD-10-CM

## 2023-03-17 LAB
ALBUMIN SERPL-MCNC: 3.8 G/DL (ref 3.4–5)
ALP SERPL-CCNC: 131 U/L (ref 40–129)
ALT SERPL-CCNC: 22 U/L (ref 10–40)
AMPHETAMINES UR QL SCN>1000 NG/ML: ABNORMAL
ANION GAP SERPL CALCULATED.3IONS-SCNC: 12 MMOL/L (ref 3–16)
APAP SERPL-MCNC: <5 UG/ML (ref 10–30)
AST SERPL-CCNC: 17 U/L (ref 15–37)
BACTERIA CSF CULT: NORMAL
BARBITURATES UR QL SCN>200 NG/ML: POSITIVE
BASE EXCESS BLDV CALC-SCNC: 4.1 MMOL/L (ref -3–3)
BASOPHILS # BLD: 0 K/UL (ref 0–0.2)
BASOPHILS NFR BLD: 0.3 %
BENZODIAZ UR QL SCN>200 NG/ML: ABNORMAL
BILIRUB DIRECT SERPL-MCNC: <0.2 MG/DL (ref 0–0.3)
BILIRUB INDIRECT SERPL-MCNC: ABNORMAL MG/DL (ref 0–1)
BILIRUB SERPL-MCNC: 0.4 MG/DL (ref 0–1)
BILIRUB UR QL STRIP.AUTO: NEGATIVE
BUN SERPL-MCNC: 7 MG/DL (ref 7–20)
CALCIUM SERPL-MCNC: 9.4 MG/DL (ref 8.3–10.6)
CANNABINOIDS UR QL SCN>50 NG/ML: POSITIVE
CHLORIDE SERPL-SCNC: 101 MMOL/L (ref 99–110)
CLARITY UR: CLEAR
CO2 BLDV-SCNC: 30 MMOL/L
CO2 SERPL-SCNC: 27 MMOL/L (ref 21–32)
COCAINE UR QL SCN: ABNORMAL
COHGB MFR BLDV: 2.1 % (ref 0–1.5)
COLOR UR: YELLOW
CREAT SERPL-MCNC: <0.5 MG/DL (ref 0.6–1.1)
DEPRECATED RDW RBC AUTO: 15 % (ref 12.4–15.4)
DRUG SCREEN COMMENT UR-IMP: ABNORMAL
EKG ATRIAL RATE: 74 BPM
EKG DIAGNOSIS: NORMAL
EKG P AXIS: -88 DEGREES
EKG P-R INTERVAL: 170 MS
EKG Q-T INTERVAL: 438 MS
EKG QRS DURATION: 74 MS
EKG QTC CALCULATION (BAZETT): 486 MS
EKG R AXIS: 78 DEGREES
EKG T AXIS: 77 DEGREES
EKG VENTRICULAR RATE: 74 BPM
EOSINOPHIL # BLD: 0 K/UL (ref 0–0.6)
EOSINOPHIL NFR BLD: 0 %
EPI CELLS #/AREA URNS HPF: NORMAL /HPF (ref 0–5)
FENTANYL SCREEN, URINE: ABNORMAL
GFR SERPLBLD CREATININE-BSD FMLA CKD-EPI: >60 ML/MIN/{1.73_M2}
GLUCOSE SERPL-MCNC: 77 MG/DL (ref 70–99)
GLUCOSE UR STRIP.AUTO-MCNC: NEGATIVE MG/DL
GRAM STAIN RESULT: NORMAL
HCO3 BLDV-SCNC: 28.9 MMOL/L (ref 23–29)
HCT VFR BLD AUTO: 36.7 % (ref 36–48)
HGB BLD-MCNC: 12.5 G/DL (ref 12–16)
HGB UR QL STRIP.AUTO: ABNORMAL
KETONES UR STRIP.AUTO-MCNC: NEGATIVE MG/DL
LEUKOCYTE ESTERASE UR QL STRIP.AUTO: NEGATIVE
LIPASE SERPL-CCNC: 22 U/L (ref 13–60)
LYMPHOCYTES # BLD: 1.1 K/UL (ref 1–5.1)
LYMPHOCYTES NFR BLD: 18.8 %
MCH RBC QN AUTO: 33.1 PG (ref 26–34)
MCHC RBC AUTO-ENTMCNC: 34.2 G/DL (ref 31–36)
MCV RBC AUTO: 96.7 FL (ref 80–100)
METHADONE UR QL SCN>300 NG/ML: ABNORMAL
METHGB MFR BLDV: 0.3 %
MONOCYTES # BLD: 0.4 K/UL (ref 0–1.3)
MONOCYTES NFR BLD: 7.1 %
NEUTROPHILS # BLD: 4.3 K/UL (ref 1.7–7.7)
NEUTROPHILS NFR BLD: 73.8 %
NITRITE UR QL STRIP.AUTO: NEGATIVE
NT-PROBNP SERPL-MCNC: 1513 PG/ML (ref 0–124)
O2 CT VFR BLDV CALC: 10 VOL %
O2 THERAPY: ABNORMAL
OPIATES UR QL SCN>300 NG/ML: POSITIVE
OXYCODONE UR QL SCN: ABNORMAL
PCO2 BLDV: 44.1 MMHG (ref 40–50)
PCP UR QL SCN>25 NG/ML: ABNORMAL
PH BLDV: 7.43 [PH] (ref 7.35–7.45)
PH UR STRIP.AUTO: 7.5 [PH] (ref 5–8)
PH UR STRIP: 7.5 [PH]
PLATELET # BLD AUTO: 301 K/UL (ref 135–450)
PMV BLD AUTO: 8.4 FL (ref 5–10.5)
PO2 BLDV: 29.3 MMHG (ref 25–40)
POTASSIUM SERPL-SCNC: 3.7 MMOL/L (ref 3.5–5.1)
PROT SERPL-MCNC: 6.5 G/DL (ref 6.4–8.2)
PROT UR STRIP.AUTO-MCNC: NEGATIVE MG/DL
RBC # BLD AUTO: 3.79 M/UL (ref 4–5.2)
RBC #/AREA URNS HPF: NORMAL /HPF (ref 0–4)
SALICYLATES SERPL-MCNC: <0.3 MG/DL (ref 15–30)
SAO2 % BLDV: 57 %
SODIUM SERPL-SCNC: 140 MMOL/L (ref 136–145)
SP GR UR STRIP.AUTO: 1.01 (ref 1–1.03)
TROPONIN T SERPL-MCNC: <0.01 NG/ML
TSH SERPL DL<=0.005 MIU/L-ACNC: 2.04 UIU/ML (ref 0.27–4.2)
UA COMPLETE W REFLEX CULTURE PNL UR: ABNORMAL
UA DIPSTICK W REFLEX MICRO PNL UR: YES
URN SPEC COLLECT METH UR: ABNORMAL
UROBILINOGEN UR STRIP-ACNC: 0.2 E.U./DL
WBC # BLD AUTO: 5.8 K/UL (ref 4–11)
WBC #/AREA URNS HPF: NORMAL /HPF (ref 0–5)

## 2023-03-17 PROCEDURE — 83880 ASSAY OF NATRIURETIC PEPTIDE: CPT

## 2023-03-17 PROCEDURE — 93010 ELECTROCARDIOGRAM REPORT: CPT | Performed by: INTERNAL MEDICINE

## 2023-03-17 PROCEDURE — 99285 EMERGENCY DEPT VISIT HI MDM: CPT

## 2023-03-17 PROCEDURE — 85025 COMPLETE CBC W/AUTO DIFF WBC: CPT

## 2023-03-17 PROCEDURE — 84484 ASSAY OF TROPONIN QUANT: CPT

## 2023-03-17 PROCEDURE — 83690 ASSAY OF LIPASE: CPT

## 2023-03-17 PROCEDURE — 80307 DRUG TEST PRSMV CHEM ANLYZR: CPT

## 2023-03-17 PROCEDURE — 80143 DRUG ASSAY ACETAMINOPHEN: CPT

## 2023-03-17 PROCEDURE — 84443 ASSAY THYROID STIM HORMONE: CPT

## 2023-03-17 PROCEDURE — 6360000004 HC RX CONTRAST MEDICATION: Performed by: NURSE PRACTITIONER

## 2023-03-17 PROCEDURE — 36415 COLL VENOUS BLD VENIPUNCTURE: CPT

## 2023-03-17 PROCEDURE — 93005 ELECTROCARDIOGRAM TRACING: CPT | Performed by: NURSE PRACTITIONER

## 2023-03-17 PROCEDURE — 87040 BLOOD CULTURE FOR BACTERIA: CPT

## 2023-03-17 PROCEDURE — 80048 BASIC METABOLIC PNL TOTAL CA: CPT

## 2023-03-17 PROCEDURE — 70498 CT ANGIOGRAPHY NECK: CPT

## 2023-03-17 PROCEDURE — 81001 URINALYSIS AUTO W/SCOPE: CPT

## 2023-03-17 PROCEDURE — 70450 CT HEAD/BRAIN W/O DYE: CPT

## 2023-03-17 PROCEDURE — 80076 HEPATIC FUNCTION PANEL: CPT

## 2023-03-17 PROCEDURE — 96374 THER/PROPH/DIAG INJ IV PUSH: CPT

## 2023-03-17 PROCEDURE — 96375 TX/PRO/DX INJ NEW DRUG ADDON: CPT

## 2023-03-17 PROCEDURE — 6360000002 HC RX W HCPCS: Performed by: NURSE PRACTITIONER

## 2023-03-17 PROCEDURE — 71046 X-RAY EXAM CHEST 2 VIEWS: CPT

## 2023-03-17 PROCEDURE — 82803 BLOOD GASES ANY COMBINATION: CPT

## 2023-03-17 PROCEDURE — 80179 DRUG ASSAY SALICYLATE: CPT

## 2023-03-17 RX ORDER — DIPHENHYDRAMINE HYDROCHLORIDE 50 MG/ML
50 INJECTION INTRAMUSCULAR; INTRAVENOUS ONCE
Status: COMPLETED | OUTPATIENT
Start: 2023-03-17 | End: 2023-03-17

## 2023-03-17 RX ORDER — KETOROLAC TROMETHAMINE 30 MG/ML
15 INJECTION, SOLUTION INTRAMUSCULAR; INTRAVENOUS ONCE
Status: COMPLETED | OUTPATIENT
Start: 2023-03-17 | End: 2023-03-17

## 2023-03-17 RX ORDER — CAFFEINE CITRATE 20 MG/ML
60 SOLUTION INTRAVENOUS ONCE
Status: DISCONTINUED | OUTPATIENT
Start: 2023-03-17 | End: 2023-03-18 | Stop reason: HOSPADM

## 2023-03-17 RX ORDER — METOCLOPRAMIDE HYDROCHLORIDE 5 MG/ML
10 INJECTION INTRAMUSCULAR; INTRAVENOUS ONCE
Status: COMPLETED | OUTPATIENT
Start: 2023-03-17 | End: 2023-03-17

## 2023-03-17 RX ADMIN — DIPHENHYDRAMINE HYDROCHLORIDE 50 MG: 50 INJECTION, SOLUTION INTRAMUSCULAR; INTRAVENOUS at 16:23

## 2023-03-17 RX ADMIN — KETOROLAC TROMETHAMINE 15 MG: 30 INJECTION, SOLUTION INTRAMUSCULAR at 16:23

## 2023-03-17 RX ADMIN — METOCLOPRAMIDE 10 MG: 5 INJECTION, SOLUTION INTRAMUSCULAR; INTRAVENOUS at 16:23

## 2023-03-17 RX ADMIN — IOPAMIDOL 75 ML: 755 INJECTION, SOLUTION INTRAVENOUS at 15:17

## 2023-03-17 ASSESSMENT — PAIN - FUNCTIONAL ASSESSMENT
PAIN_FUNCTIONAL_ASSESSMENT: 0-10

## 2023-03-17 ASSESSMENT — PAIN DESCRIPTION - DESCRIPTORS: DESCRIPTORS: ACHING

## 2023-03-17 ASSESSMENT — PAIN DESCRIPTION - LOCATION
LOCATION: HEAD
LOCATION: HEAD
LOCATION: GENERALIZED;HEAD
LOCATION: HEAD

## 2023-03-17 ASSESSMENT — LIFESTYLE VARIABLES
HOW OFTEN DO YOU HAVE A DRINK CONTAINING ALCOHOL: NEVER
HOW MANY STANDARD DRINKS CONTAINING ALCOHOL DO YOU HAVE ON A TYPICAL DAY: PATIENT DOES NOT DRINK

## 2023-03-17 ASSESSMENT — PAIN SCALES - GENERAL
PAINLEVEL_OUTOF10: 8
PAINLEVEL_OUTOF10: 6
PAINLEVEL_OUTOF10: 10
PAINLEVEL_OUTOF10: 6
PAINLEVEL_OUTOF10: 10

## 2023-03-17 ASSESSMENT — PAIN DESCRIPTION - ORIENTATION: ORIENTATION: POSTERIOR

## 2023-03-17 NOTE — ED PROVIDER NOTES
[facility] ED  [address] [place] [address]  Dept: [phone]  Loc: [address]    Chief Complaint   Patient presents with    Headache     Seen Friday at [facility] /  had spinal tap done due to elevated white count. Pt then admitted to hospital on Saturday. Has had a headache ever since the spinal and her bp is elevated. LILY Lamas is a 56 y.o. female who presents to the emergency department with her son and her daughter-in-law with multiple complaints. She had a recent hospitalization here for UTI and sepsis. She was discharged home on the 15th which was 2 days ago. She had a headache before the lumbar puncture and then she had a lumbar puncture to check for meningitis which was ruled out. She states she has had a 10 out of 10 headache ever since the LP and has been taking Tylenol, Norco, Fioricet and ibuprofen. Her blood pressures have been elevated since the discharge. The son is reporting that she was confused at home this morning. The patient states that she had a TIA many years ago and her only presenting symptom was confusion so she is concerned that this could be happening again. She quit cigarette smoking on Wednesday. She denies intentional marijuana use but states that she vapes and apparently a vape cartridge had THC in it which she did not know which is why her urine drug screen tested positive for cannabinoid earlier this week. She used to be a daily drinker of alcohol but quit 3 weeks ago. They are concerned that she had elevated liver enzymes during her admission and they want this checked out. Concerned that she had pleural effusions on her chest x-ray during her hospitalization they are concerned that she could possibly have congestive heart failure or that the MRSA in her lungs and blood have returned and they would like for this to be checked.   The patient denies visual changes, double vision, blurred vision, photophobia, chest pain or shortness of breath. She denies calf pain or leg swelling. She denies abdominal pain. She is reporting nausea without vomiting and diarrhea. She does not think she has had any fevers. She is complaining of bilateral ear pain. The family members state that she was very confused again this morning and talking out of her head. REVIEW OF SYSTEMS    Neuro: see HPI, no LOC, + headache, no dizziness, no double vision  Cardiac: No chest pain or palpitations  Respiratory: No shortness of breath or new cough  General: No fevers or chills  : No dysuria or hematuria  GI: No vomiting or diarrhea  See HPI for further details. All other systems reviewed and are negative. PAST MEDICAL OR SURGICAL HISTORY    Past Medical History:   Diagnosis Date    Asthma     Atrial septal defect     had insertion of atrial septal occluder    Bursitis of left hip     CAD (coronary artery disease)     COPD (chronic obstructive pulmonary disease) (HCC)     History of blood transfusion     Kidney stone     Migraines     Mitral valve prolapse     MRSA (methicillin resistant staph aureus) culture positive 12/2015    Lungs. Diagnosed [facility] with bronchoscopy    MVP (mitral valve prolapse)     PONV (postoperative nausea and vomiting)     Prolonged QT interval syndrome     S/P bronchoscopy     TIA (transient ischemic attack)     Vertigo      Past Surgical History:   Procedure Laterality Date    BREAST ENHANCEMENT SURGERY      augmentation    CARDIAC CATHETERIZATION      CARDIAC SURGERY      septum occluder    COLONOSCOPY  2012?     polyps    CYSTOSCOPY  6/8/2016    U-Dil    ENDOSCOPIC ULTRASOUND (UPPER)  05/08/2019    ERCP  05/08/2019    Sphincterotomy    ERCP N/A 5/8/2019    ERCP SPHINCTER/PAPILLOTOMY performed by Yamel Stevens DO at [address]    ERCP  5/8/2019    ERCP STONE REMOVAL performed by Yamel Stevens DO at [facility]    HIP ARTHROSCOPY Left 02/08/2018    HIP SURGERY      HYSTERECTOMY (CERVIX STATUS UNKNOWN)      HYSTERECTOMY, TOTAL ABDOMINAL (CERVIX REMOVED)  1992    NASAL SINUS SURGERY  8/22/13    OTHER SURGICAL HISTORY      thoracic sympathectomy    PACEMAKER INSERTION      also revision x 3    ND ARTHROSCOPY HIP DIAGNOSTIC W/WO SYNOVIAL BYP SPX Left 11/1/2018    LEFT HIP ARTHROSCOPY, LABRAL  DEBRIDEMENT, CHONDROPLASTY performed by Adan Montana MD at [address] [facility]  03/20/2017    UPPER GASTROINTESTINAL ENDOSCOPY N/A 5/8/2019    UPPER EUS W/ANES. performed by Leandro Patton DO at [address]    Current Outpatient Rx   Medication Sig Dispense Refill    venlafaxine (EFFEXOR XR) 75 MG extended release capsule Take 3 capsules by mouth daily (with breakfast) 90 capsule 0    traZODone (DESYREL) 150 MG tablet Take 1 tablet by mouth nightly as needed for Depression 30 tablet 0    albuterol (PROVENTIL) (2.5 MG/3ML) 0.083% nebulizer solution Take 3 mLs by nebulization every 6 hours as needed for Wheezing 120 each 0    cefUROXime (CEFTIN) 500 MG tablet Take 1 tablet by mouth every 12 hours for 8 doses 8 tablet 0    ibuprofen (ADVIL;MOTRIN) 600 MG tablet Take 600 mg by mouth every 6 hours as needed for Pain      albuterol sulfate  (90 Base) MCG/ACT inhaler as needed      nadolol (CORGARD) 40 MG tablet Take 120 mg by mouth daily          ALLERGIES    Allergies   Allergen Reactions    Quinolones      \"because of heart\"    Dexamethasone Sodium Phosphate Other (See Comments)     \"I feel hot all over\"    Other Hives     Adhesives      Codeine Nausea Only     Itchy nose    Nitroglycerin Other (See Comments)     Cardiologist states patient is not to have it, pt does not know why.  Patient states \"it can put me into cardiac arrest\"    Prednisone      Other reaction(s): Flushing  \"I turn red and really hot\"       FAMILY OR SOCIAL HISTORY Family History   Problem Relation Age of Onset    Heart Disease Mother     Colon Cancer Father         colon    Heart Disease Father     High Blood Pressure Father      Social History     Socioeconomic History    Marital status:      Spouse name: None    Number of children: None    Years of education: None    Highest education level: None   Tobacco Use    Smoking status: Every Day     Packs/day: 0.25     Years: 3.00     Pack years: 0.75     Types: Cigarettes    Smokeless tobacco: Never   Vaping Use    Vaping Use: Every day    Substances: Nicotine    Devices: Refillable tank   Substance and Sexual Activity    Alcohol use: Not Currently     Comment: socially    Drug use: Not Currently    Sexual activity: Not Currently     Partners: Male       PHYSICAL EXAM    VITAL SIGNS: /88   Pulse 71   Temp 97.1 °F (36.2 °C)   Resp 17   SpO2 95%   Constitutional:  Well developed, well nourished, no acute distress  Eyes:  Pupils equally round and reactive to light nation. EOMs are intact. Pupils are 5 mm equal bilaterally. Clear nonicteric. No gaze palsy. Negative test of skew.   HENT:  External ears normal, nose normal, oropharynx moist  Neck: Normal range of motion, no tenderness  Respiratory: Lungs clear to auscultation bilaterally, no respiratory distress, no wheezing   Cardiovascular:  Regular rate, regular rhythm, no murmurs   GI:  Soft, nondistended, normal bowel sounds, nontender  Musculoskeletal:  No edema, no acute deformities   Integument:  Skin is warm and dry, no obvious rash    Neurologic:  Awake, alert, oriented x3, no aphasia, no slurred speech, CN II-XII intact, normal finger to nose test bilaterally, 5/5 strength in all 4 extremities, no arm motor drift, no leg motor drift, sensation to light touch intact bilaterally, patellar and Achilles tendon reflexes 2+ and equal bilaterally  Vascular: Radial and DP pulses 2+ and equal bilaterally    EKG   I have reviewed and interpreted the EKG with the following findings:  Ventricular rate 74 bpm, MO interval 170 ms, QRS duration 74 ms, QTc 486 ms  T wave inversions are noted in V2 and V3  Interpretation is an atrial paced rhythm    Today's tracing is compared to March 11, 2023 with no significant changes noted. T wave inversions are noted in V2 and V3 on this tracing as well. RADIOLOGY/PROCEDURES    CTA HEAD NECK W CONTRAST   Final Result   No acute abnormality or flow limiting stenosis of the major arteries of the   head and neck. CT HEAD WO CONTRAST   Preliminary Result   No evidence of acute intracranial abnormality. XR CHEST (2 VW)   Final Result   1. Bandlike opacity in the right lower lobe is new likely representing   atelectasis      2.   Small bilateral pleural effusions           Labs Reviewed   CBC WITH AUTO DIFFERENTIAL - Abnormal; Notable for the following components:       Result Value    RBC 3.79 (*)     All other components within normal limits   BASIC METABOLIC PANEL - Abnormal; Notable for the following components:    Creatinine <0.5 (*)     All other components within normal limits   HEPATIC FUNCTION PANEL - Abnormal; Notable for the following components:    Alkaline Phosphatase 131 (*)     All other components within normal limits   URINALYSIS WITH REFLEX TO CULTURE - Abnormal; Notable for the following components:    Blood, Urine TRACE-INTACT (*)     All other components within normal limits   URINE DRUG SCREEN - Abnormal; Notable for the following components:    Barbiturate Screen, Ur POSITIVE (*)     Cannabinoid Scrn, Ur POSITIVE (*)     Opiate Scrn, Ur POSITIVE (*)     All other components within normal limits   BLOOD GAS, VENOUS - Abnormal; Notable for the following components:    Base Excess, Jermaine 4.1 (*)     Carboxyhemoglobin 2.1 (*)     All other components within normal limits   BRAIN NATRIURETIC PEPTIDE - Abnormal; Notable for the following components:    Pro-BNP 1,513 (*)     All other components within normal limits   ACETAMINOPHEN LEVEL - Abnormal; Notable for the following components:    Acetaminophen Level <5 (*)     All other components within normal limits   SALICYLATE LEVEL - Abnormal; Notable for the following components:    Salicylate, Serum <4.4 (*)     All other components within normal limits   CULTURE, BLOOD 1    Narrative:     ORDER#: [id number]                          ORDERED BY: Joe Santana                  SOURCE: Blood Hand, Right                  COLLECTED:  03/17/23 14:00                  ANTIBIOTICS AT JON.:                      RECEIVED :  03/17/23 14:12                  If child <=2 yrs old please draw pediatric bottle. ~Blood Culture 1   CULTURE, BLOOD 2    Narrative:     ORDER#: [id number]                          ORDERED BY: YAKELIN Arriola                  SOURCE: Blood Antecubital-Rig              COLLECTED:  03/17/23 14:04                  ANTIBIOTICS AT JON.:                      RECEIVED :  03/17/23 14:13                  If child <=2 yrs old please draw pediatric bottle. ~Blood Culture #2   LIPASE   TROPONIN   TSH WITH REFLEX   MICROSCOPIC URINALYSIS       ED COURSE & MEDICAL DECISION MAKING    Pertinent Labs & Imaging studies reviewed and interpreted. (See chart for details)    See chart for details of medications given during the ED stay.     Vitals:    03/18/23 1252 03/18/23 1253 03/18/23 1301 03/18/23 1401   BP:  122/85 120/72 123/88   Pulse: 73 71 71 71   Resp: 18 12 18 17   Temp:    97.1 °F (36.2 °C)   TempSrc:       SpO2: 97% 97% 96% 95%     Medications   iopamidol (ISOVUE-370) 76 % injection 75 mL (75 mLs IntraVENous Given 3/17/23 1517)   ketorolac (TORADOL) injection 15 mg (15 mg IntraVENous Given 3/17/23 1623)   diphenhydrAMINE (BENADRYL) injection 50 mg (50 mg IntraVENous Given 3/17/23 1623)   metoclopramide (REGLAN) injection 10 mg (10 mg IntraVENous Given 3/17/23 1623)   ondansetron (ZOFRAN) injection 4 mg (4 mg IntraVENous Given 3/18/23 0029) butalbital-acetaminophen-caffeine (FIORICET, ESGIC) per tablet 1 tablet (1 tablet Oral Given 3/18/23 0600)   butalbital-acetaminophen-caffeine (FIORICET, ESGIC) per tablet 1 tablet (1 tablet Oral Given 3/18/23 1250)     This patient was seen and evaluated by myself and my attending physician Dr. Morena Oneil.    Differential diagnosis includes but is not limited to thrombotic stroke, embolic stroke, hemorrhagic stroke, TIA,  hypoglycemia, mass lesions, metabolic cause, head injury, encephalopathy, multiple sclerosis, seizure, anemia, electrolyte derangement, dehydration, GENA, hepatic encephalopathy, polypharmacy, other      1500 I handed off care of this       CRITICAL CARE NOTE:  There was a high probability of clinically significant life-threatening deterioration of the patient's condition requiring my urgent intervention. Total critical care time was at least *** minutes. This includes vital sign monitoring, pulse oximetry monitoring, telemetry monitoring, clinical response to the IV medications, reviewing the nursing notes, consultation time, dictation/documentation time, and interpretation of the labwork. This excludes any separately billable procedures performed. ***The critical care time above also includes time spent obtaining history from ***, as the patient was unable to provide the history AND the history obtained was directly relevant to the care of the patient. FINAL IMPRESSION    1.  Acute nonintractable headache, unspecified headache type        PLAN  Discharge      (Please note that this note was completed with a voice recognition program.  Every attempt was made to edit the dictations, but inevitably there remain words that are mis-transcribed.) independently provided 35 minutes of non-concurrent critical care out of the total shared critical care time provided. This includes multiple reevaluations, vital sign monitoring, pulse oximetry monitoring, telemetry monitoring, clinical response to the IV medications, reviewing the nursing notes, consultation time, dictation/documentation time, and interpretation of the labwork. (This time excludes time spent performing procedures). FINAL IMPRESSION    1.  Acute nonintractable headache, unspecified headache type        PLAN  Transferred to [facility]      (Please note that this note was completed with a voice recognition program.  Every attempt was made to edit the dictations, but inevitably there remain words that are mis-transcribed.)        Cristian Hartley, DANII - CNP  03/25/23 4992

## 2023-03-17 NOTE — ED NOTES
[facility] [facility],[address] transfer center to start transfer to [address] [place] [address] center called with hospitalist from [facility], spoke with Dr. Magen Walter  03/17/23 1842       Evangelist Mcintyre  03/17/23 0708

## 2023-03-18 VITALS
SYSTOLIC BLOOD PRESSURE: 123 MMHG | OXYGEN SATURATION: 95 % | DIASTOLIC BLOOD PRESSURE: 88 MMHG | TEMPERATURE: 97.1 F | RESPIRATION RATE: 17 BRPM | HEART RATE: 71 BPM

## 2023-03-18 LAB
BACTERIA BLD CULT ORG #2: NORMAL
BACTERIA BLD CULT: NORMAL

## 2023-03-18 PROCEDURE — 6370000000 HC RX 637 (ALT 250 FOR IP): Performed by: EMERGENCY MEDICINE

## 2023-03-18 PROCEDURE — 6360000002 HC RX W HCPCS: Performed by: EMERGENCY MEDICINE

## 2023-03-18 RX ORDER — BUTALBITAL, ACETAMINOPHEN AND CAFFEINE 50; 325; 40 MG/1; MG/1; MG/1
1 TABLET ORAL ONCE
Status: COMPLETED | OUTPATIENT
Start: 2023-03-18 | End: 2023-03-18

## 2023-03-18 RX ORDER — ONDANSETRON 2 MG/ML
4 INJECTION INTRAMUSCULAR; INTRAVENOUS ONCE
Status: COMPLETED | OUTPATIENT
Start: 2023-03-18 | End: 2023-03-18

## 2023-03-18 RX ADMIN — BUTALBITAL, ACETAMINOPHEN, AND CAFFEINE 1 TABLET: 50; 325; 40 TABLET ORAL at 06:00

## 2023-03-18 RX ADMIN — BUTALBITAL, ACETAMINOPHEN, AND CAFFEINE 1 TABLET: 50; 325; 40 TABLET ORAL at 12:50

## 2023-03-18 RX ADMIN — ONDANSETRON 4 MG: 2 INJECTION INTRAMUSCULAR; INTRAVENOUS at 00:29

## 2023-03-18 ASSESSMENT — PAIN SCALES - GENERAL
PAINLEVEL_OUTOF10: 7
PAINLEVEL_OUTOF10: 8
PAINLEVEL_OUTOF10: 10
PAINLEVEL_OUTOF10: 5

## 2023-03-18 ASSESSMENT — PAIN DESCRIPTION - LOCATION
LOCATION: HEAD
LOCATION: HEAD

## 2023-03-18 NOTE — ED NOTES
Transfer center called and states that John Alvarez is still discharge dependent.       Sepideh Holloway  03/18/23 4227

## 2023-03-18 NOTE — ED NOTES
[address] called back with Bed assignment     Room 6016  N# [phone]         Jose Beckman  03/18/23 9819

## 2023-03-18 NOTE — ED NOTES
Updated pt regarding status of transfer. Verbalized understanding. Pt not in distress, respiration even and unlabored. Will continue to monitor.      Juanito Martini RN  03/18/23 7474

## 2023-03-18 NOTE — ED NOTES
Patient sleeping, respirations regular, unlabored.  Patient changing own postion     Devante Paris, [place]  03/18/23 5476

## 2023-03-18 NOTE — ED NOTES
Report called to [address] [place] [address] at [facility], RN  03/18/23 0261

## 2023-03-18 NOTE — ED NOTES
Meal tray ordered for patient. Denies further needs at this time.       Jenna Garza RN  03/18/23 7162

## 2023-03-18 NOTE — ED NOTES
Patient sleeping at this time. Respirations regular and unlabored.       Hanna Turner RN  03/18/23 4996

## 2023-03-20 NOTE — ED PROVIDER NOTES
[place] and would prefer to be transferred there. I did speak with the cardiologist at [facility].  Given the patient's headache, she will be admitted to the hospitalist for treatment of the headache as well as cardiology to be on consult. The patient remained hemodynamically stable for the remainder of her ED stay she is awaiting bed assignment at [facility].    All diagnostic, treatment, and disposition decisions were made by myself in conjunction with the advanced practice provider. For all further details of the patient's emergency department visit, please see the advanced practice provider's documentation. Comment: Please note this report has been produced using speech recognition software and may contain errors related to that system including errors in grammar, punctuation, and spelling, as well as words and phrases that may be inappropriate. If there are any questions or concerns please feel free to contact the dictating provider for clarification.        [facility]  03/20/23 9332

## 2023-03-21 LAB
BACTERIA BLD CULT ORG #2: NORMAL
BACTERIA BLD CULT: NORMAL

## 2023-04-01 ENCOUNTER — HOSPITAL ENCOUNTER (OUTPATIENT)
Age: 58
Discharge: HOME | End: 2023-04-01
Payer: COMMERCIAL

## 2023-04-01 ENCOUNTER — HOSPITAL ENCOUNTER (OUTPATIENT)
Dept: GENERAL RADIOLOGY | Age: 58
Discharge: HOME | End: 2023-04-01
Payer: COMMERCIAL

## 2023-04-01 DIAGNOSIS — J90 PLEURAL EFFUSION: ICD-10-CM

## 2023-04-01 LAB
ALBUMIN SERPL-MCNC: 4.4 G/DL (ref 3.4–5)
ALBUMIN/GLOB SERPL: 1.5 {RATIO} (ref 1.1–2.2)
ALP SERPL-CCNC: 111 U/L (ref 40–129)
ALT SERPL-CCNC: 15 U/L (ref 10–40)
ANION GAP SERPL CALCULATED.3IONS-SCNC: 10 MMOL/L (ref 3–16)
AST SERPL-CCNC: 19 U/L (ref 15–37)
BASOPHILS # BLD: 0 K/UL (ref 0–0.2)
BASOPHILS NFR BLD: 0.3 %
BILIRUB SERPL-MCNC: 0.4 MG/DL (ref 0–1)
BUN SERPL-MCNC: 17 MG/DL (ref 7–20)
CALCIUM SERPL-MCNC: 10.1 MG/DL (ref 8.3–10.6)
CHLORIDE SERPL-SCNC: 103 MMOL/L (ref 99–110)
CO2 SERPL-SCNC: 27 MMOL/L (ref 21–32)
CREAT SERPL-MCNC: 0.7 MG/DL (ref 0.6–1.1)
DEPRECATED RDW RBC AUTO: 14.1 % (ref 12.4–15.4)
EOSINOPHIL # BLD: 0 K/UL (ref 0–0.6)
EOSINOPHIL NFR BLD: 0 %
ERYTHROCYTE [SEDIMENTATION RATE] IN BLOOD BY WESTERGREN METHOD: 11 MM/HR (ref 0–30)
GFR SERPLBLD CREATININE-BSD FMLA CKD-EPI: >60 ML/MIN/{1.73_M2}
GLUCOSE SERPL-MCNC: 109 MG/DL (ref 70–99)
HCT VFR BLD AUTO: 38.7 % (ref 36–48)
HGB BLD-MCNC: 13.2 G/DL (ref 12–16)
LYMPHOCYTES # BLD: 1.7 K/UL (ref 1–5.1)
LYMPHOCYTES NFR BLD: 38.8 %
MCH RBC QN AUTO: 32.3 PG (ref 26–34)
MCHC RBC AUTO-ENTMCNC: 34.1 G/DL (ref 31–36)
MCV RBC AUTO: 94.7 FL (ref 80–100)
MONOCYTES # BLD: 0.4 K/UL (ref 0–1.3)
MONOCYTES NFR BLD: 8.5 %
NEUTROPHILS # BLD: 2.3 K/UL (ref 1.7–7.7)
NEUTROPHILS NFR BLD: 52.4 %
PLATELET # BLD AUTO: 150 K/UL (ref 135–450)
PMV BLD AUTO: 9 FL (ref 5–10.5)
POTASSIUM SERPL-SCNC: 4.6 MMOL/L (ref 3.5–5.1)
PROT SERPL-MCNC: 7.3 G/DL (ref 6.4–8.2)
RBC # BLD AUTO: 4.08 M/UL (ref 4–5.2)
SODIUM SERPL-SCNC: 140 MMOL/L (ref 136–145)
WBC # BLD AUTO: 4.3 K/UL (ref 4–11)

## 2023-04-01 PROCEDURE — 80053 COMPREHEN METABOLIC PANEL: CPT

## 2023-04-01 PROCEDURE — 85652 RBC SED RATE AUTOMATED: CPT

## 2023-04-01 PROCEDURE — 71046 X-RAY EXAM CHEST 2 VIEWS: CPT

## 2023-04-01 PROCEDURE — 85025 COMPLETE CBC W/AUTO DIFF WBC: CPT

## 2023-04-10 PROBLEM — N39.0 UTI (URINARY TRACT INFECTION): Status: RESOLVED | Noted: 2023-03-11 | Resolved: 2023-04-10

## 2023-05-03 ENCOUNTER — HOSPITAL ENCOUNTER (EMERGENCY)
Age: 58
Discharge: HOME | End: 2023-05-03
Attending: EMERGENCY MEDICINE
Payer: COMMERCIAL

## 2023-05-03 ENCOUNTER — APPOINTMENT (OUTPATIENT)
Dept: GENERAL RADIOLOGY | Age: 58
End: 2023-05-03
Payer: COMMERCIAL

## 2023-05-03 VITALS
BODY MASS INDEX: 21.86 KG/M2 | HEART RATE: 73 BPM | DIASTOLIC BLOOD PRESSURE: 73 MMHG | OXYGEN SATURATION: 100 % | SYSTOLIC BLOOD PRESSURE: 101 MMHG | WEIGHT: 136 LBS | TEMPERATURE: 98.2 F | RESPIRATION RATE: 16 BRPM | HEIGHT: 66 IN

## 2023-05-03 DIAGNOSIS — J20.9 ACUTE BRONCHITIS, UNSPECIFIED ORGANISM: Primary | ICD-10-CM

## 2023-05-03 LAB
ALBUMIN SERPL-MCNC: 4.8 G/DL (ref 3.4–5)
ALBUMIN/GLOB SERPL: 1.8 {RATIO} (ref 1.1–2.2)
ALP SERPL-CCNC: 122 U/L (ref 40–129)
ALT SERPL-CCNC: 14 U/L (ref 10–40)
ANION GAP SERPL CALCULATED.3IONS-SCNC: 9 MMOL/L (ref 3–16)
AST SERPL-CCNC: 18 U/L (ref 15–37)
BASOPHILS # BLD: 0 K/UL (ref 0–0.2)
BASOPHILS NFR BLD: 0.4 %
BILIRUB SERPL-MCNC: 0.6 MG/DL (ref 0–1)
BUN SERPL-MCNC: 13 MG/DL (ref 7–20)
CALCIUM SERPL-MCNC: 10.5 MG/DL (ref 8.3–10.6)
CHLORIDE SERPL-SCNC: 106 MMOL/L (ref 99–110)
CO2 SERPL-SCNC: 29 MMOL/L (ref 21–32)
CREAT SERPL-MCNC: 0.7 MG/DL (ref 0.6–1.1)
DEPRECATED RDW RBC AUTO: 13.9 % (ref 12.4–15.4)
EKG ATRIAL RATE: 73 BPM
EKG DIAGNOSIS: NORMAL
EKG P AXIS: 63 DEGREES
EKG P-R INTERVAL: 180 MS
EKG Q-T INTERVAL: 434 MS
EKG QRS DURATION: 84 MS
EKG QTC CALCULATION (BAZETT): 478 MS
EKG R AXIS: 60 DEGREES
EKG T AXIS: 73 DEGREES
EKG VENTRICULAR RATE: 73 BPM
EOSINOPHIL # BLD: 0 K/UL (ref 0–0.6)
EOSINOPHIL NFR BLD: 0 %
GFR SERPLBLD CREATININE-BSD FMLA CKD-EPI: >60 ML/MIN/{1.73_M2}
GLUCOSE SERPL-MCNC: 109 MG/DL (ref 70–99)
HCT VFR BLD AUTO: 40.9 % (ref 36–48)
HGB BLD-MCNC: 13.8 G/DL (ref 12–16)
LYMPHOCYTES # BLD: 1.2 K/UL (ref 1–5.1)
LYMPHOCYTES NFR BLD: 21.1 %
MCH RBC QN AUTO: 31.6 PG (ref 26–34)
MCHC RBC AUTO-ENTMCNC: 33.8 G/DL (ref 31–36)
MCV RBC AUTO: 93.6 FL (ref 80–100)
MONOCYTES # BLD: 0.4 K/UL (ref 0–1.3)
MONOCYTES NFR BLD: 7.7 %
NEUTROPHILS # BLD: 3.9 K/UL (ref 1.7–7.7)
NEUTROPHILS NFR BLD: 70.8 %
NT-PROBNP SERPL-MCNC: 276 PG/ML (ref 0–124)
PLATELET # BLD AUTO: 181 K/UL (ref 135–450)
PMV BLD AUTO: 8.5 FL (ref 5–10.5)
POTASSIUM SERPL-SCNC: 4.3 MMOL/L (ref 3.5–5.1)
PROT SERPL-MCNC: 7.4 G/DL (ref 6.4–8.2)
RBC # BLD AUTO: 4.37 M/UL (ref 4–5.2)
SODIUM SERPL-SCNC: 144 MMOL/L (ref 136–145)
WBC # BLD AUTO: 5.6 K/UL (ref 4–11)

## 2023-05-03 PROCEDURE — 93010 ELECTROCARDIOGRAM REPORT: CPT | Performed by: INTERNAL MEDICINE

## 2023-05-03 PROCEDURE — 83880 ASSAY OF NATRIURETIC PEPTIDE: CPT

## 2023-05-03 PROCEDURE — 80053 COMPREHEN METABOLIC PANEL: CPT

## 2023-05-03 PROCEDURE — 99285 EMERGENCY DEPT VISIT HI MDM: CPT

## 2023-05-03 PROCEDURE — 36415 COLL VENOUS BLD VENIPUNCTURE: CPT

## 2023-05-03 PROCEDURE — 85025 COMPLETE CBC W/AUTO DIFF WBC: CPT

## 2023-05-03 PROCEDURE — 71046 X-RAY EXAM CHEST 2 VIEWS: CPT

## 2023-05-03 PROCEDURE — 93005 ELECTROCARDIOGRAM TRACING: CPT | Performed by: EMERGENCY MEDICINE

## 2023-05-03 RX ORDER — GUAIFENESIN/DEXTROMETHORPHAN 100-10MG/5
10 SYRUP ORAL 3 TIMES DAILY PRN
Qty: 100 ML | Refills: 0 | Status: SHIPPED | OUTPATIENT
Start: 2023-05-03 | End: 2023-05-06

## 2023-05-03 RX ORDER — HYDROCODONE BITARTRATE AND ACETAMINOPHEN 5; 325 MG/1; MG/1
TABLET ORAL
COMMUNITY
Start: 2023-05-01

## 2023-05-03 RX ORDER — ALBUTEROL SULFATE 90 UG/1
2 AEROSOL, METERED RESPIRATORY (INHALATION) EVERY 6 HOURS PRN
Qty: 18 G | Refills: 3 | Status: SHIPPED | OUTPATIENT
Start: 2023-05-03

## 2023-05-03 RX ORDER — DOXYCYCLINE HYCLATE 100 MG/1
100 CAPSULE ORAL 2 TIMES DAILY
Qty: 20 CAPSULE | Refills: 0 | Status: SHIPPED | OUTPATIENT
Start: 2023-05-03 | End: 2023-05-13

## 2023-05-03 ASSESSMENT — ENCOUNTER SYMPTOMS
WHEEZING: 1
COUGH: 1
SHORTNESS OF BREATH: 0
ABDOMINAL PAIN: 0

## 2023-05-03 ASSESSMENT — PAIN - FUNCTIONAL ASSESSMENT: PAIN_FUNCTIONAL_ASSESSMENT: NONE - DENIES PAIN

## 2023-05-03 NOTE — DISCHARGE INSTRUCTIONS
Take doxycycline twice a day x10 days  Use Robitussin-DM for severe coughing at nighttime  Use inhaler as needed to open up your lungs  Follow-up with your primary care physician and Dr. Lino Frost not worry about that mild elevation of your BNP with your pacemaker it really means nothing

## 2023-05-03 NOTE — ED NOTES
Reviewed patient discharge instructions at this time, copy given to patient. No questions or concerns. Patient voiced understanding.         Joyce Sue RN  05/03/23 3126

## 2023-05-03 NOTE — ED PROVIDER NOTES
[facility]      Pt Name: Mariel Young  MRN: [id number]  [place] 1965  Date of evaluation: 5/3/2023  Provider: Talisha Troy MD    [address] [place] [address]       Chief Complaint   Patient presents with    Cough     C/O productive cough that she woke up with this morning. Other     Pt continues to make several complaints during triage, concerned for kidney pains, blood in urine, states her \"heart started bothering me\" yesterday, worried about afib and pacemaker function. HISTORY OF PRESENT ILLNESS   (Location/Symptom, Timing/Onset, Context/Setting, Quality, Duration, Modifying Factors, Severity)  Note limiting factors. Mariel Young is a 56 y.o. female who presents to the emergency department     Patient basically complains of a productive cough congestion    The history is provided by the patient. Nursing Notes were reviewed. REVIEW OF SYSTEMS    (2-9 systems for level 4, 10 or more for level 5)     Review of Systems   Constitutional:  Positive for activity change. HENT:  Positive for congestion. Respiratory:  Positive for cough and wheezing. Negative for shortness of breath. Cardiovascular:  Negative for chest pain. Gastrointestinal:  Negative for abdominal pain. Allergic/Immunologic: Negative for immunocompromised state. All other systems reviewed and are negative. Except as noted above the remainder of the review of systems was reviewed and negative. PAST MEDICAL HISTORY     Past Medical History:   Diagnosis Date    Asthma     Atrial septal defect     had insertion of atrial septal occluder    Bursitis of left hip     CAD (coronary artery disease)     COPD (chronic obstructive pulmonary disease) ([address]     History of blood transfusion     Kidney stone     Migraines     Mitral valve prolapse     MRSA (methicillin resistant staph aureus) culture positive 12/2015    Lungs.   Diagnosed [facility] with bronchoscopy

## 2023-05-06 ENCOUNTER — APPOINTMENT (OUTPATIENT)
Dept: GENERAL RADIOLOGY | Age: 58
End: 2023-05-06
Payer: COMMERCIAL

## 2023-05-06 ENCOUNTER — HOSPITAL ENCOUNTER (EMERGENCY)
Age: 58
Discharge: HOME | End: 2023-05-07
Attending: STUDENT IN AN ORGANIZED HEALTH CARE EDUCATION/TRAINING PROGRAM
Payer: COMMERCIAL

## 2023-05-06 DIAGNOSIS — R06.02 SHORTNESS OF BREATH: ICD-10-CM

## 2023-05-06 DIAGNOSIS — R05.1 ACUTE COUGH: Primary | ICD-10-CM

## 2023-05-06 DIAGNOSIS — R79.89 ELEVATED BRAIN NATRIURETIC PEPTIDE (BNP) LEVEL: ICD-10-CM

## 2023-05-06 LAB
ALBUMIN SERPL-MCNC: 4.4 G/DL (ref 3.4–5)
ALBUMIN/GLOB SERPL: 1.6 {RATIO} (ref 1.1–2.2)
ALP SERPL-CCNC: 102 U/L (ref 40–129)
ALT SERPL-CCNC: 14 U/L (ref 10–40)
ANION GAP SERPL CALCULATED.3IONS-SCNC: 11 MMOL/L (ref 3–16)
AST SERPL-CCNC: 19 U/L (ref 15–37)
BASOPHILS # BLD: 0 K/UL (ref 0–0.2)
BASOPHILS NFR BLD: 0.5 %
BILIRUB SERPL-MCNC: 0.5 MG/DL (ref 0–1)
BUN SERPL-MCNC: 23 MG/DL (ref 7–20)
CALCIUM SERPL-MCNC: 9.9 MG/DL (ref 8.3–10.6)
CHLORIDE SERPL-SCNC: 107 MMOL/L (ref 99–110)
CO2 SERPL-SCNC: 22 MMOL/L (ref 21–32)
CREAT SERPL-MCNC: 0.7 MG/DL (ref 0.6–1.1)
D DIMER: 0.34 UG/ML FEU (ref 0–0.6)
DEPRECATED RDW RBC AUTO: 13.7 % (ref 12.4–15.4)
EOSINOPHIL # BLD: 0 K/UL (ref 0–0.6)
EOSINOPHIL NFR BLD: 0.1 %
FLUAV RNA UPPER RESP QL NAA+PROBE: NEGATIVE
FLUBV AG NPH QL: NEGATIVE
GFR SERPLBLD CREATININE-BSD FMLA CKD-EPI: >60 ML/MIN/{1.73_M2}
GLUCOSE SERPL-MCNC: 87 MG/DL (ref 70–99)
HCT VFR BLD AUTO: 38.8 % (ref 36–48)
HGB BLD-MCNC: 13.2 G/DL (ref 12–16)
LYMPHOCYTES # BLD: 2 K/UL (ref 1–5.1)
LYMPHOCYTES NFR BLD: 35.6 %
MCH RBC QN AUTO: 31.6 PG (ref 26–34)
MCHC RBC AUTO-ENTMCNC: 34.1 G/DL (ref 31–36)
MCV RBC AUTO: 92.8 FL (ref 80–100)
MONOCYTES # BLD: 0.4 K/UL (ref 0–1.3)
MONOCYTES NFR BLD: 6.4 %
NEUTROPHILS # BLD: 3.3 K/UL (ref 1.7–7.7)
NEUTROPHILS NFR BLD: 57.4 %
NT-PROBNP SERPL-MCNC: 440 PG/ML (ref 0–124)
PLATELET # BLD AUTO: 165 K/UL (ref 135–450)
PMV BLD AUTO: 8.4 FL (ref 5–10.5)
POTASSIUM SERPL-SCNC: 4.3 MMOL/L (ref 3.5–5.1)
PROT SERPL-MCNC: 7.1 G/DL (ref 6.4–8.2)
RBC # BLD AUTO: 4.18 M/UL (ref 4–5.2)
SARS-COV-2 RDRP RESP QL NAA+PROBE: NOT DETECTED
SODIUM SERPL-SCNC: 140 MMOL/L (ref 136–145)
TROPONIN, HIGH SENSITIVITY: <6 NG/L (ref 0–14)
TROPONIN, HIGH SENSITIVITY: <6 NG/L (ref 0–14)
WBC # BLD AUTO: 5.7 K/UL (ref 4–11)

## 2023-05-06 PROCEDURE — 93005 ELECTROCARDIOGRAM TRACING: CPT | Performed by: STUDENT IN AN ORGANIZED HEALTH CARE EDUCATION/TRAINING PROGRAM

## 2023-05-06 PROCEDURE — 99285 EMERGENCY DEPT VISIT HI MDM: CPT

## 2023-05-06 PROCEDURE — 85379 FIBRIN DEGRADATION QUANT: CPT

## 2023-05-06 PROCEDURE — 87635 SARS-COV-2 COVID-19 AMP PRB: CPT

## 2023-05-06 PROCEDURE — 85025 COMPLETE CBC W/AUTO DIFF WBC: CPT

## 2023-05-06 PROCEDURE — 71046 X-RAY EXAM CHEST 2 VIEWS: CPT

## 2023-05-06 PROCEDURE — 36415 COLL VENOUS BLD VENIPUNCTURE: CPT

## 2023-05-06 PROCEDURE — 84484 ASSAY OF TROPONIN QUANT: CPT

## 2023-05-06 PROCEDURE — 80053 COMPREHEN METABOLIC PANEL: CPT

## 2023-05-06 PROCEDURE — 96374 THER/PROPH/DIAG INJ IV PUSH: CPT

## 2023-05-06 PROCEDURE — 83880 ASSAY OF NATRIURETIC PEPTIDE: CPT

## 2023-05-06 PROCEDURE — 87804 INFLUENZA ASSAY W/OPTIC: CPT

## 2023-05-06 PROCEDURE — 6370000000 HC RX 637 (ALT 250 FOR IP): Performed by: STUDENT IN AN ORGANIZED HEALTH CARE EDUCATION/TRAINING PROGRAM

## 2023-05-06 PROCEDURE — 6360000002 HC RX W HCPCS: Performed by: STUDENT IN AN ORGANIZED HEALTH CARE EDUCATION/TRAINING PROGRAM

## 2023-05-06 RX ORDER — ACETAMINOPHEN 500 MG
1000 TABLET ORAL ONCE
Status: COMPLETED | OUTPATIENT
Start: 2023-05-06 | End: 2023-05-06

## 2023-05-06 RX ORDER — KETOROLAC TROMETHAMINE 30 MG/ML
15 INJECTION, SOLUTION INTRAMUSCULAR; INTRAVENOUS ONCE
Status: COMPLETED | OUTPATIENT
Start: 2023-05-06 | End: 2023-05-06

## 2023-05-06 RX ORDER — IPRATROPIUM BROMIDE AND ALBUTEROL SULFATE 2.5; .5 MG/3ML; MG/3ML
1 SOLUTION RESPIRATORY (INHALATION)
Status: DISCONTINUED | OUTPATIENT
Start: 2023-05-06 | End: 2023-05-07 | Stop reason: HOSPADM

## 2023-05-06 RX ADMIN — ACETAMINOPHEN 1000 MG: 500 TABLET ORAL at 21:50

## 2023-05-06 RX ADMIN — IPRATROPIUM BROMIDE AND ALBUTEROL SULFATE 1 AMPULE: .5; 2.5 SOLUTION RESPIRATORY (INHALATION) at 21:57

## 2023-05-06 RX ADMIN — KETOROLAC TROMETHAMINE 15 MG: 30 INJECTION, SOLUTION INTRAMUSCULAR; INTRAVENOUS at 21:51

## 2023-05-06 ASSESSMENT — LIFESTYLE VARIABLES
HOW MANY STANDARD DRINKS CONTAINING ALCOHOL DO YOU HAVE ON A TYPICAL DAY: PATIENT DOES NOT DRINK
HOW OFTEN DO YOU HAVE A DRINK CONTAINING ALCOHOL: NEVER

## 2023-05-06 ASSESSMENT — PAIN - FUNCTIONAL ASSESSMENT: PAIN_FUNCTIONAL_ASSESSMENT: 0-10

## 2023-05-06 ASSESSMENT — PAIN DESCRIPTION - LOCATION: LOCATION: HEAD

## 2023-05-06 ASSESSMENT — PAIN SCALES - GENERAL
PAINLEVEL_OUTOF10: 8

## 2023-05-07 VITALS
SYSTOLIC BLOOD PRESSURE: 127 MMHG | TEMPERATURE: 97.5 F | HEIGHT: 66 IN | WEIGHT: 136.8 LBS | DIASTOLIC BLOOD PRESSURE: 98 MMHG | RESPIRATION RATE: 18 BRPM | HEART RATE: 72 BPM | BODY MASS INDEX: 21.98 KG/M2 | OXYGEN SATURATION: 100 %

## 2023-05-07 LAB
EKG ATRIAL RATE: 76 BPM
EKG DIAGNOSIS: NORMAL
EKG P AXIS: 52 DEGREES
EKG P-R INTERVAL: 180 MS
EKG Q-T INTERVAL: 434 MS
EKG QRS DURATION: 72 MS
EKG QTC CALCULATION (BAZETT): 488 MS
EKG R AXIS: 67 DEGREES
EKG T AXIS: 104 DEGREES
EKG VENTRICULAR RATE: 76 BPM

## 2023-05-07 PROCEDURE — 93010 ELECTROCARDIOGRAM REPORT: CPT | Performed by: INTERNAL MEDICINE

## 2023-05-07 RX ORDER — GUAIFENESIN 600 MG/1
600 TABLET, EXTENDED RELEASE ORAL 2 TIMES DAILY
Qty: 20 TABLET | Refills: 0 | Status: SHIPPED | OUTPATIENT
Start: 2023-05-07 | End: 2023-05-17

## 2023-05-07 RX ORDER — BENZONATATE 100 MG/1
100 CAPSULE ORAL 3 TIMES DAILY PRN
Qty: 20 CAPSULE | Refills: 0 | Status: SHIPPED | OUTPATIENT
Start: 2023-05-07 | End: 2023-05-14

## 2023-05-08 ENCOUNTER — CARE COORDINATION (OUTPATIENT)
Dept: CARE COORDINATION | Age: 58
End: 2023-05-08

## 2023-05-08 ENCOUNTER — HOSPITAL ENCOUNTER (OUTPATIENT)
Age: 58
Discharge: HOME | End: 2023-05-08
Payer: COMMERCIAL

## 2023-05-08 DIAGNOSIS — R79.89 ELEVATED BRAIN NATRIURETIC PEPTIDE (BNP) LEVEL: ICD-10-CM

## 2023-05-08 DIAGNOSIS — J20.9 ACUTE BRONCHITIS, UNSPECIFIED ORGANISM: ICD-10-CM

## 2023-05-08 DIAGNOSIS — J20.9 ACUTE BRONCHITIS, UNSPECIFIED ORGANISM: Primary | ICD-10-CM

## 2023-05-08 LAB
BASE EXCESS BLDA CALC-SCNC: -2.8 MMOL/L (ref -3–3)
BASOPHILS # BLD: 0 K/UL (ref 0–0.2)
BASOPHILS NFR BLD: 0.1 %
CO2 BLDA-SCNC: 23 MMOL/L
COHGB MFR BLDA: 2.1 % (ref 0–1.5)
DEPRECATED RDW RBC AUTO: 13.6 % (ref 12.4–15.4)
EOSINOPHIL # BLD: 0 K/UL (ref 0–0.6)
EOSINOPHIL NFR BLD: 0 %
HCO3 BLDA-SCNC: 21.8 MMOL/L (ref 21–29)
HCT VFR BLD AUTO: 40 % (ref 36–48)
HGB BLD-MCNC: 13.5 G/DL (ref 12–16)
HGB BLDA-MCNC: 14.4 G/DL (ref 12–16)
LYMPHOCYTES # BLD: 1.4 K/UL (ref 1–5.1)
LYMPHOCYTES NFR BLD: 22.4 %
MCH RBC QN AUTO: 32 PG (ref 26–34)
MCHC RBC AUTO-ENTMCNC: 33.8 G/DL (ref 31–36)
MCV RBC AUTO: 94.8 FL (ref 80–100)
METHGB MFR BLDA: 0.3 %
MONOCYTES # BLD: 0.3 K/UL (ref 0–1.3)
MONOCYTES NFR BLD: 5.3 %
NEUTROPHILS # BLD: 4.6 K/UL (ref 1.7–7.7)
NEUTROPHILS NFR BLD: 72.2 %
NT-PROBNP SERPL-MCNC: 180 PG/ML (ref 0–124)
O2 THERAPY: ABNORMAL
PCO2 BLDA: 37.7 MMHG (ref 35–45)
PH BLDA: 7.38 [PH] (ref 7.35–7.45)
PLATELET # BLD AUTO: 164 K/UL (ref 135–450)
PMV BLD AUTO: 9.3 FL (ref 5–10.5)
PO2 BLDA: 81.1 MMHG (ref 75–108)
RBC # BLD AUTO: 4.22 M/UL (ref 4–5.2)
SAO2 % BLDA: 95.8 %
WBC # BLD AUTO: 6.4 K/UL (ref 4–11)

## 2023-05-08 PROCEDURE — 36415 COLL VENOUS BLD VENIPUNCTURE: CPT

## 2023-05-08 PROCEDURE — 83880 ASSAY OF NATRIURETIC PEPTIDE: CPT

## 2023-05-08 PROCEDURE — 85025 COMPLETE CBC W/AUTO DIFF WBC: CPT

## 2023-05-08 NOTE — CARE COORDINATION
Noted patient sent a request via My Chart to the ACM this ACM is covering  Contacted patient and verified name and   Patient reports her PCP is with [facility]. Explained to patient the ACM she contacted works with [facility] and cannot request orders from [address] [place] [address]. patient to contact her PCP office via phone to ensure her medical needs are met in a timely manner  Patient verbalized understanding and appreciation for the f/u call.   No further outreach scheduled with this ACM; episode of care resolved

## 2023-05-31 ENCOUNTER — HOSPITAL ENCOUNTER (EMERGENCY)
Age: 58
Discharge: LEFT BEFORE DISCHARGE | End: 2023-05-31
Attending: EMERGENCY MEDICINE
Payer: COMMERCIAL

## 2023-05-31 ENCOUNTER — APPOINTMENT (OUTPATIENT)
Dept: GENERAL RADIOLOGY | Age: 58
End: 2023-05-31
Payer: COMMERCIAL

## 2023-05-31 VITALS
DIASTOLIC BLOOD PRESSURE: 70 MMHG | OXYGEN SATURATION: 99 % | TEMPERATURE: 98.3 F | WEIGHT: 135 LBS | HEIGHT: 66 IN | RESPIRATION RATE: 18 BRPM | BODY MASS INDEX: 21.69 KG/M2 | HEART RATE: 74 BPM | SYSTOLIC BLOOD PRESSURE: 132 MMHG

## 2023-05-31 DIAGNOSIS — R07.9 CHEST PAIN, UNSPECIFIED TYPE: Primary | ICD-10-CM

## 2023-05-31 DIAGNOSIS — R00.2 PALPITATIONS: ICD-10-CM

## 2023-05-31 LAB
ALBUMIN SERPL-MCNC: 4.5 G/DL (ref 3.4–5)
ALBUMIN/GLOB SERPL: 1.7 {RATIO} (ref 1.1–2.2)
ALP SERPL-CCNC: 132 U/L (ref 40–129)
ALT SERPL-CCNC: 16 U/L (ref 10–40)
ANION GAP SERPL CALCULATED.3IONS-SCNC: 13 MMOL/L (ref 3–16)
AST SERPL-CCNC: 20 U/L (ref 15–37)
BASOPHILS # BLD: 0 K/UL (ref 0–0.2)
BASOPHILS NFR BLD: 0.5 %
BILIRUB SERPL-MCNC: 0.8 MG/DL (ref 0–1)
BUN SERPL-MCNC: 11 MG/DL (ref 7–20)
CALCIUM SERPL-MCNC: 10 MG/DL (ref 8.3–10.6)
CHLORIDE SERPL-SCNC: 104 MMOL/L (ref 99–110)
CO2 SERPL-SCNC: 23 MMOL/L (ref 21–32)
CREAT SERPL-MCNC: 0.7 MG/DL (ref 0.6–1.1)
D DIMER: <0.27 UG/ML FEU (ref 0–0.6)
DEPRECATED RDW RBC AUTO: 13.9 % (ref 12.4–15.4)
EKG ATRIAL RATE: 72 BPM
EKG DIAGNOSIS: NORMAL
EKG P-R INTERVAL: 182 MS
EKG Q-T INTERVAL: 448 MS
EKG QRS DURATION: 68 MS
EKG QTC CALCULATION (BAZETT): 490 MS
EKG R AXIS: 73 DEGREES
EKG T AXIS: 208 DEGREES
EKG VENTRICULAR RATE: 72 BPM
EOSINOPHIL # BLD: 0 K/UL (ref 0–0.6)
EOSINOPHIL NFR BLD: 0 %
GFR SERPLBLD CREATININE-BSD FMLA CKD-EPI: >60 ML/MIN/{1.73_M2}
GLUCOSE SERPL-MCNC: 122 MG/DL (ref 70–99)
HCT VFR BLD AUTO: 40.1 % (ref 36–48)
HGB BLD-MCNC: 13.8 G/DL (ref 12–16)
LYMPHOCYTES # BLD: 1.5 K/UL (ref 1–5.1)
LYMPHOCYTES NFR BLD: 17.8 %
MCH RBC QN AUTO: 31.8 PG (ref 26–34)
MCHC RBC AUTO-ENTMCNC: 34.5 G/DL (ref 31–36)
MCV RBC AUTO: 92.1 FL (ref 80–100)
MONOCYTES # BLD: 0.6 K/UL (ref 0–1.3)
MONOCYTES NFR BLD: 7.1 %
NEUTROPHILS # BLD: 6.4 K/UL (ref 1.7–7.7)
NEUTROPHILS NFR BLD: 74.6 %
PLATELET # BLD AUTO: 175 K/UL (ref 135–450)
PMV BLD AUTO: 8.5 FL (ref 5–10.5)
POTASSIUM SERPL-SCNC: 4.1 MMOL/L (ref 3.5–5.1)
PROT SERPL-MCNC: 7.1 G/DL (ref 6.4–8.2)
RBC # BLD AUTO: 4.36 M/UL (ref 4–5.2)
SODIUM SERPL-SCNC: 140 MMOL/L (ref 136–145)
TROPONIN, HIGH SENSITIVITY: <6 NG/L (ref 0–14)
TROPONIN, HIGH SENSITIVITY: <6 NG/L (ref 0–14)
WBC # BLD AUTO: 8.5 K/UL (ref 4–11)

## 2023-05-31 PROCEDURE — 93010 ELECTROCARDIOGRAM REPORT: CPT | Performed by: INTERNAL MEDICINE

## 2023-05-31 PROCEDURE — 80053 COMPREHEN METABOLIC PANEL: CPT

## 2023-05-31 PROCEDURE — 93005 ELECTROCARDIOGRAM TRACING: CPT | Performed by: EMERGENCY MEDICINE

## 2023-05-31 PROCEDURE — 85025 COMPLETE CBC W/AUTO DIFF WBC: CPT

## 2023-05-31 PROCEDURE — 71045 X-RAY EXAM CHEST 1 VIEW: CPT

## 2023-05-31 PROCEDURE — 99285 EMERGENCY DEPT VISIT HI MDM: CPT

## 2023-05-31 PROCEDURE — 36415 COLL VENOUS BLD VENIPUNCTURE: CPT

## 2023-05-31 PROCEDURE — 84484 ASSAY OF TROPONIN QUANT: CPT

## 2023-05-31 PROCEDURE — 85379 FIBRIN DEGRADATION QUANT: CPT

## 2023-05-31 ASSESSMENT — PAIN - FUNCTIONAL ASSESSMENT
PAIN_FUNCTIONAL_ASSESSMENT: NONE - DENIES PAIN
PAIN_FUNCTIONAL_ASSESSMENT: 0-10

## 2023-05-31 ASSESSMENT — PAIN SCALES - GENERAL: PAINLEVEL_OUTOF10: 7

## 2023-05-31 ASSESSMENT — PAIN DESCRIPTION - FREQUENCY: FREQUENCY: INTERMITTENT

## 2023-05-31 ASSESSMENT — PAIN DESCRIPTION - DESCRIPTORS: DESCRIPTORS: PRESSURE

## 2023-05-31 ASSESSMENT — PAIN DESCRIPTION - LOCATION: LOCATION: CHEST

## 2023-05-31 NOTE — ED PROVIDER NOTES
otherwise unremarkable. Otherwise lower suspicion for PE. With her history of underlying coronary artery disease with no recent provocative testing concern for possible underlying cardiac etiology and plan to admit for further evaluation    Addendum: The patient was unable to find anyone to care for her dogs and states she is not going to be able to stay for admission. She ultimately is signing out [address] She understands the risks of this and has capacity to make this decision    Medications and Route:   Medications - No data to display    History From: Patient         Chronic Conditions: Noted in HPI    CONSULTS: (Who and What was discussed)  IP CONSULT TO HOSPITALIST        Records Reviewed : Inpatient Notes prior stress test of the heart warmed in 2020    IJesse M.D., am the primary clinician of record. MDM      CONSULTS     IP CONSULT TO HOSPITALIST    Critical Care:   None    REASSESSMENT          PROCEDURE     Unless otherwise noted below, none     Procedures      FINAL IMPRESSION      1. Chest pain, unspecified type    2. Palpitations            DISPOSITION/PLAN   DISPOSITION [facility] 05/31/2023 10:09:15 AM        PATIENT REFERRED TO:  Koby Valentin MD  [address] [place] [address] Dr. [address] [facility]  [phone]    Schedule an appointment as soon as possible for a visit         DISCHARGE MEDICATIONS:  New Prescriptions    No medications on file     Controlled Substances Monitoring:     RX Monitoring 2/18/2021   Attestation -   Periodic Controlled Substance Monitoring Possible medication side effects, risk of tolerance/dependence & alternative treatments discussed. ;No signs of potential drug abuse or diversion identified.        (Please note that portions of this note were completed with a voice recognition program.  Efforts were made to edit the dictations but occasionally words are mis-transcribed.)    Jesse Salvador MD (electronically signed)  Attending Emergency

## 2023-05-31 NOTE — ED NOTES
[facility] 64 phoned with pt info given. Pt remains alert and without c/o's.  Resps even and unlab     Belinda Spencer RN  05/31/23 [id number]

## 2023-05-31 NOTE — ED NOTES
Pt refusing to be admitted or transferred. Pt states \"I can't stay. I don't have anyone that can take care of my dogs. \" AMA risks explained with verbal understanding given via pt.  AMA papers signed and witnessed by 2 ER RNs     Gadiel Clayton RN  05/31/23 [id number]

## 2023-06-01 ENCOUNTER — HOSPITAL ENCOUNTER (OUTPATIENT)
Age: 58
Setting detail: OBSERVATION
Discharge: HOME | End: 2023-06-03
Attending: EMERGENCY MEDICINE | Admitting: STUDENT IN AN ORGANIZED HEALTH CARE EDUCATION/TRAINING PROGRAM
Payer: COMMERCIAL

## 2023-06-01 ENCOUNTER — APPOINTMENT (OUTPATIENT)
Dept: GENERAL RADIOLOGY | Age: 58
End: 2023-06-01
Payer: COMMERCIAL

## 2023-06-01 DIAGNOSIS — R07.9 CHEST PAIN, UNSPECIFIED TYPE: Primary | ICD-10-CM

## 2023-06-01 DIAGNOSIS — R94.31 QT PROLONGATION: ICD-10-CM

## 2023-06-01 LAB
ALBUMIN SERPL-MCNC: 4.4 G/DL (ref 3.4–5)
ALBUMIN/GLOB SERPL: 1.5 {RATIO} (ref 1.1–2.2)
ALP SERPL-CCNC: 124 U/L (ref 40–129)
ALT SERPL-CCNC: 18 U/L (ref 10–40)
AMPHETAMINES UR QL SCN>1000 NG/ML: ABNORMAL
ANION GAP SERPL CALCULATED.3IONS-SCNC: 18 MMOL/L (ref 3–16)
AST SERPL-CCNC: 24 U/L (ref 15–37)
BACTERIA URNS QL MICRO: ABNORMAL /HPF
BARBITURATES UR QL SCN>200 NG/ML: ABNORMAL
BASOPHILS # BLD: 0 K/UL (ref 0–0.2)
BASOPHILS NFR BLD: 0.2 %
BENZODIAZ UR QL SCN>200 NG/ML: ABNORMAL
BILIRUB SERPL-MCNC: 0.9 MG/DL (ref 0–1)
BILIRUB UR QL STRIP.AUTO: ABNORMAL
BUN SERPL-MCNC: 16 MG/DL (ref 7–20)
CALCIUM SERPL-MCNC: 9.8 MG/DL (ref 8.3–10.6)
CANNABINOIDS UR QL SCN>50 NG/ML: ABNORMAL
CHLORIDE SERPL-SCNC: 97 MMOL/L (ref 99–110)
CLARITY UR: ABNORMAL
CO2 SERPL-SCNC: 19 MMOL/L (ref 21–32)
COCAINE UR QL SCN: ABNORMAL
COLOR UR: YELLOW
CREAT SERPL-MCNC: 0.8 MG/DL (ref 0.6–1.1)
DEPRECATED RDW RBC AUTO: 13.7 % (ref 12.4–15.4)
DRUG SCREEN COMMENT UR-IMP: ABNORMAL
EKG ATRIAL RATE: 74 BPM
EKG DIAGNOSIS: NORMAL
EKG P AXIS: 79 DEGREES
EKG P-R INTERVAL: 116 MS
EKG Q-T INTERVAL: 440 MS
EKG QRS DURATION: 68 MS
EKG QTC CALCULATION (BAZETT): 488 MS
EKG R AXIS: 58 DEGREES
EKG T AXIS: 241 DEGREES
EKG VENTRICULAR RATE: 74 BPM
EOSINOPHIL # BLD: 0 K/UL (ref 0–0.6)
EOSINOPHIL NFR BLD: 0 %
EPI CELLS #/AREA URNS HPF: ABNORMAL /HPF (ref 0–5)
FENTANYL SCREEN, URINE: ABNORMAL
GFR SERPLBLD CREATININE-BSD FMLA CKD-EPI: >60 ML/MIN/{1.73_M2}
GLUCOSE SERPL-MCNC: 83 MG/DL (ref 70–99)
GLUCOSE UR STRIP.AUTO-MCNC: NEGATIVE MG/DL
HCT VFR BLD AUTO: 43.6 % (ref 36–48)
HGB BLD-MCNC: 14.8 G/DL (ref 12–16)
HGB UR QL STRIP.AUTO: NEGATIVE
KETONES UR STRIP.AUTO-MCNC: 40 MG/DL
LACTATE BLDV-SCNC: 1 MMOL/L (ref 0.4–1.9)
LEUKOCYTE ESTERASE UR QL STRIP.AUTO: ABNORMAL
LYMPHOCYTES # BLD: 2.1 K/UL (ref 1–5.1)
LYMPHOCYTES NFR BLD: 22.2 %
MCH RBC QN AUTO: 32 PG (ref 26–34)
MCHC RBC AUTO-ENTMCNC: 33.9 G/DL (ref 31–36)
MCV RBC AUTO: 94.4 FL (ref 80–100)
METHADONE UR QL SCN>300 NG/ML: ABNORMAL
MONOCYTES # BLD: 0.8 K/UL (ref 0–1.3)
MONOCYTES NFR BLD: 8.2 %
MUCOUS THREADS #/AREA URNS LPF: ABNORMAL /LPF
NEUTROPHILS # BLD: 6.6 K/UL (ref 1.7–7.7)
NEUTROPHILS NFR BLD: 69.4 %
NITRITE UR QL STRIP.AUTO: NEGATIVE
OPIATES UR QL SCN>300 NG/ML: POSITIVE
OXYCODONE UR QL SCN: ABNORMAL
PCP UR QL SCN>25 NG/ML: POSITIVE
PH UR STRIP.AUTO: 6 [PH] (ref 5–8)
PH UR STRIP: 6 [PH]
PLATELET # BLD AUTO: 180 K/UL (ref 135–450)
PMV BLD AUTO: 9.5 FL (ref 5–10.5)
POTASSIUM SERPL-SCNC: 4 MMOL/L (ref 3.5–5.1)
PROT SERPL-MCNC: 7.3 G/DL (ref 6.4–8.2)
PROT UR STRIP.AUTO-MCNC: ABNORMAL MG/DL
RBC # BLD AUTO: 4.62 M/UL (ref 4–5.2)
RBC #/AREA URNS HPF: ABNORMAL /HPF (ref 0–4)
SODIUM SERPL-SCNC: 134 MMOL/L (ref 136–145)
SP GR UR STRIP.AUTO: 1.02 (ref 1–1.03)
TROPONIN, HIGH SENSITIVITY: 7 NG/L (ref 0–14)
TROPONIN, HIGH SENSITIVITY: <6 NG/L (ref 0–14)
UA COMPLETE W REFLEX CULTURE PNL UR: YES
UA DIPSTICK W REFLEX MICRO PNL UR: YES
URN SPEC COLLECT METH UR: ABNORMAL
UROBILINOGEN UR STRIP-ACNC: 0.2 E.U./DL
WBC # BLD AUTO: 9.5 K/UL (ref 4–11)
WBC #/AREA URNS HPF: ABNORMAL /HPF (ref 0–5)

## 2023-06-01 PROCEDURE — 81001 URINALYSIS AUTO W/SCOPE: CPT

## 2023-06-01 PROCEDURE — 93010 ELECTROCARDIOGRAM REPORT: CPT | Performed by: INTERNAL MEDICINE

## 2023-06-01 PROCEDURE — 6360000002 HC RX W HCPCS: Performed by: NURSE PRACTITIONER

## 2023-06-01 PROCEDURE — 96376 TX/PRO/DX INJ SAME DRUG ADON: CPT

## 2023-06-01 PROCEDURE — 6370000000 HC RX 637 (ALT 250 FOR IP): Performed by: STUDENT IN AN ORGANIZED HEALTH CARE EDUCATION/TRAINING PROGRAM

## 2023-06-01 PROCEDURE — 96374 THER/PROPH/DIAG INJ IV PUSH: CPT

## 2023-06-01 PROCEDURE — 83605 ASSAY OF LACTIC ACID: CPT

## 2023-06-01 PROCEDURE — 85025 COMPLETE CBC W/AUTO DIFF WBC: CPT

## 2023-06-01 PROCEDURE — G0378 HOSPITAL OBSERVATION PER HR: HCPCS

## 2023-06-01 PROCEDURE — 71046 X-RAY EXAM CHEST 2 VIEWS: CPT

## 2023-06-01 PROCEDURE — 87086 URINE CULTURE/COLONY COUNT: CPT

## 2023-06-01 PROCEDURE — 2580000003 HC RX 258: Performed by: STUDENT IN AN ORGANIZED HEALTH CARE EDUCATION/TRAINING PROGRAM

## 2023-06-01 PROCEDURE — 6360000002 HC RX W HCPCS: Performed by: PHYSICIAN ASSISTANT

## 2023-06-01 PROCEDURE — 99285 EMERGENCY DEPT VISIT HI MDM: CPT

## 2023-06-01 PROCEDURE — 80053 COMPREHEN METABOLIC PANEL: CPT

## 2023-06-01 PROCEDURE — 87186 SC STD MICRODIL/AGAR DIL: CPT

## 2023-06-01 PROCEDURE — 84484 ASSAY OF TROPONIN QUANT: CPT

## 2023-06-01 PROCEDURE — 87077 CULTURE AEROBIC IDENTIFY: CPT

## 2023-06-01 PROCEDURE — 80307 DRUG TEST PRSMV CHEM ANLYZR: CPT

## 2023-06-01 PROCEDURE — 36415 COLL VENOUS BLD VENIPUNCTURE: CPT

## 2023-06-01 PROCEDURE — 93005 ELECTROCARDIOGRAM TRACING: CPT | Performed by: EMERGENCY MEDICINE

## 2023-06-01 RX ORDER — TRAZODONE HYDROCHLORIDE 50 MG/1
100 TABLET ORAL NIGHTLY PRN
Status: DISCONTINUED | OUTPATIENT
Start: 2023-06-01 | End: 2023-06-03 | Stop reason: HOSPADM

## 2023-06-01 RX ORDER — SODIUM CHLORIDE 9 MG/ML
INJECTION, SOLUTION INTRAVENOUS PRN
Status: DISCONTINUED | OUTPATIENT
Start: 2023-06-01 | End: 2023-06-03 | Stop reason: HOSPADM

## 2023-06-01 RX ORDER — MAGNESIUM SULFATE IN WATER 40 MG/ML
2000 INJECTION, SOLUTION INTRAVENOUS PRN
Status: DISCONTINUED | OUTPATIENT
Start: 2023-06-01 | End: 2023-06-03 | Stop reason: HOSPADM

## 2023-06-01 RX ORDER — POLYETHYLENE GLYCOL 3350 17 G/17G
17 POWDER, FOR SOLUTION ORAL DAILY PRN
Status: DISCONTINUED | OUTPATIENT
Start: 2023-06-01 | End: 2023-06-03 | Stop reason: HOSPADM

## 2023-06-01 RX ORDER — SODIUM CHLORIDE, SODIUM LACTATE, POTASSIUM CHLORIDE, CALCIUM CHLORIDE 600; 310; 30; 20 MG/100ML; MG/100ML; MG/100ML; MG/100ML
INJECTION, SOLUTION INTRAVENOUS CONTINUOUS
Status: ACTIVE | OUTPATIENT
Start: 2023-06-01 | End: 2023-06-02

## 2023-06-01 RX ORDER — ACETAMINOPHEN 325 MG/1
650 TABLET ORAL EVERY 6 HOURS PRN
Status: DISCONTINUED | OUTPATIENT
Start: 2023-06-01 | End: 2023-06-03 | Stop reason: HOSPADM

## 2023-06-01 RX ORDER — ACETAMINOPHEN 650 MG/1
650 SUPPOSITORY RECTAL EVERY 6 HOURS PRN
Status: DISCONTINUED | OUTPATIENT
Start: 2023-06-01 | End: 2023-06-03 | Stop reason: HOSPADM

## 2023-06-01 RX ORDER — NADOLOL 20 MG/1
120 TABLET ORAL DAILY
Status: DISCONTINUED | OUTPATIENT
Start: 2023-06-02 | End: 2023-06-03 | Stop reason: HOSPADM

## 2023-06-01 RX ORDER — ONDANSETRON 2 MG/ML
4 INJECTION INTRAMUSCULAR; INTRAVENOUS EVERY 6 HOURS PRN
Status: DISCONTINUED | OUTPATIENT
Start: 2023-06-01 | End: 2023-06-03 | Stop reason: HOSPADM

## 2023-06-01 RX ORDER — ALBUTEROL SULFATE 2.5 MG/3ML
2.5 SOLUTION RESPIRATORY (INHALATION) EVERY 6 HOURS PRN
Status: DISCONTINUED | OUTPATIENT
Start: 2023-06-01 | End: 2023-06-03 | Stop reason: HOSPADM

## 2023-06-01 RX ORDER — MORPHINE SULFATE 4 MG/ML
4 INJECTION, SOLUTION INTRAMUSCULAR; INTRAVENOUS ONCE
Status: COMPLETED | OUTPATIENT
Start: 2023-06-01 | End: 2023-06-01

## 2023-06-01 RX ORDER — ASPIRIN 325 MG
325 TABLET, DELAYED RELEASE (ENTERIC COATED) ORAL ONCE
Status: COMPLETED | OUTPATIENT
Start: 2023-06-01 | End: 2023-06-01

## 2023-06-01 RX ORDER — ONDANSETRON 4 MG/1
4 TABLET, ORALLY DISINTEGRATING ORAL EVERY 8 HOURS PRN
Status: DISCONTINUED | OUTPATIENT
Start: 2023-06-01 | End: 2023-06-03 | Stop reason: HOSPADM

## 2023-06-01 RX ORDER — SODIUM CHLORIDE 0.9 % (FLUSH) 0.9 %
5-40 SYRINGE (ML) INJECTION EVERY 12 HOURS SCHEDULED
Status: DISCONTINUED | OUTPATIENT
Start: 2023-06-01 | End: 2023-06-03 | Stop reason: HOSPADM

## 2023-06-01 RX ORDER — NICOTINE 21 MG/24HR
1 PATCH, TRANSDERMAL 24 HOURS TRANSDERMAL DAILY PRN
Status: DISCONTINUED | OUTPATIENT
Start: 2023-06-01 | End: 2023-06-03 | Stop reason: HOSPADM

## 2023-06-01 RX ORDER — MORPHINE SULFATE 4 MG/ML
4 INJECTION, SOLUTION INTRAMUSCULAR; INTRAVENOUS EVERY 4 HOURS PRN
Status: DISCONTINUED | OUTPATIENT
Start: 2023-06-01 | End: 2023-06-03 | Stop reason: HOSPADM

## 2023-06-01 RX ORDER — SODIUM CHLORIDE 0.9 % (FLUSH) 0.9 %
5-40 SYRINGE (ML) INJECTION PRN
Status: DISCONTINUED | OUTPATIENT
Start: 2023-06-01 | End: 2023-06-03 | Stop reason: HOSPADM

## 2023-06-01 RX ORDER — POTASSIUM CHLORIDE 7.45 MG/ML
10 INJECTION INTRAVENOUS PRN
Status: DISCONTINUED | OUTPATIENT
Start: 2023-06-01 | End: 2023-06-03 | Stop reason: HOSPADM

## 2023-06-01 RX ORDER — ENOXAPARIN SODIUM 100 MG/ML
40 INJECTION SUBCUTANEOUS DAILY
Status: DISCONTINUED | OUTPATIENT
Start: 2023-06-02 | End: 2023-06-03 | Stop reason: HOSPADM

## 2023-06-01 RX ADMIN — MORPHINE SULFATE 4 MG: 4 INJECTION, SOLUTION INTRAMUSCULAR; INTRAVENOUS at 18:28

## 2023-06-01 RX ADMIN — MORPHINE SULFATE 4 MG: 4 INJECTION, SOLUTION INTRAMUSCULAR; INTRAVENOUS at 23:29

## 2023-06-01 RX ADMIN — TRAZODONE HYDROCHLORIDE 100 MG: 50 TABLET ORAL at 23:26

## 2023-06-01 RX ADMIN — ASPIRIN 325 MG: 325 TABLET, COATED ORAL at 23:26

## 2023-06-01 RX ADMIN — SODIUM CHLORIDE, POTASSIUM CHLORIDE, SODIUM LACTATE AND CALCIUM CHLORIDE: 600; 310; 30; 20 INJECTION, SOLUTION INTRAVENOUS at 23:27

## 2023-06-01 RX ADMIN — SODIUM CHLORIDE, PRESERVATIVE FREE 10 ML: 5 INJECTION INTRAVENOUS at 23:29

## 2023-06-01 ASSESSMENT — PAIN SCALES - GENERAL
PAINLEVEL_OUTOF10: 2
PAINLEVEL_OUTOF10: 10
PAINLEVEL_OUTOF10: 10
PAINLEVEL_OUTOF10: 0
PAINLEVEL_OUTOF10: 10

## 2023-06-01 ASSESSMENT — PAIN DESCRIPTION - LOCATION
LOCATION: CHEST
LOCATION: CHEST

## 2023-06-01 ASSESSMENT — PAIN - FUNCTIONAL ASSESSMENT
PAIN_FUNCTIONAL_ASSESSMENT: ACTIVITIES ARE NOT PREVENTED
PAIN_FUNCTIONAL_ASSESSMENT: 0-10

## 2023-06-01 ASSESSMENT — PAIN DESCRIPTION - DESCRIPTORS: DESCRIPTORS: PRESSURE

## 2023-06-01 ASSESSMENT — HEART SCORE: ECG: 1

## 2023-06-01 ASSESSMENT — PAIN DESCRIPTION - ORIENTATION: ORIENTATION: RIGHT;LEFT

## 2023-06-01 ASSESSMENT — PAIN SCALES - WONG BAKER: WONGBAKER_NUMERICALRESPONSE: 0

## 2023-06-01 NOTE — ED PROVIDER NOTES
[facility]  ED  EMERGENCY DEPARTMENT ENCOUNTER        Pt Name: Anila Lund  MRN: [id number]  [place] 1965  Date of evaluation: 6/1/2023  Provider: SHANAE Rodriguez  PCP: Ector Blake MD  Note Started: 7:44 PM EDT 6/1/23       I have seen and evaluated this patient with my supervising physician No att. providers found. CHIEF COMPLAINT       Chief Complaint   Patient presents with    Chest Pain     Pt to ED for c/o chest pain that started 2x days ago. Pt states she was at [facility] yesterday and they wanted to admit her but she left to take care of her animals. Pt was given 4 ASA PTA. PT states she wants to be transferred to [address] [place] [address] Road: 1 or more Elements     History from : Patient    Limitations to history : None    Anila Lund is a 56 y.o. female who presents to the emergency department today complaining of chest pain. Pain began 2 days ago. She was evaluated at [facility] yesterday. It was recommended she be admitted to the hospital however patient did not have anybody to watch her dogs therefore she left AMA. She states today she was cleaning a house and began having worsening chest pain and shortness of breath. She was given aspirin prior to arrival.  She is unable to take nitro. She does have a cardiologist who is at [facility].  She also has a pacemaker. She has prolonged QT. Patient states she would like to be transferred to [facility] for continuity of care. She requested EMS to take her to [facility] however they apparently refused and brought her to [facility]. If unable to get a cry she is in agreement to stay at [facility]. Nursing Notes were all reviewed and agreed with or any disagreements were addressed in the HPI. REVIEW OF SYSTEMS :      Review of Systems    Positives and Pertinent negatives as per HPI.      SURGICAL HISTORY     Past Surgical History:   Procedure Laterality Date    BREAST ENHANCEMENT SURGERY

## 2023-06-01 NOTE — ED PROVIDER NOTES
I independently performed a history and physical on Juan Alberto Benavidez. All diagnostic, treatment, and disposition decisions were made by myself in conjunction with the advanced practice provider/resident. For further details of Juan Alberto Benavidez's emergency department encounter, please see the PATEL/resident's documentation. I personally saw the patient and performed a substantive portion of the visit including all aspects of the medical decision making. Briefly, this is a 61-year-old female presenting for evaluation of chest pain. She was brought in by EMS. Was she was given aspirin. She had evaluation outside ED yesterday however had gone home because she did not have anybody to take care of her animals. She has a cardiologist at [facility].  She does have prior history of long QT syndrome and has a pacemaker in place. She has no stents. She has had intermittent to constant left-sided chest pain. No new leg swelling. EKG is nonischemic here but does show diffuse T wave inversions that appear to be stable. Initial troponin is negative. Patient ultimately agreeable with admission here for cardiology evaluation management. I, Dr. Linda Hughes, am the primary clinician of record. Comment: Please note this report has been produced using speech recognition software and may contain errors related to that system including errors in grammar, punctuation, and spelling, as well as words and phrases that may be inappropriate. If there are any questions or concerns please feel free to contact the dictating provider for clarification. EKG  The Ekg interpreted by myself in the emergency department in the absence of a cardiologist.  normal sinus rhythm with a rate of 74  Axis is   Normal  QTc is   488  Intervals and Durations are unremarkable. There is T wave inversion present to 3 aVF, V2 through V6  No evidence of acute ischemia.    No significant change from prior EKG dated May 31, 2023

## 2023-06-02 ENCOUNTER — APPOINTMENT (OUTPATIENT)
Dept: NUCLEAR MEDICINE | Age: 58
End: 2023-06-02
Payer: COMMERCIAL

## 2023-06-02 ENCOUNTER — APPOINTMENT (OUTPATIENT)
Dept: CT IMAGING | Age: 58
End: 2023-06-02
Payer: COMMERCIAL

## 2023-06-02 ENCOUNTER — NURSE ONLY (OUTPATIENT)
Dept: CARDIOLOGY CLINIC | Age: 58
End: 2023-06-02

## 2023-06-02 PROBLEM — E43 SEVERE MALNUTRITION (HCC): Chronic | Status: ACTIVE | Noted: 2023-06-02

## 2023-06-02 PROBLEM — R00.2 HEART PALPITATIONS: Status: ACTIVE | Noted: 2023-06-02

## 2023-06-02 LAB
ANION GAP SERPL CALCULATED.3IONS-SCNC: 12 MMOL/L (ref 3–16)
BILIRUB UR QL STRIP.AUTO: ABNORMAL
BUN SERPL-MCNC: 15 MG/DL (ref 7–20)
CALCIUM SERPL-MCNC: 9.7 MG/DL (ref 8.3–10.6)
CHLORIDE SERPL-SCNC: 101 MMOL/L (ref 99–110)
CHOLEST SERPL-MCNC: 204 MG/DL (ref 0–199)
CLARITY UR: CLEAR
CO2 SERPL-SCNC: 24 MMOL/L (ref 21–32)
COLOR UR: YELLOW
CREAT SERPL-MCNC: 0.8 MG/DL (ref 0.6–1.1)
EKG ATRIAL RATE: 72 BPM
EKG DIAGNOSIS: NORMAL
EKG P AXIS: 46 DEGREES
EKG P-R INTERVAL: 174 MS
EKG Q-T INTERVAL: 464 MS
EKG QRS DURATION: 76 MS
EKG QTC CALCULATION (BAZETT): 508 MS
EKG R AXIS: 48 DEGREES
EKG T AXIS: 64 DEGREES
EKG VENTRICULAR RATE: 72 BPM
EPI CELLS #/AREA URNS HPF: ABNORMAL /HPF (ref 0–5)
EST. AVERAGE GLUCOSE BLD GHB EST-MCNC: 91.1 MG/DL
ETHANOLAMINE SERPL-MCNC: NORMAL MG/DL (ref 0–0.08)
GFR SERPLBLD CREATININE-BSD FMLA CKD-EPI: >60 ML/MIN/{1.73_M2}
GLUCOSE SERPL-MCNC: 91 MG/DL (ref 70–99)
GLUCOSE UR STRIP.AUTO-MCNC: NEGATIVE MG/DL
HBA1C MFR BLD: 4.8 %
HCG SERPL QL: NEGATIVE
HDLC SERPL-MCNC: 60 MG/DL (ref 40–60)
HGB UR QL STRIP.AUTO: NEGATIVE
INR PPP: 0.97 (ref 0.84–1.16)
KETONES UR STRIP.AUTO-MCNC: ABNORMAL MG/DL
LDLC SERPL CALC-MCNC: 119 MG/DL
LEUKOCYTE ESTERASE UR QL STRIP.AUTO: ABNORMAL
LV EF: 64 %
LVEF MODALITY: NORMAL
MAGNESIUM SERPL-MCNC: 1.9 MG/DL (ref 1.8–2.4)
NITRITE UR QL STRIP.AUTO: NEGATIVE
NT-PROBNP SERPL-MCNC: 149 PG/ML (ref 0–124)
PH UR STRIP.AUTO: 6 [PH] (ref 5–8)
POTASSIUM SERPL-SCNC: 3.9 MMOL/L (ref 3.5–5.1)
PROT UR STRIP.AUTO-MCNC: NEGATIVE MG/DL
PROTHROMBIN TIME: 12.9 SEC (ref 11.5–14.8)
RBC #/AREA URNS HPF: ABNORMAL /HPF (ref 0–4)
SODIUM SERPL-SCNC: 137 MMOL/L (ref 136–145)
SP GR UR STRIP.AUTO: 1.01 (ref 1–1.03)
TRIGL SERPL-MCNC: 126 MG/DL (ref 0–150)
TSH SERPL DL<=0.005 MIU/L-ACNC: 2.09 UIU/ML (ref 0.27–4.2)
UA DIPSTICK W REFLEX MICRO PNL UR: YES
URN SPEC COLLECT METH UR: ABNORMAL
UROBILINOGEN UR STRIP-ACNC: 0.2 E.U./DL
VLDLC SERPL CALC-MCNC: 25 MG/DL
WBC #/AREA URNS HPF: ABNORMAL /HPF (ref 0–5)

## 2023-06-02 PROCEDURE — 84703 CHORIONIC GONADOTROPIN ASSAY: CPT

## 2023-06-02 PROCEDURE — 99254 IP/OBS CNSLTJ NEW/EST MOD 60: CPT | Performed by: INTERNAL MEDICINE

## 2023-06-02 PROCEDURE — A9502 TC99M TETROFOSMIN: HCPCS | Performed by: INTERNAL MEDICINE

## 2023-06-02 PROCEDURE — 93010 ELECTROCARDIOGRAM REPORT: CPT | Performed by: INTERNAL MEDICINE

## 2023-06-02 PROCEDURE — G0378 HOSPITAL OBSERVATION PER HR: HCPCS

## 2023-06-02 PROCEDURE — 83880 ASSAY OF NATRIURETIC PEPTIDE: CPT

## 2023-06-02 PROCEDURE — 6360000002 HC RX W HCPCS: Performed by: INTERNAL MEDICINE

## 2023-06-02 PROCEDURE — 80048 BASIC METABOLIC PNL TOTAL CA: CPT

## 2023-06-02 PROCEDURE — 83735 ASSAY OF MAGNESIUM: CPT

## 2023-06-02 PROCEDURE — 93017 CV STRESS TEST TRACING ONLY: CPT

## 2023-06-02 PROCEDURE — 83036 HEMOGLOBIN GLYCOSYLATED A1C: CPT

## 2023-06-02 PROCEDURE — 80061 LIPID PANEL: CPT

## 2023-06-02 PROCEDURE — 2580000003 HC RX 258: Performed by: STUDENT IN AN ORGANIZED HEALTH CARE EDUCATION/TRAINING PROGRAM

## 2023-06-02 PROCEDURE — 82077 ASSAY SPEC XCP UR&BREATH IA: CPT

## 2023-06-02 PROCEDURE — 3430000000 HC RX DIAGNOSTIC RADIOPHARMACEUTICAL: Performed by: INTERNAL MEDICINE

## 2023-06-02 PROCEDURE — 96372 THER/PROPH/DIAG INJ SC/IM: CPT

## 2023-06-02 PROCEDURE — 84443 ASSAY THYROID STIM HORMONE: CPT

## 2023-06-02 PROCEDURE — 75571 CT HRT W/O DYE W/CA TEST: CPT

## 2023-06-02 PROCEDURE — 6370000000 HC RX 637 (ALT 250 FOR IP): Performed by: STUDENT IN AN ORGANIZED HEALTH CARE EDUCATION/TRAINING PROGRAM

## 2023-06-02 PROCEDURE — 85610 PROTHROMBIN TIME: CPT

## 2023-06-02 PROCEDURE — 81001 URINALYSIS AUTO W/SCOPE: CPT

## 2023-06-02 PROCEDURE — 6360000002 HC RX W HCPCS: Performed by: STUDENT IN AN ORGANIZED HEALTH CARE EDUCATION/TRAINING PROGRAM

## 2023-06-02 PROCEDURE — 96376 TX/PRO/DX INJ SAME DRUG ADON: CPT

## 2023-06-02 PROCEDURE — 78452 HT MUSCLE IMAGE SPECT MULT: CPT

## 2023-06-02 PROCEDURE — 93005 ELECTROCARDIOGRAM TRACING: CPT

## 2023-06-02 PROCEDURE — 93279 PRGRMG DEV EVAL PM/LDLS PM: CPT | Performed by: INTERNAL MEDICINE

## 2023-06-02 PROCEDURE — 36415 COLL VENOUS BLD VENIPUNCTURE: CPT

## 2023-06-02 PROCEDURE — 6360000002 HC RX W HCPCS: Performed by: NURSE PRACTITIONER

## 2023-06-02 RX ORDER — ASPIRIN 81 MG/1
81 TABLET ORAL DAILY
Status: DISCONTINUED | OUTPATIENT
Start: 2023-06-02 | End: 2023-06-03 | Stop reason: HOSPADM

## 2023-06-02 RX ORDER — METHOCARBAMOL 500 MG/1
500 TABLET, FILM COATED ORAL ONCE
Status: COMPLETED | OUTPATIENT
Start: 2023-06-03 | End: 2023-06-03

## 2023-06-02 RX ORDER — REGADENOSON 0.08 MG/ML
0.4 INJECTION, SOLUTION INTRAVENOUS
Status: COMPLETED | OUTPATIENT
Start: 2023-06-02 | End: 2023-06-02

## 2023-06-02 RX ORDER — KETOROLAC TROMETHAMINE 30 MG/ML
15 INJECTION, SOLUTION INTRAMUSCULAR; INTRAVENOUS ONCE
Status: COMPLETED | OUTPATIENT
Start: 2023-06-03 | End: 2023-06-03

## 2023-06-02 RX ADMIN — TETROFOSMIN 11 MILLICURIE: 1.38 INJECTION, POWDER, LYOPHILIZED, FOR SOLUTION INTRAVENOUS at 13:30

## 2023-06-02 RX ADMIN — REGADENOSON 0.4 MG: 0.08 INJECTION, SOLUTION INTRAVENOUS at 14:55

## 2023-06-02 RX ADMIN — ASPIRIN 81 MG: 81 TABLET, COATED ORAL at 17:10

## 2023-06-02 RX ADMIN — NADOLOL 120 MG: 20 TABLET ORAL at 17:10

## 2023-06-02 RX ADMIN — SODIUM CHLORIDE, PRESERVATIVE FREE 10 ML: 5 INJECTION INTRAVENOUS at 21:14

## 2023-06-02 RX ADMIN — TETROFOSMIN 32.5 MILLICURIE: 1.38 INJECTION, POWDER, LYOPHILIZED, FOR SOLUTION INTRAVENOUS at 14:55

## 2023-06-02 RX ADMIN — ACETAMINOPHEN 325MG 650 MG: 325 TABLET ORAL at 21:21

## 2023-06-02 RX ADMIN — ENOXAPARIN SODIUM 40 MG: 100 INJECTION SUBCUTANEOUS at 12:16

## 2023-06-02 RX ADMIN — TRAZODONE HYDROCHLORIDE 100 MG: 50 TABLET ORAL at 21:14

## 2023-06-02 RX ADMIN — MORPHINE SULFATE 4 MG: 4 INJECTION, SOLUTION INTRAMUSCULAR; INTRAVENOUS at 15:42

## 2023-06-02 RX ADMIN — VENLAFAXINE HYDROCHLORIDE 225 MG: 150 CAPSULE, EXTENDED RELEASE ORAL at 17:09

## 2023-06-02 ASSESSMENT — PAIN SCALES - GENERAL
PAINLEVEL_OUTOF10: 7
PAINLEVEL_OUTOF10: 10
PAINLEVEL_OUTOF10: 8
PAINLEVEL_OUTOF10: 10
PAINLEVEL_OUTOF10: 0
PAINLEVEL_OUTOF10: 0
PAINLEVEL_OUTOF10: 10
PAINLEVEL_OUTOF10: 0

## 2023-06-02 ASSESSMENT — PAIN SCALES - WONG BAKER
WONGBAKER_NUMERICALRESPONSE: 0

## 2023-06-02 ASSESSMENT — PAIN DESCRIPTION - LOCATION
LOCATION: CHEST
LOCATION: CHEST

## 2023-06-02 ASSESSMENT — PAIN DESCRIPTION - DESCRIPTORS
DESCRIPTORS: ACHING
DESCRIPTORS: ACHING
DESCRIPTORS: PRESSURE

## 2023-06-02 NOTE — PROGRESS NOTES
5035 stress  called this RN with report that patient having 10/10 chest pain. This RN ordered stat EKG and to stress to assess patient bp 106/68. Dr Jenni Martínez at bedside to evaluate EKG. IV morphine 4 mg given.

## 2023-06-02 NOTE — PROGRESS NOTES
06/02/23 0026   RT Protocol   History Pulmonary Disease 1   Respiratory pattern 0   Breath sounds 2   Cough 0   Indications for Bronchodilator Therapy On home bronchodilators   Bronchodilator Assessment Score 3

## 2023-06-02 NOTE — PROGRESS NOTES
Call returned from dr. Lynn Wei- no new orders from cardiology standpoint at this time. Patient reports chest pain is better at this time. Diet ordered, patient given ordered PO medications.

## 2023-06-02 NOTE — CARE COORDINATION
Chart review-pt from home has insurance and PCP. Per nursing, no needs for dc. Contact CM if needs arise.

## 2023-06-02 NOTE — PLAN OF CARE
Problem: Chronic Conditions and Co-morbidities  Goal: Patient's chronic conditions and co-morbidity symptoms are monitored and maintained or improved  Outcome: Progressing     Problem: Cardiovascular - Adult  Goal: Maintains optimal cardiac output and hemodynamic stability  Outcome: Progressing  Goal: Absence of cardiac dysrhythmias or at baseline  Outcome: Progressing   Stress test planned for today  Problem: Skin/Tissue Integrity - Adult  Goal: Skin integrity remains intact  Outcome: Progressing   No new issues noted

## 2023-06-02 NOTE — H&P
03/02/2023 08:52 AM     Troponin: No results found for: TROPONINT  BNP: No results for input(s): PROBNP in the last 72 hours. Lactic Acid: No results for input(s): LACTA in the last 72 hours. UA:  Lab Results   Component Value Date/Time    NITRU Negative 03/17/2023 02:11 PM    COLORU Yellow 03/17/2023 02:11 PM    PHUR 7.5 03/17/2023 02:11 PM    PHUR 7.5 03/17/2023 02:11 PM    WBCUA None seen 03/17/2023 02:11 PM    RBCUA 3-4 03/17/2023 02:11 PM    MUCUS 1+ 03/11/2023 09:19 AM    BACTERIA 3+ 03/11/2023 09:19 AM    CLARITYU Clear 03/17/2023 02:11 PM    SPECGRAV 1.010 03/17/2023 02:11 PM    LEUKOCYTESUR Negative 03/17/2023 02:11 PM    UROBILINOGEN 0.2 03/17/2023 02:11 PM    BILIRUBINUR Negative 03/17/2023 02:11 PM    BILIRUBINUR Negative By Confirmatory Testing 08/01/2011 07:30 PM    BLOODU TRACE-INTACT 03/17/2023 02:11 PM    GLUCOSEU Negative 03/17/2023 02:11 PM    GLUCOSEU Neg 08/01/2011 07:30 PM    KETUA Negative 03/17/2023 02:11 PM     Urine Cultures:   Lab Results   Component Value Date/Time    LABURIN >100,000 CFU/ml 03/11/2023 09:19 AM     Blood Cultures:   Lab Results   Component Value Date/Time    BC No Growth after 4 days of incubation. 03/17/2023 02:00 PM     Lab Results   Component Value Date/Time    BLOODCULT2 No Growth after 4 days of incubation.  03/17/2023 02:04 PM     Organism:   Lab Results   Component Value Date/Time    ORG Escherichia coli 03/11/2023 09:19 AM       Radiology results:  XR CHEST (2 VW)   Final Result   No acute abnormality             Emily No MD  06/01/23 8:45 PM

## 2023-06-02 NOTE — PROGRESS NOTES
Hospital Medicine Progress Note      Date of Admission: 6/1/2023  Hospital Day: 2    Chief Admission Complaint:  chest pain     Subjective:  reports chest pain with activity. Currently resting in bed and denies chest pain, dyspnea, n/v    Presenting Admission History:  19-year-old female with a PMHx of ASD s/p closure, prolonged QT syndrome s/p PPM, depression and anxiety who presented to the ED with worsening mid chest pain for x2 days. Symptoms worse with exertion and improved with rest. No radiation, presyncope, PND, orthopnea or LE edema. Did not try NTG. No fevers, chills, SOB, N/V, abdominal pain, C/D or urinary changes. No tobacco, alcohol or illicit drugs    Assessment/Plan:      Current Principal Problem:  Heart palpitations    Palpitations  -Check mag level  -EP consulted, appreciate recommendations  -Monitor on telemetry    Chest pain  -HS trop negative x4  -EKG, non-acute  -Cardiology consulted, appreciate recommendations   -CT chest w/ Ca score: LAD w/ score of 8; total score =8  -Stress test performed, results not yet available  -Monitor on telemetry  -A1C 4.8 this admission    Hyperlipidemia. LDL noted to be 119, chol 204 this admission    Asymptomatic bacteruria. Abx not indicated    Depression/anxiety, mood stable. Continued home dose effexor    History of ASD. S/P closure    Long QT syndrome. S/P PPM insertion. EP is consulted, appreciate assistance    Anion gap metabolic acidosis, resolved. No evidence of DKA, lactic normal. Gap 18>12. CO2 19>24    Positive UDS. Urine from 1827 on 6/1 positive for opiates and PCP. Unclear of significance of either of these as effexor can cause a false positive PCP, and she received morphine around time of urine collection      Physical Exam Performed:      General appearance:  No apparent distress  Respiratory:  Normal respiratory effort. Room air  Cardiovascular:  Regular rate and rhythm. Abdomen:  Soft, non-tender, non-distended.   Musculoskelatal:  No

## 2023-06-02 NOTE — CONSULTS
[address] [facility]   Cardiovascular Evaluation    PATIENT: Kellie Baker  DATE: 2023  MRN: [id number]  CSN: [id number]  : 1965    Primary Care Doctor/Referring provider: Baldev Dimas MD, Gail Tate MD     Reason for evaluation/Chief complaint:   Chest Pain (Pt to ED for c/o chest pain that started 2x days ago. Pt states she was at [facility] yesterday and they wanted to admit her but she left to take care of her animals. Pt was given 4 ASA PTA. PT states she wants to be transferred to [facility]. )      Subjective:    History of present illness on initial date of evaluation:   Kellie Baker is a 56 y.o. patient who presents to the hospital for evaluation of palpitations. Patient states that she has had intermittent symptoms of sensation of her heart racing and fluttering for the past couple days. She was presented to an outside emergency room and refused admission. She returned the next day with ongoing complaints. She was transferred to [facility] for further cardiac evaluation and management. Patient states that her symptom complex is mainly associated with her heart racing. She states at times she does feel chest pain during this racing. She denies any left arm jaw pain. She denies any associated diaphoresis. Patient was evaluated by our medical service and scheduled for cardiac consultations morning. During my evaluation, patient was comfortable without any chest pain or shortness of breath, or palpitations.         Patient Active Problem List   Diagnosis    Toe fracture, right    Dyspnea    Chest heaviness    Asthma exacerbation    Acute tracheobronchitis    Recurrent respiratory infection    Hip strain, left, initial encounter    Acute pain of left knee    Chondromalacia patellae of left knee    Primary osteoarthritis of left knee    Right upper quadrant abdominal pain    Chest pain    Pacemaker    Congenital long QT syndrome    S/P atrial septal
Consult Placed     Who: CARDIOLOGY, [facility]  Date:6/2/2023  Time:0750     Electronically signed by Bryson Torres on 6/2/2023 at 7:50 AM
Consult dictated # [id number]
post pacemaker implantation. 3.  History of atrial septal defect repair. 4.  History of substance abuse. MEDICATIONS:  At the time of admission include _____ 60 mg p.o. b.i.d.,  trazodone 150 mg p.o. daily, and Effexor 225 mg p.o. daily. ALLERGIES:  Include DEXAMETHASONE, CODEINE, NITROGLYCERIN, PREDNISONE  and QUINOLONE ANTIBIOTICS. SOCIAL HISTORY:  The patient is a current every day cigarette smoker. She does not use smokeless tobacco.  She does consume alcohol on an  occasional basis. She does not report any drug use at this time. FAMILY HISTORY:  Remarkable for heart disease in mother and father. History of hypertension in the father. REVIEW OF SYSTEMS:   Demonstrates no recent change in appetite or change  in weight. The patient has not had recent fever or chills. She  describes the above-mentioned palpitations and chest pain. She has not  had near-syncope or syncope. Functional status is well-preserved. All  other components of 14-system review are negative. PHYSICAL EXAMINATION:  VITAL SIGNS:  Blood pressure is 98/65, heart rate is 71 beats per minute  and regular. The patient is afebrile. GENERAL:  She is awake, alert, oriented and in no discomfort. HEENT:  Exam demonstrates normocephalic, atraumatic head. There is no  scleral icterus. Pupils are round and reactive. NECK:  Supple without thyromegaly. LUNGS:  Clear to auscultation. CARDIOVASCULAR:  Exam reveals a regular rhythm. The apical impulse is  discrete. S1 and S2 are normal.  There is no audible murmur or gallop. The jugular venous pressure is not elevated. ABDOMEN:  Lean, soft, nontender. EXTREMITIES:  Demonstrate no pitting, pretibial or dependent edema. There is no cyanosis. There is no evidence for chronic venous stasis. SKIN:  Otherwise warm and dry without skin rash. DIAGNOSTIC DATA:  A 12-lead ECG from 06/01/2023 demonstrates atrial  pacing at 74 beats per minute.   The corrected QT interval is

## 2023-06-02 NOTE — FLOWSHEET NOTE
06/02/23 1612   Vital Signs   Temp 97.8 °F (36.6 °C)   Temp Source Oral   Pulse 72   Heart Rate Source Monitor   Respirations 16   /76   MAP (Calculated) 89   BP Location Left Arm   Patient Position Semi fowlers   Level of Consciousness 0   MEWS Score 1   Pain Assessment   Pain Assessment 0-10   Pain Level 7   Pain Location Chest   Oxygen Therapy   SpO2 100 %   O2 Device None (Room air)     Patient returned to  and still c/o 7/10 chest pain. Shonda Lechuga called to page Dr. Jeanette Goncalves to notify, waiting for response.

## 2023-06-02 NOTE — PROGRESS NOTES
Comprehensive Nutrition Assessment    Type and Reason for Visit:  Initial, Positive Nutrition Screen (MST=5: weight loss, decreased appetite)    Nutrition Recommendations/Plan:   Advance diet as medically feasible to general/regular  Add Ensure with meals - requests vanilla   Monitor nutrition adequacy, pertinent labs, bowel habits, wt changes, and clinical progress     Malnutrition Assessment:  Malnutrition Status:  Severe malnutrition (06/02/23 1421)    Context:  Social/Environmental Circumstances     Findings of the 6 clinical characteristics of malnutrition:  Energy Intake:  50% or less estimated energy requirements for 1 month or longer  Weight Loss:  Greater than 7.5% over 3 months     Muscle Mass Loss:  Mild muscle mass loss Temples (temporalis), Clavicles (pectoralis & deltoids)    Nutrition Assessment:    Pt nutritionally compromised AEB poor appetite and PO intakes PTA. Pt reports minimal PO intakes for 9-12 months. Reports  lb prior to decreased appetite. States high stress at home, may be r/t minimal appetite. Pt reports only eating a cookie for the past 3 days. Pt expresses concerns over high sugar foods. Encouraged pt to eat what she is able to minimize further weight loss. Occasionally consumes protein drinks at home, encouraged pt to resume at home. Agreeable to add ONS this admission. Currently NPO for stress test, RD to add ONS following diet advancement. Will monitor. Nutrition Related Findings:    17% weight loss in 3 months. Muscle wasting. Labs reviewed. Wound Type: None       Current Nutrition Intake & Therapies:    Average Meal Intake: NPO  Average Supplements Intake: NPO  Diet NPO Exceptions are: Ice Chips, Sips of Water with Meds  Diet NPO Exceptions are: [employer], Sips of Water with Meds    Anthropometric Measures:  Height: 5' 6\" (167.6 cm)  Ideal Body Weight (IBW): 130 lbs (59 kg)       Current Body Weight: 128 lb (58.1 kg), 98.5 % IBW.  Weight Source: Not Specified  Current BMI

## 2023-06-02 NOTE — PROGRESS NOTES
A Susan Myoview stress test was completed on this patient as ordered. The patient tolerated the procedure well. Awaiting stress imaging at this time.

## 2023-06-02 NOTE — RT PROTOCOL NOTE
RT Inhaler-Nebulizer Bronchodilator Protocol Note    There is a bronchodilator order in the chart from a provider indicating to follow the RT Bronchodilator Protocol and there is an Initiate RT Inhaler-Nebulizer Bronchodilator Protocol order as well (see protocol at bottom of note). CXR Findings:  XR CHEST PORTABLE    Result Date: 5/31/2023  1. No acute abnormality. The findings from the last RT Protocol Assessment were as follows:   History Pulmonary Disease: Smoker 15 pack years or more  Respiratory Pattern: Regular pattern and RR 12-20 bpm  Breath Sounds: Slightly diminished and/or crackles  Cough: Strong, spontaneous, non-productive  Indication for Bronchodilator Therapy: On home bronchodilators  Bronchodilator Assessment Score: 3    Aerosolized bronchodilator medication orders have been revised according to the RT Inhaler-Nebulizer Bronchodilator Protocol below. Respiratory Therapist to perform RT Therapy Protocol Assessment initially then follow the protocol. Repeat RT Therapy Protocol Assessment PRN for score 0-3 or on second treatment, BID, and PRN for scores above 3. No Indications - adjust the frequency to every 6 hours PRN wheezing or bronchospasm, if no treatments needed after 48 hours then discontinue using Per Protocol order mode. If indication present, adjust the RT bronchodilator orders based on the Bronchodilator Assessment Score as indicated below. Use Inhaler orders unless patient has one or more of the following: on home nebulizer, not able to hold breath for 10 seconds, is not alert and oriented, cannot activate and use MDI correctly, or respiratory rate 25 breaths per minute or more, then use the equivalent nebulizer order(s) with same Frequency and PRN reasons based on the score. If a patient is on this medication at home then do not decrease Frequency below that used at home.     0-3 - enter or revise RT bronchodilator order(s) to equivalent RT Bronchodilator order

## 2023-06-02 NOTE — PROGRESS NOTES
4 Eyes Skin Assessment     NAME:  Su Godoy  YOB: 1965  MEDICAL RECORD NUMBER:  [id number]    The patient is being assessed for  Admission    I agree that at least one RN has performed a thorough Head to Toe Skin Assessment on the patient. ALL assessment sites listed below have been assessed. Areas assessed by both nurses:    Head, Face, Ears, Shoulders, Back, Chest, Arms, Elbows, Hands, Sacrum. Buttock, Coccyx, Ischium, Legs. Feet and Heels, and Under Medical Devices         Does the Patient have a Wound?  No noted wound(s)       Bala Prevention initiated by RN: No  Wound Care Orders initiated by RN: No    Pressure Injury (Stage 3,4, Unstageable, DTI, NWPT, and Complex wounds) if present, place Wound referral order by RN under : No    New Ostomies, if present place, Ostomy referral order under : No     Nurse 1 eSignature: Electronically signed by Lynn Pickering RN on 6/2/23 at 2:11 PM EDT    **SHARE this note so that the co-signing nurse can place an eSignature**    Nurse 2 eSignature: Electronically signed by Derek White RN on 6/2/23 at 6:04 PM EDT

## 2023-06-02 NOTE — PROGRESS NOTES
4 Eyes Skin Assessment       NAME:  Ema Rojas  YOB: 1965  MEDICAL RECORD NUMBER:  [id number]       The patient is being assessed for   Admission       I agree that One RN has performed a thorough Head to Toe Skin Assessment on the patient. ALL assessment sites listed below have been assessed. Areas assessed by both nurses:       Head, Face, Ears, Shoulders, Back, Chest, Arms, Elbows, Hands, Sacrum. Buttock, Coccyx, Ischium, and Legs. Feet and Heels                               Does the Patient have a Wound?  No noted wound(s)        Bala Prevention initiated by RN: No   Wound Care Orders initiated by RN: No       Pressure Injury (Stage 3,4, Unstageable, DTI, NWPT, and Complex wounds) if present, place referral order by RN under : No       New and Established Ostomies, if present place, referral order under : No        Nurse 1 eSignature: Electronically signed by Tish Krause RN on 6/2/23 at 7:27 AM EDT       **SHARE this note so that the co-signing nurse can place an eSignature**       Nurse 2 eSignature: {Esignature:[id number]}

## 2023-06-03 VITALS
DIASTOLIC BLOOD PRESSURE: 52 MMHG | BODY MASS INDEX: 22.85 KG/M2 | HEART RATE: 72 BPM | OXYGEN SATURATION: 94 % | HEIGHT: 66 IN | WEIGHT: 142.2 LBS | SYSTOLIC BLOOD PRESSURE: 89 MMHG | RESPIRATION RATE: 16 BRPM | TEMPERATURE: 97.5 F

## 2023-06-03 PROCEDURE — 2580000003 HC RX 258: Performed by: NURSE PRACTITIONER

## 2023-06-03 PROCEDURE — 99232 SBSQ HOSP IP/OBS MODERATE 35: CPT | Performed by: NURSE PRACTITIONER

## 2023-06-03 PROCEDURE — 2580000003 HC RX 258: Performed by: STUDENT IN AN ORGANIZED HEALTH CARE EDUCATION/TRAINING PROGRAM

## 2023-06-03 PROCEDURE — 96372 THER/PROPH/DIAG INJ SC/IM: CPT

## 2023-06-03 PROCEDURE — 6370000000 HC RX 637 (ALT 250 FOR IP): Performed by: STUDENT IN AN ORGANIZED HEALTH CARE EDUCATION/TRAINING PROGRAM

## 2023-06-03 PROCEDURE — G0378 HOSPITAL OBSERVATION PER HR: HCPCS

## 2023-06-03 PROCEDURE — 6360000002 HC RX W HCPCS: Performed by: NURSE PRACTITIONER

## 2023-06-03 PROCEDURE — 96375 TX/PRO/DX INJ NEW DRUG ADDON: CPT

## 2023-06-03 PROCEDURE — 6360000002 HC RX W HCPCS: Performed by: STUDENT IN AN ORGANIZED HEALTH CARE EDUCATION/TRAINING PROGRAM

## 2023-06-03 PROCEDURE — 6370000000 HC RX 637 (ALT 250 FOR IP): Performed by: NURSE PRACTITIONER

## 2023-06-03 RX ORDER — 0.9 % SODIUM CHLORIDE 0.9 %
500 INTRAVENOUS SOLUTION INTRAVENOUS ONCE
Status: COMPLETED | OUTPATIENT
Start: 2023-06-03 | End: 2023-06-03

## 2023-06-03 RX ORDER — TRAZODONE HYDROCHLORIDE 100 MG/1
100 TABLET ORAL NIGHTLY PRN
Qty: 30 TABLET | Refills: 0 | Status: SHIPPED | OUTPATIENT
Start: 2023-06-03

## 2023-06-03 RX ORDER — ASPIRIN 81 MG/1
81 TABLET ORAL DAILY
Qty: 30 TABLET | Refills: 3 | Status: SHIPPED | OUTPATIENT
Start: 2023-06-04

## 2023-06-03 RX ADMIN — VENLAFAXINE HYDROCHLORIDE 225 MG: 150 CAPSULE, EXTENDED RELEASE ORAL at 09:39

## 2023-06-03 RX ADMIN — NADOLOL 120 MG: 20 TABLET ORAL at 10:38

## 2023-06-03 RX ADMIN — METHOCARBAMOL 500 MG: 500 TABLET ORAL at 00:41

## 2023-06-03 RX ADMIN — SODIUM CHLORIDE 500 ML: 9 INJECTION, SOLUTION INTRAVENOUS at 07:39

## 2023-06-03 RX ADMIN — ASPIRIN 81 MG: 81 TABLET, COATED ORAL at 09:36

## 2023-06-03 RX ADMIN — SODIUM CHLORIDE 500 ML: 9 INJECTION, SOLUTION INTRAVENOUS at 00:40

## 2023-06-03 RX ADMIN — KETOROLAC TROMETHAMINE 15 MG: 30 INJECTION, SOLUTION INTRAMUSCULAR; INTRAVENOUS at 00:41

## 2023-06-03 RX ADMIN — ENOXAPARIN SODIUM 40 MG: 100 INJECTION SUBCUTANEOUS at 09:35

## 2023-06-03 ASSESSMENT — PAIN SCALES - WONG BAKER: WONGBAKER_NUMERICALRESPONSE: 0

## 2023-06-03 ASSESSMENT — PAIN SCALES - GENERAL: PAINLEVEL_OUTOF10: 7

## 2023-06-03 NOTE — PROGRESS NOTES
MD PAGE via [employer]:   BP is 83/58 this morning. Asymptomatic. 500cc bolus given earlier tonight for hypotension. Please advise. Thank you!

## 2023-06-03 NOTE — PROGRESS NOTES
Discharged with documented belongings. IV removed without complication. Tele removed and returned. CMU aware. Aware of F/U with her Cardiologist. Ambulatory at [facility].

## 2023-06-03 NOTE — PROGRESS NOTES
NP PAGE via [employer]:     BP tonight is 89/58. No other abnormal VS, known 7/10 chest pain (not able to give IV morphine right now due to hypotension). Please advise. Thank you!

## 2023-06-03 NOTE — DISCHARGE SUMMARY
06/01/2023 06:27 PM     Blood Cultures:   Lab Results   Component Value Date/Time    BC No Growth after 4 days of incubation. 03/17/2023 02:00 PM     Lab Results   Component Value Date/Time    BLOODCULT2 No Growth after 4 days of incubation.  03/17/2023 02:04 PM     Organism:   Lab Results   Component Value Date/Time    ORG Staphylococcus epidermidis 06/01/2023 06:27 PM       Signed:    DANII Cooney - CNP

## 2023-06-04 LAB
BACTERIA UR CULT: ABNORMAL
ORGANISM: ABNORMAL

## 2023-06-05 ENCOUNTER — PROCEDURE VISIT (OUTPATIENT)
Dept: CARDIOLOGY CLINIC | Age: 58
End: 2023-06-05
Payer: COMMERCIAL

## 2023-06-05 DIAGNOSIS — Z95.0 PACEMAKER: Primary | ICD-10-CM

## 2023-06-05 NOTE — PROGRESS NOTES
[facility] inpatient rep check. AAIR device. P wave 4.0mV    AP 81.8%    No arrhythmias recorded. See [facility] report under the Cardiology tab.

## 2023-06-12 ENCOUNTER — TELEPHONE (OUTPATIENT)
Dept: PULMONOLOGY | Age: 58
End: 2023-06-12

## 2023-08-10 ENCOUNTER — TELEPHONE (OUTPATIENT)
Dept: PULMONOLOGY | Age: 58
End: 2023-08-10

## 2023-08-10 ENCOUNTER — OFFICE VISIT (OUTPATIENT)
Dept: PULMONOLOGY | Age: 58
End: 2023-08-10

## 2023-08-10 VITALS
BODY MASS INDEX: 22.66 KG/M2 | WEIGHT: 141 LBS | DIASTOLIC BLOOD PRESSURE: 65 MMHG | HEART RATE: 76 BPM | HEIGHT: 66 IN | SYSTOLIC BLOOD PRESSURE: 105 MMHG | RESPIRATION RATE: 16 BRPM | OXYGEN SATURATION: 97 %

## 2023-08-10 DIAGNOSIS — R06.81 WITNESSED APNEIC SPELLS: ICD-10-CM

## 2023-08-10 DIAGNOSIS — F11.90 OPIOID USE: ICD-10-CM

## 2023-08-10 DIAGNOSIS — G47.00 INSOMNIA, UNSPECIFIED TYPE: ICD-10-CM

## 2023-08-10 DIAGNOSIS — I45.81 CONGENITAL LONG QT SYNDROME: ICD-10-CM

## 2023-08-10 DIAGNOSIS — R06.83 SNORING: Primary | ICD-10-CM

## 2023-08-10 PROBLEM — R10.11 RIGHT UPPER QUADRANT ABDOMINAL PAIN: Status: RESOLVED | Noted: 2019-05-07 | Resolved: 2023-08-10

## 2023-08-10 PROBLEM — R51.9 INTRACTABLE HEADACHE: Status: RESOLVED | Noted: 2021-02-24 | Resolved: 2023-08-10

## 2023-08-10 PROBLEM — R41.0 DELIRIUM: Status: RESOLVED | Noted: 2023-03-12 | Resolved: 2023-08-10

## 2023-08-10 PROBLEM — S76.012A HIP STRAIN, LEFT, INITIAL ENCOUNTER: Status: RESOLVED | Noted: 2018-09-27 | Resolved: 2023-08-10

## 2023-08-10 PROBLEM — M25.562 ACUTE PAIN OF LEFT KNEE: Status: RESOLVED | Noted: 2019-04-04 | Resolved: 2023-08-10

## 2023-08-10 PROBLEM — R07.9 CHEST PAIN: Status: RESOLVED | Noted: 2020-07-29 | Resolved: 2023-08-10

## 2023-08-10 RX ORDER — GABAPENTIN 100 MG/1
100 CAPSULE ORAL
COMMUNITY
Start: 2023-07-25

## 2023-08-10 RX ORDER — HYDROCODONE BITARTRATE AND ACETAMINOPHEN 5; 325 MG/1; MG/1
5-325 TABLET ORAL
COMMUNITY
Start: 2023-08-08

## 2023-08-10 ASSESSMENT — SLEEP AND FATIGUE QUESTIONNAIRES
HOW LIKELY ARE YOU TO NOD OFF OR FALL ASLEEP WHILE SITTING INACTIVE IN A PUBLIC PLACE: 0
HOW LIKELY ARE YOU TO NOD OFF OR FALL ASLEEP WHILE SITTING AND TALKING TO SOMEONE: 0
HOW LIKELY ARE YOU TO NOD OFF OR FALL ASLEEP WHILE LYING DOWN TO REST IN THE AFTERNOON WHEN CIRCUMSTANCES PERMIT: 0
HOW LIKELY ARE YOU TO NOD OFF OR FALL ASLEEP WHILE WATCHING TV: 0
NECK CIRCUMFERENCE (INCHES): 12
ESS TOTAL SCORE: 2
HOW LIKELY ARE YOU TO NOD OFF OR FALL ASLEEP WHILE SITTING QUIETLY AFTER LUNCH WITHOUT ALCOHOL: 0
HOW LIKELY ARE YOU TO NOD OFF OR FALL ASLEEP IN A CAR, WHILE STOPPED FOR A FEW MINUTES IN TRAFFIC: 0
HOW LIKELY ARE YOU TO NOD OFF OR FALL ASLEEP WHEN YOU ARE A PASSENGER IN A CAR FOR AN HOUR WITHOUT A BREAK: 0
HOW LIKELY ARE YOU TO NOD OFF OR FALL ASLEEP WHILE SITTING AND READING: 2

## 2023-08-10 NOTE — TELEPHONE ENCOUNTER
Lm for pt to cb to see if I could email/mail her a medical release form. [facility] they will not release without a signed release.  [facility] number is [phone]

## 2023-08-10 NOTE — PROGRESS NOTES
PULMONARY, CRITICAL CARE AND SLEEP MEDICINE   Outpatient Sleep Consult Note  CC: Snoring  Referring Provider: Patient is being seen at the request of Dr. Mazin Frost for a consultation to evaluate for Obstructive Sleep Apnea. Presenting HPI 8/10/23:  57 yo female with a lifelong history of insomnia, worse with fibromyalgia pain, associated with moderate to severe snoring & observed apneas by family & hospital staff/surgical teams. + choking or gasping during sleep. She reports having completed a PSG for fibromyalgia research purposes ~8-10 years ago [facility] but reports she was never informed of the results of this. Routine is relatively inconsistent. Gets ~ 5 hrs of sleep per night; has 2-3 awakenings per night (\"depends on noise and light\"), routinely has a prolonged awakening for several hours in the middle of the night before she is able to get back to sleep if she is able to get back to sleep. To restroom 2x/night. Takes no naps during the day even though she tries to, because she always feels tired but just cannot sleep. [place] is 2. No car wrecks/near wrecks because of the sleepiness or nodding off while driving. Drinks 2-3 caffinated beverages/day. Current smoker, 1/4 ppd x8 months. .     reports that she has been smoking cigarettes. She has a 0.75 pack-year smoking history. She has never used smokeless tobacco.    Past Medical History:   Diagnosis Date    Asthma     Atrial septal defect     had insertion of atrial septal occluder    Bursitis of left hip     CAD (coronary artery disease)     COPD (chronic obstructive pulmonary disease) (HCC)     History of blood transfusion     Kidney stone     Migraines     Mitral valve prolapse     MRSA (methicillin resistant staph aureus) culture positive 12/2015    Lungs.   Diagnosed [facility] with bronchoscopy    MVP (mitral valve prolapse)     PONV (postoperative nausea and vomiting)     Prolonged QT interval syndrome     S/P bronchoscopy

## 2023-08-10 NOTE — PATIENT INSTRUCTIONS
What's next:  Schedule a study with the sleep lab (call them at [phone] if you do not receive their call.)    Read through the sleep hygiene tips below to see what kind of changes you can start working on in the meantime, while awaiting your sleep study. We will meet after your sleep study to discuss results, options and recommendations from there. It was great to meet you and take care Debby Doctor!  -[address] [place] [address]    ______________________________________________________________________  Never drive a car or operate a motorized vehicle while drowsy or sleepy.   ______________________________________________________________________        Sleep Hygiene. .. Important practices for better sleep:    Avoid naps. This will ensure you are sleepy at bedtime. If you have to take a nap, sleep less than 1 hour, before 3 pm.  SCHEDULE: Have a fixed bedtime and awakening time. The human body thrives on routines. Only deviate from these set sleep times about 1-2 hours on the weekends (more than this will start altering your internal clock). You will feel better keeping a regular sleep cycle and giving your body a dependable pattern, even (especially) if you are retired or not working. Use light to train your biological clock: When you get up in the morning, exposure yourself to bright lights. When preparing for bed, dim the lights and avoid exposure to screens. The blue light from electronic screens tells our brain that it is time to be awake; it inhibits melatonin production which stops our brain from helping us get to sleep. Go to bed only when sleepy; this reduces the time you are awake in bed (which can lead to frustration and negative thoughts about sleep). If you can't fall asleep within 15-30 minutes, get up and do something boring until you feel sleepy again. Sit quietly in the dark or read the warranty on your refrigerator.  Don't expose yourself to bright light during this time (especially screens), which

## 2023-08-24 NOTE — TELEPHONE ENCOUNTER
Spoke with pt and informed that signed release is needed.   Emailed form to pt to complete at [email or web address].

## 2023-09-15 ENCOUNTER — TELEPHONE (OUTPATIENT)
Dept: PULMONOLOGY | Age: 58
End: 2023-09-15

## 2023-09-15 NOTE — TELEPHONE ENCOUNTER
Called [phone] (home)    Spoke w/ pt and informed needed to r/s appt for psg results due to psg testing being completed after appt w/ provider. R/s pt for 10-25-23 @ 8520. Pt understood.

## 2023-10-11 ENCOUNTER — HOSPITAL ENCOUNTER (OUTPATIENT)
Dept: SLEEP CENTER | Age: 58
Discharge: HOME | End: 2023-10-13
Payer: COMMERCIAL

## 2023-10-11 DIAGNOSIS — I45.81 CONGENITAL LONG QT SYNDROME: ICD-10-CM

## 2023-10-11 DIAGNOSIS — R06.83 SNORING: ICD-10-CM

## 2023-10-11 DIAGNOSIS — F11.90 OPIOID USE: ICD-10-CM

## 2023-10-11 DIAGNOSIS — G47.00 INSOMNIA, UNSPECIFIED TYPE: ICD-10-CM

## 2023-10-11 DIAGNOSIS — R06.81 WITNESSED APNEIC SPELLS: ICD-10-CM

## 2023-10-11 PROCEDURE — 95810 POLYSOM 6/> YRS 4/> PARAM: CPT

## 2023-10-12 PROBLEM — R06.83 SNORING: Status: ACTIVE | Noted: 2023-10-12

## 2023-10-12 PROBLEM — F11.90 OPIOID USE: Status: ACTIVE | Noted: 2023-10-12

## 2023-10-12 PROBLEM — R06.81 WITNESSED APNEIC SPELLS: Status: ACTIVE | Noted: 2023-10-12

## 2023-10-12 PROBLEM — G47.00 INSOMNIA: Status: ACTIVE | Noted: 2023-10-12

## 2023-10-25 ENCOUNTER — TELEPHONE (OUTPATIENT)
Dept: PULMONOLOGY | Age: 58
End: 2023-10-25

## 2023-10-25 ENCOUNTER — OFFICE VISIT (OUTPATIENT)
Dept: PULMONOLOGY | Age: 58
End: 2023-10-25
Payer: COMMERCIAL

## 2023-10-25 VITALS
BODY MASS INDEX: 25.07 KG/M2 | DIASTOLIC BLOOD PRESSURE: 62 MMHG | HEIGHT: 66 IN | SYSTOLIC BLOOD PRESSURE: 100 MMHG | OXYGEN SATURATION: 97 % | HEART RATE: 75 BPM | WEIGHT: 156 LBS

## 2023-10-25 DIAGNOSIS — G47.00 INSOMNIA, UNSPECIFIED TYPE: Primary | ICD-10-CM

## 2023-10-25 DIAGNOSIS — G47.8 UARS (UPPER AIRWAY RESISTANCE SYNDROME): ICD-10-CM

## 2023-10-25 PROBLEM — R06.83 SNORING: Status: RESOLVED | Noted: 2023-10-12 | Resolved: 2023-10-25

## 2023-10-25 PROBLEM — R06.81 WITNESSED APNEIC SPELLS: Status: RESOLVED | Noted: 2023-10-12 | Resolved: 2023-10-25

## 2023-10-25 PROCEDURE — 1036F TOBACCO NON-USER: CPT

## 2023-10-25 PROCEDURE — G8419 CALC BMI OUT NRM PARAM NOF/U: HCPCS

## 2023-10-25 PROCEDURE — G8482 FLU IMMUNIZE ORDER/ADMIN: HCPCS

## 2023-10-25 PROCEDURE — G8427 DOCREV CUR MEDS BY ELIG CLIN: HCPCS

## 2023-10-25 PROCEDURE — 99213 OFFICE O/P EST LOW 20 MIN: CPT

## 2023-10-25 PROCEDURE — 3017F COLORECTAL CA SCREEN DOC REV: CPT

## 2023-10-25 ASSESSMENT — SLEEP AND FATIGUE QUESTIONNAIRES
ESS TOTAL SCORE: 0
HOW LIKELY ARE YOU TO NOD OFF OR FALL ASLEEP WHILE LYING DOWN TO REST IN THE AFTERNOON WHEN CIRCUMSTANCES PERMIT: 0
HOW LIKELY ARE YOU TO NOD OFF OR FALL ASLEEP IN A CAR, WHILE STOPPED FOR A FEW MINUTES IN TRAFFIC: 0
HOW LIKELY ARE YOU TO NOD OFF OR FALL ASLEEP WHILE SITTING AND READING: 0
HOW LIKELY ARE YOU TO NOD OFF OR FALL ASLEEP WHEN YOU ARE A PASSENGER IN A CAR FOR AN HOUR WITHOUT A BREAK: 0
HOW LIKELY ARE YOU TO NOD OFF OR FALL ASLEEP WHILE SITTING INACTIVE IN A PUBLIC PLACE: 0
HOW LIKELY ARE YOU TO NOD OFF OR FALL ASLEEP WHILE WATCHING TV: 0
HOW LIKELY ARE YOU TO NOD OFF OR FALL ASLEEP WHILE SITTING QUIETLY AFTER LUNCH WITHOUT ALCOHOL: 0
NECK CIRCUMFERENCE (INCHES): 13
HOW LIKELY ARE YOU TO NOD OFF OR FALL ASLEEP WHILE SITTING AND TALKING TO SOMEONE: 0

## 2023-10-25 NOTE — PROGRESS NOTES
141# Aug 2023;   Constitutional:  No acute distress. Pleasant. HENT:  Oropharynx is clear and moist. Mallampati class II  Eyes: Pupils equal, round, and reactive to light. No scleral icterus. Neck: No tracheal deviation present. Circumference is 12 inches   Cardiovascular: Normal heart sounds. No lower extremity edema. Pulmonary/Chest: Clear breath sounds. No wheezes. No rhonchi. No rales. No decreased breath sounds. No accessory muscle usage. Musculoskeletal: No cyanosis. No clubbing. Skin: Skin is warm and dry. Psychiatric: Normal mood and affect. DATA:  NM stress: no arrhythmia, normal stress response, normal perfusion study, EF 64%    PSG 10/11/23: AHI 2.1, RDI 18.8 (RERA 16.7). , SE 71%. SL 88, REM SL 97.  AI 18.3. Mild snoring. SpO2 melony 86% with 0.6 min <90%. No significant PLMS. ECG paced rhythm. 4% criteria. ASSESSMENT:  UARS, Upper airway resistance syndrome (RDI 19)  Witnessed apneas   Daytime sleepiness / chronic fatigue   Snoring  Insomnia, sleep onset and maintenance   Short sleeper  Congenital long Q-T syndrome, s/p pacemaker   Polymorphic Vtach f/b cardiology   On chronic opioids PRN for fibromyalgia flares   Comorbid conditions: HLD, depression/anxiety, fibromyalgia   Current smoker    PLAN:   For moderate UARS, I recommend NUPUR treatment with CPAP. Patient would like to avoid CPAP and fears she would not tolerate. She would like to pursue weight loss, lifestyle changes and optimize sleep hygiene to improve her symptoms. I agree with this approach. Sleep hygiene tips discussed & provided. Regular schedule. Avoiding screens at night. Specifically cautioned: absolutely no driving while fatigued, drowsy or sleepy   Pathophysiology & complications of untreated NUPUR were discussed to include systemic HTN, pulmonary HTN, CV morbidities, car accidents & all-cause mortality   For insomnia: a cognitive behavioral program is the most effective intervention.  I

## 2023-10-25 NOTE — PATIENT INSTRUCTIONS
An additional resource that we did not get to discuss today if you continue to struggle with insomnia:     For insomnia: a cognitive behavioral program is the most effective intervention. I recommend the [facility]'s Go! To Sleep! online program for insomnia. https://[email or web address]/pages/Mercedes.htm    It is six weeks' worth of effective sleep therapy, online sleep logs, daily sleep score, daily sleep improvement recommendations, activities to help get the sleep needed, daily e-mails from the program , daily articles to help get the most out of the program, personal progress charts, six specially crafted relaxation practices, and motivational tips. Great to see you again and take care Brando Slate!  -Umberto Webster      Never drive a car or operate a motorized vehicle while drowsy or sleepy. Sleep Hygiene. .. Important practices for better sleep:    Avoid naps. This will ensure you are sleepy at bedtime. If you have to take a nap, sleep less than 1 hour, before 3 pm.  SCHEDULE: Have a fixed bedtime and awakening time. The human body thrives on routines. Only deviate from these set sleep times about 1-2 hours on the weekends (more than this will start altering your internal clock). You will feel better keeping a regular sleep cycle and giving your body a dependable pattern, even (especially) if you are retired or not working. Use light to train your biological clock: When you get up in the morning, exposure yourself to bright lights. When preparing for bed, dim the lights and avoid exposure to screens. The blue light from electronic screens tells our brain that it is time to be awake; it inhibits melatonin production which stops our brain from helping us get to sleep. Go to bed only when sleepy; this reduces the time you are awake in bed (which can lead to frustration and negative thoughts about sleep).  If you can't fall asleep within 15-30 minutes, get up and do something boring

## 2023-10-25 NOTE — TELEPHONE ENCOUNTER
Please notify patient of this. I know she previously mentioned that the study was part of a trial so that may have something to do with being unable to obtain the records.

## 2023-10-25 NOTE — TELEPHONE ENCOUNTER
Sign records release form please then please request prior PSG from [facility] ~8-10 years ago. [address] [facility] records they do not have any sleep studies on patient.

## 2023-12-05 ENCOUNTER — HOSPITAL ENCOUNTER (OUTPATIENT)
Age: 58
Discharge: HOME | End: 2023-12-05
Payer: COMMERCIAL

## 2023-12-05 ENCOUNTER — HOSPITAL ENCOUNTER (OUTPATIENT)
Dept: GENERAL RADIOLOGY | Age: 58
Discharge: HOME | End: 2023-12-05
Payer: COMMERCIAL

## 2023-12-05 DIAGNOSIS — U09.9 POST-COVID SYNDROME: ICD-10-CM

## 2023-12-05 PROCEDURE — 71046 X-RAY EXAM CHEST 2 VIEWS: CPT

## 2024-01-31 ENCOUNTER — TELEPHONE (OUTPATIENT)
Dept: PULMONOLOGY | Age: 59
End: 2024-01-31

## 2024-01-31 NOTE — TELEPHONE ENCOUNTER
Patient did not show for 6 mof/u (restablish last seen 2016) appointment  with Dr. Lopez on 1/31/24    Same Day Cancellation: No    Patient rescheduled:  No    New appointment:     Patient was also no show on: N/A    LOV 10/25/23          ASSESSMENT:  UARS, Upper airway resistance syndrome (RDI 19)  Witnessed apneas   Daytime sleepiness / chronic fatigue   Snoring  Insomnia, sleep onset and maintenance   Short sleeper  Congenital long Q-T syndrome, s/p pacemaker   Polymorphic Vtach f/b cardiology   On chronic opioids PRN for fibromyalgia flares   Comorbid conditions: HLD, depression/anxiety, fibromyalgia   Current smoker     PLAN:   For moderate UARS, I recommend NUPUR treatment with CPAP.  Patient would like to avoid CPAP and fears she would not tolerate.  She would like to pursue weight loss, lifestyle changes and optimize sleep hygiene to improve her symptoms.  I agree with this approach.   Sleep hygiene tips discussed & provided.  Regular schedule.  Avoiding screens at night.   Specifically cautioned: absolutely no driving while fatigued, drowsy or sleepy   Pathophysiology & complications of untreated NUPUR were discussed to include systemic HTN, pulmonary HTN, CV morbidities, car accidents & all-cause mortality   For insomnia: a cognitive behavioral program is the most effective intervention. I recommend the [facility]'s Go! To Sleep! online program for insomnia.   [email or web address]/pages/[email or web address]    Sign records release form, then please request prior PSG from [facility] ~8-10 years ago (pt would like a call with results)   F/U in 6 months or PRN with me.   (FYI pt would like to request an appointment with Dr. Lopez for pulmonary care, states she used to see him but had pulm care elsewhere in the interim; I will defer to him.)

## 2024-03-29 ENCOUNTER — HOSPITAL ENCOUNTER (OUTPATIENT)
Age: 59
Discharge: HOME | End: 2024-03-29
Payer: COMMERCIAL

## 2024-03-29 PROCEDURE — 85025 COMPLETE CBC W/AUTO DIFF WBC: CPT

## 2024-03-29 PROCEDURE — 83880 ASSAY OF NATRIURETIC PEPTIDE: CPT

## 2024-03-29 PROCEDURE — 80053 COMPREHEN METABOLIC PANEL: CPT

## 2024-03-30 LAB
ALBUMIN SERPL-MCNC: 4.6 G/DL (ref 3.4–5)
ALBUMIN/GLOB SERPL: 1.5 {RATIO} (ref 1.1–2.2)
ALP SERPL-CCNC: 146 U/L (ref 40–129)
ALT SERPL-CCNC: 28 U/L (ref 10–40)
ANION GAP SERPL CALCULATED.3IONS-SCNC: 10 MMOL/L (ref 3–16)
AST SERPL-CCNC: 35 U/L (ref 15–37)
BASOPHILS # BLD: 0.1 K/UL (ref 0–0.2)
BASOPHILS NFR BLD: 0.9 %
BILIRUB SERPL-MCNC: 0.4 MG/DL (ref 0–1)
BUN SERPL-MCNC: 14 MG/DL (ref 7–20)
CALCIUM SERPL-MCNC: 9.9 MG/DL (ref 8.3–10.6)
CHLORIDE SERPL-SCNC: 97 MMOL/L (ref 99–110)
CO2 SERPL-SCNC: 28 MMOL/L (ref 21–32)
CREAT SERPL-MCNC: 0.8 MG/DL (ref 0.6–1.1)
DEPRECATED RDW RBC AUTO: 12.8 % (ref 12.4–15.4)
EOSINOPHIL # BLD: 0.1 K/UL (ref 0–0.6)
EOSINOPHIL NFR BLD: 1.8 %
GFR SERPLBLD CREATININE-BSD FMLA CKD-EPI: 86 ML/MIN/{1.73_M2}
GLUCOSE SERPL-MCNC: 97 MG/DL (ref 70–99)
HCT VFR BLD AUTO: 43.6 % (ref 36–48)
HGB BLD-MCNC: 15.1 G/DL (ref 12–16)
LYMPHOCYTES # BLD: 1.6 K/UL (ref 1–5.1)
LYMPHOCYTES NFR BLD: 26.1 %
MCH RBC QN AUTO: 34 PG (ref 26–34)
MCHC RBC AUTO-ENTMCNC: 34.6 G/DL (ref 31–36)
MCV RBC AUTO: 98.1 FL (ref 80–100)
MONOCYTES # BLD: 0.5 K/UL (ref 0–1.3)
MONOCYTES NFR BLD: 8.5 %
NEUTROPHILS # BLD: 3.9 K/UL (ref 1.7–7.7)
NEUTROPHILS NFR BLD: 62.7 %
NT-PROBNP SERPL-MCNC: 83 PG/ML (ref 0–124)
PLATELET # BLD AUTO: 194 K/UL (ref 135–450)
PMV BLD AUTO: 8.8 FL (ref 5–10.5)
POTASSIUM SERPL-SCNC: 4.7 MMOL/L (ref 3.5–5.1)
PROT SERPL-MCNC: 7.6 G/DL (ref 6.4–8.2)
RBC # BLD AUTO: 4.45 M/UL (ref 4–5.2)
SODIUM SERPL-SCNC: 135 MMOL/L (ref 136–145)
WBC # BLD AUTO: 6.2 K/UL (ref 4–11)

## 2024-07-03 ENCOUNTER — ANCILLARY PROCEDURE (OUTPATIENT)
Dept: EMERGENCY DEPT | Age: 59
End: 2024-07-03
Attending: STUDENT IN AN ORGANIZED HEALTH CARE EDUCATION/TRAINING PROGRAM
Payer: COMMERCIAL

## 2024-07-03 ENCOUNTER — APPOINTMENT (OUTPATIENT)
Dept: GENERAL RADIOLOGY | Age: 59
End: 2024-07-03
Payer: COMMERCIAL

## 2024-07-03 ENCOUNTER — HOSPITAL ENCOUNTER (EMERGENCY)
Age: 59
Discharge: HOME | End: 2024-07-03
Attending: STUDENT IN AN ORGANIZED HEALTH CARE EDUCATION/TRAINING PROGRAM
Payer: COMMERCIAL

## 2024-07-03 VITALS
SYSTOLIC BLOOD PRESSURE: 132 MMHG | WEIGHT: 176 LBS | HEIGHT: 66 IN | RESPIRATION RATE: 15 BRPM | BODY MASS INDEX: 28.28 KG/M2 | OXYGEN SATURATION: 98 % | HEART RATE: 72 BPM | TEMPERATURE: 97.7 F | DIASTOLIC BLOOD PRESSURE: 87 MMHG

## 2024-07-03 DIAGNOSIS — R11.2 NAUSEA AND VOMITING, UNSPECIFIED VOMITING TYPE: ICD-10-CM

## 2024-07-03 DIAGNOSIS — R05.1 ACUTE COUGH: Primary | ICD-10-CM

## 2024-07-03 DIAGNOSIS — R74.01 TRANSAMINITIS: ICD-10-CM

## 2024-07-03 LAB
ALBUMIN SERPL-MCNC: 4.5 G/DL (ref 3.4–5)
ALBUMIN/GLOB SERPL: 1.9 {RATIO} (ref 1.1–2.2)
ALP SERPL-CCNC: 138 U/L (ref 40–129)
ALT SERPL-CCNC: 51 U/L (ref 10–40)
ANION GAP SERPL CALCULATED.3IONS-SCNC: 11 MMOL/L (ref 3–16)
AST SERPL-CCNC: 41 U/L (ref 15–37)
BASOPHILS # BLD: 0 K/UL (ref 0–0.2)
BASOPHILS NFR BLD: 0.9 %
BILIRUB SERPL-MCNC: 0.3 MG/DL (ref 0–1)
BUN SERPL-MCNC: 12 MG/DL (ref 7–20)
CALCIUM SERPL-MCNC: 9.1 MG/DL (ref 8.3–10.6)
CHLORIDE SERPL-SCNC: 106 MMOL/L (ref 99–110)
CO2 SERPL-SCNC: 26 MMOL/L (ref 21–32)
CREAT SERPL-MCNC: 0.6 MG/DL (ref 0.6–1.1)
DEPRECATED RDW RBC AUTO: 13.6 % (ref 12.4–15.4)
EOSINOPHIL # BLD: 0.1 K/UL (ref 0–0.6)
EOSINOPHIL NFR BLD: 2.9 %
FLUAV RNA UPPER RESP QL NAA+PROBE: NEGATIVE
FLUBV AG NPH QL: NEGATIVE
GFR SERPLBLD CREATININE-BSD FMLA CKD-EPI: >90 ML/MIN/{1.73_M2}
GLUCOSE SERPL-MCNC: 101 MG/DL (ref 70–99)
HCT VFR BLD AUTO: 40.9 % (ref 36–48)
HGB BLD-MCNC: 14.1 G/DL (ref 12–16)
LIPASE SERPL-CCNC: 55 U/L (ref 13–60)
LYMPHOCYTES # BLD: 1.5 K/UL (ref 1–5.1)
LYMPHOCYTES NFR BLD: 30.9 %
MCH RBC QN AUTO: 33.2 PG (ref 26–34)
MCHC RBC AUTO-ENTMCNC: 34.4 G/DL (ref 31–36)
MCV RBC AUTO: 96.6 FL (ref 80–100)
MONOCYTES # BLD: 0.4 K/UL (ref 0–1.3)
MONOCYTES NFR BLD: 8.9 %
NEUTROPHILS # BLD: 2.7 K/UL (ref 1.7–7.7)
NEUTROPHILS NFR BLD: 56.4 %
PLATELET # BLD AUTO: 166 K/UL (ref 135–450)
PMV BLD AUTO: 8.7 FL (ref 5–10.5)
POTASSIUM SERPL-SCNC: 4 MMOL/L (ref 3.5–5.1)
PROT SERPL-MCNC: 6.9 G/DL (ref 6.4–8.2)
RBC # BLD AUTO: 4.23 M/UL (ref 4–5.2)
SARS-COV-2 RDRP RESP QL NAA+PROBE: NOT DETECTED
SODIUM SERPL-SCNC: 143 MMOL/L (ref 136–145)
WBC # BLD AUTO: 4.8 K/UL (ref 4–11)

## 2024-07-03 PROCEDURE — 80053 COMPREHEN METABOLIC PANEL: CPT

## 2024-07-03 PROCEDURE — 96374 THER/PROPH/DIAG INJ IV PUSH: CPT

## 2024-07-03 PROCEDURE — 85025 COMPLETE CBC W/AUTO DIFF WBC: CPT

## 2024-07-03 PROCEDURE — 71046 X-RAY EXAM CHEST 2 VIEWS: CPT

## 2024-07-03 PROCEDURE — 87804 INFLUENZA ASSAY W/OPTIC: CPT

## 2024-07-03 PROCEDURE — 83690 ASSAY OF LIPASE: CPT

## 2024-07-03 PROCEDURE — 87635 SARS-COV-2 COVID-19 AMP PRB: CPT

## 2024-07-03 PROCEDURE — 2580000003 HC RX 258: Performed by: STUDENT IN AN ORGANIZED HEALTH CARE EDUCATION/TRAINING PROGRAM

## 2024-07-03 PROCEDURE — 76705 ECHO EXAM OF ABDOMEN: CPT

## 2024-07-03 PROCEDURE — 6370000000 HC RX 637 (ALT 250 FOR IP): Performed by: STUDENT IN AN ORGANIZED HEALTH CARE EDUCATION/TRAINING PROGRAM

## 2024-07-03 PROCEDURE — 99284 EMERGENCY DEPT VISIT MOD MDM: CPT

## 2024-07-03 PROCEDURE — 6360000002 HC RX W HCPCS: Performed by: STUDENT IN AN ORGANIZED HEALTH CARE EDUCATION/TRAINING PROGRAM

## 2024-07-03 PROCEDURE — 36415 COLL VENOUS BLD VENIPUNCTURE: CPT

## 2024-07-03 RX ORDER — ONDANSETRON 2 MG/ML
4 INJECTION INTRAMUSCULAR; INTRAVENOUS ONCE
Status: COMPLETED | OUTPATIENT
Start: 2024-07-03 | End: 2024-07-03

## 2024-07-03 RX ORDER — FUROSEMIDE 40 MG/1
40 TABLET ORAL 2 TIMES DAILY
COMMUNITY

## 2024-07-03 RX ORDER — ACETAMINOPHEN 500 MG
1000 TABLET ORAL ONCE
Status: COMPLETED | OUTPATIENT
Start: 2024-07-03 | End: 2024-07-03

## 2024-07-03 RX ORDER — SODIUM CHLORIDE, SODIUM LACTATE, POTASSIUM CHLORIDE, AND CALCIUM CHLORIDE .6; .31; .03; .02 G/100ML; G/100ML; G/100ML; G/100ML
1000 INJECTION, SOLUTION INTRAVENOUS ONCE
Status: COMPLETED | OUTPATIENT
Start: 2024-07-03 | End: 2024-07-03

## 2024-07-03 RX ORDER — ONDANSETRON 4 MG/1
4 TABLET, ORALLY DISINTEGRATING ORAL 3 TIMES DAILY PRN
Qty: 21 TABLET | Refills: 0 | Status: SHIPPED | OUTPATIENT
Start: 2024-07-03

## 2024-07-03 RX ADMIN — SODIUM CHLORIDE, POTASSIUM CHLORIDE, SODIUM LACTATE AND CALCIUM CHLORIDE 1000 ML: 600; 310; 30; 20 INJECTION, SOLUTION INTRAVENOUS at 03:48

## 2024-07-03 RX ADMIN — ONDANSETRON 4 MG: 2 INJECTION INTRAMUSCULAR; INTRAVENOUS at 03:49

## 2024-07-03 RX ADMIN — ACETAMINOPHEN 1000 MG: 500 TABLET ORAL at 03:49

## 2024-07-03 ASSESSMENT — PAIN - FUNCTIONAL ASSESSMENT
PAIN_FUNCTIONAL_ASSESSMENT: 0-10
PAIN_FUNCTIONAL_ASSESSMENT: NONE - DENIES PAIN
PAIN_FUNCTIONAL_ASSESSMENT: NONE - DENIES PAIN

## 2024-07-03 ASSESSMENT — PAIN DESCRIPTION - PAIN TYPE: TYPE: ACUTE PAIN

## 2024-07-03 ASSESSMENT — PAIN DESCRIPTION - LOCATION: LOCATION: HEAD

## 2024-07-03 ASSESSMENT — PAIN DESCRIPTION - DESCRIPTORS: DESCRIPTORS: ACHING

## 2024-07-03 ASSESSMENT — PAIN SCALES - GENERAL
PAINLEVEL_OUTOF10: 4
PAINLEVEL_OUTOF10: 4

## 2024-07-03 NOTE — DISCHARGE INSTRUCTIONS
You were evaluated in the emergency department for concern for cough and nausea. Assessments and testing completed during your visit were reassuring and at this time there is no indication for further testing, treatment or admission to the hospital. Given this it is appropriate to discharge you from the emergency department. At the time of discharge we discussed the following:    Please review this visit to the emergency department with your primary doctor as well as GI specialist for further recommendations including follow-up of your liver enzymes.  I have prescribed a antinausea medication to use as needed    Please note that sometimes it is difficult to diagnose a medical condition early in the disease process before the disease is fully manifest. Because of this, should you develop any new or worsening symptoms, you may return at any time to the emergency department for another evaluation. If available you are also recommended to review this visit with your primary care physician or other medical provider in the next 7 days. Thank you for allowing us to care for you today.

## 2024-11-16 ENCOUNTER — HOSPITAL ENCOUNTER (EMERGENCY)
Age: 59
Discharge: HOME | End: 2024-11-16
Attending: INTERNAL MEDICINE

## 2024-11-16 VITALS
HEIGHT: 66 IN | SYSTOLIC BLOOD PRESSURE: 105 MMHG | WEIGHT: 160.5 LBS | OXYGEN SATURATION: 98 % | BODY MASS INDEX: 25.79 KG/M2 | DIASTOLIC BLOOD PRESSURE: 74 MMHG | HEART RATE: 78 BPM | TEMPERATURE: 98.9 F | RESPIRATION RATE: 16 BRPM

## 2024-11-16 DIAGNOSIS — R10.13 ACUTE EPIGASTRIC PAIN: ICD-10-CM

## 2024-11-16 DIAGNOSIS — R11.14 BILIOUS VOMITING WITH NAUSEA: Primary | ICD-10-CM

## 2024-11-16 LAB
ALBUMIN SERPL-MCNC: 4.9 G/DL (ref 3.4–5)
ALP SERPL-CCNC: 119 U/L (ref 40–129)
ALT SERPL-CCNC: 51 U/L (ref 10–40)
ANION GAP SERPL CALCULATED.3IONS-SCNC: 18 MMOL/L (ref 3–16)
AST SERPL-CCNC: 61 U/L (ref 15–37)
BASE EXCESS BLDV CALC-SCNC: 4.4 MMOL/L (ref -3–3)
BASOPHILS # BLD: 0.1 K/UL (ref 0–0.2)
BASOPHILS NFR BLD: 0.8 %
BILIRUB DIRECT SERPL-MCNC: 0.2 MG/DL (ref 0–0.3)
BILIRUB INDIRECT SERPL-MCNC: 0.5 MG/DL (ref 0–1)
BILIRUB SERPL-MCNC: 0.7 MG/DL (ref 0–1)
BUN SERPL-MCNC: 20 MG/DL (ref 7–20)
CALCIUM SERPL-MCNC: 11.5 MG/DL (ref 8.3–10.6)
CHLORIDE SERPL-SCNC: 91 MMOL/L (ref 99–110)
CO2 BLDV-SCNC: 28 MMOL/L
CO2 SERPL-SCNC: 30 MMOL/L (ref 21–32)
COHGB MFR BLDV: 1.5 % (ref 0–1.5)
CREAT SERPL-MCNC: 0.7 MG/DL (ref 0.6–1.1)
DEPRECATED RDW RBC AUTO: 13.9 % (ref 12.4–15.4)
EOSINOPHIL # BLD: 0.1 K/UL (ref 0–0.6)
EOSINOPHIL NFR BLD: 0.8 %
GFR SERPLBLD CREATININE-BSD FMLA CKD-EPI: >90 ML/MIN/{1.73_M2}
GLUCOSE SERPL-MCNC: 124 MG/DL (ref 70–99)
HCO3 BLDV-SCNC: 27.1 MMOL/L (ref 23–29)
HCT VFR BLD AUTO: 44.4 % (ref 36–48)
HGB BLD-MCNC: 14.9 G/DL (ref 12–16)
LIPASE SERPL-CCNC: 54 U/L (ref 13–60)
LYMPHOCYTES # BLD: 1.4 K/UL (ref 1–5.1)
LYMPHOCYTES NFR BLD: 20.2 %
MCH RBC QN AUTO: 32.9 PG (ref 26–34)
MCHC RBC AUTO-ENTMCNC: 33.5 G/DL (ref 31–36)
MCV RBC AUTO: 98.2 FL (ref 80–100)
METHGB MFR BLDV: 0.3 %
MONOCYTES # BLD: 0.6 K/UL (ref 0–1.3)
MONOCYTES NFR BLD: 8.5 %
NEUTROPHILS # BLD: 4.9 K/UL (ref 1.7–7.7)
NEUTROPHILS NFR BLD: 69.7 %
O2 THERAPY: ABNORMAL
PCO2 BLDV: 34.7 MMHG (ref 40–50)
PH BLDV: 7.51 [PH] (ref 7.35–7.45)
PLATELET # BLD AUTO: 169 K/UL (ref 135–450)
PMV BLD AUTO: 9 FL (ref 5–10.5)
PO2 BLDV: 80.4 MMHG (ref 25–40)
POTASSIUM SERPL-SCNC: 4.1 MMOL/L (ref 3.5–5.1)
PROT SERPL-MCNC: 7.8 G/DL (ref 6.4–8.2)
RBC # BLD AUTO: 4.52 M/UL (ref 4–5.2)
SAO2 % BLDV: 97 %
SODIUM SERPL-SCNC: 139 MMOL/L (ref 136–145)
WBC # BLD AUTO: 7 K/UL (ref 4–11)

## 2024-11-16 PROCEDURE — 96375 TX/PRO/DX INJ NEW DRUG ADDON: CPT

## 2024-11-16 PROCEDURE — 82803 BLOOD GASES ANY COMBINATION: CPT

## 2024-11-16 PROCEDURE — 96374 THER/PROPH/DIAG INJ IV PUSH: CPT

## 2024-11-16 PROCEDURE — 83690 ASSAY OF LIPASE: CPT

## 2024-11-16 PROCEDURE — 6360000002 HC RX W HCPCS: Performed by: INTERNAL MEDICINE

## 2024-11-16 PROCEDURE — 6370000000 HC RX 637 (ALT 250 FOR IP): Performed by: INTERNAL MEDICINE

## 2024-11-16 PROCEDURE — 80076 HEPATIC FUNCTION PANEL: CPT

## 2024-11-16 PROCEDURE — 36415 COLL VENOUS BLD VENIPUNCTURE: CPT

## 2024-11-16 PROCEDURE — 85025 COMPLETE CBC W/AUTO DIFF WBC: CPT

## 2024-11-16 PROCEDURE — 80048 BASIC METABOLIC PNL TOTAL CA: CPT

## 2024-11-16 PROCEDURE — 99284 EMERGENCY DEPT VISIT MOD MDM: CPT

## 2024-11-16 RX ORDER — KETOROLAC TROMETHAMINE 15 MG/ML
15 INJECTION, SOLUTION INTRAMUSCULAR; INTRAVENOUS ONCE
Status: COMPLETED | OUTPATIENT
Start: 2024-11-16 | End: 2024-11-16

## 2024-11-16 RX ORDER — SUCRALFATE 1 G/1
1 TABLET ORAL 4 TIMES DAILY
Qty: 12 TABLET | Refills: 0 | Status: SHIPPED | OUTPATIENT
Start: 2024-11-16 | End: 2024-11-19

## 2024-11-16 RX ORDER — PANTOPRAZOLE SODIUM 40 MG/10ML
40 INJECTION, POWDER, LYOPHILIZED, FOR SOLUTION INTRAVENOUS ONCE
Status: COMPLETED | OUTPATIENT
Start: 2024-11-16 | End: 2024-11-16

## 2024-11-16 RX ORDER — PANTOPRAZOLE SODIUM 40 MG/1
40 TABLET, DELAYED RELEASE ORAL
Qty: 14 TABLET | Refills: 0 | Status: SHIPPED | OUTPATIENT
Start: 2024-11-16 | End: 2024-11-30

## 2024-11-16 RX ADMIN — PANTOPRAZOLE SODIUM 40 MG: 40 INJECTION, POWDER, FOR SOLUTION INTRAVENOUS at 11:46

## 2024-11-16 RX ADMIN — KETOROLAC TROMETHAMINE 15 MG: 15 INJECTION, SOLUTION INTRAMUSCULAR; INTRAVENOUS at 11:46

## 2024-11-16 RX ADMIN — LIDOCAINE HYDROCHLORIDE: 20 SOLUTION ORAL at 12:59

## 2024-11-16 ASSESSMENT — PAIN DESCRIPTION - ORIENTATION
ORIENTATION: RIGHT;LEFT;MID
ORIENTATION: MID;RIGHT;LEFT
ORIENTATION: RIGHT;LEFT;MID

## 2024-11-16 ASSESSMENT — PAIN SCALES - GENERAL
PAINLEVEL_OUTOF10: 7
PAINLEVEL_OUTOF10: 3
PAINLEVEL_OUTOF10: 7

## 2024-11-16 ASSESSMENT — PAIN DESCRIPTION - FREQUENCY
FREQUENCY: CONTINUOUS
FREQUENCY: CONTINUOUS

## 2024-11-16 ASSESSMENT — PAIN DESCRIPTION - LOCATION
LOCATION: ABDOMEN

## 2024-11-16 ASSESSMENT — PAIN DESCRIPTION - DESCRIPTORS
DESCRIPTORS: CRAMPING
DESCRIPTORS: CRAMPING
DESCRIPTORS: CRAMPING;SHARP

## 2024-11-16 ASSESSMENT — PAIN - FUNCTIONAL ASSESSMENT
PAIN_FUNCTIONAL_ASSESSMENT: ACTIVITIES ARE NOT PREVENTED
PAIN_FUNCTIONAL_ASSESSMENT: 0-10
PAIN_FUNCTIONAL_ASSESSMENT: 0-10
PAIN_FUNCTIONAL_ASSESSMENT: ACTIVITIES ARE NOT PREVENTED

## 2024-11-16 ASSESSMENT — PAIN DESCRIPTION - ONSET
ONSET: ON-GOING
ONSET: ON-GOING

## 2024-11-16 ASSESSMENT — PAIN DESCRIPTION - PAIN TYPE
TYPE: ACUTE PAIN
TYPE: ACUTE PAIN

## 2024-11-16 NOTE — ED PROVIDER NOTES
EMERGENCY MEDICINE PROVIDER NOTE    Patient Identification  Pt Name: Juan Alberto Benavidez  MRN: [id number]  Birthdate 1965  Date of evaluation: 11/16/2024  Provider: ESTRELLA BARRETO DO  PCP: Brent Ferreira MD    Chief Complaint  Vomiting (Pt states she started vomiting blood 3x this AM. )      HPI  (History provided by patient)  This is a 59 y.o. female who was brought in by self for vomiting dark material x 3 this morning.  Patient states that she usually vomits every morning for the past couple months.  She denies any dark stools.  She states that she had some pizza yesterday and had diarrhea.  Patient is epigastric abdominal pain.  Patient denies any dizziness or weakness.  Patient denies any chest pain or shortness of breath.  Patient denies fever or chills.  The pain is mild to moderate in intensity.  The pain does not radiate to her back.  Patient did show me a picture of vomit that was dark.    I have reviewed the following nursing documentation:  Allergies: Quinolones, Dexamethasone sodium phosphate, Other, Codeine, Nitroglycerin, and Prednisone    Past medical history:   Past Medical History:   Diagnosis Date    Asthma     Atrial septal defect     had insertion of atrial septal occluder    Bursitis of left hip     CAD (coronary artery disease)     COPD (chronic obstructive pulmonary disease) (HCC)     History of blood transfusion     Kidney stone     Migraines     Mitral valve prolapse     MRSA (methicillin resistant staph aureus) culture positive 12/2015    Lungs.  Diagnosed [facility] with bronchoscopy    MVP (mitral valve prolapse)     PONV (postoperative nausea and vomiting)     Prolonged QT interval syndrome     S/P bronchoscopy     TIA (transient ischemic attack)     Vertigo      Past surgical history:   Past Surgical History:   Procedure Laterality Date    BREAST ENHANCEMENT SURGERY      augmentation    CARDIAC CATHETERIZATION      CARDIAC SURGERY      septum occluder    COLONOSCOPY  2012?

## 2024-12-12 ENCOUNTER — HOSPITAL ENCOUNTER (EMERGENCY)
Age: 59
Discharge: HOME | End: 2024-12-12

## 2024-12-12 ENCOUNTER — APPOINTMENT (OUTPATIENT)
Dept: CT IMAGING | Age: 59
End: 2024-12-12

## 2024-12-12 VITALS
DIASTOLIC BLOOD PRESSURE: 88 MMHG | HEART RATE: 82 BPM | TEMPERATURE: 97.8 F | WEIGHT: 166 LBS | BODY MASS INDEX: 26.68 KG/M2 | HEIGHT: 66 IN | RESPIRATION RATE: 16 BRPM | SYSTOLIC BLOOD PRESSURE: 126 MMHG | OXYGEN SATURATION: 100 %

## 2024-12-12 DIAGNOSIS — R10.10 PAIN OF UPPER ABDOMEN: Primary | ICD-10-CM

## 2024-12-12 LAB
ALBUMIN SERPL-MCNC: 4.1 G/DL (ref 3.4–5)
ALBUMIN/GLOB SERPL: 1.8 {RATIO} (ref 1.1–2.2)
ALP SERPL-CCNC: 121 U/L (ref 40–129)
ALT SERPL-CCNC: 36 U/L (ref 10–40)
ANION GAP SERPL CALCULATED.3IONS-SCNC: 11 MMOL/L (ref 3–16)
AST SERPL-CCNC: 45 U/L (ref 15–37)
BASOPHILS # BLD: 0.1 K/UL (ref 0–0.2)
BASOPHILS NFR BLD: 1.1 %
BILIRUB SERPL-MCNC: 0.7 MG/DL (ref 0–1)
BILIRUB UR QL STRIP.AUTO: ABNORMAL
BUN SERPL-MCNC: 9 MG/DL (ref 7–20)
CALCIUM SERPL-MCNC: 9.3 MG/DL (ref 8.3–10.6)
CHLORIDE SERPL-SCNC: 101 MMOL/L (ref 99–110)
CLARITY UR: CLEAR
CO2 SERPL-SCNC: 29 MMOL/L (ref 21–32)
COLOR UR: YELLOW
CREAT SERPL-MCNC: 0.7 MG/DL (ref 0.6–1.1)
DEPRECATED RDW RBC AUTO: 13.8 % (ref 12.4–15.4)
EOSINOPHIL # BLD: 0.1 K/UL (ref 0–0.6)
EOSINOPHIL NFR BLD: 1.6 %
GFR SERPLBLD CREATININE-BSD FMLA CKD-EPI: >90 ML/MIN/{1.73_M2}
GLUCOSE SERPL-MCNC: 100 MG/DL (ref 70–99)
GLUCOSE UR STRIP.AUTO-MCNC: NEGATIVE MG/DL
HCT VFR BLD AUTO: 41.4 % (ref 36–48)
HGB BLD-MCNC: 14.3 G/DL (ref 12–16)
HGB UR QL STRIP.AUTO: NEGATIVE
KETONES UR STRIP.AUTO-MCNC: ABNORMAL MG/DL
LEUKOCYTE ESTERASE UR QL STRIP.AUTO: NEGATIVE
LIPASE SERPL-CCNC: 62 U/L (ref 13–60)
LYMPHOCYTES # BLD: 1.5 K/UL (ref 1–5.1)
LYMPHOCYTES NFR BLD: 27.4 %
MAGNESIUM SERPL-MCNC: 1.7 MG/DL (ref 1.8–2.4)
MCH RBC QN AUTO: 34.3 PG (ref 26–34)
MCHC RBC AUTO-ENTMCNC: 34.6 G/DL (ref 31–36)
MCV RBC AUTO: 99.2 FL (ref 80–100)
MONOCYTES # BLD: 0.6 K/UL (ref 0–1.3)
MONOCYTES NFR BLD: 9.9 %
NEUTROPHILS # BLD: 3.4 K/UL (ref 1.7–7.7)
NEUTROPHILS NFR BLD: 60 %
NITRITE UR QL STRIP.AUTO: NEGATIVE
PH UR STRIP.AUTO: 8 [PH] (ref 5–8)
PLATELET # BLD AUTO: 173 K/UL (ref 135–450)
PMV BLD AUTO: 8.9 FL (ref 5–10.5)
POTASSIUM SERPL-SCNC: 3.5 MMOL/L (ref 3.5–5.1)
PROT SERPL-MCNC: 6.4 G/DL (ref 6.4–8.2)
PROT UR STRIP.AUTO-MCNC: NEGATIVE MG/DL
RBC # BLD AUTO: 4.17 M/UL (ref 4–5.2)
SODIUM SERPL-SCNC: 141 MMOL/L (ref 136–145)
SP GR UR STRIP.AUTO: 1.01 (ref 1–1.03)
UA COMPLETE W REFLEX CULTURE PNL UR: ABNORMAL
UA DIPSTICK W REFLEX MICRO PNL UR: ABNORMAL
URN SPEC COLLECT METH UR: ABNORMAL
UROBILINOGEN UR STRIP-ACNC: 0.2 E.U./DL
WBC # BLD AUTO: 5.6 K/UL (ref 4–11)

## 2024-12-12 PROCEDURE — 6360000004 HC RX CONTRAST MEDICATION: Performed by: PHYSICIAN ASSISTANT

## 2024-12-12 PROCEDURE — 96374 THER/PROPH/DIAG INJ IV PUSH: CPT

## 2024-12-12 PROCEDURE — 83690 ASSAY OF LIPASE: CPT

## 2024-12-12 PROCEDURE — 83735 ASSAY OF MAGNESIUM: CPT

## 2024-12-12 PROCEDURE — 85025 COMPLETE CBC W/AUTO DIFF WBC: CPT

## 2024-12-12 PROCEDURE — 74177 CT ABD & PELVIS W/CONTRAST: CPT

## 2024-12-12 PROCEDURE — 6360000002 HC RX W HCPCS: Performed by: PHYSICIAN ASSISTANT

## 2024-12-12 PROCEDURE — 99285 EMERGENCY DEPT VISIT HI MDM: CPT

## 2024-12-12 PROCEDURE — 80053 COMPREHEN METABOLIC PANEL: CPT

## 2024-12-12 PROCEDURE — 81003 URINALYSIS AUTO W/O SCOPE: CPT

## 2024-12-12 RX ORDER — KETOROLAC TROMETHAMINE 15 MG/ML
15 INJECTION, SOLUTION INTRAMUSCULAR; INTRAVENOUS ONCE
Status: DISCONTINUED | OUTPATIENT
Start: 2024-12-12 | End: 2024-12-12 | Stop reason: HOSPADM

## 2024-12-12 RX ORDER — KETOROLAC TROMETHAMINE 15 MG/ML
15 INJECTION, SOLUTION INTRAMUSCULAR; INTRAVENOUS ONCE
Status: COMPLETED | OUTPATIENT
Start: 2024-12-12 | End: 2024-12-12

## 2024-12-12 RX ORDER — IOPAMIDOL 755 MG/ML
75 INJECTION, SOLUTION INTRAVASCULAR
Status: COMPLETED | OUTPATIENT
Start: 2024-12-12 | End: 2024-12-12

## 2024-12-12 RX ORDER — DICYCLOMINE HYDROCHLORIDE 10 MG/1
10 CAPSULE ORAL
Qty: 10 CAPSULE | Refills: 0 | Status: CANCELLED | OUTPATIENT
Start: 2024-12-12

## 2024-12-12 RX ADMIN — IOPAMIDOL 75 ML: 755 INJECTION, SOLUTION INTRAVENOUS at 13:22

## 2024-12-12 RX ADMIN — KETOROLAC TROMETHAMINE 15 MG: 15 INJECTION, SOLUTION INTRAMUSCULAR; INTRAVENOUS at 12:26

## 2024-12-12 ASSESSMENT — PAIN DESCRIPTION - ORIENTATION: ORIENTATION: LOWER

## 2024-12-12 ASSESSMENT — LIFESTYLE VARIABLES
HOW OFTEN DO YOU HAVE A DRINK CONTAINING ALCOHOL: 2-3 TIMES A WEEK
HOW MANY STANDARD DRINKS CONTAINING ALCOHOL DO YOU HAVE ON A TYPICAL DAY: 1 OR 2

## 2024-12-12 ASSESSMENT — PAIN DESCRIPTION - LOCATION: LOCATION: ABDOMEN

## 2024-12-12 ASSESSMENT — PAIN - FUNCTIONAL ASSESSMENT: PAIN_FUNCTIONAL_ASSESSMENT: 0-10

## 2024-12-12 ASSESSMENT — PAIN SCALES - GENERAL: PAINLEVEL_OUTOF10: 9

## 2024-12-12 NOTE — DISCHARGE INSTRUCTIONS
Follow-up with family doctor.  May try other antacid type medications rather than Protonix if you choose to.  Avoid alcohol.  Return for new or worsening.

## 2024-12-12 NOTE — ED PROVIDER NOTES
[facility] ED  EMERGENCY DEPARTMENT ENCOUNTER        Pt Name: Juan Alberto Benavidez  MRN: [id number]  Birthdate 1965  Date of evaluation: 12/12/2024  Provider: Librado Marks PA-C  PCP: Brent Ferreira MD  Note Started: 1:14 PM EST 12/12/24      PATEL. I have evaluated this patient.        CHIEF COMPLAINT       Chief Complaint   Patient presents with    Abdominal Pain     Black emesis since nov 16, was at [place] and sent home. States her pcp wont do anything else for her or schedule scope. Has appt jan 8th but can't wait that long. C/o lower abd pain and diarrhea       HISTORY OF PRESENT ILLNESS: 1 or more Elements     History From: Patient    Limitations to history : None    Social Determinants Significantly Affecting Health : None    Chief Complaint: Ongoing upper abdominal pain    Juan Alberto Benavidez is a 59 y.o. female who presents to the ED complaining of upper abdominal pain does not radiate.  She also reports vomiting in the mornings.  She had an episode of vomiting blood about 1 month ago.  She was referred and has a referral appointment on 6 January but she is not getting better so she returned to the ED.  Patient reports 3-4 history of morning emesis along with upper abdominal pain and diarrhea.  Patient reports pain with eating.  She denies fever, rash, neck pain or stiffness, chest pain or difficulty breathing, hematochezia, melena, hematemesis, hematuria, urinary frequency, urinary urgency, dysuria, incontinence, constipation, paresthesias or any additional systemic or neurologic complaints.    Nursing Notes were all reviewed and agreed with or any disagreements were addressed in the HPI.    REVIEW OF SYSTEMS :      Review of Systems    Positives and Pertinent negatives as per HPI.     SURGICAL HISTORY     Past Surgical History:   Procedure Laterality Date    BREAST ENHANCEMENT SURGERY      augmentation    CARDIAC CATHETERIZATION      CARDIAC SURGERY      septum occluder

## 2024-12-22 ENCOUNTER — HOSPITAL ENCOUNTER (EMERGENCY)
Age: 59
Discharge: HOME | End: 2024-12-22
Attending: INTERNAL MEDICINE

## 2024-12-22 ENCOUNTER — APPOINTMENT (OUTPATIENT)
Dept: GENERAL RADIOLOGY | Age: 59
End: 2024-12-22
Attending: INTERNAL MEDICINE

## 2024-12-22 VITALS
WEIGHT: 162.1 LBS | TEMPERATURE: 97.7 F | DIASTOLIC BLOOD PRESSURE: 76 MMHG | OXYGEN SATURATION: 98 % | RESPIRATION RATE: 16 BRPM | BODY MASS INDEX: 26.05 KG/M2 | HEART RATE: 71 BPM | HEIGHT: 66 IN | SYSTOLIC BLOOD PRESSURE: 119 MMHG

## 2024-12-22 DIAGNOSIS — M62.838 SPASM OF MUSCLE: Primary | ICD-10-CM

## 2024-12-22 LAB
AMORPH SED URNS QL MICRO: ABNORMAL /HPF
ANION GAP SERPL CALCULATED.3IONS-SCNC: 17 MMOL/L (ref 3–16)
BACTERIA URNS QL MICRO: ABNORMAL /HPF
BASOPHILS # BLD: 0.1 K/UL (ref 0–0.2)
BASOPHILS NFR BLD: 1.1 %
BILIRUB UR QL STRIP.AUTO: NEGATIVE
BUN SERPL-MCNC: 11 MG/DL (ref 7–20)
CALCIUM SERPL-MCNC: 9.7 MG/DL (ref 8.3–10.6)
CHLORIDE SERPL-SCNC: 99 MMOL/L (ref 99–110)
CLARITY UR: CLEAR
CO2 SERPL-SCNC: 26 MMOL/L (ref 21–32)
COLOR UR: YELLOW
CREAT SERPL-MCNC: 0.6 MG/DL (ref 0.6–1.1)
DEPRECATED RDW RBC AUTO: 14.3 % (ref 12.4–15.4)
EKG ATRIAL RATE: 72 BPM
EKG DIAGNOSIS: NORMAL
EKG P-R INTERVAL: 160 MS
EKG Q-T INTERVAL: 492 MS
EKG QRS DURATION: 74 MS
EKG QTC CALCULATION (BAZETT): 538 MS
EKG R AXIS: 78 DEGREES
EKG T AXIS: 70 DEGREES
EKG VENTRICULAR RATE: 72 BPM
EOSINOPHIL # BLD: 0.1 K/UL (ref 0–0.6)
EOSINOPHIL NFR BLD: 1.4 %
EPI CELLS #/AREA URNS HPF: ABNORMAL /HPF (ref 0–5)
ETHANOLAMINE SERPL-MCNC: NORMAL MG/DL (ref 0–0.08)
GFR SERPLBLD CREATININE-BSD FMLA CKD-EPI: >90 ML/MIN/{1.73_M2}
GLUCOSE BLD-MCNC: 136 MG/DL (ref 70–99)
GLUCOSE SERPL-MCNC: 124 MG/DL (ref 70–99)
GLUCOSE UR STRIP.AUTO-MCNC: NEGATIVE MG/DL
HCT VFR BLD AUTO: 44.2 % (ref 36–48)
HGB BLD-MCNC: 14.7 G/DL (ref 12–16)
HGB UR QL STRIP.AUTO: NEGATIVE
KETONES UR STRIP.AUTO-MCNC: 15 MG/DL
LEUKOCYTE ESTERASE UR QL STRIP.AUTO: NEGATIVE
LYMPHOCYTES # BLD: 1 K/UL (ref 1–5.1)
LYMPHOCYTES NFR BLD: 11.4 %
MAGNESIUM SERPL-MCNC: 1.8 MG/DL (ref 1.8–2.4)
MCH RBC QN AUTO: 33.4 PG (ref 26–34)
MCHC RBC AUTO-ENTMCNC: 33.4 G/DL (ref 31–36)
MCV RBC AUTO: 99.9 FL (ref 80–100)
MONOCYTES # BLD: 0.5 K/UL (ref 0–1.3)
MONOCYTES NFR BLD: 6 %
MUCOUS THREADS #/AREA URNS LPF: ABNORMAL /LPF
NEUTROPHILS # BLD: 6.8 K/UL (ref 1.7–7.7)
NEUTROPHILS NFR BLD: 80.1 %
NITRITE UR QL STRIP.AUTO: NEGATIVE
PERFORMED ON: ABNORMAL
PH UR STRIP.AUTO: 7 [PH] (ref 5–8)
PLATELET # BLD AUTO: 223 K/UL (ref 135–450)
PMV BLD AUTO: 8.8 FL (ref 5–10.5)
POTASSIUM SERPL-SCNC: 4.2 MMOL/L (ref 3.5–5.1)
PROT UR STRIP.AUTO-MCNC: 30 MG/DL
RBC # BLD AUTO: 4.42 M/UL (ref 4–5.2)
RBC #/AREA URNS HPF: ABNORMAL /HPF (ref 0–4)
SODIUM SERPL-SCNC: 142 MMOL/L (ref 136–145)
SP GR UR STRIP.AUTO: 1.01 (ref 1–1.03)
TROPONIN, HIGH SENSITIVITY: 10 NG/L (ref 0–14)
UA COMPLETE W REFLEX CULTURE PNL UR: ABNORMAL
UA DIPSTICK W REFLEX MICRO PNL UR: YES
URN SPEC COLLECT METH UR: ABNORMAL
UROBILINOGEN UR STRIP-ACNC: 0.2 E.U./DL
WBC # BLD AUTO: 8.5 K/UL (ref 4–11)
WBC #/AREA URNS HPF: ABNORMAL /HPF (ref 0–5)

## 2024-12-22 PROCEDURE — 82077 ASSAY SPEC XCP UR&BREATH IA: CPT

## 2024-12-22 PROCEDURE — 99285 EMERGENCY DEPT VISIT HI MDM: CPT

## 2024-12-22 PROCEDURE — 84484 ASSAY OF TROPONIN QUANT: CPT

## 2024-12-22 PROCEDURE — 81001 URINALYSIS AUTO W/SCOPE: CPT

## 2024-12-22 PROCEDURE — 80048 BASIC METABOLIC PNL TOTAL CA: CPT

## 2024-12-22 PROCEDURE — 71045 X-RAY EXAM CHEST 1 VIEW: CPT

## 2024-12-22 PROCEDURE — 6370000000 HC RX 637 (ALT 250 FOR IP): Performed by: INTERNAL MEDICINE

## 2024-12-22 PROCEDURE — 36415 COLL VENOUS BLD VENIPUNCTURE: CPT

## 2024-12-22 PROCEDURE — 85025 COMPLETE CBC W/AUTO DIFF WBC: CPT

## 2024-12-22 PROCEDURE — 83735 ASSAY OF MAGNESIUM: CPT

## 2024-12-22 PROCEDURE — 93005 ELECTROCARDIOGRAM TRACING: CPT | Performed by: INTERNAL MEDICINE

## 2024-12-22 PROCEDURE — 93010 ELECTROCARDIOGRAM REPORT: CPT | Performed by: INTERNAL MEDICINE

## 2024-12-22 RX ORDER — DIAZEPAM 5 MG/1
5 TABLET ORAL ONCE
Status: COMPLETED | OUTPATIENT
Start: 2024-12-22 | End: 2024-12-22

## 2024-12-22 RX ADMIN — DIAZEPAM 5 MG: 5 TABLET ORAL at 06:45

## 2024-12-22 ASSESSMENT — PAIN - FUNCTIONAL ASSESSMENT
PAIN_FUNCTIONAL_ASSESSMENT: NONE - DENIES PAIN
PAIN_FUNCTIONAL_ASSESSMENT: NONE - DENIES PAIN

## 2024-12-22 NOTE — ED PROVIDER NOTES
Patient signed out to me.    Patient presented with tremor in her left arm and face that started at 3 AM.  Reported by previous physician and nurses that it was intermittent and all over her body at times.  He states that when she stopped and she would just sit there when she would move her arms up it would twitch.  Nursing staff noted that when they were trying to do the EKG and put the leads on her it would be very random and her right arm that her left arm that her right leg or her left leg.    Patient has extensive cardiac history including diagnosis of V. tach over 30 to 40 years ago.  She also has prolonged QT syndrome.  She has had pacemaker for over 30 years and has had it replaced previously as well as had to have a revision as one of the leads dissected from the pectoralis muscle but that was almost 25 years ago.  She also had a septal defect that was corrected in 2009.    Patient's vital signs here are stable.  Her CBC and chemistry are unremarkable.  Troponin is normal.  Patient does admit to drinking alcohol but did not recall the last time that she had anything to drink.  Alcohol level is normal.  Low suspicion for alcohol withdrawal.  Chest x-ray normal.  Troponin is negative.  EKG shows atrial paced rhythm.    Patient was given Valium prior to my arrival.  I went to reevaluate her about 45 minutes later.  She was laying on her side sleeping.  When she woke up she states that she felt better and would like to go home.  Unclear etiology at this time but low suspicion for pacemaker defect, arrhythmia, electrolyte abnormality, stroke, seizure, alcohol withdrawal.   is at bedside.  Will discharge patient home at this time and have her call her primary care physician tomorrow for close outpatient follow-up.    Final impression  Muscle spasms       Vandana Matute MD  12/22/24 0770

## 2025-07-11 ENCOUNTER — APPOINTMENT (OUTPATIENT)
Dept: GENERAL RADIOLOGY | Age: 60
End: 2025-07-11
Payer: COMMERCIAL

## 2025-07-11 ENCOUNTER — HOSPITAL ENCOUNTER (EMERGENCY)
Age: 60
Discharge: HOME | End: 2025-07-11
Attending: EMERGENCY MEDICINE
Payer: COMMERCIAL

## 2025-07-11 VITALS
HEIGHT: 66 IN | RESPIRATION RATE: 16 BRPM | HEART RATE: 68 BPM | BODY MASS INDEX: 24.75 KG/M2 | DIASTOLIC BLOOD PRESSURE: 78 MMHG | OXYGEN SATURATION: 98 % | TEMPERATURE: 97.8 F | WEIGHT: 154 LBS | SYSTOLIC BLOOD PRESSURE: 102 MMHG

## 2025-07-11 DIAGNOSIS — Z77.120 MOLD EXPOSURE: Primary | ICD-10-CM

## 2025-07-11 DIAGNOSIS — R05.1 ACUTE COUGH: ICD-10-CM

## 2025-07-11 LAB
ALBUMIN SERPL-MCNC: 4.4 G/DL (ref 3.4–5)
ALBUMIN/GLOB SERPL: 1.8 {RATIO} (ref 1.1–2.2)
ALP SERPL-CCNC: 149 U/L (ref 40–129)
ALT SERPL-CCNC: 64 U/L (ref 10–40)
ANION GAP SERPL CALCULATED.3IONS-SCNC: 20 MMOL/L (ref 3–16)
AST SERPL-CCNC: 102 U/L (ref 15–37)
BASOPHILS # BLD: 0.1 K/UL (ref 0–0.2)
BASOPHILS NFR BLD: 1 %
BILIRUB SERPL-MCNC: 0.4 MG/DL (ref 0–1)
BUN SERPL-MCNC: 7 MG/DL (ref 7–20)
CALCIUM SERPL-MCNC: 9.7 MG/DL (ref 8.3–10.6)
CHLORIDE SERPL-SCNC: 99 MMOL/L (ref 99–110)
CO2 SERPL-SCNC: 20 MMOL/L (ref 21–32)
CREAT SERPL-MCNC: 0.6 MG/DL (ref 0.6–1.1)
DEPRECATED RDW RBC AUTO: 14.7 % (ref 12.4–15.4)
EOSINOPHIL # BLD: 0.1 K/UL (ref 0–0.6)
EOSINOPHIL NFR BLD: 1.4 %
FLUAV RNA RESP QL NAA+PROBE: NOT DETECTED
FLUBV RNA RESP QL NAA+PROBE: NOT DETECTED
GFR SERPLBLD CREATININE-BSD FMLA CKD-EPI: >90 ML/MIN/{1.73_M2}
GLUCOSE SERPL-MCNC: 90 MG/DL (ref 70–99)
HCT VFR BLD AUTO: 43.9 % (ref 36–48)
HGB BLD-MCNC: 14.9 G/DL (ref 12–16)
LIPASE SERPL-CCNC: 53 U/L (ref 13–60)
LYMPHOCYTES # BLD: 1.1 K/UL (ref 1–5.1)
LYMPHOCYTES NFR BLD: 21.3 %
MCH RBC QN AUTO: 34.1 PG (ref 26–34)
MCHC RBC AUTO-ENTMCNC: 33.9 G/DL (ref 31–36)
MCV RBC AUTO: 100.7 FL (ref 80–100)
MONOCYTES # BLD: 0.3 K/UL (ref 0–1.3)
MONOCYTES NFR BLD: 6.2 %
NEUTROPHILS # BLD: 3.7 K/UL (ref 1.7–7.7)
NEUTROPHILS NFR BLD: 70.1 %
PLATELET # BLD AUTO: 216 K/UL (ref 135–450)
PMV BLD AUTO: 9.1 FL (ref 5–10.5)
POTASSIUM SERPL-SCNC: 4.6 MMOL/L (ref 3.5–5.1)
PROT SERPL-MCNC: 6.9 G/DL (ref 6.4–8.2)
RBC # BLD AUTO: 4.36 M/UL (ref 4–5.2)
RSV BY PCR: NOT DETECTED
SARS-COV-2 RNA RESP QL NAA+PROBE: NOT DETECTED
SODIUM SERPL-SCNC: 139 MMOL/L (ref 136–145)
WBC # BLD AUTO: 5.2 K/UL (ref 4–11)

## 2025-07-11 PROCEDURE — 96375 TX/PRO/DX INJ NEW DRUG ADDON: CPT

## 2025-07-11 PROCEDURE — 80053 COMPREHEN METABOLIC PANEL: CPT

## 2025-07-11 PROCEDURE — 87637 SARSCOV2&INF A&B&RSV AMP PRB: CPT

## 2025-07-11 PROCEDURE — 6360000002 HC RX W HCPCS: Performed by: EMERGENCY MEDICINE

## 2025-07-11 PROCEDURE — 71045 X-RAY EXAM CHEST 1 VIEW: CPT

## 2025-07-11 PROCEDURE — 85025 COMPLETE CBC W/AUTO DIFF WBC: CPT

## 2025-07-11 PROCEDURE — 83690 ASSAY OF LIPASE: CPT

## 2025-07-11 PROCEDURE — 96374 THER/PROPH/DIAG INJ IV PUSH: CPT

## 2025-07-11 PROCEDURE — 6370000000 HC RX 637 (ALT 250 FOR IP): Performed by: EMERGENCY MEDICINE

## 2025-07-11 PROCEDURE — 99284 EMERGENCY DEPT VISIT MOD MDM: CPT

## 2025-07-11 RX ORDER — ONDANSETRON 4 MG/1
4 TABLET, FILM COATED ORAL 3 TIMES DAILY PRN
Qty: 15 TABLET | Refills: 0 | Status: SHIPPED | OUTPATIENT
Start: 2025-07-11

## 2025-07-11 RX ORDER — ONDANSETRON 2 MG/ML
4 INJECTION INTRAMUSCULAR; INTRAVENOUS ONCE
Status: COMPLETED | OUTPATIENT
Start: 2025-07-11 | End: 2025-07-11

## 2025-07-11 RX ORDER — KETOROLAC TROMETHAMINE 15 MG/ML
15 INJECTION, SOLUTION INTRAMUSCULAR; INTRAVENOUS ONCE
Status: COMPLETED | OUTPATIENT
Start: 2025-07-11 | End: 2025-07-11

## 2025-07-11 RX ORDER — OXYCODONE HYDROCHLORIDE 5 MG/1
5 TABLET ORAL ONCE
Refills: 0 | Status: COMPLETED | OUTPATIENT
Start: 2025-07-11 | End: 2025-07-11

## 2025-07-11 RX ADMIN — OXYCODONE 5 MG: 5 TABLET ORAL at 01:35

## 2025-07-11 RX ADMIN — ONDANSETRON 4 MG: 2 INJECTION, SOLUTION INTRAMUSCULAR; INTRAVENOUS at 01:32

## 2025-07-11 RX ADMIN — KETOROLAC TROMETHAMINE 15 MG: 15 INJECTION, SOLUTION INTRAMUSCULAR; INTRAVENOUS at 01:31

## 2025-07-11 ASSESSMENT — PAIN SCALES - GENERAL
PAINLEVEL_OUTOF10: 8

## 2025-07-11 ASSESSMENT — PAIN DESCRIPTION - LOCATION
LOCATION: HEAD

## 2025-07-11 ASSESSMENT — PAIN - FUNCTIONAL ASSESSMENT
PAIN_FUNCTIONAL_ASSESSMENT: 0-10
PAIN_FUNCTIONAL_ASSESSMENT: ACTIVITIES ARE NOT PREVENTED

## 2025-07-11 ASSESSMENT — PAIN DESCRIPTION - ONSET: ONSET: ON-GOING

## 2025-07-11 ASSESSMENT — LIFESTYLE VARIABLES
HOW MANY STANDARD DRINKS CONTAINING ALCOHOL DO YOU HAVE ON A TYPICAL DAY: 1 OR 2
HOW OFTEN DO YOU HAVE A DRINK CONTAINING ALCOHOL: 4 OR MORE TIMES A WEEK

## 2025-07-11 ASSESSMENT — PAIN DESCRIPTION - DESCRIPTORS
DESCRIPTORS: THROBBING
DESCRIPTORS: ACHING;THROBBING
DESCRIPTORS: THROBBING

## 2025-07-11 ASSESSMENT — PAIN DESCRIPTION - PAIN TYPE: TYPE: ACUTE PAIN

## 2025-07-11 ASSESSMENT — PAIN DESCRIPTION - FREQUENCY: FREQUENCY: CONTINUOUS

## 2025-07-11 NOTE — ED PROVIDER NOTES
[facility] EMERGENCY DEPARTMENT  EMERGENCY DEPARTMENTENCOUNTER      Pt Name: Juan Alberto Benavidez  MRN: [id number]  Birthdate 1965  Date ofevaluation: 7/11/2025  Provider: Faina Roman MD    CHIEF COMPLAINT       Chief Complaint   Patient presents with    Illness     Pt arrives with c/o headache, cough, runny nose, N/V x 3 days. Pt states had mold in house and someone came to clean it and since they cleaned it has noticed the symptoms.          HISTORY OF PRESENT ILLNESS   (Location/Symptom, Timing/Onset,Context/Setting, Quality, Duration, Modifying Factors, Severity)  Note limiting factors.   Juan Alberto Benavidez is a 59 y.o. female  who  has a past medical history of Asthma, Atrial septal defect, Bursitis of left hip, CAD (coronary artery disease), COPD (chronic obstructive pulmonary disease) (HCC), History of blood transfusion, Kidney stone, Migraines, Mitral valve prolapse, MRSA (methicillin resistant staph aureus) culture positive, MVP (mitral valve prolapse), PONV (postoperative nausea and vomiting), Prolonged QT interval syndrome, S/P bronchoscopy, TIA (transient ischemic attack), and Vertigo.    who presents to the emergency department complaining of headache, productive cough of clear sputum, runny nose, nausea, vomiting for 3 days.  Patient states that she has significant mold exposure in her apartment.  She states they try to clean it but it just continues to spread.  She states her apartment complex does not do anything to fix the mold issue.  He states her headache is generalized and somewhat related to sinus pressure.  She states she only gets abdominal pain before she has emesis.  Occasional diarrhea.  Patient states she recently had her pacemaker changed and has had some tenderness over the surgical incision.  She denies fever, changes in vision.  History obtained from the patient and her son.  She presents with acute illness.      NursingNotes were reviewed.    REVIEW OF SYSTEMS    (2-9 systems

## (undated) DEVICE — SHEET,DRAPE,53X77,STERILE: Brand: MEDLINE

## (undated) DEVICE — GAUZE,SPONGE,4"X4",16PLY,XRAY,STRL,LF: Brand: MEDLINE

## (undated) DEVICE — XL, HIP 8-FLUTE PEAR BUR. ARTHROSCOPIC SHAVER BLADE. FOR USE WITH: REF 0375-701-500, 0375-704-500, 0375-708-500. DO NOT USE IF PACKAGE IS DAMAGED, KEEP DRY, KEEP AWAY FROM SUNLIGHT: Brand: FORMULA

## (undated) DEVICE — INJECTOR II NEEDLE CARTRIDGE: Brand: INJECTOR

## (undated) DEVICE — SINGLE-USE BIOPSY FORCEPS: Brand: RADIAL JAW 4

## (undated) DEVICE — SYSTEM SKIN CLSR 22CM DERMBND PRINEO

## (undated) DEVICE — SPONGE GZ W4XL8IN COT WVN 12 PLY

## (undated) DEVICE — SURGICAL SET UP - SURE SET: Brand: MEDLINE INDUSTRIES, INC.

## (undated) DEVICE — COVER,TABLE,HEAVY DUTY,77"X90",STRL: Brand: MEDLINE

## (undated) DEVICE — 50-S XL SUCTION PROBE, NON-BENDABLE, MAX CUT LEVEL 11: Brand: SERFAS ENERGY

## (undated) DEVICE — DRAPE C ARM UNIV W41XL74IN CLR PLAS XR VELC CLSR POLY STRP

## (undated) DEVICE — COTTON UNDERCAST PADDING,CRIMPED FINISH: Brand: WEBRIL

## (undated) DEVICE — XL, ARTHROSCOPIC SHAVER BLADES, DIAMOND ROUND BUR.  DO NOT RESTERILIZE, DO NOT USE IF PACKAGE IS DAMAGED, KEEP DRY, KEEP AWAY FROM SUNLIGHT: Brand: CROSSBLADE

## (undated) DEVICE — TUBING PMP L6FT CONT WAVE EXTN

## (undated) DEVICE — PAD DRY FLOOR ABS 32X58IN GRN

## (undated) DEVICE — XL AGGRESSIVE PLUS, HIP CUTTER. ARTHROSCOPIC SHAVER BLADE. FOR USE WITH: REF 0375-701-500, 0375-704-500, 0375-708-500. DO NOT USE IF PACKAGE IS DAMAGED, KEEP DRY, KEEP AWAY FROM SUNLIGHT: Brand: FORMULA

## (undated) DEVICE — 3M™ WARMING BLANKET, UPPER BODY, 10 PER CASE, 42268: Brand: BAIR HUGGER™

## (undated) DEVICE — FORCEPS BX 240CM 2.4MM L NDL RAD JAW 4 M00513334

## (undated) DEVICE — GLOVE SURG SZ 75 L12IN FNGR THK13MIL BRN LTX SYN POLYMER W

## (undated) DEVICE — SUTURE VCRL SZ 3-0 L27IN ABSRB UD L26MM SH 1/2 CIR J416H

## (undated) DEVICE — TUBING FLD MGMT Y DBL SPIK DUALWAVE

## (undated) DEVICE — CANNULA NSL AD TBNG L7FT PVC STR NONFLARED PRNG O2 DEL W STD

## (undated) DEVICE — CUFF RESTRN WRST OR ANK 45FT AD FOAM

## (undated) DEVICE — 1010 S-DRAPE TOWEL DRAPE 10/BX: Brand: STERI-DRAPE™

## (undated) DEVICE — GLOVE SURG SZ 7 L12IN FNGR THK79MIL GRN LTX FREE

## (undated) DEVICE — COVER,MAYO STAND,XL,STERILE: Brand: MEDLINE

## (undated) DEVICE — SAMURAI CURVED BLADE: Brand: SAMURAI

## (undated) DEVICE — PORTAL ENTRY KIT

## (undated) DEVICE — SPHINCTEROTOME: Brand: JAGTOME RX 39

## (undated) DEVICE — DRAPE,UTILTY,TAPE,15X26, 4EA/PK: Brand: MEDLINE

## (undated) DEVICE — TURNOVER KIT RM INF CTRL TECH

## (undated) DEVICE — 3M™ MICROFOAM™ SURGICAL TAPE 4 ROLLS/CARTON 6 CARTONS/CASE 1528-3: Brand: 3M™ MICROFOAM™

## (undated) DEVICE — SOLUTION IRRIG 3000ML LAC R FLX CONT

## (undated) DEVICE — INTENDED FOR TISSUE SEPARATION, AND OTHER PROCEDURES THAT REQUIRE A SHARP SURGICAL BLADE TO PUNCTURE OR CUT.: Brand: BARD-PARKER ® CARBON RIB-BACK BLADES

## (undated) DEVICE — GOWN,SIRUS,POLYRNF,SETINSLV,XL,20/CS: Brand: MEDLINE

## (undated) DEVICE — 6619 2 PTNT ISO SYS INCISE AREA&LT;(&GT;&&LT;)&GT;P: Brand: STERI-DRAPE™ IOBAN™ 2

## (undated) DEVICE — CHLORAPREP 26ML ORANGE

## (undated) DEVICE — NEEDLE SPNL L3.5IN PNK HUB S STL REG WALL FIT STYL W/ QNCKE

## (undated) DEVICE — RETRIEVAL BALLOON CATHETER: Brand: EXTRACTOR™ PRO RX

## (undated) DEVICE — CONMED SCOPE SAVER BITE BLOCK, 20X27 MM: Brand: SCOPE SAVER

## (undated) DEVICE — GOWN,SIRUS,POLYRNF,BRTHSLV,XLN/XXL,18/CS: Brand: MEDLINE

## (undated) DEVICE — FUSION WIRE GUIDE LOCKING DEVICE: Brand: FUSION

## (undated) DEVICE — FLOWPORT II CANNULA WITH OBTURATOR STRYKER 165MM: Brand: FLOWPORT

## (undated) DEVICE — SUTURE MCRYL SZ 4-0 L27IN ABSRB UD L19MM PS-2 1/2 CIR PRIM Y426H

## (undated) DEVICE — PHOENIX DRILL BIT: Brand: PHOENIX

## (undated) DEVICE — BANDAGE COBAN 4 IN COMPR W4INXL5YD FOAM COHESIVE QUIK STK SELF ADH SFT

## (undated) DEVICE — ENDO CARRY-ON PROCEDURE KIT INCLUDES SUCTION TUBING, LUBRICANT, GAUZE, BIOHAZARD STICKER, TRANSPORT PAD AND INTERCEPT BEDSIDE KIT.: Brand: ENDO CARRY-ON PROCEDURE KIT

## (undated) DEVICE — GLOVE SURG SZ 8 L12IN FNGR THK79MIL GRN LTX FREE

## (undated) DEVICE — SUTURE SLINGSHOT ZIPLINE SZ 2 NONABSORBABLE GRN WHT COBRAID CAT01585

## (undated) DEVICE — SYRINGE MED 30ML STD CLR PLAS LUERLOCK TIP N CTRL DISP

## (undated) DEVICE — SURE SET-DOUBLE BASIN-LF: Brand: MEDLINE INDUSTRIES, INC.

## (undated) DEVICE — 3M™ STERI-DRAPE™ U-DRAPE 1015: Brand: STERI-DRAPE™

## (undated) DEVICE — COVER LT HNDL BLU PLAS

## (undated) DEVICE — GLOVE SURG SZ 7 L12IN FNGR THK87MIL WHT LTX FREE

## (undated) DEVICE — MEDI-VAC NON-CONDUCTIVE SUCTION TUBING: Brand: CARDINAL HEALTH

## (undated) DEVICE — PERINEAL POST PAD 1

## (undated) DEVICE — TUBING PMP L16FT MAIN DISP FOR AR-6400 AR-6475

## (undated) DEVICE — PAD,ABDOMINAL,5"X9",ST,LF,25/BX: Brand: MEDLINE INDUSTRIES, INC.